# Patient Record
Sex: FEMALE | Race: WHITE | NOT HISPANIC OR LATINO | Employment: FULL TIME | URBAN - METROPOLITAN AREA
[De-identification: names, ages, dates, MRNs, and addresses within clinical notes are randomized per-mention and may not be internally consistent; named-entity substitution may affect disease eponyms.]

---

## 2017-01-09 ENCOUNTER — GENERIC CONVERSION - ENCOUNTER (OUTPATIENT)
Dept: OTHER | Facility: OTHER | Age: 42
End: 2017-01-09

## 2017-01-19 ENCOUNTER — GENERIC CONVERSION - ENCOUNTER (OUTPATIENT)
Dept: OTHER | Facility: OTHER | Age: 42
End: 2017-01-19

## 2017-04-11 ENCOUNTER — HOSPITAL ENCOUNTER (OUTPATIENT)
Dept: RADIOLOGY | Facility: HOSPITAL | Age: 42
Discharge: HOME | End: 2017-04-11
Payer: COMMERCIAL

## 2017-04-11 ENCOUNTER — TRANSCRIBE ORDERS (OUTPATIENT)
Dept: ADMINISTRATIVE | Facility: HOSPITAL | Age: 42
End: 2017-04-11

## 2017-04-11 DIAGNOSIS — J45.20 MILD INTERMITTENT ASTHMA WITHOUT COMPLICATION: ICD-10-CM

## 2017-04-11 DIAGNOSIS — J45.20 MILD INTERMITTENT ASTHMA WITHOUT COMPLICATION: Primary | ICD-10-CM

## 2017-04-11 PROCEDURE — 71020 HB CHEST X-RAY 2VW FRONTAL&LATL: CPT

## 2017-05-01 ENCOUNTER — TRANSCRIBE ORDERS (OUTPATIENT)
Dept: ADMINISTRATIVE | Facility: HOSPITAL | Age: 42
End: 2017-05-01

## 2017-05-01 DIAGNOSIS — Z12.31 VISIT FOR SCREENING MAMMOGRAM: Primary | ICD-10-CM

## 2017-05-23 ENCOUNTER — ALLSCRIPTS OFFICE VISIT (OUTPATIENT)
Dept: OTHER | Facility: OTHER | Age: 42
End: 2017-05-23

## 2017-05-23 DIAGNOSIS — Z12.31 ENCOUNTER FOR SCREENING MAMMOGRAM FOR MALIGNANT NEOPLASM OF BREAST: ICD-10-CM

## 2017-05-23 PROCEDURE — 87624 HPV HI-RISK TYP POOLED RSLT: CPT | Performed by: OBSTETRICS & GYNECOLOGY

## 2017-05-23 PROCEDURE — G0145 SCR C/V CYTO,THINLAYER,RESCR: HCPCS | Performed by: OBSTETRICS & GYNECOLOGY

## 2017-05-24 ENCOUNTER — LAB REQUISITION (OUTPATIENT)
Dept: LAB | Facility: HOSPITAL | Age: 42
End: 2017-05-24
Payer: COMMERCIAL

## 2017-05-24 DIAGNOSIS — Z01.419 ENCOUNTER FOR GYNECOLOGICAL EXAMINATION WITHOUT ABNORMAL FINDING: ICD-10-CM

## 2017-05-25 ENCOUNTER — HOSPITAL ENCOUNTER (OUTPATIENT)
Dept: MAMMOGRAPHY | Facility: HOSPITAL | Age: 42
Discharge: HOME | End: 2017-05-25
Attending: OBSTETRICS & GYNECOLOGY
Payer: COMMERCIAL

## 2017-05-25 DIAGNOSIS — Z12.31 VISIT FOR SCREENING MAMMOGRAM: ICD-10-CM

## 2017-05-25 PROCEDURE — G0202 SCR MAMMO BI INCL CAD: HCPCS

## 2017-05-31 LAB
HPV RRNA GENITAL QL NAA+PROBE: NORMAL
LAB AP GYN PRIMARY INTERPRETATION: NORMAL
LAB AP LMP: NORMAL
Lab: NORMAL

## 2017-06-01 ENCOUNTER — GENERIC CONVERSION - ENCOUNTER (OUTPATIENT)
Dept: OTHER | Facility: OTHER | Age: 42
End: 2017-06-01

## 2018-01-10 NOTE — MISCELLANEOUS
Message   Recorded as Task   Date: 01/09/2017 04:52 PM, Created By: Amparo Brown   Task Name: Med Renewal Request   Assigned To: Fabricio Luu   Regarding Patient: Cinthia Matthews, Status: Active   CommentBeryle Jarret - 09 Jan 2017 4:52 PM     TASK CREATED  PT CALLED FOR A REFILL ON HER BCP  TO GO [facility]   Berl Carry - 09 Jan 2017 4:55 PM     TASK EDITED  sent to [facility]        Active Problems    1  Abnormal mammogram (793 80) (R92 8)   2  Encounter for routine gynecological examination (V72 31) (Z01 419)   3  Encounter for screening mammogram for malignant neoplasm of breast (V76 12)   (Z12 31)   4  Menorrhagia (626 2) (N92 0)   5  Visit for screening mammogram (V76 12) (Z12 31)    Current Meds   1  Allegra-D Allergy & Congestion  MG Oral Tablet Extended Release 12 Hour   (Fexofenadine-Pseudoephed ER); Therapy: [id number] to Recorded   2  Azurette 0 15-0 02/0 01 MG (21/5) Oral Tablet; Take 1 tablet daily as directed; Therapy: 86Wgm7081 to (Evaluate:05Jun2017)  Requested for: 95VJA5262; Last   Rx:10Jun2016 Ordered   3  EpiPen Jr 2-Aayush 0 15 MG/0 3ML YARIEL; Therapy: (Recorded:13Mar2015) to Recorded   4  ProAir  (90 Base) MCG/ACT Inhalation Aerosol Solution; Therapy: 67DZH5162 to Recorded   5  Singulair 10 MG Oral Tablet (Montelukast Sodium); Therapy: 18NIH5697 to Recorded    Allergies    1  Erythromycin TABS    Plan  Menorrhagia    · Azurette 0 15-0 02/0 01 MG (21/5) Oral Tablet;  Take 1 tablet daily as directed    Signatures   Electronically signed by : German Thompson, ; Jan 9 2017  4:55PM EST                       (Author)

## 2018-01-12 NOTE — MISCELLANEOUS
Message   Recorded as Task   Date: 06/10/2016 01:04 PM, Created By: Emmett Syed   Task Name: Med Renewal Request   Assigned To: Fabricio Luu   Regarding Patient: Marissa Moreno, Status: Active   CommentCarina Bhandari - 10 Jose 2016 1:04 PM     TASK CREATED    [facility],radha maradiaga not until next may        Active Problems    1  Abnormal mammogram (793 80) (R92 8)   2  Encounter for routine gynecological examination (V72 31) (Z01 419)   3  Encounter for screening mammogram for malignant neoplasm of breast (V76 12)   (Z12 31)   4  Menorrhagia (626 2) (N92 0)   5  Visit for screening mammogram (V76 12) (Z12 31)    Current Meds   1  Allegra-D Allergy & Congestion  MG Oral Tablet Extended Release 12 Hour   (Fexofenadine-Pseudoephed ER); Therapy: 26VNQ9663 to Recorded   2  Azurette 0 15-0 02/0 01 MG (21/5) Oral Tablet; Take 1 tablet daily as directed; Therapy: 33Iov1677 to (Evaluate:11Aug2016)  Requested for: 56IKA4463; Last   Rx:15Iue9765 Ordered   3  EpiPen Jr 2-Aayush 0 15 MG/0 3ML YARIEL; Therapy: (Recorded:13Mar2015) to Recorded   4  ProAir  (90 Base) MCG/ACT Inhalation Aerosol Solution; Therapy: 07TDU2188 to Recorded   5  Singulair 10 MG Oral Tablet (Montelukast Sodium); Therapy: 93UBM5616 to Recorded    Allergies    1  Erythromycin TABS    Plan  Menorrhagia    · Azurette 0 15-0 02/0 01 MG (21/5) Oral Tablet;  Take 1 tablet daily as directed    Signatures   Electronically signed by : Peter Lujan, ; Jose 10 2016  1:04PM EST                       (Author)

## 2018-01-14 VITALS
WEIGHT: 148 LBS | HEIGHT: 63 IN | DIASTOLIC BLOOD PRESSURE: 84 MMHG | SYSTOLIC BLOOD PRESSURE: 142 MMHG | HEART RATE: 88 BPM | RESPIRATION RATE: 16 BRPM | BODY MASS INDEX: 26.22 KG/M2

## 2018-01-16 NOTE — RESULT NOTES
Verified Results  (1) THIN PREP PAP WITH IMAGING [id number] 10:07JJ Jabier Quiroz     Test Name Result Flag Reference   LAB AP CASE REPORT (Report)     Gynecologic Cytology Report            Case: [id number]                  Authorizing Provider: Derrick Madden DO     Collected:      05/23/2017           First Screen:     NOÉ Rivera Received:      05/26/2017 0818        Specimen:  LIQUID-BASED PAP, SCREENING, Cervix   HPV HIGH RISK RESULT (Report)     HPV, High Risk: HPV NEG, HPV16 NEG, HPV18 NEG      Other High Risk HPV Negative, HPV 16 Negative, HPV 18 Negative  HPV types: 16,18,31,33,35,39,45,51,52,56,58,59,66 and 68 DNA are undetectable or below the pre-set threshold  Roche???s FDA approved Adamaris 4800 is utilized with strict adherence to the ???s instruction  manual to test for the presence of High-Risk HPV DNA, as well as HPV 16 and HPV 18  This instrument  has been validated by our laboratory and/or by the   A negative result does not preclude the presence of HPV infection because results depend on adequate  specimen collection, absence of inhibitors and sufficient DNA to be detected  Additionally, HPV negative  results are not intended to prevent women from proceeding to colposcopy if clinically warranted  Positive HPV test results indicate the presence of any one or more of the high risk types, but since patients  are often co-infected with low-risk types it does not rule out the presence of low-risk types in patients  with mixed infections  LAB AP GYN PRIMARY INTERPRETATION      Negative for intraepithelial lesion or malignancy  Electronically signed by NOÉ Rivera on 5/31/2017 at 4:50 PM   LAB AP GYN SPECIMEN ADEQUACY      Satisfactory for evaluation  Endocervical/transformation zone component present     LAB AP GYN ADDITIONAL INFORMATION (Report)     [employer]'s FDA approved ,  and ThinPrep Imaging System are   utilized with strict adherence to the 's instruction manual to   prepare gynecologic and non-gynecologic cytology specimens for the   production of ThinPrep slides as well as for gynecologic ThinPrep imaging  These processes have been validated by our laboratory and/or by the     The Pap test is not a diagnostic procedure and should not be used as the   sole means to detect cervical cancer  It is only a screening procedure to   aid in the detection of cervical cancer and its precursors  Both   false-negative and false-positive results have been experienced  Your   patient's test result should be interpreted in this context together with   the history and clinical findings     LAB AP LMP 2/18/2017

## 2018-01-17 NOTE — MISCELLANEOUS
Message   Recorded as Task   Date: 01/09/2017 04:52 PM, Created By: Erich Snider   Task Name: Med Renewal Request   Assigned To: Fabricio Luu   Regarding Patient: Mary Silvestre, Status: Complete   CommentJan Nipple - 09 Jan 2017 4:52 PM     TASK CREATED  PT CALLED FOR A REFILL ON HER BCP  [facility]   Michelle Body - 09 Jan 2017 4:55 PM     TASK EDITED  sent to [place] - 09 Jan 2017 4:55 PM     TASK COMPLETED   Jessie Angelo - 19 Jan 2017 11:16 AM     TASK REACTIVATED  Pt usually gets 4 pks of bc at a time bc she only takes the active pills  [facility]  Pt called pharmacy and was told the rx needs to be rewritten to state that  Michelle Body - 19 Jan 2017 11:25 AM     TASK EDITED  pt has always been given 90 day   Fabricio Luu - 19 Jan 2017 11:26 AM     TASK COMPLETED        Active Problems    1  Abnormal mammogram (793 80) (R92 8)   2  Encounter for routine gynecological examination (V72 31) (Z01 419)   3  Encounter for screening mammogram for malignant neoplasm of breast (V76 12)   (Z12 31)   4  Menorrhagia (626 2) (N92 0)   5  Visit for screening mammogram (V76 12) (Z12 31)    Current Meds   1  Allegra-D Allergy & Congestion  MG Oral Tablet Extended Release 12 Hour   (Fexofenadine-Pseudoephed ER); Therapy: 84SXB1020 to Recorded   2  Azurette 0 15-0 02/0 01 MG (21/5) Oral Tablet; Take 1 tablet daily as directed; Therapy: 09Oaq3531 to (Evaluate:09Apr2017)  Requested for: 38HCZ6708; Last   Rx:10Jan2017 Ordered   3  EpiPen Jr 2-Aayush 0 15 MG/0 3ML YARIEL; Therapy: (Recorded:13Mar2015) to Recorded   4  ProAir  (90 Base) MCG/ACT Inhalation Aerosol Solution; Therapy: 40XMW2475 to Recorded   5  Singulair 10 MG Oral Tablet (Montelukast Sodium); Therapy: 22FHV0719 to Recorded    Allergies    1   Erythromycin TABS    Signatures   Electronically signed by : Eleni Wong, ; Jan 19 2017 11:29AM EST                       (Author)

## 2018-01-18 NOTE — MISCELLANEOUS
Message   Recorded as Task   Date: 05/23/2016 12:14 PM, Created By: Sai Gill   Task Name: Follow Up   Assigned To: Rosalba Nation   Regarding Patient: Mitchell Rodriguez, Status: Active   Comment:    Sai Gill - 23 May 2016 12:14 PM     TASK CREATED  Pt is aware of screening mammogram results and recommendation and has an appointment TODAY for a right diagnostic mammogram and u/s if needed  There is an order in [facility] for the u/s  Can you please put a right diagnostic mammogram in too? Thank you! Alyssa Ochoavero - 23 May 2016 12:35 PM     TASK EDITED  Shona Barrett entered into [facility] - r diag and r u/s        Active Problems    1  Abnormal mammogram (793 80) (R92 8)   2  Encounter for routine gynecological examination (V72 31) (Z01 419)   3  Encounter for screening mammogram for malignant neoplasm of breast (V76 12)   (Z12 31)   4  Menorrhagia (626 2) (N92 0)   5  Visit for screening mammogram (V76 12) (Z12 31)    Current Meds   1  Allegra-D Allergy & Congestion  MG Oral Tablet Extended Release 12 Hour   (Fexofenadine-Pseudoephed ER); Therapy: 52IBL3163 to Recorded   2  Azurette 0 15-0 02/0 01 MG (21/5) Oral Tablet; Take 1 tablet daily as directed; Therapy: 26Ybx0046 to (Evaluate:11Aug2016)  Requested for: 15WVT0330; Last   Rx:13May2016 Ordered   3  EpiPen Jr 2-Aayush 0 15 MG/0 3ML YARIEL; Therapy: (Recorded:13Mar2015) to Recorded   4  ProAir  (90 Base) MCG/ACT Inhalation Aerosol Solution; Therapy: 38OEZ0722 to Recorded   5  Singulair 10 MG Oral Tablet (Montelukast Sodium); Therapy: 39PKR6519 to Recorded    Allergies    1  Erythromycin TABS    Plan  Abnormal mammogram    · *US BREAST RIGHT LIMITED (DIAGNOSTIC); Status:Hold For -  [employer]; Requested for:23May2016;    · MAMMO DIAGNOSTIC RIGHT W CAD; Status:Hold For - Scheduling,Retrospective  Authorization; Requested for:23May2016;     Signatures   Electronically signed by :  Avi Pickett, ; May 23 2016 12:35PM EST                       (Author)

## 2018-01-25 DIAGNOSIS — Z30.41 ENCOUNTER FOR SURVEILLANCE OF CONTRACEPTIVE PILLS: Primary | ICD-10-CM

## 2018-01-25 NOTE — TELEPHONE ENCOUNTER
Viorele 0 15-0 02/0 01 MG (21/5) Oral Tablet          Pt called, would like refill of bc,sent to mailorder   needs 4 packs at a time

## 2018-01-26 RX ORDER — DESOGESTREL AND ETHINYL ESTRADIOL 21-5 (28)
1 KIT ORAL DAILY
Qty: 84 TABLET | Refills: 1 | Status: SHIPPED | OUTPATIENT
Start: 2018-01-26 | End: 2018-02-02

## 2018-02-02 ENCOUNTER — OFFICE VISIT (OUTPATIENT)
Dept: FAMILY MEDICINE CLINIC | Facility: CLINIC | Age: 43
End: 2018-02-02
Payer: COMMERCIAL

## 2018-02-02 VITALS
HEART RATE: 110 BPM | OXYGEN SATURATION: 99 % | SYSTOLIC BLOOD PRESSURE: 146 MMHG | WEIGHT: 147 LBS | HEIGHT: 65 IN | RESPIRATION RATE: 16 BRPM | DIASTOLIC BLOOD PRESSURE: 100 MMHG | BODY MASS INDEX: 24.49 KG/M2

## 2018-02-02 DIAGNOSIS — Z13.220 SCREENING CHOLESTEROL LEVEL: ICD-10-CM

## 2018-02-02 DIAGNOSIS — Z00.00 ANNUAL PHYSICAL EXAM: ICD-10-CM

## 2018-02-02 DIAGNOSIS — Z76.89 ENCOUNTER TO ESTABLISH CARE: Primary | ICD-10-CM

## 2018-02-02 DIAGNOSIS — Z13.29 THYROID DISORDER SCREENING: ICD-10-CM

## 2018-02-02 DIAGNOSIS — R03.0 ELEVATED BLOOD PRESSURE READING: ICD-10-CM

## 2018-02-02 DIAGNOSIS — F43.9 STRESS: ICD-10-CM

## 2018-02-02 PROBLEM — S43.002A SHOULDER SUBLUXATION, LEFT: Status: ACTIVE | Noted: 2018-02-02

## 2018-02-02 PROBLEM — G43.109 MIGRAINE WITH AURA: Status: ACTIVE | Noted: 2018-02-02

## 2018-02-02 PROBLEM — G43.009 MIGRAINE WITHOUT AURA: Status: ACTIVE | Noted: 2018-02-02

## 2018-02-02 PROBLEM — K58.0 IRRITABLE BOWEL SYNDROME WITH DIARRHEA: Status: ACTIVE | Noted: 2018-02-02

## 2018-02-02 PROCEDURE — 99386 PREV VISIT NEW AGE 40-64: CPT | Performed by: FAMILY MEDICINE

## 2018-02-02 RX ORDER — FEXOFENADINE HCL 180 MG/1
TABLET ORAL
COMMUNITY

## 2018-02-02 RX ORDER — DESOGESTREL AND ETHINYL ESTRADIOL 21-5 (28)
KIT ORAL
COMMUNITY
End: 2018-05-25 | Stop reason: SDUPTHER

## 2018-02-02 RX ORDER — ALBUTEROL SULFATE 90 UG/1
AEROSOL, METERED RESPIRATORY (INHALATION)
COMMUNITY
Start: 2014-02-28

## 2018-02-02 RX ORDER — MONTELUKAST SODIUM 10 MG/1
TABLET ORAL
COMMUNITY
End: 2019-01-02

## 2018-02-02 RX ORDER — MULTIVITAMIN
TABLET ORAL
COMMUNITY

## 2018-02-02 RX ORDER — MULTIVIT-MIN/IRON/FOLIC ACID/K 18-600-40
CAPSULE ORAL
COMMUNITY

## 2018-02-02 RX ORDER — YOHIMBE BARK 500 MG
CAPSULE ORAL
COMMUNITY
End: 2019-01-02

## 2018-02-02 RX ORDER — NICOTINE POLACRILEX 4 MG/1
GUM, CHEWING ORAL
COMMUNITY
End: 2019-03-01 | Stop reason: SDUPTHER

## 2018-02-02 RX ORDER — EPINEPHRINE 0.15 MG/.3ML
INJECTION INTRAMUSCULAR
COMMUNITY
End: 2018-02-09 | Stop reason: SDUPTHER

## 2018-02-02 NOTE — PROGRESS NOTES
Assessment/Plan:    No problem-specific Assessment & Plan notes found for this encounter  Diagnoses and all orders for this visit:    Encounter to establish care    Annual physical exam  -     CBC and differential  -     Type and screen  - advised to get records from her Allergist sent to office  - UTD with Flu and Tdap   - last PAP was in 2017 - nml -- has a GYN exam scheduled  - last Mammo was in 2017 - nml   - discussed diet and exercise     Elevated blood pressure reading  - BP elevated x3 in the office today   - no screening labs  - screening labs as listed: Comprehensive metabolic panel, CBC, Lipids and TSH  - RTO in 3-4days for BP check     Stress  - under increased stress - Mom may have had a 3rd stroke   - beast friend moved away to [place]   - 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  - offered referral to [facility] - pt will think about it, will revisit at next OV     Screening cholesterol level  -     Lipid panel    Thyroid disorder screening  -     TSH, 3rd generation with T4 reflex    Other orders  -     Lactobacillus (ACIDOPHILUS) 100 MG CAPS; Take by mouth  -     fexofenadine (ALLEGRA ALLERGY) 180 MG tablet; Take by mouth  -     Calcium 250 MG CAPS; Take by mouth  -     EPINEPHrine (EPIPEN JR) 0 15 mg/0 3 mL SOAJ; Inject as directed  -     Multiple Vitamin (MULTI-VITAMIN DAILY) TABS; Take by mouth  -     Omeprazole 20 MG TBEC; Take by mouth  -     albuterol (PROAIR HFA) 90 mcg/act inhaler; Inhale  -     montelukast (SINGULAIR) 10 mg tablet; Take by mouth  -     Ascorbic Acid (VITAMIN C) 500 MG CAPS; Take by mouth  -     desogestrel-ethinyl estradiol (VIORELE) 0 15-0 02/0 01 MG (21/5) per tablet; Take by mouth        Subjective:      Patient ID: Kameron Hernandez is a 38 y o  female      Kameron Hernandez is a 38 y o  female who presents to the office to establish care and for an annual exam  1) HM   - prior PCP: doesn't have one; does not recall last screening labs   - Specialists: Allergist (Dr Kb Saldaña), Ob[facility]Gyn: [facility]  - PMHx: Migraines (only 1 with Aura), Cluster HA x1 5yrs ago, Allergies, Asthma (seasonal), IBS, L-shoulder subluxation (2/2 overuse from exercise)    - allergies: Erythromycin, Animal dander, peanuts   - Meds: Allegra, Singulair, ProAir PRN, OCPs, Omeprazole   - PSHx: tonsillectomy, wrist surgery  - Fhx: M (HL, HTN, stroke x3), F (HTN, Hl), denies Fhx of breast/colon/cervical ca   - Immunizations: UTD with Flu vaccine, last Tdap was within 5yrs   - GYN Hx: last PAP was 5/2017 - no h/o abnml PAPs; last Mammo in 5/2017 (annual screening)   - diet/exercise: goes to [employer]; varied diet, does not eat fast food, only 12oz coffee/daily   - social: denies Tob/illicits; a glass of wine/month, single, in the process of adopting a child   - sexual Hx: not currently sexually active  - last vision: wears glasses/contacts; last Optho was 7/2017  - last dental: goes Q6months   - ROS: today in the office pt denies F/C/N/V/HA/visual changes/palpitations/SOB/wheezing/abd pain/D/urinary incontinence/suprapubic pain/CVA tenderness/vaginal bleeding or discharge/numbness or tingling in b/l UE+LE/LE edema or calf tenderness  2) elevated BP read   - noted to have elevated BP reads x3 156/94, and 146/100 x2 on repeat   - states that normally runs in the 120s/80   - is under increased stress currently - her mother may have just had her 3rd stroke, she was passed over for an adoption after years of trying  - no good support system here - her best friend moved to [facility])   - just got back from a semester abroad in [place]; new semester is starting and she is a  in [employer]  - denies excessive salt in diet and does not eat fast food         The following portions of the patient's history were reviewed and updated as appropriate: allergies, current medications, past family history, past medical history, past social history, past surgical history and problem list     Review of Systems  as per HPI    Objective:     Physical Exam   Constitutional: She is oriented to person, place, and time  She appears well-developed and well-nourished  She is active  No distress  HENT:   Head: Normocephalic and atraumatic  Right Ear: Tympanic membrane, external ear and ear canal normal    Left Ear: Tympanic membrane, external ear and ear canal normal    Nose: Nose normal  No rhinorrhea or septal deviation  Right sinus exhibits no maxillary sinus tenderness and no frontal sinus tenderness  Left sinus exhibits no maxillary sinus tenderness and no frontal sinus tenderness  Mouth/Throat: Uvula is midline, oropharynx is clear and moist and mucous membranes are normal  No oral lesions  Normal dentition  No oropharyngeal exudate or tonsillar abscesses  Eyes: Conjunctivae, EOM and lids are normal  Pupils are equal, round, and reactive to light  Right eye exhibits no discharge  Left eye exhibits no discharge  No scleral icterus  Neck: Trachea normal and normal range of motion  Neck supple  No tracheal deviation present  No thyromegaly present  Cardiovascular: Normal rate, regular rhythm, S1 normal, S2 normal and normal heart sounds  Exam reveals no gallop and no friction rub  No murmur heard  Pulmonary/Chest: Effort normal and breath sounds normal  No accessory muscle usage  No respiratory distress  She has no wheezes  She has no rhonchi  She has no rales  Abdominal: Soft  Normal appearance and bowel sounds are normal  She exhibits no distension  There is no hepatosplenomegaly  There is no tenderness  There is no rebound, no guarding and no CVA tenderness  Musculoskeletal: Normal range of motion  She exhibits no edema, tenderness or deformity  Lymphadenopathy:     She has no cervical adenopathy  Neurological: She is alert and oriented to person, place, and time  She has normal strength  No sensory deficit   Coordination and gait normal    Skin: Skin is warm  No rash noted  She is not diaphoretic  No erythema  No pallor  Psychiatric: Her speech is normal and behavior is normal  Judgment normal  Her mood appears anxious   Cognition and memory are normal

## 2018-02-07 LAB
ABO GROUP BLD: NORMAL
ALBUMIN SERPL-MCNC: 3.7 G/DL (ref 3.6–5.1)
ALBUMIN/GLOB SERPL: 1.1 (CALC) (ref 1–2.5)
ALP SERPL-CCNC: 77 U/L (ref 33–115)
ALT SERPL-CCNC: 11 U/L (ref 6–29)
AST SERPL-CCNC: 13 U/L (ref 10–30)
BASOPHILS # BLD AUTO: 45 CELLS/UL (ref 0–200)
BASOPHILS NFR BLD AUTO: 0.4 %
BILIRUB SERPL-MCNC: 0.3 MG/DL (ref 0.2–1.2)
BLD GP AB SCN SERPL QL: NORMAL
BUN SERPL-MCNC: 11 MG/DL (ref 7–25)
BUN/CREAT SERPL: NORMAL (CALC) (ref 6–22)
CALCIUM SERPL-MCNC: 8.9 MG/DL (ref 8.6–10.2)
CHLORIDE SERPL-SCNC: 102 MMOL/L (ref 98–110)
CHOLEST SERPL-MCNC: 175 MG/DL
CHOLEST/HDLC SERPL: 3.1 (CALC)
CO2 SERPL-SCNC: 25 MMOL/L (ref 20–31)
CREAT SERPL-MCNC: 0.87 MG/DL (ref 0.5–1.1)
EOSINOPHIL # BLD AUTO: 181 CELLS/UL (ref 15–500)
EOSINOPHIL NFR BLD AUTO: 1.6 %
ERYTHROCYTE [DISTWIDTH] IN BLOOD BY AUTOMATED COUNT: 12.1 % (ref 11–15)
GLOBULIN SER CALC-MCNC: 3.4 G/DL (CALC) (ref 1.9–3.7)
GLUCOSE SERPL-MCNC: 95 MG/DL (ref 65–99)
HCT VFR BLD AUTO: 40.1 % (ref 35–45)
HDLC SERPL-MCNC: 57 MG/DL
HGB BLD-MCNC: 13.2 G/DL (ref 11.7–15.5)
LDLC SERPL CALC-MCNC: 90 MG/DL (CALC)
LYMPHOCYTES # BLD AUTO: 2757 CELLS/UL (ref 850–3900)
LYMPHOCYTES NFR BLD AUTO: 24.4 %
MCH RBC QN AUTO: 28.7 PG (ref 27–33)
MCHC RBC AUTO-ENTMCNC: 32.9 G/DL (ref 32–36)
MCV RBC AUTO: 87.2 FL (ref 80–100)
MONOCYTES # BLD AUTO: 961 CELLS/UL (ref 200–950)
MONOCYTES NFR BLD AUTO: 8.5 %
NEUTROPHILS # BLD AUTO: 7356 CELLS/UL (ref 1500–7800)
NEUTROPHILS NFR BLD AUTO: 65.1 %
NONHDLC SERPL-MCNC: 118 MG/DL (CALC)
PLATELET # BLD AUTO: 326 THOUSAND/UL (ref 140–400)
PMV BLD REES-ECKER: 11.1 FL (ref 7.5–12.5)
POTASSIUM SERPL-SCNC: 3.8 MMOL/L (ref 3.5–5.3)
PROT SERPL-MCNC: 7.1 G/DL (ref 6.1–8.1)
RBC # BLD AUTO: 4.6 MILLION/UL (ref 3.8–5.1)
RH BLD: NORMAL
SL AMB EGFR AFRICAN AMERICAN: 94 ML/MIN/1.73M2
SL AMB EGFR NON AFRICAN AMERICAN: 81 ML/MIN/1.73M2
SODIUM SERPL-SCNC: 135 MMOL/L (ref 135–146)
TRIGL SERPL-MCNC: 185 MG/DL
TSH SERPL-ACNC: 2.19 MIU/L
WBC # BLD AUTO: 11.3 THOUSAND/UL (ref 3.8–10.8)

## 2018-02-09 ENCOUNTER — OFFICE VISIT (OUTPATIENT)
Dept: FAMILY MEDICINE CLINIC | Facility: CLINIC | Age: 43
End: 2018-02-09
Payer: COMMERCIAL

## 2018-02-09 VITALS
RESPIRATION RATE: 16 BRPM | WEIGHT: 145 LBS | BODY MASS INDEX: 24.16 KG/M2 | SYSTOLIC BLOOD PRESSURE: 150 MMHG | OXYGEN SATURATION: 99 % | HEIGHT: 65 IN | DIASTOLIC BLOOD PRESSURE: 80 MMHG | HEART RATE: 112 BPM

## 2018-02-09 DIAGNOSIS — I10 HYPERTENSION, UNSPECIFIED TYPE: Primary | ICD-10-CM

## 2018-02-09 PROBLEM — J30.2 SEASONAL ALLERGIES: Status: ACTIVE | Noted: 2017-05-23

## 2018-02-09 PROBLEM — J45.909 ASTHMA: Status: ACTIVE | Noted: 2017-05-23

## 2018-02-09 PROCEDURE — 99213 OFFICE O/P EST LOW 20 MIN: CPT | Performed by: FAMILY MEDICINE

## 2018-02-09 RX ORDER — HYDROCHLOROTHIAZIDE 12.5 MG/1
12.5 TABLET ORAL DAILY
Qty: 30 TABLET | Refills: 0 | Status: SHIPPED | OUTPATIENT
Start: 2018-02-09 | End: 2018-02-16 | Stop reason: SINTOL

## 2018-02-09 RX ORDER — EPINEPHRINE 0.3 MG/.3ML
INJECTION SUBCUTANEOUS
COMMUNITY
Start: 2018-02-08 | End: 2020-03-04 | Stop reason: ALTCHOICE

## 2018-02-09 NOTE — PROGRESS NOTES
Assessment/Plan:    No problem-specific Assessment & Plan notes found for this encounter  Diagnoses and all orders for this visit:    Hypertension, unspecified type  - BP elevated in the office x2  - did have elevated readings at last OV last week   - CBC, CMP, TSH within normal limits  - TG slightly elevated but rest of Lipid Panel within nml limits   - started on hydrochlorothiazide (HYDRODIURIL) 12 5 mg tablet; Take 1 tablet (12 5 mg total) by mouth daily  - cont with lifestyle modifications: diet, exercise  - encouraged to reduce stress in her life   - RTO in 1wk for HTN f/u - pt agreeable     Other orders  -     EPINEPHrine (EPIPEN) 0 3 mg/0 3 mL SOAJ;         Subjective:      Patient ID: Angie Madden is a 38 y o  female  35yo F presents to the office for f/u of elevated BP reading from last week   - BP in the office 170/90 and on repeat 150/80  - went to her Allergist appt yesterday and states that SBP was in the 130s  - of note, she has a PMHx of migraines and sometimes she gets facial/chest flushing and experiences chest tightness  - ROS denies F/C/N/V/CP/SOB/abd pain/LE edema   - she does mention that shes been told that shes had elevated BP in the past but there was no f/u   - (+) Fhx of HTN in both parents   - diet/exercise: goes to [employer]; varied diet (but does mention she eats a lot of cheeses/carbs), does not eat fast food/processed foods, 12oz coffee/daily   - social: denies Tob/illicits; a glass of wine/month, single, in the process of adopting a child   - last vision: wears glasses/contacts; last Optho was 7/2017  - (+) increased stress:  Mother may have had her 3rd stroke, best friend moved to [facility]), she got passed over for an adoption after years of trying         The following portions of the patient's history were reviewed and updated as appropriate: allergies, current medications, past family history, past medical history, past social history, past surgical history and problem list     Review of Systems  as per HPI    Objective:    Vitals:    02/09/18 1447   BP: 170/90   Pulse: (!) 112   Resp: 16   SpO2: 99%        Physical Exam   Constitutional: She is oriented to person, place, and time  She appears well-developed and well-nourished  No distress  HENT:   Head: Normocephalic and atraumatic  Nose: Nose normal    Eyes: Conjunctivae and EOM are normal  Right eye exhibits no discharge  Left eye exhibits no discharge  No scleral icterus  Neck: Normal range of motion  Cardiovascular: Normal rate, regular rhythm and normal heart sounds  Exam reveals no gallop and no friction rub  No murmur heard  Pulmonary/Chest: Effort normal and breath sounds normal  No respiratory distress  She has no wheezes  Abdominal: Soft  Bowel sounds are normal  She exhibits no distension  There is no tenderness  Musculoskeletal: Normal range of motion  She exhibits no edema or tenderness  Neurological: She is alert and oriented to person, place, and time  No cranial nerve deficit  Coordination normal    Skin: Skin is warm  No rash noted  She is not diaphoretic  No erythema  No pallor  Psychiatric: Her speech is normal and behavior is normal  Judgment and thought content normal  Her mood appears anxious   Cognition and memory are normal

## 2018-02-16 ENCOUNTER — OFFICE VISIT (OUTPATIENT)
Dept: FAMILY MEDICINE CLINIC | Facility: CLINIC | Age: 43
End: 2018-02-16
Payer: COMMERCIAL

## 2018-02-16 VITALS
OXYGEN SATURATION: 99 % | BODY MASS INDEX: 23.66 KG/M2 | WEIGHT: 142 LBS | HEIGHT: 65 IN | RESPIRATION RATE: 16 BRPM | HEART RATE: 103 BPM | DIASTOLIC BLOOD PRESSURE: 90 MMHG | SYSTOLIC BLOOD PRESSURE: 156 MMHG

## 2018-02-16 DIAGNOSIS — I10 HYPERTENSION, UNSPECIFIED TYPE: Primary | ICD-10-CM

## 2018-02-16 DIAGNOSIS — F43.9 STRESS: ICD-10-CM

## 2018-02-16 PROCEDURE — 99213 OFFICE O/P EST LOW 20 MIN: CPT | Performed by: FAMILY MEDICINE

## 2018-02-16 RX ORDER — LISINOPRIL 5 MG/1
5 TABLET ORAL DAILY
Qty: 30 TABLET | Refills: 0 | Status: SHIPPED | OUTPATIENT
Start: 2018-02-16 | End: 2018-03-02 | Stop reason: SDUPTHER

## 2018-02-16 NOTE — PROGRESS NOTES
Assessment/Plan:    No problem-specific Assessment & Plan notes found for this encounter  Diagnoses and all orders for this visit:    Hypertension, unspecified type  -  BP in the office 156/90 and on repeat 140/80  - HCTZ 12 5mg PO QD was discontinued 2/2 side effects - excessive thirst and dizziness  - started on lisinopril (ZESTRIL) 5 mg tablet; Take 1 tablet (5 mg total) by mouth daily  - advised to get a home BP cuff and to bring to the next OV with her  - RTO in 2wks for f/u     Stress  - discussed the importance of self-care and learning how to say "no" to others/delegate tasks  - t/c referral to [facility] at next OV         Subjective:      Patient ID: Jameel Elizabeth is a 38 y o  female  35yo F presents to the office for HTN f/u   1) HTN   - was started on HCTZ 12 5mg PO QD at last OV - has been feeling more [place] on [address] and been getting dizzy at the [address] [place] [address] Rd (goes 3x/week)   - BP in the office 156/90 and on repeat 140/80   - of note, she has a PMHx of migraines and sometimes she gets facial/chest flushing and experiences chest tightness (feels this is more allergy related)   - ROS denies F/C/N/V/CP/SOB/abd pain/LE edema   - of note, her maternal aunt passed away last night  - she ate out and had a bad stomach ache this morning  - (+) Fhx of HTN in both parents   - diet/exercise: goes to [employer]; varied diet (but does mention she eats a lot of cheeses/carbs), does not eat fast food/processed foods, 12oz coffee/daily   - social: denies Tob/illicits; a glass of wine/month, single, in the process of adopting a child   - last vision: wears glasses/contacts; last Optho was 2017  2) Stress   - (+) increased stress:  Mother may have had her 3rd stroke  - best friend moved to [facility])  - she got passed over for an adoption after years of trying   - 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    - her Aunt  last night (per pt, they were not close)   - 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"  - has been trying to do more self-care: read a book for fun this week, delegated a task to her sister         The following portions of the patient's history were reviewed and updated as appropriate: allergies, current medications, past family history, past medical history, past social history, past surgical history and problem list     Review of Systems  per HPI     Objective:      /90   Pulse 103   Resp 16   Ht 5' 5" (1 651 m)   Wt 64 4 kg (142 lb)   SpO2 99%   BMI 23 63 kg/m²          Physical Exam   Constitutional: She appears well-developed and well-nourished  No distress  HENT:   Head: Normocephalic and atraumatic  Nose: Nose normal    Eyes: Conjunctivae and EOM are normal  Right eye exhibits no discharge  Left eye exhibits no discharge  No scleral icterus  Neck: Normal range of motion  Cardiovascular: Normal rate, regular rhythm and normal heart sounds  Exam reveals no gallop and no friction rub  No murmur heard  Pulmonary/Chest: Effort normal and breath sounds normal  No respiratory distress  She has no wheezes  She has no rales  She exhibits no tenderness  Abdominal: Soft  There is no tenderness  Musculoskeletal: She exhibits no edema or tenderness  Neurological: She is alert  Skin: Skin is warm  She is not diaphoretic  Psychiatric: Her speech is normal and behavior is normal  Judgment and thought content normal  Her mood appears anxious   Cognition and memory are normal

## 2018-03-02 ENCOUNTER — OFFICE VISIT (OUTPATIENT)
Dept: FAMILY MEDICINE CLINIC | Facility: CLINIC | Age: 43
End: 2018-03-02
Payer: COMMERCIAL

## 2018-03-02 VITALS
RESPIRATION RATE: 16 BRPM | DIASTOLIC BLOOD PRESSURE: 80 MMHG | OXYGEN SATURATION: 98 % | SYSTOLIC BLOOD PRESSURE: 124 MMHG | HEIGHT: 65 IN | WEIGHT: 143 LBS | HEART RATE: 88 BPM | BODY MASS INDEX: 23.82 KG/M2

## 2018-03-02 DIAGNOSIS — F41.1 GENERALIZED ANXIETY DISORDER: ICD-10-CM

## 2018-03-02 DIAGNOSIS — I10 HYPERTENSION, UNSPECIFIED TYPE: Primary | ICD-10-CM

## 2018-03-02 DIAGNOSIS — F43.9 STRESS: ICD-10-CM

## 2018-03-02 PROCEDURE — 99213 OFFICE O/P EST LOW 20 MIN: CPT | Performed by: FAMILY MEDICINE

## 2018-03-02 RX ORDER — LISINOPRIL 5 MG/1
5 TABLET ORAL DAILY
Qty: 30 TABLET | Refills: 1 | Status: SHIPPED | OUTPATIENT
Start: 2018-03-02 | End: 2018-05-04 | Stop reason: SDUPTHER

## 2018-03-02 NOTE — PROGRESS NOTES
Assessment/Plan:    No problem-specific Assessment & Plan notes found for this encounter  Diagnoses and all orders for this visit:    Hypertension, unspecified type  -  BP well controlled on ACEI - 132/90 and 124/80 on repeat  - cont lisinopril (ZESTRIL) 5 mg tablet; Take 1 tablet (5 mg total) by mouth daily  - noted decrease in frequency of migraine headaches   - has home BP cuff and can check intermittently   - RTO in 2months for f/u - pt aware and agreeable     Stress  Generalized anxiety disorder  - discussed the importance of self-care and learning how to say "no" to others/delegate tasks  - offered referral to Jossie Rubio - pt will think about it         Subjective:    Patient ID: Dharmesh Velazquez is a 38 y o  female    35yo F presents to the office for HTN f/u   1) HTN  - currently on Lisinopril 5mg QD and tolerating it well - did not develop a dry cough or angioedema   - did take antihypertensive this AM   - BP in the office 132/90 and on repeat 124/80 (138/88 on her automatic BP cuff)  - denies F/C/N/V/dizziness/HA/cough/CP/SOB/abd pain/D/C/LE edema  - has noted that she hasn't had to take Excedrin for her migraines as frequently as she was before   - (+) Fhx of HTN in both parents   - diet/exercise: goes [address] 3x/wk; varied diet (but does mention she eats a lot of cheeses/carbs), does not eat fast food/processed foods, 12oz coffee/daily   - social: denies Tob/illicits; a glass of wine/month, single, in the process of adopting a child (got a call for adopting a baby and a 4yo yesterday but the adoption fell through this Am)   - last vision: wears glasses/contacts; last Optho was 7/2017  2) Stress   - best friend moved to [facility])  - she got passed over for an adoption after years of trying   - 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"  - has been trying to do more self-care: read a book for fun this week, delegated a task to her sister                   The following portions of the patient's history were reviewed and updated as appropriate: allergies, current medications, past family history, past medical history, past social history, past surgical history and problem list     Review of Systems  per HPI    Objective:    /90   Pulse 88   Resp 16   Ht 5' 5" (1 651 m)   Wt 64 9 kg (143 lb)   SpO2 98%   BMI 23 80 kg/m²      Physical Exam   Constitutional: She is oriented to person, place, and time  She appears well-developed and well-nourished  No distress  HENT:   Head: Normocephalic and atraumatic  Nose: Nose normal    Eyes: Conjunctivae and EOM are normal  Right eye exhibits no discharge  Left eye exhibits no discharge  No scleral icterus  Neck: Normal range of motion  Neck supple  Cardiovascular: Normal rate, regular rhythm and normal heart sounds  Exam reveals no gallop and no friction rub  No murmur heard  Pulmonary/Chest: Effort normal and breath sounds normal  No stridor  No respiratory distress  She has no wheezes  She has no rales  Abdominal: Soft  Bowel sounds are normal  She exhibits no distension  There is no tenderness  Musculoskeletal: Normal range of motion  She exhibits no edema, tenderness or deformity  Neurological: She is alert and oriented to person, place, and time  Skin: Skin is warm and dry  No rash noted  She is not diaphoretic  No erythema  Psychiatric: Her speech is normal and behavior is normal  Judgment and thought content normal  Her mood appears anxious   Cognition and memory are normal

## 2018-04-23 ENCOUNTER — TRANSCRIBE ORDERS (OUTPATIENT)
Dept: ADMINISTRATIVE | Facility: HOSPITAL | Age: 43
End: 2018-04-23

## 2018-04-23 DIAGNOSIS — Z12.39 SCREENING BREAST EXAMINATION: Primary | ICD-10-CM

## 2018-05-04 ENCOUNTER — OFFICE VISIT (OUTPATIENT)
Dept: FAMILY MEDICINE CLINIC | Facility: CLINIC | Age: 43
End: 2018-05-04
Payer: COMMERCIAL

## 2018-05-04 VITALS
RESPIRATION RATE: 16 BRPM | HEART RATE: 111 BPM | SYSTOLIC BLOOD PRESSURE: 136 MMHG | HEIGHT: 65 IN | BODY MASS INDEX: 23.99 KG/M2 | DIASTOLIC BLOOD PRESSURE: 80 MMHG | WEIGHT: 144 LBS | OXYGEN SATURATION: 99 %

## 2018-05-04 DIAGNOSIS — I10 HYPERTENSION, UNSPECIFIED TYPE: Primary | ICD-10-CM

## 2018-05-04 DIAGNOSIS — F43.9 STRESS: ICD-10-CM

## 2018-05-04 DIAGNOSIS — F41.1 GAD (GENERALIZED ANXIETY DISORDER): ICD-10-CM

## 2018-05-04 PROCEDURE — 3008F BODY MASS INDEX DOCD: CPT | Performed by: FAMILY MEDICINE

## 2018-05-04 PROCEDURE — 3075F SYST BP GE 130 - 139MM HG: CPT | Performed by: FAMILY MEDICINE

## 2018-05-04 PROCEDURE — 3079F DIAST BP 80-89 MM HG: CPT | Performed by: FAMILY MEDICINE

## 2018-05-04 PROCEDURE — 99213 OFFICE O/P EST LOW 20 MIN: CPT | Performed by: FAMILY MEDICINE

## 2018-05-04 RX ORDER — LISINOPRIL 5 MG/1
5 TABLET ORAL DAILY
Qty: 30 TABLET | Refills: 5 | Status: SHIPPED | OUTPATIENT
Start: 2018-05-04 | End: 2018-05-09 | Stop reason: SDUPTHER

## 2018-05-04 NOTE — PROGRESS NOTES
Assessment/Plan:  No problem-specific Assessment & Plan notes found for this encounter  Diagnoses and all orders for this visit:  Hypertension, unspecified type  - BP well controlled on ACEI - 140/80 and 136/80 on repeat  - cont lisinopril (ZESTRIL) 5 mg tablet; Take 1 tablet (5 mg total) by mouth daily  - noted decrease in frequency of migraine headaches and facial flushing   - has home BP cuff and can check intermittently   - RTO in 6months for f/u - pt aware and agreeable     Stress  EDELMIRA (generalized anxiety disorder)  - doing much better   - discussed the importance of self-care and learning how to say "no" to others/delegate tasks        Subjective:    Patient ID: Herman Peña is a 38 y o  female    37yo F presents to the office for HTN and stress f/u   1) HTN   - BP in the office 140/80 and 136/80 on repeat   - on Lisinopril 5mg QD and tolerating it well - no dry cough or swelling   - today in the office pt denies F/C/HA/N/V/CP/palpitations/SOB/abd pain/LE edema  - was a little nervous about coming in to get her pressures checked  - has not been checking them at home   - has noticed a decrease in frequency of HA and facial flushing since started ACEI - maybe 1 migraine in the past 2 months   - has been trying to eat better  - has been going to the [facility]  - of note, had experienced excessive thirst and dizziness while on HCTZ in the past   - (+) Fhx of HTN in both parents    - social: denies Tob/illicits; a glass of wine/month, single, in the process of adopting   - last vision: wears glasses/contacts; last Optho was 2017  2) Stress  - today is the last day of classes for semester, finals start next week   - MA's  went well and her Mom has been doing well   - has been in touch with her friend who moved to [facility])  - has been passed over for adoption a few times but has gotten better at coping with it - is currently in the process of getting another placement - a group of 3 siblings ages 3, 11 and 12  - was offered 2 book writing contracts - had to turn one down  - better at self care     The following portions of the patient's history were reviewed and updated as appropriate: allergies, current medications, past family history, past medical history, past social history, past surgical history and problem list     Review of Systems  as per HPI     Objective:  /80   Pulse (!) 111   Resp 16   Ht 5' 5" (1 651 m)   Wt 65 3 kg (144 lb)   SpO2 99%   BMI 23 96 kg/m²      Physical Exam   Constitutional: She is oriented to person, place, and time  She appears well-developed and well-nourished  No distress  HENT:   Head: Normocephalic and atraumatic  Right Ear: External ear normal    Left Ear: External ear normal    Nose: Nose normal    Eyes: Conjunctivae and EOM are normal  Right eye exhibits no discharge  Left eye exhibits no discharge  No scleral icterus  Neck: Normal range of motion  Neck supple  Cardiovascular: Normal rate, regular rhythm and normal heart sounds  Exam reveals no gallop and no friction rub  No murmur heard  Pulmonary/Chest: Effort normal and breath sounds normal  No stridor  No respiratory distress  She has no wheezes  She has no rales  Abdominal: Soft  Bowel sounds are normal    Musculoskeletal: Normal range of motion  She exhibits no edema, tenderness or deformity  Neurological: She is alert and oriented to person, place, and time  Skin: Skin is warm and dry  No rash noted  She is not diaphoretic  No erythema  No pallor  Psychiatric: She has a normal mood and affect  Her behavior is normal  Judgment and thought content normal    Vitals reviewed

## 2018-05-09 DIAGNOSIS — I10 HYPERTENSION, UNSPECIFIED TYPE: ICD-10-CM

## 2018-05-09 RX ORDER — LISINOPRIL 5 MG/1
5 TABLET ORAL DAILY
Qty: 90 TABLET | Refills: 1 | Status: SHIPPED | OUTPATIENT
Start: 2018-05-09 | End: 2018-11-07 | Stop reason: SDUPTHER

## 2018-05-25 ENCOUNTER — ANNUAL EXAM (OUTPATIENT)
Dept: OBGYN CLINIC | Facility: CLINIC | Age: 43
End: 2018-05-25
Payer: COMMERCIAL

## 2018-05-25 VITALS
WEIGHT: 143.2 LBS | BODY MASS INDEX: 25.37 KG/M2 | SYSTOLIC BLOOD PRESSURE: 142 MMHG | DIASTOLIC BLOOD PRESSURE: 82 MMHG | HEIGHT: 63 IN

## 2018-05-25 DIAGNOSIS — Z12.39 SCREENING FOR MALIGNANT NEOPLASM OF BREAST: ICD-10-CM

## 2018-05-25 DIAGNOSIS — Z30.41 ENCOUNTER FOR SURVEILLANCE OF CONTRACEPTIVE PILLS: ICD-10-CM

## 2018-05-25 DIAGNOSIS — Z01.419 WELL WOMAN EXAM WITH ROUTINE GYNECOLOGICAL EXAM: Primary | ICD-10-CM

## 2018-05-25 PROCEDURE — S0612 ANNUAL GYNECOLOGICAL EXAMINA: HCPCS | Performed by: OBSTETRICS & GYNECOLOGY

## 2018-05-25 RX ORDER — DESOGESTREL AND ETHINYL ESTRADIOL 21-5 (28)
1 KIT ORAL DAILY
Qty: 112 TABLET | Refills: 3 | Status: SHIPPED | OUTPATIENT
Start: 2018-05-25 | End: 2018-11-21 | Stop reason: SDUPTHER

## 2018-05-25 RX ORDER — DESOGESTREL AND ETHINYL ESTRADIOL 21-5 (28)
1 KIT ORAL DAILY
Qty: 112 TABLET | Refills: 2 | Status: SHIPPED | OUTPATIENT
Start: 2018-05-25 | End: 2018-05-25 | Stop reason: SDUPTHER

## 2018-05-25 RX ORDER — DESOGESTREL AND ETHINYL ESTRADIOL 21-5 (28)
1 KIT ORAL DAILY
Qty: 112 TABLET | Refills: 3 | Status: SHIPPED | OUTPATIENT
Start: 2018-05-25 | End: 2018-05-25 | Stop reason: SDUPTHER

## 2018-05-25 NOTE — PROGRESS NOTES
ASSESSMENT & PLAN: Johann Crowley is a 38 y o  Vonc[place] Milch with normal gynecologic exam     1   Routine well woman exam done today  2  Pap and HPV:  The patient's last pap was in 2017 was normal    Pap and cotesting was done today  Current ASCCP Guidelines reviewed  Repeat in     3   Mammogram ordered  4  The following were reviewed in today's visit: breast self exam, mammography screening ordered, use and side effects of OCPs, adequate intake of calcium and vitamin D, exercise and healthy diet  5  Contraception: refil rx for viorele sent to the pharmacy  Takes it consecutively and will call when rx is up  Is in the process of adopting 3 children ages 3, 11, 12(all sisters)    CC: Annual Gynecologic Examination    HPI: Johann Crowley is a 38 y o  Voncille Milch who presents for annual gynecologic examination  She has the following concerns:  none    Health Maintenance:    She exercises 4 days per week with [employer] and [employer]  She wears her seatbelt routinely  She does perform regular monthly self breast exams  She feels safe at home  Patients does not follow a particular diet  Her last pap was normal in    Last mammogram: was normal in 2017    Past Medical History:   Diagnosis Date    Allergic     Asthma     seasonal     Hypertension     Irritable bowel syndrome     Migraine without aura     Shoulder subluxation, left        Past Surgical History:   Procedure Laterality Date    TONSILLECTOMY      WRIST SURGERY         Past OB/Gyn History:  OB History      Para Term  AB Living    0 0 0 0 0 0    SAB TAB Ectopic Multiple Live Births    0 0 0 0 0           No LMP recorded (lmp unknown)  Patient is not currently having periods (Reason: Birth Control)  History of sexually transmitted infection No  History of abnormal pap smears  No     Patient is not currently sexually active  heterosexual Birth control: combination OCPs      Family History   Problem Relation Age of Onset  Stroke Mother      CEREBROVASCULAR ACCIDENT (CVA), LISTED 2X    Hypertension Mother     Hyperlipidemia Mother     Hypertension Father     Hyperlipidemia Father     Cervical cancer Neg Hx     Colon cancer Neg Hx     Ovarian cancer Neg Hx     Uterine cancer Neg Hx        Social History:  Social History     Social History    Marital status: Single     Spouse name: N/A    Number of children: N/A    Years of education: N/A     Occupational History    Not on file  Social History Main Topics    Smoking status: Never Smoker    Smokeless tobacco: Never Used    Alcohol use 0 6 oz/week     1 Glasses of wine per week    Drug use: No    Sexual activity: Not Currently     Other Topics Concern    Not on file     Social History Narrative    CAFFEINE USE - 12oz/daily     EXERCISE HABITS    In the process of adopting a child     Is a  at [employer]      Presently lives with self  Patient is single    Patient is currently employed [employer]  Allergies   Allergen Reactions    Cat Hair Extract     Erythromycin     Hctz [Hydrochlorothiazide] Dizziness    Seasonal Ic  [Cholestatin]     Latex Rash       Current Outpatient Prescriptions:     albuterol (PROAIR HFA) 90 mcg/act inhaler, Inhale, Disp: , Rfl:     Ascorbic Acid (VITAMIN C) 500 MG CAPS, Take by mouth, Disp: , Rfl:     Calcium 250 MG CAPS, Take by mouth, Disp: , Rfl:     desogestrel-ethinyl estradiol (VIORELE) 0 15-0 02/0 01 MG (21/5) per tablet, Take by mouth, Disp: , Rfl:     EPINEPHrine (EPIPEN) 0 3 mg/0 3 mL SOAJ, , Disp: , Rfl:     fexofenadine (ALLEGRA ALLERGY) 180 MG tablet, Take by mouth, Disp: , Rfl:     Lactobacillus (ACIDOPHILUS) 100 MG CAPS, Take by mouth, Disp: , Rfl:     lisinopril (ZESTRIL) 5 mg tablet, Take 1 tablet (5 mg total) by mouth daily, Disp: 90 tablet, Rfl: 1    montelukast (SINGULAIR) 10 mg tablet, Take by mouth, Disp: , Rfl:     Multiple Vitamin (MULTI-VITAMIN DAILY) TABS, Take by mouth, Disp: , Rfl:     Omeprazole 20 MG TBEC, Take by mouth, Disp: , Rfl:     Review of Systems:  A complete review of systems was performed and was negative, except as listed  Physical Exam:  /82   Ht 5' 3" (1 6 m)   Wt 65 kg (143 lb 3 2 oz)   LMP  (LMP Unknown)   Breastfeeding? No   BMI 25 37 kg/m²    GEN: The patient was alert and oriented x3, pleasant well-appearing female in no acute distress  HEENT:  Unremarkable, no anterior or posterior lymphadenopathy, no thyromegaly  CV:  RRR, no murmurs  RESP:  Clear to auscultation bilaterally  BREAST:  Symmetric breasts with no palpable breast masses or obvious breast lesions  She has no retractions or nipple discharge  She has no axillary abnormalities or palpable masses  Self breast exam is taught  ABD:  Soft, nontender, nondistended, normoactive bowel sounds,    EXT:  WWP, nontender, no edema  BACK:  No CVA tenderness, no tenderness to palpation along spine  PELVIC:  Normal appearing external female genitalia, normal vaginal epithelium, No discharge  Cervix present   Bimanual: absent CMT,  normal uterus, non-tender  No palpable adnexal masses

## 2018-06-21 ENCOUNTER — HOSPITAL ENCOUNTER (OUTPATIENT)
Dept: MAMMOGRAPHY | Facility: HOSPITAL | Age: 43
Discharge: HOME | End: 2018-06-21
Attending: OBSTETRICS & GYNECOLOGY
Payer: COMMERCIAL

## 2018-06-21 DIAGNOSIS — Z12.39 SCREENING BREAST EXAMINATION: ICD-10-CM

## 2018-06-21 PROCEDURE — 77067 SCR MAMMO BI INCL CAD: CPT

## 2018-06-21 PROCEDURE — 77063 BREAST TOMOSYNTHESIS BI: CPT

## 2018-11-07 ENCOUNTER — OFFICE VISIT (OUTPATIENT)
Dept: FAMILY MEDICINE CLINIC | Facility: CLINIC | Age: 43
End: 2018-11-07
Payer: COMMERCIAL

## 2018-11-07 VITALS
BODY MASS INDEX: 21.49 KG/M2 | HEIGHT: 65 IN | DIASTOLIC BLOOD PRESSURE: 70 MMHG | SYSTOLIC BLOOD PRESSURE: 122 MMHG | RESPIRATION RATE: 16 BRPM | OXYGEN SATURATION: 98 % | WEIGHT: 129 LBS | HEART RATE: 110 BPM

## 2018-11-07 DIAGNOSIS — I10 HYPERTENSION, UNSPECIFIED TYPE: Primary | ICD-10-CM

## 2018-11-07 DIAGNOSIS — Z13.220 SCREENING CHOLESTEROL LEVEL: ICD-10-CM

## 2018-11-07 DIAGNOSIS — F41.1 GAD (GENERALIZED ANXIETY DISORDER): ICD-10-CM

## 2018-11-07 DIAGNOSIS — Z13.29 SCREENING FOR THYROID DISORDER: ICD-10-CM

## 2018-11-07 DIAGNOSIS — Z13.21 ENCOUNTER FOR VITAMIN DEFICIENCY SCREENING: ICD-10-CM

## 2018-11-07 DIAGNOSIS — Z13.0 SCREENING FOR DEFICIENCY ANEMIA: ICD-10-CM

## 2018-11-07 PROCEDURE — 99214 OFFICE O/P EST MOD 30 MIN: CPT | Performed by: FAMILY MEDICINE

## 2018-11-07 PROCEDURE — 3074F SYST BP LT 130 MM HG: CPT | Performed by: FAMILY MEDICINE

## 2018-11-07 PROCEDURE — 3078F DIAST BP <80 MM HG: CPT | Performed by: FAMILY MEDICINE

## 2018-11-07 RX ORDER — LISINOPRIL 5 MG/1
5 TABLET ORAL DAILY
Qty: 90 TABLET | Refills: 1 | Status: SHIPPED | OUTPATIENT
Start: 2018-11-07 | End: 2018-11-07 | Stop reason: SDUPTHER

## 2018-11-07 RX ORDER — LISINOPRIL 5 MG/1
5 TABLET ORAL DAILY
Qty: 90 TABLET | Refills: 1 | Status: SHIPPED | OUTPATIENT
Start: 2018-11-07 | End: 2019-03-01 | Stop reason: SDUPTHER

## 2018-11-07 NOTE — PROGRESS NOTES
Assessment/Plan:  No problem-specific Assessment & Plan notes found for this encounter  Diagnoses and all orders for this visit:  Hypertension, unspecified type  - BP well controlled: 132/78 and 122/70 on repeat    - well controlled on ACEI and tolerating it well - no swelling or cough  - last Optho was 7/2017 - aware that she needs to make a f/u appt  - script given for screening labs  - RTO in 2/2019 for annual exam = pt aware   -     lisinopril (ZESTRIL) 5 mg tablet; Take 1 tablet (5 mg total) by mouth daily  -     Comprehensive metabolic panel; Future  -     Microalbumin / creatinine urine ratio    EDELMIRA (generalized anxiety disorder)  - recently adopted 3 children ages: 3, 10, 15 in July  - mother had a TIA and her niece had a baby - so the family who would normally help her are helping other family members   - discussed the importance of self-care and learning how to say "no" to others/delegate tasks  - will be looking for a  and a      Screening cholesterol level  -     Lipid panel; Future    Screening for deficiency anemia  -     CBC and differential    Screening for thyroid disorder  -     TSH, 3rd generation with Free T4 reflex; Future    Encounter for vitamin deficiency screening  -     Vitamin D 25 hydroxy; Future    Other orders  - UTD with Tdap and Flu vaccine  -    Cancel: TDAP VACCINE GREATER THAN OR EQUAL TO 8YO IM  -     Cancel: influenza vaccine, [id number], quadrivalent, recombinant, PF, 0 5 mL, for patients 18-49 yr with comorbidities (FLUBLOK)          Subjective:    Patient ID: Brody Encarnacion is a 37 y o  female    37yo F presents to the office for f/u of HTN  - BP in the office 132/78 and 122/70 on reoeat  - slightly tachy as was rushing to get to the office   - adopted 3 girls: ages 3, 10, and 15 - got the kids in July, "official adoption pending   - is currently on maternity leave till the end of the semester   - did have walking pneumonia and "lost 10lbs overnight" (current weight in the office 129lbs and last OV 144lbs in May)   - Mom had another "mini-stroke" and her niece had a baby so has been taking care of the kids all by herself  - has been eating a lot less and not snacking in front of the TV as she did in the past   - on Lisinopril 5mg QD and tolerating it well - no dry cough or swelling   - today in the office pt denies F/C/HA/N/V/CP/palpitations/SOB/abd pain/LE edema  - (+) earache (R>L), her 2yo was pulling her ears last night   - has not been going to the GYM  - of note, had experienced excessive thirst and dizziness while on HCTZ in the past   - (+) Fhx of HTN in both parents    - social: denies Tob/illicits; a glass of wine/month, single, now with 3girls  - last vision: wears glasses/contacts; last Optho was 7/2017 = aware that she needs a f/u Optho exam   - last Tdap was within the past 5-6yrs  - did get her Flu vaccine through work last week       The following portions of the patient's history were reviewed and updated as appropriate: allergies, current medications, past family history, past medical history, past social history, past surgical history and problem list     Review of Systems  as per HPI     Objective:  /70 (BP Location: Left arm, Patient Position: Sitting)   Pulse (!) 110   Resp 16   Ht 5' 5" (1 651 m)   Wt 58 5 kg (129 lb)   SpO2 98%   BMI 21 47 kg/m²    Physical Exam   Constitutional: She is oriented to person, place, and time  She appears well-developed and well-nourished  No distress  HENT:   Head: Normocephalic and atraumatic  Right Ear: External ear normal    Left Ear: External ear normal    Nose: Nose normal    Eyes: Conjunctivae and EOM are normal  Right eye exhibits no discharge  Left eye exhibits no discharge  No scleral icterus  Neck: Normal range of motion  Cardiovascular: Normal rate, regular rhythm and normal heart sounds  Exam reveals no gallop and no friction rub  No murmur heard    Pulmonary/Chest: Effort normal and breath sounds normal  No respiratory distress  She has no wheezes  She has no rales  Abdominal: Soft  Bowel sounds are normal    Musculoskeletal: Normal range of motion  She exhibits no edema, tenderness or deformity  Neurological: She is alert and oriented to person, place, and time  Skin: Skin is warm  She is not diaphoretic  Psychiatric: She has a normal mood and affect  Her behavior is normal  Judgment and thought content normal    Vitals reviewed

## 2018-11-08 ENCOUNTER — TELEPHONE (OUTPATIENT)
Dept: FAMILY MEDICINE CLINIC | Facility: CLINIC | Age: 43
End: 2018-11-08

## 2018-11-08 NOTE — TELEPHONE ENCOUNTER
Murray Carrel called in to let Dr Mardelle Nageotte know that she got her tetanus shot on 10/26/2012

## 2018-11-21 ENCOUNTER — TELEPHONE (OUTPATIENT)
Dept: OBGYN CLINIC | Facility: CLINIC | Age: 43
End: 2018-11-21

## 2018-11-21 DIAGNOSIS — Z30.41 ENCOUNTER FOR SURVEILLANCE OF CONTRACEPTIVE PILLS: Primary | ICD-10-CM

## 2018-11-21 RX ORDER — DESOGESTREL AND ETHINYL ESTRADIOL 21-5 (28)
1 KIT ORAL DAILY
Qty: 112 TABLET | Refills: 3 | Status: SHIPPED | OUTPATIENT
Start: 2018-11-21 | End: 2019-05-31 | Stop reason: SDUPTHER

## 2018-11-21 NOTE — TELEPHONE ENCOUNTER
Patient call for refill on OCP  Currently taking Viorele   Patient skips placebo so she is in need of refill      Last annual  5/25/18      Pharmacy updated  Routing to provider

## 2018-11-29 ENCOUNTER — OFFICE VISIT (OUTPATIENT)
Dept: FAMILY MEDICINE CLINIC | Facility: CLINIC | Age: 43
End: 2018-11-29
Payer: COMMERCIAL

## 2018-11-29 VITALS
OXYGEN SATURATION: 98 % | HEIGHT: 65 IN | TEMPERATURE: 98.9 F | HEART RATE: 94 BPM | WEIGHT: 127 LBS | SYSTOLIC BLOOD PRESSURE: 130 MMHG | BODY MASS INDEX: 21.16 KG/M2 | DIASTOLIC BLOOD PRESSURE: 72 MMHG

## 2018-11-29 DIAGNOSIS — J06.9 VIRAL URI: Primary | ICD-10-CM

## 2018-11-29 LAB — S PYO AG THROAT QL: NEGATIVE

## 2018-11-29 PROCEDURE — 87880 STREP A ASSAY W/OPTIC: CPT | Performed by: FAMILY MEDICINE

## 2018-11-29 PROCEDURE — 3008F BODY MASS INDEX DOCD: CPT | Performed by: FAMILY MEDICINE

## 2018-11-29 PROCEDURE — 99213 OFFICE O/P EST LOW 20 MIN: CPT | Performed by: FAMILY MEDICINE

## 2018-11-29 PROCEDURE — 1036F TOBACCO NON-USER: CPT | Performed by: FAMILY MEDICINE

## 2018-11-29 NOTE — PROGRESS NOTES
Subjective:      Patient ID: Avtar Mcginnis is a 37 y o  female  Sore Throat    This is a new problem  The current episode started yesterday  The problem has been unchanged  The maximum temperature recorded prior to her arrival was 103 - 104 F  The fever has been present for less than 1 day  The pain is at a severity of 5/10  The pain is moderate  Associated symptoms include headaches  Pertinent negatives include no congestion, coughing, hoarse voice, neck pain, shortness of breath, trouble swallowing or vomiting  She has had no exposure to strep or mono  Exposure to: Patient is 23month-old child was recently diagnosed with hand foot and mouth disease    She has tried acetaminophen for the symptoms  The treatment provided significant relief  Patient states that she has a history of streptococcal infection in the past and would like to be tested, since her child has recently improved from her viral illness  Patient states that she took Tylenol last night and this morning feels slightly better  She denies any fever or headaches  Past Medical History:   Diagnosis Date    Allergic     Asthma     seasonal     Hypertension     Irritable bowel syndrome     Migraine without aura     Shoulder subluxation, left        Family History   Problem Relation Age of Onset    Stroke Mother         CEREBROVASCULAR ACCIDENT (CVA), LISTED 2X    Hypertension Mother     Hyperlipidemia Mother     Hypertension Father     Hyperlipidemia Father     Cervical cancer Neg Hx     Colon cancer Neg Hx     Ovarian cancer Neg Hx     Uterine cancer Neg Hx        Past Surgical History:   Procedure Laterality Date    TONSILLECTOMY      WRIST SURGERY          reports that she has never smoked  She has never used smokeless tobacco  She reports that she drinks about 0 6 oz of alcohol per week   She reports that she does not use drugs        Current Outpatient Prescriptions:     albuterol (PROAIR HFA) 90 mcg/act inhaler, Inhale, Disp: , Rfl:     Ascorbic Acid (VITAMIN C) 500 MG CAPS, Take by mouth, Disp: , Rfl:     Calcium 250 MG CAPS, Take by mouth, Disp: , Rfl:     desogestrel-ethinyl estradiol (VIORELE) 0 15-0 02/0 01 MG (21/5) per tablet, Take 1 tablet by mouth daily, Disp: 112 tablet, Rfl: 3    EPINEPHrine (EPIPEN) 0 3 mg/0 3 mL SOAJ, , Disp: , Rfl:     fexofenadine (ALLEGRA ALLERGY) 180 MG tablet, Take by mouth, Disp: , Rfl:     Lactobacillus (ACIDOPHILUS) 100 MG CAPS, Take by mouth, Disp: , Rfl:     lisinopril (ZESTRIL) 5 mg tablet, Take 1 tablet (5 mg total) by mouth daily, Disp: 90 tablet, Rfl: 1    montelukast (SINGULAIR) 10 mg tablet, Take by mouth, Disp: , Rfl:     Multiple Vitamin (MULTI-VITAMIN DAILY) TABS, Take by mouth, Disp: , Rfl:     Omeprazole 20 MG TBEC, Take by mouth, Disp: , Rfl:     The following portions of the patient's history were reviewed and updated as appropriate: allergies, current medications, past family history, past medical history, past social history, past surgical history and problem list     Review of Systems   Constitutional: Negative  HENT: Positive for postnasal drip, sinus pain, sinus pressure, sneezing and sore throat  Negative for congestion, hoarse voice and trouble swallowing  Respiratory: Positive for chest tightness  Negative for cough and shortness of breath  Cardiovascular: Negative  Gastrointestinal: Negative for vomiting  Musculoskeletal: Negative for neck pain  Neurological: Positive for headaches  Objective:    /72   Pulse 94   Temp 98 9 °F (37 2 °C)   Ht 5' 5" (1 651 m)   Wt 57 6 kg (127 lb)   SpO2 98%   BMI 21 13 kg/m²      Physical Exam   Constitutional: She appears well-developed and well-nourished  HENT:   Right Ear: External ear normal    Left Ear: External ear normal    Mouth/Throat: Oropharynx is clear and moist  No oropharyngeal exudate  Posterior pharyngeal wall erythema   Neck: Normal range of motion  Neck supple  Cardiovascular: Normal rate, regular rhythm and normal heart sounds  Pulmonary/Chest: Effort normal  She has no wheezes  She has no rales  No results found for this or any previous visit (from the past 1008 hour(s))  Assessment/Plan:       Diagnoses and all orders for this visit:    Viral URI  -     POCT rapid strepA      rapid strep test was negative in the office  Since patient's symptoms have all improved I suggested her just to monitor her temperature and continue with the supportive care  She will follow up if her symptoms change or if her need for albuterol increases above her baseline

## 2018-12-03 ENCOUNTER — OFFICE VISIT (OUTPATIENT)
Dept: FAMILY MEDICINE CLINIC | Facility: CLINIC | Age: 43
End: 2018-12-03
Payer: COMMERCIAL

## 2018-12-03 VITALS
DIASTOLIC BLOOD PRESSURE: 76 MMHG | OXYGEN SATURATION: 98 % | BODY MASS INDEX: 21.16 KG/M2 | SYSTOLIC BLOOD PRESSURE: 128 MMHG | WEIGHT: 127 LBS | HEIGHT: 65 IN | RESPIRATION RATE: 16 BRPM | TEMPERATURE: 98.1 F | HEART RATE: 86 BPM

## 2018-12-03 DIAGNOSIS — B08.4 HAND, FOOT AND MOUTH DISEASE: Primary | ICD-10-CM

## 2018-12-03 PROCEDURE — 99213 OFFICE O/P EST LOW 20 MIN: CPT | Performed by: FAMILY MEDICINE

## 2018-12-03 PROCEDURE — 3008F BODY MASS INDEX DOCD: CPT | Performed by: FAMILY MEDICINE

## 2018-12-03 NOTE — PROGRESS NOTES
Assessment/Plan:      Diagnoses and all orders for this visit:    Hand, foot and mouth disease      Discussion on hand, foot, and mouth disease and progression  Patient instructed to drink plenty of fluids  Patient instructed to follow-up if symptoms get worse or do not get better  Subjective:     Patient ID: Carrillo Barrera is a 37 y o  female  Patient reports a rash for the past 4 days  Patient reports that the rash is on her palms, feet, and the inside of her mouth  Patient reports that the rash hurts  Patient reports that she had a fever of 104 last week  Patient reports that she also developed a few spots on her face and thighs  Patient reports that she is concerned about the rash  Patient reports that her daughter was diagnosed with hand, foot, and mouth disease last week  Patient reports that she was here last week and had a strep test done which was negative  Review of Systems   Constitutional: Positive for fever  Negative for appetite change  HENT: Positive for sore throat  Negative for congestion and ear pain  Eyes: Negative for pain, discharge and redness  Respiratory: Negative for cough, shortness of breath and wheezing  Cardiovascular: Negative for chest pain and palpitations  Gastrointestinal: Negative for abdominal pain, diarrhea, nausea and vomiting  Genitourinary: Negative for dysuria, frequency, hematuria and urgency  Skin: Positive for rash  Neurological: Negative for dizziness, seizures, syncope, light-headedness and headaches  Objective:     Physical Exam   Constitutional: She is oriented to person, place, and time  No distress  HENT:   Right Ear: External ear normal    Left Ear: External ear normal    Nose: Nose normal    Mouth/Throat: Posterior oropharyngeal erythema present  Eyes: Pupils are equal, round, and reactive to light  Conjunctivae are normal  Right eye exhibits no discharge  Left eye exhibits no discharge     Cardiovascular: Normal rate, regular rhythm and normal heart sounds  Pulmonary/Chest: Effort normal and breath sounds normal  No respiratory distress  She has no wheezes  Musculoskeletal:   Gait wnl  Lymphadenopathy:     She has no cervical adenopathy  Neurological: She is alert and oriented to person, place, and time  Skin:   Blister like rash noted on patient's palms and feet  Papules noted on patients thighs bilaterally  Psychiatric: She has a normal mood and affect  Vitals reviewed

## 2019-01-02 ENCOUNTER — OFFICE VISIT (OUTPATIENT)
Dept: FAMILY MEDICINE CLINIC | Facility: CLINIC | Age: 44
End: 2019-01-02
Payer: COMMERCIAL

## 2019-01-02 VITALS
OXYGEN SATURATION: 98 % | BODY MASS INDEX: 20.66 KG/M2 | DIASTOLIC BLOOD PRESSURE: 70 MMHG | RESPIRATION RATE: 16 BRPM | SYSTOLIC BLOOD PRESSURE: 148 MMHG | TEMPERATURE: 98.4 F | HEART RATE: 110 BPM | WEIGHT: 124 LBS | HEIGHT: 65 IN

## 2019-01-02 DIAGNOSIS — H66.002 ACUTE SUPPURATIVE OTITIS MEDIA OF LEFT EAR WITHOUT SPONTANEOUS RUPTURE OF TYMPANIC MEMBRANE, RECURRENCE NOT SPECIFIED: Primary | ICD-10-CM

## 2019-01-02 DIAGNOSIS — J01.10 ACUTE FRONTAL SINUSITIS, RECURRENCE NOT SPECIFIED: ICD-10-CM

## 2019-01-02 PROCEDURE — 99213 OFFICE O/P EST LOW 20 MIN: CPT | Performed by: FAMILY MEDICINE

## 2019-01-02 RX ORDER — FLUTICASONE PROPIONATE 50 MCG
2 SPRAY, SUSPENSION (ML) NASAL DAILY
Qty: 1 BOTTLE | Refills: 0 | Status: SHIPPED | OUTPATIENT
Start: 2019-01-02 | End: 2019-01-02 | Stop reason: SDUPTHER

## 2019-01-02 RX ORDER — FLUTICASONE PROPIONATE 50 MCG
2 SPRAY, SUSPENSION (ML) NASAL DAILY
Qty: 1 BOTTLE | Refills: 0 | Status: SHIPPED | OUTPATIENT
Start: 2019-01-02 | End: 2019-01-29 | Stop reason: SDUPTHER

## 2019-01-02 RX ORDER — AMOXICILLIN AND CLAVULANATE POTASSIUM 875; 125 MG/1; MG/1
1 TABLET, FILM COATED ORAL EVERY 12 HOURS SCHEDULED
Qty: 14 TABLET | Refills: 0 | Status: SHIPPED | OUTPATIENT
Start: 2019-01-02 | End: 2019-01-02 | Stop reason: SDUPTHER

## 2019-01-02 RX ORDER — AMOXICILLIN AND CLAVULANATE POTASSIUM 875; 125 MG/1; MG/1
1 TABLET, FILM COATED ORAL EVERY 12 HOURS SCHEDULED
Qty: 14 TABLET | Refills: 0 | Status: SHIPPED | OUTPATIENT
Start: 2019-01-02 | End: 2019-01-09

## 2019-01-02 NOTE — PROGRESS NOTES
Assessment/Plan:   Diagnoses and all orders for this visit:  Acute suppurative otitis media of left ear without spontaneous rupture of tympanic membrane, recurrence not specified  -     amoxicillin-clavulanate (AUGMENTIN) 875-125 mg per tablet; Take 1 tablet by mouth every 12 (twelve) hours for 7 days    Acute frontal sinusitis, recurrence not specified  -     fluticasone (FLONASE) 50 mcg/act nasal spray; 2 sprays into each nostril daily  - encouraged fluids, hot tea with honey to soothe the throat  - cool mist humidifier   - rest   - RTO PRN            Subjective:    Patient ID: Angie Madden is a 37 y o  female  37yo F presents to the office for eval of cold symptoms  - started 10days ago   - (+) subjective fevers, sinus pressure, teeth pain, L-ear clogged, nasal congestion, cough productive for thick yellow phlegm, losing voice, chest pressure - hurts when breaths and coughs, palpitations, stomach ache   - denies chills, N/V, chest pain, SOB/wheezing   - did take an OTC decongestant earlier this AM   - no antipyretics  - (+) sick contacts at home = 3 daughters   - not much of an appetite - did have HFM and the stomach bug earlier last month         The following portions of the patient's history were reviewed and updated as appropriate: allergies, current medications, past family history, past medical history, past social history, past surgical history and problem list     Review of Systems  as per HPI    Objective:  /70   Pulse (!) 110   Temp 98 4 °F (36 9 °C)   Resp 16   Ht 5' 5" (1 651 m)   Wt 56 2 kg (124 lb)   SpO2 98%   BMI 20 63 kg/m²    Physical Exam   Constitutional: She is oriented to person, place, and time  She appears well-developed and well-nourished  No distress  HENT:   Head: Normocephalic and atraumatic  Right Ear: Tympanic membrane, external ear and ear canal normal  No drainage, swelling or tenderness  No middle ear effusion  Left Ear: There is swelling and tenderness   No drainage  A middle ear effusion is present  Nose: Rhinorrhea present  Right sinus exhibits frontal sinus tenderness  Right sinus exhibits no maxillary sinus tenderness  Left sinus exhibits frontal sinus tenderness  Left sinus exhibits no maxillary sinus tenderness  Mouth/Throat: Uvula is midline and mucous membranes are normal  No uvula swelling  Posterior oropharyngeal erythema present  No oropharyngeal exudate or posterior oropharyngeal edema  Eyes: Conjunctivae and EOM are normal  Right eye exhibits no discharge  Left eye exhibits no discharge  No scleral icterus  Neck: Normal range of motion  Neck supple  Cardiovascular: Regular rhythm and normal heart sounds  Tachycardia present  Exam reveals no gallop and no friction rub  No murmur heard  Pulmonary/Chest: Effort normal and breath sounds normal  No respiratory distress  She has no wheezes  She has no rales  (+) cough   Abdominal: Soft  Neurological: She is alert and oriented to person, place, and time  Skin: Skin is warm and dry  She is not diaphoretic  Psychiatric: She has a normal mood and affect  Her behavior is normal  Judgment and thought content normal    Vitals reviewed

## 2019-01-04 ENCOUNTER — OFFICE VISIT (OUTPATIENT)
Dept: FAMILY MEDICINE CLINIC | Facility: CLINIC | Age: 44
End: 2019-01-04
Payer: COMMERCIAL

## 2019-01-04 VITALS
HEART RATE: 83 BPM | SYSTOLIC BLOOD PRESSURE: 122 MMHG | HEIGHT: 65 IN | DIASTOLIC BLOOD PRESSURE: 72 MMHG | BODY MASS INDEX: 20.49 KG/M2 | WEIGHT: 123 LBS | TEMPERATURE: 98.1 F | OXYGEN SATURATION: 98 %

## 2019-01-04 DIAGNOSIS — H66.002 ACUTE SUPPURATIVE OTITIS MEDIA OF LEFT EAR WITHOUT SPONTANEOUS RUPTURE OF TYMPANIC MEMBRANE, RECURRENCE NOT SPECIFIED: Primary | ICD-10-CM

## 2019-01-04 PROCEDURE — 99213 OFFICE O/P EST LOW 20 MIN: CPT | Performed by: FAMILY MEDICINE

## 2019-01-04 NOTE — PROGRESS NOTES
Assessment/Plan:   Diagnoses and all orders for this visit:  Acute suppurative otitis media of left ear without spontaneous rupture of tympanic membrane, recurrence not specified  -     benzocaine (OTICAINE) 20 % SOLN; Administer 1 drop into both ears 2 (two) times a day as needed for mucositis  - cont Augmentin   - continued pressure behind b/l ears (L>R) - TMs may rupture to relieve pressure  - RTO if not resolving within 1wk - pt aware and agreeable         Subjective:    Patient ID: Michael Johnson is a 37 y o  female  35yo F presents to the office for eval of cold symptoms  - was seen in the office on 1/2 and dx with acute suppurative L-OM and acute frontal sinusitis  - was started on Augmentin and instructed to use Flonase   - pt returns to office due to continued pressure behind both ears (L>R) - does not feel like her drums have popped   - otherwise feeling better than she was on Wednesday   - voice getting better    - denies chills, N/V, chest pain, SOB/wheezing   - (+) sick contacts at home = 3 daughters   - not much of an appetite - did have HFM and the stomach bug earlier last month       The following portions of the patient's history were reviewed and updated as appropriate: allergies, current medications, past family history, past medical history, past social history, past surgical history and problem list     Review of Systems  as per HPI    Objective:  /72   Pulse 83   Temp 98 1 °F (36 7 °C)   Ht 5' 5" (1 651 m)   Wt 55 8 kg (123 lb)   SpO2 98%   BMI 20 47 kg/m²    Physical Exam   Constitutional: She is oriented to person, place, and time  She appears well-developed and well-nourished  No distress  HENT:   Head: Normocephalic and atraumatic  Right Ear: Tympanic membrane, external ear and ear canal normal  No drainage, swelling or tenderness  Left Ear: Tympanic membrane, external ear and ear canal normal  No drainage, swelling or tenderness  Nose: Rhinorrhea present   Right sinus exhibits no maxillary sinus tenderness and no frontal sinus tenderness  Left sinus exhibits no maxillary sinus tenderness and no frontal sinus tenderness  Mouth/Throat: Oropharynx is clear and moist  No oropharyngeal exudate  Cardiovascular: Normal rate, regular rhythm and normal heart sounds  Exam reveals no gallop and no friction rub  No murmur heard  Pulmonary/Chest: Breath sounds normal  No respiratory distress  She has no wheezes  She has no rales  Abdominal: Soft  Musculoskeletal: Normal range of motion  Neurological: She is alert and oriented to person, place, and time  Skin: Skin is warm  She is not diaphoretic  Psychiatric: She has a normal mood and affect  Her behavior is normal  Judgment and thought content normal    Vitals reviewed

## 2019-01-29 DIAGNOSIS — J01.10 ACUTE FRONTAL SINUSITIS, RECURRENCE NOT SPECIFIED: ICD-10-CM

## 2019-01-30 RX ORDER — FLUTICASONE PROPIONATE 50 MCG
SPRAY, SUSPENSION (ML) NASAL
Qty: 1 BOTTLE | Refills: 0 | Status: SHIPPED | OUTPATIENT
Start: 2019-01-30 | End: 2019-02-09 | Stop reason: ALTCHOICE

## 2019-02-09 ENCOUNTER — OFFICE VISIT (OUTPATIENT)
Dept: URGENT CARE | Facility: CLINIC | Age: 44
End: 2019-02-09
Payer: COMMERCIAL

## 2019-02-09 VITALS
TEMPERATURE: 100.2 F | HEART RATE: 116 BPM | SYSTOLIC BLOOD PRESSURE: 138 MMHG | HEIGHT: 63 IN | OXYGEN SATURATION: 99 % | WEIGHT: 120 LBS | DIASTOLIC BLOOD PRESSURE: 74 MMHG | BODY MASS INDEX: 21.26 KG/M2 | RESPIRATION RATE: 20 BRPM

## 2019-02-09 DIAGNOSIS — R68.89 FLU-LIKE SYMPTOMS: Primary | ICD-10-CM

## 2019-02-09 PROCEDURE — 99213 OFFICE O/P EST LOW 20 MIN: CPT | Performed by: NURSE PRACTITIONER

## 2019-02-09 RX ORDER — OSELTAMIVIR PHOSPHATE 75 MG/1
75 CAPSULE ORAL EVERY 12 HOURS SCHEDULED
Qty: 10 CAPSULE | Refills: 0 | Status: SHIPPED | OUTPATIENT
Start: 2019-02-09 | End: 2019-02-14

## 2019-02-09 NOTE — PROGRESS NOTES
[facility]        NAME: Efrain Brown is a 37 y o  female  : 1975    MRN: [id number]  DATE: 2019  TIME: 11:37 AM    Assessment and Plan     1  Flu-like symptoms  Symptomatic tx  - oseltamivir (TAMIFLU) 75 mg capsule; Take 1 capsule (75 mg total) by mouth every 12 (twelve) hours for 5 days  Dispense: 10 capsule; Refill: 0      Patient Instructions     You have been diagnosed with a flu like illness  Tamiflu 75mg twice daily for 5 days  Rest, fluids, tylenol/ibuprofen as needed  Symptomatic treatment  Antibiotics are not indicated at this time  Follow up with PCP in 5-7 days if symptoms persist or worsen  Chief Complaint     Chief Complaint   Patient presents with    Fever     x 2 days fevers, body aches, chills coughing          History of Present Illness       Symptoms for 2 days  Headache, fever, chills, body aches  Cough  No sore throat  Did get flu shot  [facility]  Review of Systems   Review of Systems   Constitutional: Positive for fatigue and fever  HENT: Positive for congestion, postnasal drip and rhinorrhea  Negative for sinus pain, sinus pressure and sore throat  Respiratory: Positive for cough  Negative for shortness of breath  Gastrointestinal: Negative for diarrhea, nausea and vomiting  Musculoskeletal: Positive for myalgias  Skin: Negative for rash  Neurological: Positive for headaches  Negative for dizziness  Hematological: Negative for adenopathy           Current Medications       Current Outpatient Prescriptions:     albuterol (PROAIR HFA) 90 mcg/act inhaler, Inhale, Disp: , Rfl:     Ascorbic Acid (VITAMIN C) 500 MG CAPS, Take by mouth, Disp: , Rfl:     benzocaine (OTICAINE) 20 % SOLN, Administer 1 drop into both ears 2 (two) times a day as needed for mucositis, Disp: 1 Bottle, Rfl: 0    Calcium 250 MG CAPS, Take by mouth, Disp: , Rfl:     desogestrel-ethinyl estradiol (VIORELE) 0 15-0 02/0 01 MG () per tablet, Take 1 tablet by mouth daily, Disp: 112 tablet, Rfl: 3    EPINEPHrine (EPIPEN) 0 3 mg/0 3 mL SOAJ, , Disp: , Rfl:     fexofenadine (ALLEGRA ALLERGY) 180 MG tablet, Take by mouth, Disp: , Rfl:     lisinopril (ZESTRIL) 5 mg tablet, Take 1 tablet (5 mg total) by mouth daily, Disp: 90 tablet, Rfl: 1    Multiple Vitamin (MULTI-VITAMIN DAILY) TABS, Take by mouth, Disp: , Rfl:     Omeprazole 20 MG TBEC, Take by mouth, Disp: , Rfl:     fluticasone (FLONASE) 50 mcg/act nasal spray, SPRAY 2 SPRAYS INTO EACH NOSTRIL EVERY DAY (Patient not taking: Reported on 2/9/2019), Disp: 1 Bottle, Rfl: 0    Current Allergies     Allergies as of 02/09/2019 - Reviewed 02/09/2019   Allergen Reaction Noted    Cat hair extract  05/23/2017    Erythromycin  02/28/2014    Hctz [hydrochlorothiazide] Dizziness 05/04/2018    Seasonal ic  [cholestatin]  05/23/2017    Latex Rash 02/09/2018            The following portions of the patient's history were reviewed and updated as appropriate: allergies, current medications, past family history, past medical history, past social history, past surgical history and problem list      Past Medical History:   Diagnosis Date    Allergic     Asthma     seasonal     Hypertension     Irritable bowel syndrome     Migraine without aura     Shoulder subluxation, left        Past Surgical History:   Procedure Laterality Date    TONSILLECTOMY      WRIST SURGERY         Family History   Problem Relation Age of Onset    Stroke Mother         CEREBROVASCULAR ACCIDENT (CVA), LISTED 2X    Hypertension Mother     Hyperlipidemia Mother     Hypertension Father     Hyperlipidemia Father     Cervical cancer Neg Hx     Colon cancer Neg Hx     Ovarian cancer Neg Hx     Uterine cancer Neg Hx          Medications have been verified          Objective   /74   Pulse (!) 116   Temp 100 2 °F (37 9 °C)   Resp 20   Ht 5' 3" (1 6 m)   Wt 54 4 kg (120 lb)   LMP 12/10/2018   SpO2 99%   BMI 21 26 kg/m²        Physical Exam     Physical Exam   Constitutional: She is oriented to person, place, and time  She appears well-developed and well-nourished  No distress  HENT:   TMS WNL  Turbinates inflamed  Oropharynx with no erythema or exudate  No sinus tenderness to palpation    (+) PND     Cardiovascular: Normal rate and regular rhythm  Pulmonary/Chest: Effort normal and breath sounds normal  No respiratory distress  She has no wheezes  Lymphadenopathy:     She has no cervical adenopathy  Neurological: She is alert and oriented to person, place, and time  Skin: Skin is warm and dry  Vitals reviewed

## 2019-02-09 NOTE — PATIENT INSTRUCTIONS
You have been diagnosed with a flu like illness  Tamiflu 75mg twice daily for 5 days  Rest, fluids, tylenol/ibuprofen as needed  Symptomatic treatment  Antibiotics are not indicated at this time  Follow up with PCP in 5-7 days if symptoms persist or worsen

## 2019-02-26 LAB
25(OH)D3 SERPL-MCNC: 52 NG/ML (ref 30–100)
ALBUMIN SERPL-MCNC: 3.8 G/DL (ref 3.6–5.1)
ALBUMIN/CREAT UR: 2 MCG/MG CREAT
ALBUMIN/GLOB SERPL: 1.1 (CALC) (ref 1–2.5)
ALP SERPL-CCNC: 73 U/L (ref 33–115)
ALT SERPL-CCNC: 12 U/L (ref 6–29)
AST SERPL-CCNC: 14 U/L (ref 10–35)
BASOPHILS # BLD AUTO: 43 CELLS/UL (ref 0–200)
BASOPHILS NFR BLD AUTO: 0.4 %
BILIRUB SERPL-MCNC: 0.3 MG/DL (ref 0.2–1.2)
BUN SERPL-MCNC: 12 MG/DL (ref 7–25)
BUN/CREAT SERPL: NORMAL (CALC) (ref 6–22)
CALCIUM SERPL-MCNC: 8.8 MG/DL (ref 8.6–10.2)
CHLORIDE SERPL-SCNC: 101 MMOL/L (ref 98–110)
CHOLEST SERPL-MCNC: 154 MG/DL
CHOLEST/HDLC SERPL: 3.3 (CALC)
CO2 SERPL-SCNC: 28 MMOL/L (ref 20–32)
CREAT SERPL-MCNC: 0.76 MG/DL (ref 0.5–1.1)
CREAT UR-MCNC: 193 MG/DL (ref 20–275)
EOSINOPHIL # BLD AUTO: 107 CELLS/UL (ref 15–500)
EOSINOPHIL NFR BLD AUTO: 1 %
ERYTHROCYTE [DISTWIDTH] IN BLOOD BY AUTOMATED COUNT: 12.5 % (ref 11–15)
GLOBULIN SER CALC-MCNC: 3.4 G/DL (CALC) (ref 1.9–3.7)
GLUCOSE SERPL-MCNC: 81 MG/DL (ref 65–99)
HCT VFR BLD AUTO: 35.9 % (ref 35–45)
HDLC SERPL-MCNC: 46 MG/DL
HGB BLD-MCNC: 11.7 G/DL (ref 11.7–15.5)
LDLC SERPL CALC-MCNC: 77 MG/DL (CALC)
LYMPHOCYTES # BLD AUTO: 2076 CELLS/UL (ref 850–3900)
LYMPHOCYTES NFR BLD AUTO: 19.4 %
MCH RBC QN AUTO: 28.3 PG (ref 27–33)
MCHC RBC AUTO-ENTMCNC: 32.6 G/DL (ref 32–36)
MCV RBC AUTO: 86.7 FL (ref 80–100)
MICROALBUMIN UR-MCNC: 0.4 MG/DL
MONOCYTES # BLD AUTO: 845 CELLS/UL (ref 200–950)
MONOCYTES NFR BLD AUTO: 7.9 %
NEUTROPHILS # BLD AUTO: 7629 CELLS/UL (ref 1500–7800)
NEUTROPHILS NFR BLD AUTO: 71.3 %
NONHDLC SERPL-MCNC: 108 MG/DL (CALC)
PLATELET # BLD AUTO: 347 THOUSAND/UL (ref 140–400)
PMV BLD REES-ECKER: 11.3 FL (ref 7.5–12.5)
POTASSIUM SERPL-SCNC: 3.8 MMOL/L (ref 3.5–5.3)
PROT SERPL-MCNC: 7.2 G/DL (ref 6.1–8.1)
RBC # BLD AUTO: 4.14 MILLION/UL (ref 3.8–5.1)
SL AMB EGFR AFRICAN AMERICAN: 110 ML/MIN/1.73M2
SL AMB EGFR NON AFRICAN AMERICAN: 95 ML/MIN/1.73M2
SODIUM SERPL-SCNC: 136 MMOL/L (ref 135–146)
TRIGL SERPL-MCNC: 225 MG/DL
TSH SERPL-ACNC: 2.1 MIU/L
WBC # BLD AUTO: 10.7 THOUSAND/UL (ref 3.8–10.8)

## 2019-03-01 ENCOUNTER — OFFICE VISIT (OUTPATIENT)
Dept: FAMILY MEDICINE CLINIC | Facility: CLINIC | Age: 44
End: 2019-03-01
Payer: COMMERCIAL

## 2019-03-01 VITALS
WEIGHT: 124 LBS | HEIGHT: 63 IN | BODY MASS INDEX: 21.97 KG/M2 | DIASTOLIC BLOOD PRESSURE: 70 MMHG | HEART RATE: 79 BPM | OXYGEN SATURATION: 99 % | RESPIRATION RATE: 16 BRPM | SYSTOLIC BLOOD PRESSURE: 120 MMHG

## 2019-03-01 DIAGNOSIS — L85.3 DRY SKIN: ICD-10-CM

## 2019-03-01 DIAGNOSIS — E78.1 HYPERTRIGLYCERIDEMIA: ICD-10-CM

## 2019-03-01 DIAGNOSIS — Z00.00 ANNUAL PHYSICAL EXAM: Primary | ICD-10-CM

## 2019-03-01 DIAGNOSIS — I10 HYPERTENSION, UNSPECIFIED TYPE: ICD-10-CM

## 2019-03-01 DIAGNOSIS — K21.9 GASTROESOPHAGEAL REFLUX DISEASE WITHOUT ESOPHAGITIS: ICD-10-CM

## 2019-03-01 DIAGNOSIS — R49.0 HOARSENESS: ICD-10-CM

## 2019-03-01 PROCEDURE — 3078F DIAST BP <80 MM HG: CPT | Performed by: FAMILY MEDICINE

## 2019-03-01 PROCEDURE — 99213 OFFICE O/P EST LOW 20 MIN: CPT | Performed by: FAMILY MEDICINE

## 2019-03-01 PROCEDURE — 3074F SYST BP LT 130 MM HG: CPT | Performed by: FAMILY MEDICINE

## 2019-03-01 PROCEDURE — 3008F BODY MASS INDEX DOCD: CPT | Performed by: FAMILY MEDICINE

## 2019-03-01 PROCEDURE — 99396 PREV VISIT EST AGE 40-64: CPT | Performed by: FAMILY MEDICINE

## 2019-03-01 RX ORDER — LISINOPRIL 5 MG/1
5 TABLET ORAL DAILY
Qty: 90 TABLET | Refills: 1 | Status: SHIPPED | OUTPATIENT
Start: 2019-03-01 | End: 2020-01-08 | Stop reason: SDUPTHER

## 2019-03-01 RX ORDER — NICOTINE POLACRILEX 4 MG/1
1 GUM, CHEWING ORAL DAILY
Qty: 90 TABLET | Refills: 0 | Status: SHIPPED | OUTPATIENT
Start: 2019-03-01 | End: 2020-06-22

## 2019-03-01 NOTE — PROGRESS NOTES
Assessment/Plan:   Diagnoses and all orders for this visit:  Annual physical exam  - reviewed PMHx, PSHx, meds, allergies, FHx and Soc Hx  - UTD with Tdap and Flu vaccine for this season  - UTD with GYN exam, PAP (next due in 2020) and Mammo   - UTD with Optho and Dental     Hypertension, unspecified type  -     lisinopril (ZESTRIL) 5 mg tablet; Take 1 tablet (5 mg total) by mouth daily  - BP in the office 120/70   - well controlled on ACEI and tolerating it well - no swelling or cough  - of note, pt has lost ~15lbs since her HTN dx  - adopted 3girls in June 2018 and has not been able to exercise and eat as well   - nml CMP and urine microalbumin   - RTO in 6months for f/u - pt aware and agreeable     Hypertriglyceridemia  - TG noted to be elevated at 225 and HDL decreased from 57 --> 46   - encouraged to exercise and monitor fast food intake - pt aware  - will recheck in 6months     Hoarseness  -     Ambulatory Referral to Otolaryngology; Future  - rough winter season with back to back illnesses, has been hoarse for the past month - will refer to ENT for further eval     Gastroesophageal reflux disease without esophagitis  -     Omeprazole 20 MG TBEC; Take 1 tablet (20 mg total) by mouth daily    Dry skin  - advised to use Eucerin/Vaseline and apply thick coat on her hands at night and wear a glove to sleep         Subjective:    Patient ID: Brody Encarnacion is a 37 y o  female    Brody Encarnacion is a 37 y o  female who presents to the office for an annual exam  - Specialists: Allergist (Dr Marcelino Fuller), Ob/Gyn: [facility]   - PMHx: HTN, Migraines (only 1 with Aura), Cluster HA x1, Allergies, Asthma (seasonal), IBS-D, L-shoulder subluxation (2/2 overuse from exercise)    - allergies: Erythromycin (GI upset), HTCZ (dizziness), Latex (rash), Animal dander, peanuts   - Meds: Lisinopril 5mg PO QD, Vit C, Ca, OCPs, EpiPen, Allegra, ProAir PRN, Omeprazole 20mg PO PRN  - PSHx: tonsillectomy, wrist surgery  - Fhx: M (HL, HTN, stroke x3), F (HTN, Hl), denies Fhx of breast/colon/cervical ca   - Immunizations: UTD with Flu vaccine, last Tdap was within 6yrs   - GYN Hx: last exam 5/2018, last PAP was 5/2017 - no h/o abnml PAPs - due again in 2020; last Mammo in 6/2018 (annual screening)   - diet/exercise: not currently exercise; varied diet, 16oz/coffee/day  - social: denies Tob/illicits; a glass of wine/month, single, has adopted 3 kids (1 6yo, 5yo and 13yo daughters) - yesterday termination of parental rights was approved!   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   - (+) has lost her voice and feels like she has been hoarse for the past month, dry cracked skin on her hands, had a nosebleed earlier today    - ROS: today in the office pt denies F/C/N/V/HA/visual changes/CP/palpitations/SOB/wheezing/abd pain/D/numbness or tingling in b/l UE+LE/LE edema or calf tenderness  - reviewed essentially normal labs, but her TGs are elevated on her cholesterol panel           The following portions of the patient's history were reviewed and updated as appropriate: allergies, current medications, past family history, past medical history, past social history, past surgical history and problem list     Review of Systems  as per HPI    Objective:  /70   Pulse 79   Resp 16   Ht 5' 3" (1 6 m)   Wt 56 2 kg (124 lb)   SpO2 99%   BMI 21 97 kg/m²    Physical Exam   Constitutional: She is oriented to person, place, and time  She appears well-developed and well-nourished  HENT:   Head: Normocephalic and atraumatic  Right Ear: External ear and ear canal normal  No drainage, swelling or tenderness  A middle ear effusion is present     Left Ear: External ear and ear canal normal  No drainage, swelling or tenderness  A middle ear effusion is present  Nose: Rhinorrhea present  No mucosal edema  Right sinus exhibits no maxillary sinus tenderness and no frontal sinus tenderness  Left sinus exhibits no maxillary sinus tenderness and no frontal sinus tenderness  Mouth/Throat: Uvula is midline, oropharynx is clear and moist and mucous membranes are normal  No uvula swelling  No oropharyngeal exudate, posterior oropharyngeal edema, posterior oropharyngeal erythema or tonsillar abscesses  Eyes: Pupils are equal, round, and reactive to light  Conjunctivae and EOM are normal  Right eye exhibits no discharge  Left eye exhibits no discharge  No scleral icterus  Neck: Normal range of motion  Neck supple  No tracheal deviation present  Cardiovascular: Normal rate, regular rhythm and normal heart sounds  Exam reveals no gallop and no friction rub  No murmur heard  Pulmonary/Chest: Effort normal and breath sounds normal  No stridor  No respiratory distress  She has no wheezes  She has no rales  Abdominal: Soft  Bowel sounds are normal    Musculoskeletal: Normal range of motion  She exhibits no edema, tenderness or deformity  Lymphadenopathy:     She has no cervical adenopathy  Neurological: She is alert and oriented to person, place, and time  Skin: Skin is warm and dry  Dry hands b/l    Psychiatric: She has a normal mood and affect  Her behavior is normal    Vitals reviewed

## 2019-04-03 ENCOUNTER — TELEPHONE (OUTPATIENT)
Dept: OBGYN CLINIC | Facility: CLINIC | Age: 44
End: 2019-04-03

## 2019-04-03 DIAGNOSIS — Z12.31 ENCOUNTER FOR SCREENING MAMMOGRAM FOR MALIGNANT NEOPLASM OF BREAST: Primary | ICD-10-CM

## 2019-05-06 ENCOUNTER — CLINICAL SUPPORT (OUTPATIENT)
Dept: FAMILY MEDICINE CLINIC | Facility: CLINIC | Age: 44
End: 2019-05-06
Payer: COMMERCIAL

## 2019-05-06 DIAGNOSIS — Z11.1 PPD SCREENING TEST: Primary | ICD-10-CM

## 2019-05-06 PROCEDURE — 86580 TB INTRADERMAL TEST: CPT

## 2019-05-08 LAB
INDURATION: 0 MM
TB SKIN TEST: NEGATIVE

## 2019-05-31 ENCOUNTER — ANNUAL EXAM (OUTPATIENT)
Dept: OBGYN CLINIC | Facility: CLINIC | Age: 44
End: 2019-05-31
Payer: COMMERCIAL

## 2019-05-31 VITALS
DIASTOLIC BLOOD PRESSURE: 64 MMHG | WEIGHT: 124.4 LBS | BODY MASS INDEX: 22.04 KG/M2 | HEIGHT: 63 IN | SYSTOLIC BLOOD PRESSURE: 102 MMHG

## 2019-05-31 DIAGNOSIS — Z01.419 WELL WOMAN EXAM WITH ROUTINE GYNECOLOGICAL EXAM: Primary | ICD-10-CM

## 2019-05-31 DIAGNOSIS — Z30.41 ENCOUNTER FOR SURVEILLANCE OF CONTRACEPTIVE PILLS: ICD-10-CM

## 2019-05-31 PROCEDURE — S0612 ANNUAL GYNECOLOGICAL EXAMINA: HCPCS | Performed by: OBSTETRICS & GYNECOLOGY

## 2019-05-31 RX ORDER — DESOGESTREL AND ETHINYL ESTRADIOL 21-5 (28)
1 KIT ORAL DAILY
Qty: 112 TABLET | Refills: 3 | Status: SHIPPED | OUTPATIENT
Start: 2019-05-31 | End: 2020-01-09 | Stop reason: SDUPTHER

## 2019-06-10 ENCOUNTER — TELEPHONE (OUTPATIENT)
Dept: OBGYN CLINIC | Facility: CLINIC | Age: 44
End: 2019-06-10

## 2019-06-11 DIAGNOSIS — Z30.45 ENCOUNTER FOR SURVEILLANCE OF TRANSDERMAL PATCH HORMONAL CONTRACEPTIVE DEVICE: Primary | ICD-10-CM

## 2019-06-11 RX ORDER — DESOGESTREL AND ETHINYL ESTRADIOL 21-5 (28)
1 KIT ORAL DAILY
Qty: 84 TABLET | Refills: 3 | Status: SHIPPED | OUTPATIENT
Start: 2019-06-11 | End: 2019-10-02 | Stop reason: SDUPTHER

## 2019-08-07 ENCOUNTER — HOSPITAL ENCOUNTER (OUTPATIENT)
Dept: MAMMOGRAPHY | Facility: HOSPITAL | Age: 44
Discharge: HOME | End: 2019-08-07
Attending: OBSTETRICS & GYNECOLOGY
Payer: COMMERCIAL

## 2019-08-07 VITALS — WEIGHT: 124 LBS | BODY MASS INDEX: 21.97 KG/M2 | HEIGHT: 63 IN

## 2019-08-07 DIAGNOSIS — Z12.31 ENCOUNTER FOR SCREENING MAMMOGRAM FOR MALIGNANT NEOPLASM OF BREAST: ICD-10-CM

## 2019-08-07 PROCEDURE — 77063 BREAST TOMOSYNTHESIS BI: CPT

## 2019-08-07 PROCEDURE — 77067 SCR MAMMO BI INCL CAD: CPT

## 2019-09-02 ENCOUNTER — OFFICE VISIT (OUTPATIENT)
Dept: URGENT CARE | Facility: CLINIC | Age: 44
End: 2019-09-02
Payer: COMMERCIAL

## 2019-09-02 VITALS
SYSTOLIC BLOOD PRESSURE: 135 MMHG | RESPIRATION RATE: 18 BRPM | BODY MASS INDEX: 21.97 KG/M2 | HEART RATE: 103 BPM | TEMPERATURE: 99.2 F | HEIGHT: 63 IN | OXYGEN SATURATION: 98 % | WEIGHT: 124 LBS | DIASTOLIC BLOOD PRESSURE: 65 MMHG

## 2019-09-02 DIAGNOSIS — J02.0 STREP PHARYNGITIS: Primary | ICD-10-CM

## 2019-09-02 LAB — S PYO AG THROAT QL: POSITIVE

## 2019-09-02 PROCEDURE — 87880 STREP A ASSAY W/OPTIC: CPT | Performed by: PHYSICIAN ASSISTANT

## 2019-09-02 PROCEDURE — 99213 OFFICE O/P EST LOW 20 MIN: CPT | Performed by: PHYSICIAN ASSISTANT

## 2019-09-02 RX ORDER — PENICILLIN V POTASSIUM 500 MG/1
500 TABLET ORAL EVERY 12 HOURS
Qty: 20 TABLET | Refills: 0 | Status: SHIPPED | OUTPATIENT
Start: 2019-09-02 | End: 2019-09-12

## 2019-09-02 NOTE — PROGRESS NOTES
[facility]        NAME: Adelia Serrano is a 37 y o  female  : 1975    MRN: [id number]  DATE: 2019  TIME: 11:31 AM    Assessment and Plan   Strep pharyngitis [J02 0]  1  Strep pharyngitis  POCT rapid strepA    penicillin V potassium (VEETID) 500 mg tablet     Positive rapid strep  Patient Instructions     Penicillin as directed  Tylenol/motrin  Increase fluids  Follow up with PCP in 3-5 days  Proceed to  ER if symptoms worsen  Chief Complaint     Chief Complaint   Patient presents with    Sore Throat     x 2 days fevers, chills, sore throat, HA, sore neck          History of Present Illness       Patient is a 64-year-old female presenting for a sore throat  She developed chills, fever, headache, diarrhea and abdominal pain with a sore throat couple days ago  She denies any nausea, vomiting, cough, sinus congestion  She has been taking Advil for symptoms  She denies any sick contacts at home but has been visiting a relative in the hospital       Review of Systems   Review of Systems   Constitutional: Positive for chills, fatigue and fever  HENT: Positive for sore throat  Negative for congestion and ear pain  Eyes: Negative for redness  Respiratory: Negative for cough  Gastrointestinal: Positive for abdominal pain and diarrhea  Negative for nausea and vomiting  Skin: Negative for rash  Neurological: Positive for headaches  Current Medications       Current Outpatient Medications:     albuterol (PROAIR HFA) 90 mcg/act inhaler, Inhale, Disp: , Rfl:     Ascorbic Acid (VITAMIN C) 500 MG CAPS, Take by mouth, Disp: , Rfl:     Calcium 250 MG CAPS, Take by mouth, Disp: , Rfl:     desogestrel-ethinyl estradiol (KARIVA) 0 15-0 02/0 01 MG () per tablet, Take 1 tablet by mouth daily for 84 days Take one tablet daily for three weeks, skip placebo pills, and move straight to next pack  , Disp: 84 tablet, Rfl: 3    desogestrel-ethinyl estradiol ([place] José Antoniosar) 0  15-0 02/0 01 MG (21/5) per tablet, Take 1 tablet by mouth daily, Disp: 112 tablet, Rfl: 3    EPINEPHrine (EPIPEN) 0 3 mg/0 3 mL SOAJ, , Disp: , Rfl:     fexofenadine (ALLEGRA ALLERGY) 180 MG tablet, Take by mouth, Disp: , Rfl:     lisinopril (ZESTRIL) 5 mg tablet, Take 1 tablet (5 mg total) by mouth daily, Disp: 90 tablet, Rfl: 1    Multiple Vitamin (MULTI-VITAMIN DAILY) TABS, Take by mouth, Disp: , Rfl:     Omeprazole 20 MG TBEC, Take 1 tablet (20 mg total) by mouth daily, Disp: 90 tablet, Rfl: 0    penicillin V potassium (VEETID) 500 mg tablet, Take 1 tablet (500 mg total) by mouth every 12 (twelve) hours for 10 days, Disp: 20 tablet, Rfl: 0    Current Allergies     Allergies as of 09/02/2019 - Reviewed 09/02/2019   Allergen Reaction Noted    Cat hair extract  05/23/2017    Erythromycin  02/28/2014    Hctz [hydrochlorothiazide] Dizziness 05/04/2018    Seasonal ic  [cholestatin]  05/23/2017    Latex Rash 02/09/2018            The following portions of the patient's history were reviewed and updated as appropriate: allergies, current medications, past family history, past medical history, past social history, past surgical history and problem list      Past Medical History:   Diagnosis Date    Allergic     Asthma     seasonal     GERD (gastroesophageal reflux disease)     Hypertension     Irritable bowel syndrome     Migraine without aura     Shoulder subluxation, left        Past Surgical History:   Procedure Laterality Date    TONSILLECTOMY      WRIST SURGERY         Family History   Problem Relation Age of Onset    Stroke Mother         CEREBROVASCULAR ACCIDENT (CVA), LISTED 2X    Hypertension Mother     Hyperlipidemia Mother     Hypertension Father     Hyperlipidemia Father     Cervical cancer Neg Hx     Colon cancer Neg Hx     Ovarian cancer Neg Hx     Uterine cancer Neg Hx          Medications have been verified          Objective   /65   Pulse 103   Temp 99 2 °F (37 3 °C) Resp 18   Ht 5' 3" (1 6 m)   Wt 56 2 kg (124 lb)   SpO2 98%   BMI 21 97 kg/m²        Physical Exam     Physical Exam   Constitutional: She is oriented to person, place, and time  She appears well-developed and well-nourished  She does not have a sickly appearance  She appears ill  No distress  HENT:   Head: Normocephalic and atraumatic  Right Ear: Hearing, tympanic membrane, external ear and ear canal normal    Left Ear: Hearing, tympanic membrane, external ear and ear canal normal    Mouth/Throat: Uvula is midline and mucous membranes are normal  Posterior oropharyngeal edema and posterior oropharyngeal erythema present  No oropharyngeal exudate  Tonsils are 2+ on the right  Tonsils are 2+ on the left  No tonsillar exudate  Eyes: Conjunctivae are normal    Cardiovascular: Normal rate, regular rhythm and normal heart sounds  Pulmonary/Chest: Effort normal and breath sounds normal    Neurological: She is alert and oriented to person, place, and time  Skin: Skin is warm and dry  She is not diaphoretic  Psychiatric: She has a normal mood and affect   Her behavior is normal

## 2019-10-02 ENCOUNTER — OFFICE VISIT (OUTPATIENT)
Dept: FAMILY MEDICINE CLINIC | Facility: CLINIC | Age: 44
End: 2019-10-02
Payer: COMMERCIAL

## 2019-10-02 VITALS
HEART RATE: 86 BPM | DIASTOLIC BLOOD PRESSURE: 74 MMHG | OXYGEN SATURATION: 98 % | WEIGHT: 126.6 LBS | BODY MASS INDEX: 22.43 KG/M2 | SYSTOLIC BLOOD PRESSURE: 122 MMHG | HEIGHT: 63 IN | RESPIRATION RATE: 16 BRPM

## 2019-10-02 DIAGNOSIS — E78.1 HYPERTRIGLYCERIDEMIA: ICD-10-CM

## 2019-10-02 DIAGNOSIS — Z28.21 INFLUENZA VACCINATION DECLINED BY PATIENT: ICD-10-CM

## 2019-10-02 DIAGNOSIS — M25.521 ELBOW PAIN, RIGHT: Primary | ICD-10-CM

## 2019-10-02 PROCEDURE — 99213 OFFICE O/P EST LOW 20 MIN: CPT | Performed by: FAMILY MEDICINE

## 2019-10-02 RX ORDER — NAPROXEN 500 MG/1
500 TABLET ORAL 2 TIMES DAILY WITH MEALS
Qty: 28 TABLET | Refills: 0 | Status: SHIPPED | OUTPATIENT
Start: 2019-10-02 | End: 2019-11-25 | Stop reason: SDUPTHER

## 2019-10-02 NOTE — PROGRESS NOTES
Assessment/Plan:   Diagnoses and all orders for this visit:    Elbow pain, right  -     naproxen (NAPROSYN) 500 mg tablet; Take 1 tablet (500 mg total) by mouth 2 (two) times a day with meals  -     Ambulatory referral to Physical Therapy; Future  - likely from overuse   - educational handout re: elbow exercises/stretches printed out for pt   - RTO if not resolving/worsening - pt aware and agreeable     Hypertriglyceridemia  -     Lipid panel; Future  - RTO in 6months for annual exam - pt aware and agreeable     Influenza vaccination declined by patient  - will get via work for free    Other orders  -     Cancel: influenza vaccine, [id number], quadrivalent, 0 5 mL, preservative-free, for adult and pediatric patients 6 mos+ (AFLURIA, FLUARIX, FLULAVAL, FLUZONE)        Subjective:    Patient ID: Marcelino Gong is a 37 y o  female  35yo F presents to the office for eval of R-elbow pain   - 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   - has formally adopted 3girls - 8/13/2019  - bought and sold her house  - got a new car   - Dad was in the hospital   - (+) R-elbow pain - "nagging" pain for the past few months   - "definite" pain in the past week and then worse yesterday   - attributes it to lifting her 3yo who is 29lbs   - (+) pain with some movements - hurts to write   - has not tried anything for it   - declined Flu vaccine in the office today - gets it free from work       The following portions of the patient's history were reviewed and updated as appropriate: allergies, current medications, past family history, past medical history, past social history, past surgical history and problem list     Review of Systems  as per HPI    Objective:  /74   Pulse 86   Resp 16   Ht 5' 3" (1 6 m)   Wt 57 4 kg (126 lb 9 6 oz)   SpO2 98%   BMI 22 43 kg/m²    Physical Exam   Constitutional: She is oriented to person, place, and time  She appears well-developed and well-nourished  No distress     HENT:   Head: Normocephalic and atraumatic  Right Ear: External ear normal    Left Ear: External ear normal    Nose: Nose normal    Eyes: Conjunctivae and EOM are normal  Right eye exhibits no discharge  Left eye exhibits no discharge  No scleral icterus  Neck: Normal range of motion  Neck supple  Pulmonary/Chest: Effort normal    Musculoskeletal: She exhibits tenderness  (+) R-elbow tender to touch, not edematous, not erythematous, (+) pain with F, some pain with supination/pronation    Neurological: She is alert and oriented to person, place, and time  Skin: Skin is warm  She is not diaphoretic  Psychiatric: She has a normal mood and affect  Her behavior is normal    Vitals reviewed

## 2019-10-02 NOTE — PATIENT INSTRUCTIONS
Tennis Elbow Exercises   AMBULATORY CARE:   Tennis elbow exercises  help decrease pain in your elbow, forearm, wrist, and hand  They also help strengthen your arm muscles and prevent further injury  Start these exercises slowly  Do the exercises on both arms  Stop if you feel pain  · Finger extensions:  Hold your fingers close together  Put a rubber band around the outside of your fingers and thumb  Spread your fingers apart and then slowly bring them together without letting the rubber band fall off  Repeat 40 times  · Wrist flexor stretch:  Hold your arm straight out in front of you with your palm facing down  Use your other hand to grasp your fingers  Keep your elbow straight and slowly bend your hand back  Your fingertips should point up and your palm should face away from you  Do this until you feel a stretch in the top of your wrist  Hold for 10 seconds  Repeat 5 times  · Wrist extensor stretch: This stretch is the opposite of the wrist flexor stretch  Hold your arm straight out in front of you with your palm facing down  Use your other hand to grasp your fingers  Keep your elbow straight and slowly bend your hand down  Your fingertips should point down and your palm should face you  Do this until you feel a stretch in your wrist  Hold for 10 seconds  Repeat 5 times  · Bicep curls:  Place your hand under your injured elbow for support  Turn your palm so that it faces up and hold a weight in your hand  Ask your healthcare provider how much weight you should use  Slowly bend and straighten your elbow 30 times  · :  Hold a soft rubber ball or tennis ball in your hand  Squeeze the ball as hard as you can and hold this position  Ask your healthcare provider how long to hold this position  Repeat this exercise as directed by your healthcare provider    Contact your healthcare provider if:   · You have increased pain or weakness in your arm, wrist, hand, or fingers  · You have new numbness or tingling in your arm, hand, or fingers  · You have questions or concerns about tennis elbow exercises  © 2017 [employer] Information is for End User's use only and may not be sold, redistributed or otherwise used for commercial purposes  All illustrations and images included in CareNotes® are the copyrighted property of [employer]  or Tom Zaragoza  The above information is an  only  It is not intended as medical advice for individual conditions or treatments  Talk to your doctor, nurse or pharmacist before following any medical regimen to see if it is safe and effective for you

## 2019-10-03 ENCOUNTER — EVALUATION (OUTPATIENT)
Dept: PHYSICAL THERAPY | Facility: CLINIC | Age: 44
End: 2019-10-03
Payer: COMMERCIAL

## 2019-10-03 DIAGNOSIS — M25.521 ELBOW PAIN, RIGHT: ICD-10-CM

## 2019-10-03 PROCEDURE — 97162 PT EVAL MOD COMPLEX 30 MIN: CPT | Performed by: PHYSICAL THERAPIST

## 2019-10-03 NOTE — PROGRESS NOTES
PT Evaluation     Today's date: 10/3/2019  Patient name: Mauricio Schaefer  : 1975  MRN: [id number]  Referring provider: Ольга Hammer DO  Dx:   Encounter Diagnosis     ICD-10-CM    1  Elbow pain, right M25 521 Ambulatory referral to Physical Therapy                  Assessment  Assessment details: Patient is a 37year old female with signs and symptoms consistent with R elbow pain and lateral epicondylitis resulting in difficulty with ADLS and functional activities  She demonstrates deficits in R upper extremity strength of both shoulder, elbow, and wrist  She had gradual, insidious onset of symptoms with recent, rapid increase in symptoms  It is correlated with increased lifting demands on R UE as she is a single parent of young children  She also demonstrates deficits in rotator cuff strength which may be contributing to elbow/forearm compensatory strategies with overhead lifting, reaching  Her  strength is impaired compared to the contralateral and nondominant side  Patient was educated on all of the above and the impact that overuse and compensatory strategies can have  She was agreeable to POC 2x/week  Impairments: activity intolerance, impaired physical strength, lacks appropriate home exercise program and pain with function  Barriers to therapy: History of L shoulder subluxation; 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   Understanding of Dx/Px/POC: good   Prognosis: good    Goals  ST  Decrease pain at worst to 4/10 in 4 weeks  2  Able to tolerate wrist flexion with elbow extended in 4 weeks  LT  Increase R  strength to symmetrical with L in 8 weeks  2  Able to tolerate lifting her daughter into her car seat without increase in pain in 8 weeks  3  Independent in HEP in 8 weeks  Plan  Plan details: Taper POC to 1x/week as appropriate     Patient would benefit from: skilled physical therapy  Planned modality interventions: cryotherapy, TENS and thermotherapy: hydrocollator packs  Other planned modality interventions: Cyrosphere  Planned therapy interventions: flexibility, functional ROM exercises, home exercise program, therapeutic exercise, therapeutic activities, stretching, strengthening, patient education, neuromuscular re-education, Mejia taping, massage, manual therapy and joint mobilization  Other planned therapy interventions: Kinesiotaping   Frequency: 2x week  Duration in weeks: 8  Treatment plan discussed with: patient and referring physician        Subjective Evaluation    History of Present Illness  Mechanism of injury: Patient reports that she adopted 3 children a year ago  She notes that she's had R elbow pain for the last few months  She notes that she has a 29 pound toddler  She attributes this to lifting her frequently  She reports R hand dominance  She notes that it's been a nagging pain  She notes an increased sensitivity to even bumping the arm  She notes that in the past month it's been significantly worse  She notes that they switched to a new car and she's been lifting her in/out of the car moreso about 1 week ago  She notes that before it was nagging and intermittent pain  She notes that the pain is more consistent in the past week  She notes that she did start taking the Naproxen yesterday and that has helped  She notes that prior to taking it, she was unable to sleep that night  She reports feeling better with pressure on the elbow  She notes typing is fine  She reports that writing and reaching to  shampoo bottles is difficult  She notes intermittent tingling in hand and in the forearm  She notes that she had L shoulder subluxation last year and 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[employer]   She notes that she is on sabbatical    Pain  Current pain ratin  At best pain ratin  At worst pain ratin  Location: lateral epicondyle   Quality: dull ache  Relieving factors: rest and medications  Aggravating factors: lifting and overhead activity  Progression: worsening    Social Support  Lives with: young children    Hand dominance: right      Diagnostic Tests  No diagnostic tests performed  Treatments  Previous treatment: medication  Current treatment: physical therapy  Patient Goals  Patient goals for therapy: increased motion, decreased pain, increased strength, independence with ADLs/IADLs and return to sport/leisure activities  Patient goal: I would like it to not hurt anymore; I'd like to learn to modify         Objective     Tenderness     Right Elbow   Tenderness in the lateral epicondyle  Right Wrist/Hand   Tenderness in the lateral epicondyle  Active Range of Motion   Left Shoulder   Normal active range of motion    Right Shoulder   Normal active range of motion    Left Elbow   Flexion: 140 degrees   Extension: 0 degrees   Forearm supination: 90 degrees   Forearm pronation: 80 degrees     Right Elbow   Flexion: 138 degrees   Extension: 10 (hyper extension) degrees   Forearm supination: 90 degrees   Forearm pronation: 70 degrees     Additional Active Range of Motion Details  R extension: 10 deg  Hyper extension       Strength/Myotome Testing     Left Shoulder     Planes of Motion   Flexion: 5   Abduction: 4+   External rotation at 0°: 4+   Internal rotation at 0°: 4+     Right Shoulder     Planes of Motion   Flexion: 5   Abduction: 4   External rotation at 0°: 4-   Internal rotation at 0°: 4-     Left Elbow   Flexion: 5  Extension: 5  Forearm supination: 5  Forearm pronation: 5    Right Elbow   Flexion: 4+  Extension: 4-  Forearm supination: 4-  Forearm pronation: 4    Left Wrist/Hand   Wrist extension: 5  Wrist flexion: 5     (2nd hand position)     Trial 1: 40    Trial 2: 45    Trial 3: 35    Right Wrist/Hand   Wrist extension: 4-  Wrist flexion: 4-     (2nd hand position)     Trial 1: 25    Trial 2: 30    Trial 3: 20        Diagnosis: R elbow pain/R lateral epicondylitis   Precautions: N/A   Manual Therapy 10/3/19       IASTM R lateral forearm        Kinesiotaping                                Exercise Diary  10/3/19       UBE        Elbow flexed wrist extensor stretch 10x10"       Prayer stretch        Elbow extended wrist extensor stretch        Eccentric wrist extension        Prone retraction        Prone row        Prone T        sidelying ER                                                                                                Modalities        Cyrosphere

## 2019-10-08 ENCOUNTER — OFFICE VISIT (OUTPATIENT)
Dept: PHYSICAL THERAPY | Facility: CLINIC | Age: 44
End: 2019-10-08
Payer: COMMERCIAL

## 2019-10-08 DIAGNOSIS — M25.521 ELBOW PAIN, RIGHT: Primary | ICD-10-CM

## 2019-10-08 PROCEDURE — 97110 THERAPEUTIC EXERCISES: CPT | Performed by: PHYSICAL THERAPIST

## 2019-10-08 PROCEDURE — 97112 NEUROMUSCULAR REEDUCATION: CPT | Performed by: PHYSICAL THERAPIST

## 2019-10-08 NOTE — PROGRESS NOTES
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 and she had shaking and increased pain with that  Objective: See treatment diary below      Assessment: Tolerated treatment well  Patient would benefit from continued PT  She was challenged with UBE this date requiring verbal cues for scapular retraction  She was able to tolerate passive stretching with elbow flexed  She had hypersensitivity at lateral epicondyle which decreased with cyrosphere  She was educated on tape wear time and removal technique and verbalized understanding  She was limited in positional tolerance this date including frequent adjustments and modifications based on muscle length tension relationship  Plan: Continue per plan of care  Progress stretching as tolerated        Diagnosis: R elbow pain/R lateral epicondylitis   Precautions: N/A   Manual Therapy 10/3/19 10/8/19      IASTM R lateral forearm  3' RS      Wrist Extensor Stretch  2' RS      Kinesiotaping  3' RS                              Exercise Diary  10/3/19 10/8/19      UBE  2'/2'      Elbow flexed wrist extensor stretch 10x10" 10x10"      Prayer stretch  5"x10      Elbow extended wrist extensor stretch        Eccentric wrist extension        Prone retraction        Prone row        Prone T        sidelying ER        Putty Squeeze  Yellow 3'                                                                                       Modalities  10/8/19      Cyrosphere  3' RS      CP  10' RS

## 2019-10-10 ENCOUNTER — OFFICE VISIT (OUTPATIENT)
Dept: PHYSICAL THERAPY | Facility: CLINIC | Age: 44
End: 2019-10-10
Payer: COMMERCIAL

## 2019-10-10 DIAGNOSIS — M25.521 ELBOW PAIN, RIGHT: Primary | ICD-10-CM

## 2019-10-10 PROCEDURE — 97140 MANUAL THERAPY 1/> REGIONS: CPT | Performed by: PHYSICAL THERAPIST

## 2019-10-10 PROCEDURE — 97110 THERAPEUTIC EXERCISES: CPT | Performed by: PHYSICAL THERAPIST

## 2019-10-10 PROCEDURE — 97112 NEUROMUSCULAR REEDUCATION: CPT | Performed by: PHYSICAL THERAPIST

## 2019-10-10 NOTE — PROGRESS NOTES
Daily Note     Today's date: 10/10/2019  Patient name: Cheikh Arias  : 1975  MRN: [id number]  Referring provider: Alvarado Ramos DO  Dx:   Encounter Diagnosis     ICD-10-CM    1  Elbow pain, right M25 521                   Subjective: Patient reports that she had relief with tape, but she did have a rash upon removing which has been improving  She notes that overall the elbow felt pretty good though yesterday  She notes that squeezing the shampoo bottle was painful washing the girls hair last night  Objective: See treatment diary below      Assessment: Tolerated treatment well  Patient would benefit from continued PT  She had increased exercise tolerance this date  We deferred taping this date due to skin sensitivity last application  She fatigued quickly, but had greater tolerance to exercise when pressure applied to mid substance of wrist extensors  She had decreased pain with TB ER and wall slides with towel when verbally cued for scapular retraction  Plan: Continue per plan of care        Diagnosis: R elbow pain/R lateral epicondylitis   Precautions: N/A   Manual Therapy 10/3/19 10/8/19 10/10/19     IASTM R lateral forearm  3' RS 4' RS     Wrist Extensor Stretch  2' RS 4' RS     Kinesiotaping  3' RS                              Exercise Diary  10/3/19 10/8/19 10/10/19     UBE  2'/2' 2'/2'     Elbow flexed wrist extensor stretch 10x10" 10x10"      Prayer stretch  5"x10      Wrist extension   AROM extension 20x with pressure on trigger point     Wrist Extension iso   20x red therabar     Elbow extended wrist extensor stretch        Eccentric wrist extension        TB Rows   Grn 20x     TB ER   Red 2x10             Prone retraction        Prone row        Prone T        sidelying ER        Putty Squeeze  Yellow 3'       Towel Wall Slides   10x                                                                             Modalities  10/8/19 10/10/19     Cyrosphere  3' RS 3' RS     CP  10' RS

## 2019-10-15 ENCOUNTER — OFFICE VISIT (OUTPATIENT)
Dept: PHYSICAL THERAPY | Facility: CLINIC | Age: 44
End: 2019-10-15
Payer: COMMERCIAL

## 2019-10-15 DIAGNOSIS — M25.521 ELBOW PAIN, RIGHT: Primary | ICD-10-CM

## 2019-10-15 PROCEDURE — 97140 MANUAL THERAPY 1/> REGIONS: CPT | Performed by: PHYSICAL THERAPIST

## 2019-10-15 PROCEDURE — 97110 THERAPEUTIC EXERCISES: CPT | Performed by: PHYSICAL THERAPIST

## 2019-10-15 NOTE — PROGRESS NOTES
Daily Note     Today's date: 10/15/2019  Patient name: Chintan Crockett  : 1975  MRN: [id number]  Referring provider: Brianna Hopkins DO  Dx:   Encounter Diagnosis     ICD-10-CM    1  Elbow pain, right M25 521                   Subjective: Patient reports that she did feel somewhat better for a few days, but had increased soreness over the weekend once the kids were home  She notes that her skin is still sensitive from the area that was taped, but it is improving  She notes that the arm does feel better with pressure  Objective: See treatment diary below      Assessment: Tolerated treatment well  Patient would benefit from continued PT  She tolerated increased IASTM to involved tissues this date with greatest restriction at distal wrist extensors  She did tolerate passive wrist extensor stretching with elbow extended this date  She does fatigue quickly, but was able to perform increased repetitions this date  She required modification for hammer  for wrist supination/pronaiton to decrease level arm to tolerate  With prone retraction she required tactile cues to avoid upper trap and elbow flexion, shoulder extension compensation during scapular retraction  She would benefit from slow progression of stretching, strengthening, and endurance training as tolerated  She tolerated CP applied with increased layers  Plan: Continue per plan of care         Diagnosis: R elbow pain/R lateral epicondylitis   Precautions: N/A   Manual Therapy 10/3/19 10/8/19 10/10/19 10/15/19    IASTM R lateral forearm  3' RS 4' RS 5' RS    Wrist Extensor Stretch  2' RS 4' RS 3' RS    Kinesiotaping  3' RS                              Exercise Diary  10/3/19 10/8/19 10/10/19 10/15/19    UBE  2'/2' 2'/2' 2'/2'    Elbow flexed wrist extensor stretch 10x10" 10x10"      Prayer stretch  5"x10      Wrist extension   AROM extension 20x with pressure on trigger point     Wrist Extension iso   20x red therabar 20x3" red therabar    Elbow extended wrist extensor stretch        Eccentric wrist extension        Wrist Pronation/Supination    15x hammer mid                     TB Rows   Grn 20x Grn 20x    TB ER   Red 2x10             Prone retraction    5"x15    Prone row        Prone T        sidelying ER        Putty Squeeze  Yellow 3'       Towel Wall Slides   10x                                                                             Modalities  10/8/19 10/10/19 10/15/19    Cyrosphere  3' RS 3' RS     CP  10' RS  10' RS

## 2019-10-17 ENCOUNTER — OFFICE VISIT (OUTPATIENT)
Dept: PHYSICAL THERAPY | Facility: CLINIC | Age: 44
End: 2019-10-17
Payer: COMMERCIAL

## 2019-10-17 DIAGNOSIS — M25.521 ELBOW PAIN, RIGHT: Primary | ICD-10-CM

## 2019-10-17 PROCEDURE — G0283 ELEC STIM OTHER THAN WOUND: HCPCS | Performed by: PHYSICAL THERAPIST

## 2019-10-17 PROCEDURE — 97140 MANUAL THERAPY 1/> REGIONS: CPT | Performed by: PHYSICAL THERAPIST

## 2019-10-17 PROCEDURE — 97014 ELECTRIC STIMULATION THERAPY: CPT | Performed by: PHYSICAL THERAPIST

## 2019-10-17 PROCEDURE — 97110 THERAPEUTIC EXERCISES: CPT | Performed by: PHYSICAL THERAPIST

## 2019-10-17 NOTE — PROGRESS NOTES
Daily Note     Today's date: 10/17/2019  Patient name: Gabrielle Mccarty  : 1975  MRN: [id number]  Referring provider: Ulices Longo DO  Dx:   Encounter Diagnosis     ICD-10-CM    1  Elbow pain, right M25 521                   Subjective: She notes that she was attempting to use a drill Tu night to mount a tv and had severe increase in pain  Objective: See treatment diary below      Assessment: Tolerated treatment well  Patient would benefit from continued PT  She was unable to tolerate bilateral  bicep curl with 3# this date  She does demonstrate decreased sensitivity to R elbow, but it does remain hyper sensitive  She has increased exercise tolerance with mild modifications in positioning  We trialed TENS this date applied proximal to the elbow and on the palm without skin reaction evident and decreased pain  She requires consistent verbal cues for form  She was able to perform TB Ws without difficulty this date  Plan: Continue per plan of care  Progress shoulder strengthening as able        Diagnosis: R elbow pain/R lateral epicondylitis   Precautions: N/A   Manual Therapy 10/3/19 10/8/19 10/10/19 10/15/19 10/17/19   IASTM R lateral forearm  3' RS 4' RS 5' RS 6' RS   Wrist Extensor Stretch  2' RS 4' RS 3' RS 2' RS   Kinesiotaping  3' RS      Posterior glide Ulna     Grade I-II 2' RS                   Exercise Diary  10/3/19 10/8/19 10/10/19 10/15/19 10/17/19   UBE  2'/2' 2'/2' 2'/2' 2'/2'   Elbow flexed wrist extensor stretch 10x10" 10x10"   10x10"   Prayer stretch  5"x10      Wrist extension   AROM extension 20x with pressure on trigger point     Wrist Extension iso   20x red therabar 20x3" red therabar    Elbow extended wrist extensor stretch        Eccentric wrist extension     Cane DL 2x10 eccentric slow   Wrist Pronation/Supination    15x hammer mid             Tricep Stretch     10x10"   TB Rows   Grn 20x Grn 20x    TB ER   Red 2x10             Prone retraction    5"x15 Prone row        Prone T        sidelying ER        Putty Squeeze  Yellow 3'       Towel Wall Slides   10x     TB W     2x10 Red                                                                   Modalities  10/8/19 10/10/19 10/15/19 10/17/19   Cyrosphere  3' RS 3' RS     CP  10' RS  10' RS 10' concurrent   TENS     Distal triceps, palm 10' RS

## 2019-10-22 ENCOUNTER — OFFICE VISIT (OUTPATIENT)
Dept: PHYSICAL THERAPY | Facility: CLINIC | Age: 44
End: 2019-10-22
Payer: COMMERCIAL

## 2019-10-22 DIAGNOSIS — M25.521 ELBOW PAIN, RIGHT: Primary | ICD-10-CM

## 2019-10-22 PROCEDURE — 97112 NEUROMUSCULAR REEDUCATION: CPT | Performed by: PHYSICAL THERAPIST

## 2019-10-22 PROCEDURE — 97140 MANUAL THERAPY 1/> REGIONS: CPT | Performed by: PHYSICAL THERAPIST

## 2019-10-22 PROCEDURE — 97110 THERAPEUTIC EXERCISES: CPT | Performed by: PHYSICAL THERAPIST

## 2019-10-22 NOTE — PROGRESS NOTES
Daily Note     Today's date: 10/22/2019  Patient name: Kelly Guerra  : 1975  MRN: [id number]  Referring provider: Dasha Caraballo DO  Dx:   Encounter Diagnosis     ICD-10-CM    1  Elbow pain, right M25 521                   Subjective: Patient reports that she has decreased sensitivity  She notes that it continues to be painful  She notes seeing the hand specialist tomorrow  Objective: See treatment diary below      Assessment: Tolerated treatment well  Patient would benefit from continued PT  She responded well to edema massage and IASTM this date with increase tissue extensibility  She is able to tolerate the exercises, but then has increased discomfort and subjective report of pain afterwards  She was more limited by nerve pain and tingling with elbow extension this date  She does demonstrate decreased sensitivity to touch in that region  Plan: Continue per plan of care  Modify POC as necessary following physician follow-up        Diagnosis: R elbow pain/R lateral epicondylitis   Precautions: N/A   Manual Therapy 10/22/19 10/8/19 10/10/19 10/15/19 10/17/19   IASTM R lateral forearm 6' RS 3' RS 4' RS 5' RS 6' RS   Wrist Extensor Stretch  2' RS 4' RS 3' RS 2' RS   Kinesiotaping  3' RS      Posterior glide Ulna     Grade I-II 2' RS   Soft tissue tricep offload 2' RS       Edema massage R forearm  4' RS                               Exercise Diary  10/22/19 10/8/19 10/10/19 10/15/19 10/17/19   UBE 2'/2' 2'/2' 2'/2' 2'/2' 2'/2'   Elbow flexed wrist extensor stretch  10x10"   10x10"   Prayer stretch  5"x10      Wrist extension   AROM extension 20x with pressure on trigger point     Wrist Extension iso   20x red therabar 20x3" red therabar    Elbow extended wrist extensor stretch        Eccentric wrist extension     Cane DL 2x10 eccentric slow   Wrist Pronation/Supination    15x hammer mid             Tricep Stretch     10x10"   TB Rows   Grn 20x Grn 20x    TB ER   Red 2x10             Prone retraction    5"x15    Prone row        Prone T        sidelying ER        Putty Squeeze  Yellow 3'       Towel Wall Slides   10x     TB W Red 20x    2x10 Red   Median nerve glides 10x       OH cane flexion AAROM 10x       Elbow flexion  AAROM 10x       Pec Stretch 10x10"                                       Modalities 10/22/19 10/8/19 10/10/19 10/15/19 10/17/19   Cyrosphere  3' RS 3' RS     CP 10' RS 10' RS  10' RS 10' concurrent   TENS     Distal triceps, palm 10' RS

## 2019-10-23 ENCOUNTER — APPOINTMENT (OUTPATIENT)
Dept: RADIOLOGY | Facility: AMBULARY SURGERY CENTER | Age: 44
End: 2019-10-23
Payer: COMMERCIAL

## 2019-10-23 ENCOUNTER — OFFICE VISIT (OUTPATIENT)
Dept: OBGYN CLINIC | Facility: CLINIC | Age: 44
End: 2019-10-23
Payer: COMMERCIAL

## 2019-10-23 ENCOUNTER — TELEPHONE (OUTPATIENT)
Dept: OBGYN CLINIC | Facility: HOSPITAL | Age: 44
End: 2019-10-23

## 2019-10-23 VITALS
SYSTOLIC BLOOD PRESSURE: 139 MMHG | BODY MASS INDEX: 20.83 KG/M2 | DIASTOLIC BLOOD PRESSURE: 80 MMHG | HEIGHT: 64 IN | WEIGHT: 122 LBS | HEART RATE: 80 BPM

## 2019-10-23 DIAGNOSIS — G56.01 CARPAL TUNNEL SYNDROME ON RIGHT: ICD-10-CM

## 2019-10-23 DIAGNOSIS — M77.11 LATERAL EPICONDYLITIS, RIGHT ELBOW: ICD-10-CM

## 2019-10-23 DIAGNOSIS — M25.521 PAIN IN RIGHT ELBOW: ICD-10-CM

## 2019-10-23 DIAGNOSIS — M77.11 LATERAL EPICONDYLITIS, RIGHT ELBOW: Primary | ICD-10-CM

## 2019-10-23 PROCEDURE — 99243 OFF/OP CNSLTJ NEW/EST LOW 30: CPT | Performed by: SURGERY

## 2019-10-23 PROCEDURE — 73080 X-RAY EXAM OF ELBOW: CPT

## 2019-10-23 RX ORDER — MELOXICAM 7.5 MG/1
7.5 TABLET ORAL DAILY
Qty: 30 TABLET | Refills: 3 | Status: SHIPPED | OUTPATIENT
Start: 2019-10-23 | End: 2020-01-06 | Stop reason: SDUPTHER

## 2019-10-23 RX ORDER — METHYLPREDNISOLONE 4 MG/1
TABLET ORAL
Qty: 21 EACH | Refills: 0 | Status: SHIPPED | OUTPATIENT
Start: 2019-10-23 | End: 2019-10-23 | Stop reason: SDUPTHER

## 2019-10-23 RX ORDER — METHYLPREDNISOLONE 4 MG/1
TABLET ORAL
Qty: 21 EACH | Refills: 0 | Status: SHIPPED | OUTPATIENT
Start: 2019-10-23 | End: 2019-12-10

## 2019-10-23 NOTE — PROGRESS NOTES
ASSESSMENT/PLAN:      52 y/o female with right elbow lateral epicondylitis and right sided CTS  Patient has been treating with formal PT/HEP for the past 2-3 weeks without much noted benefit  Instructed patient to continue with formal PT/HEP until next follow up visit  She will be placed in a volar wrist brace today in the office  May perform activities as tolerated  Avoid painful maneuvers  The patient verbalized understanding of exam findings and treatment plan  We engaged in the shared decision-making process and treatment options were discussed at length with the patient  Surgical and conservative management discussed today along with risks and benefits  Diagnoses and all orders for this visit:    Lateral epicondylitis, right elbow    Carpal tunnel syndrome on right        The anatomy and physiology of lateral epicondylitis was discussed with the patient today  Typically, a traumatic injury or repetitive use may cause a partial or complete tear of the extensor carpi radialis brevis muscle  This creates pain over the lateral epicondyle  This pain typically is made worse with palm down lifting activities as well as anything that involves strength and stability of the wrist   The pain may radiate from the wrist up to the elbow  At times, the shoulder may be weak as well which can predispose or cause continuation of the problem  Conservative treatment usually cures a majority of patients; however, this may take up to 6-9 months  Conservative treatment options typically include activity modification, therapy for strengthening of the shoulder and elbow, nocturnal wrist support splints, tennis elbow straps, and possible corticosteroid injections  Corticosteroid injections do not change the natural history of this process, may decrease the pain temporarily, and may increase the risk of recurrence  Surgery is required in fewer than 10% of patients        The anatomy and physiology of carpal tunnel syndrome was discussed with the patient today  Increase pressure localized under the transverse carpal ligament can cause pain, numbness, tingling, or dysesthesias within the median nerve distribution as well as feelings of fatigue, clumsiness, or awkwardness  These symptoms typically occur at night and worse in the morning upon waking  Eventually, untreated carpal tunnel syndrome can result in weakness and permanent loss of muscle within the thenar compartment of the hand  Treatment options were discussed with the patient  Conservative treatment includes nocturnal resting splints to keep the nerve in a neutral position, ergonomic changes within the work or home environment, activity modification, and tendon gliding exercises  Steroid injections within the carpal canal can help a majority of patients, however this is often self-limited in a majority of patients  Surgical intervention to divide the transverse carpal ligament typically results in a long-lasting relief of the patient's complaints, with the recurrence rate of less than 1%  Follow Up:  Return in about 4 weeks (around 11/20/2019) for Recheck of right elbow and carpal tunnel symptoms  To Do Next Visit:  Re-evaluation of current issue  Possible cortisone injection into lateral epicondyle if symptoms persist    ____________________________________________________________________________________________________________________________________________      CHIEF COMPLAINT:  Chief Complaint   Patient presents with    Right Elbow - Pain       SUBJECTIVE:  Yamil Jonas is a 53y o  year old RHD female who presents today for an orthopedic consultation visit from her PCP (Dr Tom Mathew) on 10/2/19 for right elbow pain  Patient has been performing formal physical therapy on the diagnosis of lateral epicondylitis of the right elbow    Patient reports that her symptoms began about 2-3 months ago and mentions that she recently adopted a 2 yr child and is constantly lifting and holding her  Patient states that she has not received much benefit from formal PT/HEP since starting  She is localizing her symptoms about the lateral aspect of the elbow  Symptoms are worse with gripping and wrist extension exercises  She is also noting intermittent numbness and tingling from her wrist to her first 3 digits  She states that her pain started at the same time her right elbow symptoms began  I have personally reviewed all the relevant PMH, PSH, SH, FH, Medications and allergies  PAST MEDICAL HISTORY:  Past Medical History:   Diagnosis Date    Allergic     seasonal, food allergies    Asthma     GERD (gastroesophageal reflux disease)     Hypertension     Irritable bowel syndrome     Migraine without aura     Shoulder subluxation, left        PAST SURGICAL HISTORY:  Past Surgical History:   Procedure Laterality Date    TONSILLECTOMY      WRIST SURGERY         FAMILY HISTORY:  Family History   Problem Relation Age of Onset    Stroke Mother         CEREBROVASCULAR ACCIDENT (CVA), LISTED 2X    Hypertension Mother     Hyperlipidemia Mother     Hypertension Father     Hyperlipidemia Father     Cervical cancer Neg Hx     Colon cancer Neg Hx     Ovarian cancer Neg Hx     Uterine cancer Neg Hx        SOCIAL HISTORY:  Social History     Tobacco Use    Smoking status: Never Smoker    Smokeless tobacco: Never Used   Substance Use Topics    Alcohol use:  Yes     Alcohol/week: 1 0 standard drinks     Types: 1 Glasses of wine per week     Frequency: Monthly or less     Comment: socially     Drug use: Never       MEDICATIONS:    Current Outpatient Medications:     albuterol (PROAIR HFA) 90 mcg/act inhaler, Inhale, Disp: , Rfl:     Ascorbic Acid (VITAMIN C) 500 MG CAPS, Take by mouth, Disp: , Rfl:     Calcium 250 MG CAPS, Take by mouth, Disp: , Rfl:     desogestrel-ethinyl estradiol (VIORELE) 0 15-0 02/0 01 MG (21/5) per tablet, Take 1 tablet by mouth daily, Disp: 112 tablet, Rfl: 3    EPINEPHrine (EPIPEN) 0 3 mg/0 3 mL SOAJ, , Disp: , Rfl:     fexofenadine (ALLEGRA ALLERGY) 180 MG tablet, Take by mouth, Disp: , Rfl:     lisinopril (ZESTRIL) 5 mg tablet, Take 1 tablet (5 mg total) by mouth daily, Disp: 90 tablet, Rfl: 1    Multiple Vitamin (MULTI-VITAMIN DAILY) TABS, Take by mouth, Disp: , Rfl:     naproxen (NAPROSYN) 500 mg tablet, Take 1 tablet (500 mg total) by mouth 2 (two) times a day with meals, Disp: 28 tablet, Rfl: 0    Omeprazole 20 MG TBEC, Take 1 tablet (20 mg total) by mouth daily, Disp: 90 tablet, Rfl: 0    ALLERGIES:  Allergies   Allergen Reactions    Cat Hair Extract     Erythromycin     Hctz [Hydrochlorothiazide] Dizziness    Nuts     Seasonal Ic  [Cholestatin]     Latex Rash       REVIEW OF SYSTEMS:  Review of Systems   Constitutional: Negative for chills, fever and unexpected weight change  HENT: Negative for hearing loss, nosebleeds and sore throat  Eyes: Negative for pain, redness and visual disturbance  Respiratory: Negative for cough, shortness of breath and wheezing  Cardiovascular: Negative for chest pain, palpitations and leg swelling  Gastrointestinal: Negative for abdominal distention, nausea and vomiting  Endocrine: Negative for polydipsia and polyuria  Genitourinary: Negative for dysuria and hematuria  Musculoskeletal: Positive for arthralgias  Skin: Negative for rash and wound  Neurological: Negative for dizziness, numbness and headaches  Psychiatric/Behavioral: Negative for decreased concentration and suicidal ideas         VITALS:  Vitals:    10/23/19 1410   BP: 139/80   Pulse: 80       LABS:  HgA1c: No results found for: HGBA1C  BMP:   Lab Results   Component Value Date    CALCIUM 8 8 02/25/2019    K 3 8 02/25/2019    CO2 28 02/25/2019     02/25/2019    BUN 12 02/25/2019    CREATININE 0 76 02/25/2019       _____________________________________________________  PHYSICAL EXAMINATION:  General: well developed and well nourished, alert, oriented times 3 and appears comfortable  Psychiatric: Normal  HEENT: Normocephalic, Atraumatic Trachea Midline, No torticollis  Pulmonary: No audible wheezing or respiratory distress   Cardiovascular: No pitting edema, 2+ radial pulse   Skin: No masses, erythema, lacerations, fluctation, ulcerations  Neurovascular: Sensation Intact to the Median, Ulnar, Radial Nerve, Motor Intact to the Median, Ulnar, Radial Nerve and Pulses Intact  Musculoskeletal: Normal, except as noted in detailed exam and in HPI  MUSCULOSKELETAL EXAMINATION:    Right Elbow:    No swelling or deformity  Full range of motion with flexion, extension, supination and pronation  Positive tenderness to palpation over the Lateral Epicondyle  No pain with passive flexion of the wrist with elbow fully extended  Pain with resisted wrist extension with elbow fully extended  Pain with resisted forearm supination with the elbow fully extended  Negative tinels over the ulnar nerve at the elbow  Right Carpal Tunnel Exam:    Negative thenar atrophy  Negative phalen's test  Negative carpal tunnel compression  Positive tinels over median nerve at the wrist   Opposition strength 5/5  Abduction strength 5/5               ___________________________________________________  STUDIES REVIEWED:  I have personally reviewed AP lateral and oblique radiographs of right elbow which demonstrate no acute osseous abnormality or degenerative changes              PROCEDURES PERFORMED:  Procedures  No Procedures performed today    _____________________________________________________      Nava Stewart    I,:   Татьяна Jimenez am acting as a scribe while in the presence of the attending physician :        I,:   Tania Cavazos MD personally performed the services described in this documentation    as scribed in my presence :

## 2019-10-23 NOTE — TELEPHONE ENCOUNTER
Patient is calling stating that the script should have gone to [facility] but [facility]

## 2019-10-24 ENCOUNTER — OFFICE VISIT (OUTPATIENT)
Dept: PHYSICAL THERAPY | Facility: CLINIC | Age: 44
End: 2019-10-24
Payer: COMMERCIAL

## 2019-10-24 DIAGNOSIS — M25.521 ELBOW PAIN, RIGHT: Primary | ICD-10-CM

## 2019-10-24 PROCEDURE — 97110 THERAPEUTIC EXERCISES: CPT | Performed by: PHYSICAL THERAPIST

## 2019-10-24 PROCEDURE — 97014 ELECTRIC STIMULATION THERAPY: CPT | Performed by: PHYSICAL THERAPIST

## 2019-10-24 PROCEDURE — G0283 ELEC STIM OTHER THAN WOUND: HCPCS | Performed by: PHYSICAL THERAPIST

## 2019-10-24 NOTE — PROGRESS NOTES
Daily Note     Today's date: 10/24/2019  Patient name: Jacinta Bradley  : 1975  MRN: [id number]  Referring provider: Elsa Garcia DO  Dx:   Encounter Diagnosis     ICD-10-CM    1  Elbow pain, right M25 521                   Subjective: Patient reports that she had her appointment with Dr Zandra Bright  She notes that she was prescribed steroid/antiinflammatory for afterwards and a night splint  She notes that she was able to sleep through the night with the splint, but had increased soreness coming out of it  Objective: See treatment diary below      Assessment: Tolerated treatment well  Patient would benefit from continued PT  She had increased tolerance with hand in neutral  She was able to perform cane press AAROM  She does continue to tolerate shoulder exercises better depending on positioning vs  Isolated wrist/elbow exercises  We have deferred resisted elbow/wrist exercises this date  She was challenged with and wrist rotational movements, even in transition  Plan: Continue per plan of care  Progress AAROM of elbow and wrist gripping as able        Diagnosis: R elbow pain/R lateral epicondylitis   Precautions: N/A   Manual Therapy 10/22/19 10/24/19 10/10/19 10/15/19 10/17/19   IASTM R lateral forearm 6' RS 4' RS 4' RS 5' RS 6' RS   Wrist Extensor Stretch   4' RS 3' RS 2' RS   Kinesiotaping        Posterior glide Ulna     Grade I-II 2' RS   Soft tissue tricep offload 2' RS       Edema massage R forearm  4' RS 4' RS                              Exercise Diary  10/22/19 10/24/19 10/10/19 10/15/19 10/17/19   UBE 2'/2' 2'/2' 2'/2' 2'/2' 2'/2'   Elbow flexed wrist extensor stretch     10x10"   Prayer stretch        Wrist extension   AROM extension 20x with pressure on trigger point     Wrist Extension iso   20x red therabar 20x3" red therabar    Elbow extended wrist extensor stretch        Eccentric wrist extension     Cane DL 2x10 eccentric slow   Wrist Pronation/Supination    15x hammer mid  Tricep Stretch     10x10"   TB Rows   Grn 20x Grn 20x    TB ER   Red 2x10             Prone retraction   2x5 5"x15    Prone row        Prone T        sidelying ER  2x5      Putty Squeeze        Towel Wall Slides   10x     TB W Red 20x    2x10 Red   Median nerve glides 10x       OH cane flexion AAROM 10x 10x       Elbow flexion  AAROM 10x 10x      Cane press  10x      Pec Stretch 10x10"                                       Modalities 10/22/19 10/24/19 10/10/19 10/15/19 10/17/19   Cyrosphere   3' RS     CP 10' RS 10' RS  10' RS 10' concurrent   TENS  10' RS   Distal triceps, palm 10' RS

## 2019-10-29 ENCOUNTER — EVALUATION (OUTPATIENT)
Dept: PHYSICAL THERAPY | Facility: CLINIC | Age: 44
End: 2019-10-29
Payer: COMMERCIAL

## 2019-10-29 DIAGNOSIS — M25.521 ELBOW PAIN, RIGHT: Primary | ICD-10-CM

## 2019-10-29 PROCEDURE — G0283 ELEC STIM OTHER THAN WOUND: HCPCS | Performed by: PHYSICAL THERAPIST

## 2019-10-29 PROCEDURE — 97110 THERAPEUTIC EXERCISES: CPT | Performed by: PHYSICAL THERAPIST

## 2019-10-29 PROCEDURE — 97014 ELECTRIC STIMULATION THERAPY: CPT | Performed by: PHYSICAL THERAPIST

## 2019-10-29 PROCEDURE — 97140 MANUAL THERAPY 1/> REGIONS: CPT | Performed by: PHYSICAL THERAPIST

## 2019-10-29 NOTE — PROGRESS NOTES
PT Re-Evaluation     Today's date: 10/29/2019  Patient name: Kentrell Palmer  : 1975  MRN: [id number]  Referring provider: Marianne Conde DO  Dx:   Encounter Diagnosis     ICD-10-CM    1  Elbow pain, right M25 521                   Assessment  Assessment details: Patient is a 37year old female with signs and symptoms consistent with R elbow pain and lateral epicondylitis resulting in difficulty with ADLS and functional activities  She does demonstrate improved ROM and flexibility with decreased sensitivity in the region  She does demonstrate mild strength gains of her elbow/wrist  She recently started with steroid dose pack and night splint which has helped significantly allowing her to have increased activity and exercise tolerance  She does remain limited in endurance of involved musculature  She would benefit from continued skilled PT to address these residual deficits and improve overall function as she is a single mother of 3 and requires frequent and repetitive use of RUE for care taking and ADLs  Impairments: activity intolerance, impaired physical strength, lacks appropriate home exercise program and pain with function  Barriers to therapy: History of L shoulder subluxation; 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   Understanding of Dx/Px/POC: good   Prognosis: good    Goals  ST  Decrease pain at worst to 4/10 in 4 weeks  - Progressing  2  Able to tolerate wrist flexion with elbow extended in 4 weeks  - Partially Met    LT  Increase R  strength to symmetrical with L in 8 weeks  - Progressing  2  Able to tolerate lifting her daughter into her car seat without increase in pain in 8 weeks  - Progressing  3  Independent in HEP in 8 weeks  Plan  Plan details: Taper POC to 1x/week as appropriate     Patient would benefit from: skilled physical therapy  Planned modality interventions: cryotherapy, TENS and thermotherapy: hydrocollator packs  Other planned modality interventions: Cyrosphere  Planned therapy interventions: flexibility, functional ROM exercises, home exercise program, therapeutic exercise, therapeutic activities, stretching, strengthening, patient education, neuromuscular re-education, Mejia taping, massage, manual therapy and joint mobilization  Frequency: 2x week  Duration in weeks: 8  Treatment plan discussed with: patient and referring physician        Subjective Evaluation    Pain  Current pain ratin  At best pain ratin  At worst pain ratin  Location: lateral epicondyle   Quality: dull ache  Relieving factors: rest and medications  Aggravating factors: lifting and overhead activity  Progression: improved    Social Support  Lives with: young children    Hand dominance: right      Diagnostic Tests  No diagnostic tests performed  Treatments  Previous treatment: medication  Current treatment: physical therapy  Patient Goals  Patient goals for therapy: increased motion, decreased pain, increased strength, independence with ADLs/IADLs and return to sport/leisure activities  Patient goal: I would like it to not hurt anymore; I'd like to learn to modify - Progressing     Patient reports 70% improvement since start of PT  She notes that in the past week, she has seen great improvement since starting the medication and the night splint  She notes that her daughter was up all night last night so she did not wear the splint much last night  She notes that she only had intermittent twinges sometimes vs  Pain all the time before starting it  She notes that she has been able to tolerate doing her stretches slightly more frequently with less irritation  She notes that she is starting with skin irritation from the splint  She notes that she is holding the arm and cradling it more  Objective     Tenderness     Right Elbow   Tenderness in the lateral epicondyle  Right Wrist/Hand   Tenderness in the lateral epicondyle       Active Range of Motion   Left Shoulder Normal active range of motion    Right Shoulder   Normal active range of motion    Left Elbow   Flexion: 140 degrees   Extension: 0 degrees   Forearm supination: 90 degrees   Forearm pronation: 80 degrees     Right Elbow   Flexion: 135 degrees   Extension: 0 degrees   Forearm supination: 90 degrees   Forearm pronation: 70 degrees     Additional Active Range of Motion Details  R extension: 10 deg  Hyper extension       Strength/Myotome Testing     Left Shoulder     Planes of Motion   Flexion: 5   Abduction: 4+   External rotation at 0°: 4+   Internal rotation at 0°: 4+     Right Shoulder     Planes of Motion   Flexion: 5   Abduction: 4   External rotation at 0°: 4-   Internal rotation at 0°: 4-     Left Elbow   Flexion: 5  Extension: 5  Forearm supination: 5  Forearm pronation: 5    Right Elbow   Flexion: 4+  Extension: 4  Forearm supination: 4  Forearm pronation: 4    Left Wrist/Hand   Wrist extension: 5  Wrist flexion: 5     (2nd hand position)     Trial 1: 40    Trial 2: 55    Trial 3: 45    Right Wrist/Hand   Wrist extension: 4  Wrist flexion: 4     (2nd hand position)     Trial 1: 40    Trial 2: 30    Trial 3: 30      Flowsheet Rows      Most Recent Value   PT/OT G-Codes   Current Score  63   Projected Score  75   FOTO information reviewed  Yes        Diagnosis: R elbow pain/R lateral epicondylitis   Precautions: N/A   Manual Therapy 10/22/19 10/24/19 10/29/19 10/15/19 10/17/19   IASTM R lateral forearm 6' RS 4' RS 5' RS 5' RS 6' RS   Wrist Extensor Stretch   3' RS 3' RS 2' RS   Kinesiotaping        Posterior glide Ulna     Grade I-II 2' RS   Soft tissue tricep offload 2' RS       Edema massage R forearm  4' RS 4' RS 2' RS                     Re-Evaluation   20' RS     Exercise Diary  10/22/19 10/24/19 10/29/19 10/15/19 10/17/19   UBE 2'/2' 2'/2'  2'/2' 2'/2'   Elbow flexed wrist extensor stretch     10x10"   Prayer stretch        Wrist extension        Wrist Extension iso    20x3" red therabar    Elbow extended wrist extensor stretch        Eccentric wrist extension     Cane DL 2x10 eccentric slow   Wrist Pronation/Supination    15x hammer mid             Tricep Stretch     10x10"   TB Rows    Grn 20x    TB ER                Prone retraction    5"x15    Prone row        Prone T        sidelying ER  2x5      Putty Squeeze        Towel Wall Slides        TB W Red 20x    2x10 Red   Median nerve glides 10x       OH cane flexion AAROM 10x 10x       Elbow flexion  AAROM 10x 10x Cane 2# 15xea  Supinated ,   Pronated        Cane press  10x      Pec Stretch 10x10"                                       Modalities 10/22/19 10/24/19 10/29/19 10/15/19 10/17/19   Cyrosphere        CP 10' RS 10' RS 10' concurrent RS 10' RS 10' concurrent   TENS  10' RS 10' RS  Distal triceps, palm 10' RS

## 2019-11-01 ENCOUNTER — OFFICE VISIT (OUTPATIENT)
Dept: PHYSICAL THERAPY | Facility: CLINIC | Age: 44
End: 2019-11-01
Payer: COMMERCIAL

## 2019-11-01 DIAGNOSIS — M25.521 ELBOW PAIN, RIGHT: Primary | ICD-10-CM

## 2019-11-01 PROCEDURE — 97140 MANUAL THERAPY 1/> REGIONS: CPT | Performed by: PHYSICAL THERAPIST

## 2019-11-01 PROCEDURE — 97014 ELECTRIC STIMULATION THERAPY: CPT | Performed by: PHYSICAL THERAPIST

## 2019-11-01 PROCEDURE — 97112 NEUROMUSCULAR REEDUCATION: CPT | Performed by: PHYSICAL THERAPIST

## 2019-11-01 PROCEDURE — 97110 THERAPEUTIC EXERCISES: CPT | Performed by: PHYSICAL THERAPIST

## 2019-11-01 PROCEDURE — G0283 ELEC STIM OTHER THAN WOUND: HCPCS | Performed by: PHYSICAL THERAPIST

## 2019-11-01 NOTE — PROGRESS NOTES
Daily Note     Today's date: 2019  Patient name: Cheikh Arias  : 1975  MRN: [id number]  Referring provider: Alvarado Ramos DO  Dx:   Encounter Diagnosis     ICD-10-CM    1  Elbow pain, right M25 521                   Subjective: Patient reports that since starting medication and splint, the pain at worst has been 2/10  She notes that she some soreness in the wrist extensors Tuesday night  She notes that its much more manageable with lifting the baby and pouring, etc        Objective: See treatment diary below      Assessment: Tolerated treatment well  Patient would benefit from continued PT  She tolerated today's session well without increase in subjective report of pain  She remains more limited by increased paresthesias in certain positions which resolves quickly by getting out of the position  She needed cues for scapular retraction/depression to avoid upper trap compensation  She fatigued with prone rows/Ts and would benefit from continued scapular strengthening to provide stability for all reaching/lifting to offload distal musculature such as wrist extensors  Plan: Continue per plan of care  Progress shoulder strengthening and reinitiate wrist strengthening as able        Diagnosis: R elbow pain/R lateral epicondylitis   Precautions: N/A   Manual Therapy 10/22/19 10/24/19 10/29/19 11/1/19 10/17/19   IASTM R lateral forearm 6' RS 4' RS 5' RS 5' RS 6' RS   Wrist Extensor Stretch   3' RS 3' RS 2' RS   Kinesiotaping        Posterior glide Ulna     Grade I-II 2' RS   Soft tissue tricep offload 2' RS       Edema massage R forearm  4' RS 4' RS 2' RS                     Re-Evaluation   20' RS     Exercise Diary  10/22/19 10/24/19 10/29/19 10/31/19 10/17/19   UBE 2'/2' 2'/2'  2'/2' 2'/2'   Elbow flexed wrist extensor stretch     10x10"   Prayer stretch        Wrist extension        Wrist Extension iso        Elbow extended wrist extensor stretch        Eccentric wrist extension     Cane DL 2x10 eccentric slow   Wrist Pronation/Supination                Tricep Stretch    10x10" 10x10"   TB Rows    Blue 2x10    TB ER                Prone retraction    5"x10    Prone row    10x    Prone T    10x    sidelying ER  2x5  10x    Putty Squeeze        Towel Wall Slides        TB W Red 20x    2x10 Red   Median nerve glides 10x       OH cane flexion AAROM 10x 10x       Elbow flexion  AAROM 10x 10x Cane 2# 15xea  Supinated ,   Pronated        Cane press  10x      Pec Stretch 10x10"   10x10"                                    Modalities 10/22/19 10/24/19 10/29/19 11/1/19 10/17/19   Cyrosphere        CP 10' RS 10' RS 10' concurrent RS 10' concurrent RS 10' concurrent   TENS  10' RS 10' RS 10' RS Distal triceps, palm 10' RS

## 2019-11-04 ENCOUNTER — OFFICE VISIT (OUTPATIENT)
Dept: PHYSICAL THERAPY | Facility: CLINIC | Age: 44
End: 2019-11-04
Payer: COMMERCIAL

## 2019-11-04 DIAGNOSIS — M25.521 ELBOW PAIN, RIGHT: Primary | ICD-10-CM

## 2019-11-04 PROCEDURE — 97110 THERAPEUTIC EXERCISES: CPT | Performed by: PHYSICAL THERAPIST

## 2019-11-04 PROCEDURE — 97140 MANUAL THERAPY 1/> REGIONS: CPT | Performed by: PHYSICAL THERAPIST

## 2019-11-04 PROCEDURE — 97112 NEUROMUSCULAR REEDUCATION: CPT | Performed by: PHYSICAL THERAPIST

## 2019-11-04 NOTE — PROGRESS NOTES
Daily Note     Today's date: 2019  Patient name: Felecia Junior  : 1975  MRN: [id number]  Referring provider: Lilly Moreno DO  Dx:   Encounter Diagnosis     ICD-10-CM    1  Elbow pain, right M25 521                   Subjective: She notes that she finished the steroid on Thursday  She notes that pain returned on Saturday  She notes that it's not as bad as it was though  She notes that the pain is more like a cramp/spasm in the lateral forearm, not as the insertion  She notes that she did start taking the anti-inflammatory as instructed  Objective: See treatment diary below      Assessment: Tolerated treatment well  Patient would benefit from continued PT  She demonstrates greater restriction of proximal wrist extensors which responded well to IASTM and STM with improved soft tissue mobility evident  She had greater tolerance of wrist extensor stretch with elbow flexed vs  Extended  She responded to pronator self stretch and also had decreased spasm with eccentric wrist pronation  She was more limited due to subjective report of pain throughout session as she has stopped taking her steroid  Plan: Continue per plan of care        Diagnosis: R elbow pain/R lateral epicondylitis   Precautions: N/A   Manual Therapy 10/22/19 10/24/19 10/29/19 11/1/19 11/4/19   IASTM R lateral forearm 6' RS 4' RS 5' RS 5' RS 5' RS   Wrist Extensor Stretch   3' RS 3' RS    Kinesiotaping        Posterior glide Ulna        Soft tissue tricep offload 2' RS       Edema massage R forearm  4' RS 4' RS 2' RS  3' RS                   Re-Evaluation   20' RS     Exercise Diary  10/22/19 10/24/19 10/29/19 10/31/19 11/4/19   UBE 2'/2' 2'/2'  2'/2' 2'/2'   Elbow flexed wrist extensor stretch     10x10"   Prayer stretch        Wrist extension        Wrist Extension iso        Elbow extended wrist extensor stretch        Eccentric wrist extension        Wrist Pronation/Supination                Tricep Stretch    10x10"    TB Rows Blue 2x10    TB ER                Prone retraction    5"x10    Prone row    10x    Prone T    10x    sidelying ER  2x5  10x    Putty Squeeze        Towel Wall Slides        TB W Red 20x       Median nerve glides 10x       OH cane flexion AAROM 10x 10x       Elbow flexion  AAROM 10x 10x Cane 2# 15xea  Supinated ,   Pronated     Cane 10x    Cane extension     10x   Cane press  10x      Pec Stretch 10x10"   10x10"    Pronation self stretch     10x10"   Eccentric wrist pronation     10x                   Modalities 10/22/19 10/24/19 10/29/19 11/1/19 11/4/19   Cyrosphere        HP     10' concurrent   CP 10' RS 10' RS 10' concurrent RS 10' concurrent RS    TENS  10' RS 10' RS 10' RS 10' RS

## 2019-11-07 ENCOUNTER — OFFICE VISIT (OUTPATIENT)
Dept: PHYSICAL THERAPY | Facility: CLINIC | Age: 44
End: 2019-11-07
Payer: COMMERCIAL

## 2019-11-07 DIAGNOSIS — M25.521 ELBOW PAIN, RIGHT: Primary | ICD-10-CM

## 2019-11-07 PROCEDURE — 97014 ELECTRIC STIMULATION THERAPY: CPT | Performed by: PHYSICAL THERAPIST

## 2019-11-07 PROCEDURE — 97112 NEUROMUSCULAR REEDUCATION: CPT | Performed by: PHYSICAL THERAPIST

## 2019-11-07 PROCEDURE — G0283 ELEC STIM OTHER THAN WOUND: HCPCS | Performed by: PHYSICAL THERAPIST

## 2019-11-07 PROCEDURE — 97110 THERAPEUTIC EXERCISES: CPT | Performed by: PHYSICAL THERAPIST

## 2019-11-07 NOTE — PROGRESS NOTES
Daily Note     Today's date: 2019  Patient name: Giovanna Brock  : 1975  MRN: [id number]  Referring provider: Roseann Guevara DO  Dx:   Encounter Diagnosis     ICD-10-CM    1  Elbow pain, right M25 521                   Subjective: Patient reports that it's sore and "twingy" in the forearm muscles, but not as painful as it was before the steroid medication  Objective: See treatment diary below      Assessment: Tolerated treatment well  Patient would benefit from continued PT  She remains challenged with shoulder strengthening when required to   She was able to perform isometric rotator cuff activation at overhead range with open   She does continue to respond well to IASTM, with greatest restriction detected in mid belly of wrist extensors vs  Origin  She remains limited in overall endurance of involved musculature  Plan: Continue per plan of care  Progress nerve glides as able        Diagnosis: R elbow pain/R lateral epicondylitis   Precautions: N/A   Manual Therapy 11/7/19 10/24/19 10/29/19 11/1/19 11/4/19   IASTM R lateral forearm 5' RS 4' RS 5' RS 5' RS 5' RS   Wrist Extensor Stretch   3' RS 3' RS    Kinesiotaping        Posterior glide Ulna        Soft tissue tricep offload        Edema massage R forearm  3' RS 4' RS 2' RS  3' RS                   Re-Evaluation   20' RS     Exercise Diary  11/7/19 10/24/19 10/29/19 10/31/19 11/4/19   UBE 2'/2' 2'/2'  2'/2' 2'/2'   Elbow flexed wrist extensor stretch 10x10"    10x10"   Prayer stretch        Wrist extension        Wrist Extension iso        Elbow extended wrist extensor stretch        Eccentric wrist extension        Wrist Pronation/Supination        Bent Over Row 15x cane b/l 5#       Tricep Stretch    10x10"    TB Rows    Blue 2x10    TB ER Grn 10x       TB IR Grn 10x                       Prone retraction    5"x10    Prone row    10x    Prone T    10x    sidelying ER  2x5  10x    Putty Squeeze        Towel Wall Slides        TB W        Median nerve glides        OH cane flexion AAROM  10x       Elbow flexion   10x Cane 2# 15xea  Supinated ,   Pronated     Cane 10x    Cane extension     10x   Cane press  10x      Pec Stretch    10x10"    Pronation self stretch     10x10"   Eccentric wrist pronation 10x    10x   ER iso at 90 abd 5" 2x5               Modalities 11/7/19 10/24/19 10/29/19 11/1/19 11/4/19   Cyrosphere        HP 10' concurrent    10' concurrent   CP  10' RS 10' concurrent RS 10' concurrent RS    TENS 10' RS  10' RS 10' RS 10' RS 10' RS

## 2019-11-09 ENCOUNTER — APPOINTMENT (OUTPATIENT)
Dept: RADIOLOGY | Facility: CLINIC | Age: 44
End: 2019-11-09
Payer: COMMERCIAL

## 2019-11-09 ENCOUNTER — OFFICE VISIT (OUTPATIENT)
Dept: URGENT CARE | Facility: CLINIC | Age: 44
End: 2019-11-09
Payer: COMMERCIAL

## 2019-11-09 VITALS
BODY MASS INDEX: 22.61 KG/M2 | SYSTOLIC BLOOD PRESSURE: 131 MMHG | HEART RATE: 87 BPM | OXYGEN SATURATION: 99 % | RESPIRATION RATE: 14 BRPM | HEIGHT: 63 IN | WEIGHT: 127.6 LBS | TEMPERATURE: 98.1 F | DIASTOLIC BLOOD PRESSURE: 93 MMHG

## 2019-11-09 DIAGNOSIS — S93.401A SPRAIN OF RIGHT ANKLE, UNSPECIFIED LIGAMENT, INITIAL ENCOUNTER: Primary | ICD-10-CM

## 2019-11-09 DIAGNOSIS — S93.401A SPRAIN OF RIGHT ANKLE, UNSPECIFIED LIGAMENT, INITIAL ENCOUNTER: ICD-10-CM

## 2019-11-09 PROCEDURE — 99213 OFFICE O/P EST LOW 20 MIN: CPT | Performed by: NURSE PRACTITIONER

## 2019-11-09 PROCEDURE — 73610 X-RAY EXAM OF ANKLE: CPT

## 2019-11-09 NOTE — PROGRESS NOTES
[facility]        NAME: Misty Vela is a 53 y o  female  : 1975    MRN: [id number]  DATE: 2019  TIME: 3:05 PM    Assessment and Plan   1  Sprain of right ankle, unspecified ligament, initial encounter  - XR ankle 3+ vw right; Future  Preliminary xray reviewed  No apparent abnormality, however awaiting final report from radiologist  Pt made aware of same  Ace wrap, air cast, nsaids, ice  Patient Instructions   Preliminary xray reviewed  No apparent abnormality, however awaiting final report from radiologist    Ace bandage and air cast    Ibuprofen or Aleve as needed  Ice to right ankle several times a day  Follow up with PCP in 7-10 days if not improving  Chief Complaint     Ankle injury    History of Present Illness       Pt presents with right ankle pain  Was carrying daughter down the stairs this Am, and missed a step  Ankle has been progressively getting worse as the day went on  Pain is posterior to lateral malleolus when she is bearing weight  Slight swelling noted posterior to lateral malleolus  No bruising noted  Review of Systems   Review of Systems   Constitutional: Negative for chills, diaphoresis and fever  HENT: Negative  Respiratory: Negative for cough and shortness of breath  Cardiovascular: Negative for chest pain and palpitations  Gastrointestinal: Negative  Genitourinary: Negative  Musculoskeletal: Positive for arthralgias (left ankle)  Psychiatric/Behavioral: Negative            Current Medications       Current Outpatient Medications:     albuterol (PROAIR HFA) 90 mcg/act inhaler, Inhale, Disp: , Rfl:     Ascorbic Acid (VITAMIN C) 500 MG CAPS, Take by mouth, Disp: , Rfl:     Calcium 250 MG CAPS, Take by mouth, Disp: , Rfl:     desogestrel-ethinyl estradiol (VIORELE) 0 15-0 02/0 01 MG () per tablet, Take 1 tablet by mouth daily, Disp: 112 tablet, Rfl: 3    EPINEPHrine (EPIPEN) 0 3 mg/0 3 mL SOAJ, , Disp: , Rfl:   fexofenadine (ALLEGRA ALLERGY) 180 MG tablet, Take by mouth, Disp: , Rfl:     lisinopril (ZESTRIL) 5 mg tablet, Take 1 tablet (5 mg total) by mouth daily, Disp: 90 tablet, Rfl: 1    meloxicam (MOBIC) 7 5 mg tablet, Take 1 tablet (7 5 mg total) by mouth daily, Disp: 30 tablet, Rfl: 3    methylPREDNISolone 4 MG tablet therapy pack, Use as directed on package, Disp: 21 each, Rfl: 0    Multiple Vitamin (MULTI-VITAMIN DAILY) TABS, Take by mouth, Disp: , Rfl:     naproxen (NAPROSYN) 500 mg tablet, Take 1 tablet (500 mg total) by mouth 2 (two) times a day with meals, Disp: 28 tablet, Rfl: 0    Omeprazole 20 MG TBEC, Take 1 tablet (20 mg total) by mouth daily, Disp: 90 tablet, Rfl: 0    Current Allergies     Allergies as of 11/09/2019 - Reviewed 11/09/2019   Allergen Reaction Noted    Cat hair extract  05/23/2017    Erythromycin  02/28/2014    Hctz [hydrochlorothiazide] Dizziness 05/04/2018    Nuts  10/03/2019    Seasonal ic  [cholestatin]  05/23/2017    Latex Rash 02/09/2018            The following portions of the patient's history were reviewed and updated as appropriate: allergies, current medications, past family history, past medical history, past social history, past surgical history and problem list      Past Medical History:   Diagnosis Date    Allergic     seasonal, food allergies    Asthma     GERD (gastroesophageal reflux disease)     Hypertension     Irritable bowel syndrome     Migraine without aura     Shoulder subluxation, left        Past Surgical History:   Procedure Laterality Date    TONSILLECTOMY      WRIST SURGERY         Family History   Problem Relation Age of Onset    Stroke Mother         CEREBROVASCULAR ACCIDENT (CVA), LISTED 2X    Hypertension Mother     Hyperlipidemia Mother     Hypertension Father     Hyperlipidemia Father     Cervical cancer Neg Hx     Colon cancer Neg Hx     Ovarian cancer Neg Hx     Uterine cancer Neg Hx          Medications have been verified  Objective   /93   Pulse 87   Temp 98 1 °F (36 7 °C)   Resp 14   Ht 5' 3" (1 6 m)   Wt 57 9 kg (127 lb 9 6 oz)   SpO2 99%   BMI 22 60 kg/m²        Physical Exam     Physical Exam   Constitutional: She is oriented to person, place, and time  She appears well-developed and well-nourished  Cardiovascular: Normal rate, regular rhythm, normal heart sounds and intact distal pulses  Pulmonary/Chest: Effort normal and breath sounds normal  No respiratory distress  She has no wheezes  Musculoskeletal: She exhibits edema (Posterior to lateral malleolus) and tenderness  Neurological: She is alert and oriented to person, place, and time  Skin: Skin is warm and dry  Capillary refill takes less than 2 seconds  Psychiatric: She has a normal mood and affect   Her behavior is normal  Judgment and thought content normal      Splint application  Date/Time: 11/9/2019 3:37 PM  Performed by: DIAMOND Phelan  Authorized by: DIAMOND Phelan     Consent:     Consent obtained:  Verbal    Consent given by:  Patient    Risks discussed:  Discoloration, numbness, pain and swelling    Alternatives discussed:  No treatment, delayed treatment, alternative treatment, observation and referral  Pre-procedure details:     Sensation:  Normal  Procedure details:     Laterality:  Right    Location:  Ankle    Splint type: air cast     Supplies:  Elastic bandage

## 2019-11-09 NOTE — PATIENT INSTRUCTIONS
Preliminary xray reviewed  No apparent abnormality, however awaiting final report from radiologist    Ace bandage and air cast    Ibuprofen or Aleve as needed  Ice to right ankle several times a day  Follow up with PCP in 7-10 days if not improving

## 2019-11-12 ENCOUNTER — OFFICE VISIT (OUTPATIENT)
Dept: PHYSICAL THERAPY | Facility: CLINIC | Age: 44
End: 2019-11-12
Payer: COMMERCIAL

## 2019-11-12 DIAGNOSIS — M25.521 ELBOW PAIN, RIGHT: Primary | ICD-10-CM

## 2019-11-12 PROCEDURE — 97112 NEUROMUSCULAR REEDUCATION: CPT | Performed by: PHYSICAL THERAPIST

## 2019-11-12 PROCEDURE — 97110 THERAPEUTIC EXERCISES: CPT | Performed by: PHYSICAL THERAPIST

## 2019-11-12 NOTE — PROGRESS NOTES
Daily Note     Today's date: 2019  Patient name: Sarah Salgado  : 1975  MRN: [id number]  Referring provider: Gokul Mayes DO  Dx:   Encounter Diagnosis     ICD-10-CM    1  Elbow pain, right M25 521                   Subjective: She notes that she rolled her ankle on the steps this weekend  She notes that she did have it evaluated and it was sprained  She notes that she was pulling herself up the steps with her hands to offload the ankle this weekend and was less compliant with her stretches as a result  She notes that the bump in the muscle is back and tightness  Objective: See treatment diary below      Assessment: Tolerated treatment well  Patient would benefit from continued PT  She did demonstrate increased restriction of her wrist extensors this date which did respond well to IASTM  She was able to tolerate added resistance to her wrist pronation/supination attached to her wrist to avoid need to  resistance  She was challenged with her shoulder ER/IR isometrics at the wall  She was also limited in endurance of isometric wrist extensors  Plan: Continue per plan of care        Diagnosis: R elbow pain/R lateral epicondylitis   Precautions: N/A   Manual Therapy 11/7/19 11/12/19 10/29/19 11/1/19 11/4/19   IASTM R lateral forearm 5' RS 5' RS 5' RS 5' RS 5' RS   Wrist Extensor Stretch  3' RS 3' RS 3' RS    Kinesiotaping        Posterior glide Ulna        Soft tissue tricep offload        Edema massage R forearm  3' RS  2' RS  3' RS   Trigger point  Wrist extensors with active wrist extension  10x              Re-Evaluation   20' RS     Exercise Diary  11/7/19 11/12/19 10/29/19 10/31/19 11/4/19   UBE 2'/2' 2'/2'  2'/2' 2'/2'   Elbow flexed wrist extensor stretch 10x10"    10x10"   Prayer stretch        Wrist extension        Wrist Extension iso  Red therabar x8  2x5      Elbow extended wrist extensor stretch        Eccentric wrist extension        Wrist Pronation/Supination        Bent Over Row 15x cane b/l 5#       Tricep Stretch    10x10"    TB Rows    Blue 2x10    TB ER Grn 10x       TB IR Grn 10x                       Prone retraction    5"x10    Prone row    10x    Prone T    10x    sidelying ER    10x    Putty Squeeze        Towel Wall Slides        TB W        Median nerve glides        OH cane flexion AAROM        Elbow flexion    Cane 2# 15xea  Supinated ,   Pronated     Cane 10x    Cane extension     10x   Cane press        Pec Stretch    10x10"    Pronation self stretch     10x10"   Eccentric wrist pronation 10x 1# wrist weight 20x   10x   ER iso at 90 abd 5" 2x5 Purple ball 5"x10ea ER/IR              Modalities 11/7/19 11/12/19 10/29/19 11/1/19 11/4/19   Cyrosphere        HP 10' concurrent 10' concurrent   10' concurrent   CP   10' concurrent RS 10' concurrent RS    TENS 10' RS  10' RS 10' RS 10' RS 10' RS

## 2019-11-14 ENCOUNTER — OFFICE VISIT (OUTPATIENT)
Dept: PHYSICAL THERAPY | Facility: CLINIC | Age: 44
End: 2019-11-14
Payer: COMMERCIAL

## 2019-11-14 DIAGNOSIS — M25.521 ELBOW PAIN, RIGHT: Primary | ICD-10-CM

## 2019-11-14 PROCEDURE — 97110 THERAPEUTIC EXERCISES: CPT | Performed by: PHYSICAL MEDICINE & REHABILITATION

## 2019-11-14 PROCEDURE — 97014 ELECTRIC STIMULATION THERAPY: CPT | Performed by: PHYSICAL MEDICINE & REHABILITATION

## 2019-11-14 PROCEDURE — G0283 ELEC STIM OTHER THAN WOUND: HCPCS | Performed by: PHYSICAL MEDICINE & REHABILITATION

## 2019-11-14 PROCEDURE — 97112 NEUROMUSCULAR REEDUCATION: CPT | Performed by: PHYSICAL MEDICINE & REHABILITATION

## 2019-11-14 NOTE — PROGRESS NOTES
Daily Note     Today's date: 2019  Patient name: Salvador Vasquez  : 1975  MRN: [id number]  Referring provider: Ervin Mccormick DO  Dx:   Encounter Diagnosis     ICD-10-CM    1  Elbow pain, right M25 521                   Subjective: Patient notes continued discomfort, notes muscle spasm and discomfort just above elbow over the past day or so  Continued difficulty with all gripping exercise  Objective: See treatment diary below      Assessment: Tolerated treatment well  Patient would benefit from continued PT  Plan: Continue per plan of care        Diagnosis: R elbow pain/R lateral epicondylitis   Precautions: N/A   Manual Therapy 19   IASTM R lateral forearm 5' RS 5' RS LH 5' RS 5' RS   Wrist Extensor Stretch  3' RS Not tolerated 3' RS    Kinesiotaping        Posterior glide Ulna        Soft tissue tricep offload        Edema massage R forearm  3' RS    3' RS   Trigger point  Wrist extensors with active wrist extension  10x 10x             Re-Evaluation        Exercise Diary  11/7/19 11/12/19 11/14 10/31/19 11/4/19   UBE 2'/2' 2'/2' 3'/3' 2'/2' 2'/2'   Elbow flexed wrist extensor stretch 10x10"    10x10"   Prayer stretch        Wrist extension        Wrist Extension iso  Red therabar x8  2x5 Red, 3x5     Elbow extended wrist extensor stretch        Eccentric wrist extension        Wrist Pronation/Supination        Bent Over Row 15x cane b/l 5#       Tricep Stretch    10x10"    TB Rows    Blue 2x10    TB ER Grn 10x       TB IR Grn 10x                       Prone retraction    5"x10    Prone row    10x    Prone T    10x    sidelying ER    10x    Putty Squeeze        Towel Wall Slides        TB W        Median nerve glides        OH cane flexion AAROM        Elbow flexion      Cane 10x    Cane extension     10x   Cane press        Pec Stretch    10x10"    Pronation self stretch     10x10"   Eccentric wrist pronation 10x 1# wrist weight 20x 18x, 1# cuff wt tony 2ary to p!  10x   ER iso at 90 abd 5" 2x5 Purple ball 5"x10ea ER/IR 10x5" ea             Modalities 11/7/19 11/12/19 10/29/19 11/1/19 11/4/19   Cyrosphere        HP 10' concurrent 10' concurrent 10' w/ TENS  10' concurrent   CP    10' concurrent RS    TENS 10' RS  10' RS 10' 10' RS 10' RS

## 2019-11-18 ENCOUNTER — EVALUATION (OUTPATIENT)
Dept: PHYSICAL THERAPY | Facility: CLINIC | Age: 44
End: 2019-11-18
Payer: COMMERCIAL

## 2019-11-18 DIAGNOSIS — M25.521 ELBOW PAIN, RIGHT: Primary | ICD-10-CM

## 2019-11-18 PROCEDURE — G0283 ELEC STIM OTHER THAN WOUND: HCPCS | Performed by: PHYSICAL THERAPIST

## 2019-11-18 PROCEDURE — 97014 ELECTRIC STIMULATION THERAPY: CPT | Performed by: PHYSICAL THERAPIST

## 2019-11-18 PROCEDURE — 97110 THERAPEUTIC EXERCISES: CPT | Performed by: PHYSICAL THERAPIST

## 2019-11-18 PROCEDURE — 97140 MANUAL THERAPY 1/> REGIONS: CPT | Performed by: PHYSICAL THERAPIST

## 2019-11-18 NOTE — PROGRESS NOTES
PT Re-Evaluation     Today's date: 2019  Patient name: Juanpablo Lopez  : 1975  MRN: [id number]  Referring provider: Randy Moreno DO  Dx:   Encounter Diagnosis     ICD-10-CM    1  Elbow pain, right M25 521        Start Time: 915  Stop Time:   Total time in clinic (min): 56 minutes    Assessment  Assessment details: Patient is a 37year old female with signs and symptoms consistent with R elbow pain and lateral epicondylitis resulting in difficulty with ADLS and functional activities  She does demonstrates increased R elbow/forearm motion and improved strength  Deficits continue to be observed objectively in supination,  strength, elbow extension strength, and wrist extension strength which is consistent with her functional difficulties  She does continue to demonstrate increased tolerance to gross movements, but is more limited with fine motor movements  She would benefit from continued skilled PT to address these deficits as she is a single mother of 3 young children, dependent on her for care  Her continued exacerbation of symptoms is affected by her continued dependence on [facility]UE for tasks to care for herself and children, despite modifications as able  Impairments: activity intolerance, impaired physical strength, lacks appropriate home exercise program and pain with function  Barriers to therapy: History of L shoulder subluxation; 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   Understanding of Dx/Px/POC: good   Prognosis: good    Goals  ST  Decrease pain at worst to 4/10 in 4 weeks  - Progressing  2  Able to tolerate wrist flexion with elbow extended in 4 weeks  - Partially Met  3  Increase R  strength > 50 lbs in 4 weeks  LT  Increase R  strength to symmetrical with L in 8 weeks  - Progressing  2  Able to tolerate lifting her daughter into her car seat without increase in pain in 8 weeks  - Progressing  3  Independent in HEP in 8 weeks     4  Able to pour juice without increase in pain in 8 weeks  Plan  Plan details: Taper POC to 1x/week as appropriate  Patient would benefit from: skilled physical therapy  Planned modality interventions: cryotherapy, TENS and thermotherapy: hydrocollator packs  Other planned modality interventions: Cyrosphere  Planned therapy interventions: flexibility, functional ROM exercises, home exercise program, therapeutic exercise, therapeutic activities, stretching, strengthening, patient education, neuromuscular re-education, Mejia taping, massage, manual therapy and joint mobilization  Frequency: 2x week  Duration in weeks: 6  Treatment plan discussed with: patient and referring physician        Subjective Evaluation    Pain  Current pain ratin  At best pain ratin  At worst pain ratin  Location: lateral epicondyle   Quality: dull ache and tight (spasm)  Relieving factors: rest and medications  Aggravating factors: lifting and overhead activity  Progression: improved    Social Support  Lives with: young children    Hand dominance: right      Diagnostic Tests  No diagnostic tests performed  Treatments  Previous treatment: medication  Current treatment: physical therapy  Patient Goals  Patient goals for therapy: increased motion, decreased pain, increased strength, independence with ADLs/IADLs and return to sport/leisure activities  Patient goal: I would like it to not hurt anymore; I'd like to learn to modify - Progressing     Patient reports 60% improvement since start of PT  She notes that she is still taking the Meloxicam  She notes that  and twisting while gripping such as with pouring  She notes smaller  are more difficult  She notes that mild improvement with gross movements such as lifting her daughter and reaching overhead  She notes that she still gets spasms and knots intermittently  She notes that the sensitivity is better to touch       Objective     Tenderness     Right Elbow   Tenderness in the lateral epicondyle  Right Wrist/Hand   Tenderness in the lateral epicondyle  Active Range of Motion   Left Shoulder   Normal active range of motion    Right Shoulder   Normal active range of motion    Left Elbow   Flexion: 140 degrees   Extension: 0 degrees   Forearm supination: 90 degrees   Forearm pronation: 80 degrees     Right Elbow   Flexion: 140 degrees   Extension: 0 degrees   Forearm supination: 90 degrees   Forearm pronation: 80 degrees     Additional Active Range of Motion Details  R extension: 10 deg  Hyper extension       Strength/Myotome Testing     Left Shoulder     Planes of Motion   Flexion: 5   Abduction: 4+   External rotation at 0°: 4+   Internal rotation at 0°: 4+     Right Shoulder     Planes of Motion   Flexion: 5   Abduction: 4   External rotation at 0°: 4-   Internal rotation at 0°: 4-     Left Elbow   Flexion: 5  Extension: 5  Forearm supination: 5  Forearm pronation: 5    Right Elbow   Flexion: 5  Extension: 4+  Forearm supination: 4  Forearm pronation: 4+    Left Wrist/Hand   Wrist extension: 5  Wrist flexion: 5     (2nd hand position)     Trial 1: 40    Trial 2: 55    Trial 3: 45    Right Wrist/Hand   Wrist extension: 4  Wrist flexion: 5     (2nd hand position)     Trial 1: 40    Trial 2: 45    Trial 3: 45      Flowsheet Rows      Most Recent Value   PT/OT G-Codes   Current Score  64   Projected Score  75   FOTO information reviewed  Yes        Diagnosis: R elbow pain/R lateral epicondylitis   Precautions: N/A   Manual Therapy 11/7/19 11/12/19 11/18/19 11/1/19 11/4/19   IASTM R lateral forearm 5' RS 5' RS 4' RS  STM 4' RS 5' RS 5' RS   Wrist Extensor Stretch  3' RS  3' RS    Kinesiotaping        Posterior glide Ulna        Soft tissue tricep offload        Edema massage R forearm  3' RS    3' RS   Trigger point  Wrist extensors with active wrist extension  10x              Re-Evaluation   15' RS     Exercise Diary  11/7/19 11/12/19 11/18/19 10/31/19 11/4/19   UBE 2'/2' 2'/2' 2'/2' 2'/2' 2'/2'   Elbow flexed wrist extensor stretch 10x10"    10x10"   Prayer stretch        Wrist extension        Wrist Extension iso  Red therabar x8  2x5 Red therabar 5" 2x10     Elbow extended wrist extensor stretch        Eccentric wrist extension        Therabar bend  Red 20x Red 20x     Wrist Pronation/Supination   Active with self-pressure 10x     Bent Over Row 15x cane b/l 5#       Tricep Stretch    10x10"    TB Rows    Blue 2x10    TB ER Grn 10x       TB IR Grn 10x                       Prone retraction    5"x10    Prone row    10x    Prone T    10x    sidelying ER    10x    Putty Squeeze        Towel Wall Slides        TB W        Median nerve glides        OH cane flexion AAROM        Elbow flexion      Cane 10x    Cane extension     10x   Cane press        Pec Stretch    10x10"    Pronation self stretch     10x10"   Eccentric wrist pronation 10x 1# wrist weight 20x   10x   ER iso at 90 abd 5" 2x5 Purple ball 5"x10ea ER/IR              Modalities 11/7/19 11/12/19 11/1/19 11/4/19   Cyrosphere        HP 10' concurrent 10' concurrent   10' concurrent   CP    10' concurrent RS    TENS 10' RS  10' RS  10' RS 10' RS

## 2019-11-21 ENCOUNTER — OFFICE VISIT (OUTPATIENT)
Dept: PHYSICAL THERAPY | Facility: CLINIC | Age: 44
End: 2019-11-21
Payer: COMMERCIAL

## 2019-11-21 DIAGNOSIS — M25.521 ELBOW PAIN, RIGHT: Primary | ICD-10-CM

## 2019-11-21 PROCEDURE — 97110 THERAPEUTIC EXERCISES: CPT | Performed by: PHYSICAL THERAPIST

## 2019-11-21 PROCEDURE — 97140 MANUAL THERAPY 1/> REGIONS: CPT | Performed by: PHYSICAL THERAPIST

## 2019-11-21 PROCEDURE — 97014 ELECTRIC STIMULATION THERAPY: CPT | Performed by: PHYSICAL THERAPIST

## 2019-11-21 PROCEDURE — G0283 ELEC STIM OTHER THAN WOUND: HCPCS | Performed by: PHYSICAL THERAPIST

## 2019-11-21 NOTE — PROGRESS NOTES
Daily Note     Today's date: 2019  Patient name: Felecia Junior  : 1975  MRN: [id number]  Referring provider: Lilly Moreno DO  Dx:   Encounter Diagnosis     ICD-10-CM    1  Elbow pain, right M25 521                   Subjective: Patient reports that she has less constant pain  She notes that overall it is better, but she still has pain  Objective: See treatment diary below      Assessment: Tolerated treatment well  Patient would benefit from continued PT  She demonstrated greater restriction with IASTM at distal wrist extensors > proximal this date  She was able to perform resisted tricep extension with cues for slow controlled movements through pain-free range  She does continue to tolerate gripping greater resistance, but with larger  vs  Tighter   She would benefit form continued progression of postural training and shoulder strengthening to offload wrist extensors and elbow extensors with functional movements  Plan: Continue per plan of care        Diagnosis: R elbow pain/R lateral epicondylitis   Precautions: N/A   Manual Therapy 19   IASTM R lateral forearm 5' RS 5' RS 4' RS  STM 4' RS 8' RS IASTM, STM 5' RS   Wrist Extensor Stretch  3' RS      Kinesiotaping        Posterior glide Ulna        Soft tissue tricep offload        Edema massage R forearm  3' RS    3' RS   Trigger point  Wrist extensors with active wrist extension  10x              Re-Evaluation   15' RS     Exercise Diary  19   UBE 2'/2' 2'/2' 2'/2' 2'/2' 2'/2'   Elbow flexed wrist extensor stretch 10x10"    10x10"   Prayer stretch        Wrist extension        Wrist Extension iso  Red therabar x8  2x5 Red therabar 5" 2x10     Elbow extended wrist extensor stretch        Eccentric wrist extension        Therabar bend  Red 20x Red 20x     Wrist Pronation/Supination   Active with self-pressure 10x     Bent Over Row 15x cane b/l 5# Tricep Stretch        TB Rows        TB ER Grn 10x       TB IR Grn 10x       TB W    Red 20x    Bicep Curls    johanna 5# 2x10    Tricep Ext    kesier 5# 2x10                    Prone retraction        Prone row        Prone T        sidelying ER        Putty Squeeze        Towel Wall Slides        TB W        Median nerve glides        OH cane flexion AAROM        Elbow flexion      Cane 10x    Cane extension     10x   Cane press        Pec Stretch        Radial Nerve Glides    10x    Pronation self stretch     10x10"   Eccentric wrist pronation 10x 1# wrist weight 20x   10x   ER iso at 90 abd 5" 2x5 Purple ball 5"x10ea ER/IR              Modalities 11/7/19 11/12/19 11/21/19 11/4/19   Cyrosphere        HP 10' concurrent 10' concurrent  10' concurrent 10' concurrent   CP        TENS 10' RS  10' RS  10' RS 10' RS

## 2019-11-25 ENCOUNTER — OFFICE VISIT (OUTPATIENT)
Dept: OBGYN CLINIC | Facility: CLINIC | Age: 44
End: 2019-11-25
Payer: COMMERCIAL

## 2019-11-25 VITALS
DIASTOLIC BLOOD PRESSURE: 87 MMHG | SYSTOLIC BLOOD PRESSURE: 137 MMHG | WEIGHT: 125 LBS | HEART RATE: 76 BPM | HEIGHT: 64 IN | BODY MASS INDEX: 21.34 KG/M2

## 2019-11-25 DIAGNOSIS — G56.01 CARPAL TUNNEL SYNDROME ON RIGHT: ICD-10-CM

## 2019-11-25 DIAGNOSIS — M77.11 LATERAL EPICONDYLITIS, RIGHT ELBOW: Primary | ICD-10-CM

## 2019-11-25 DIAGNOSIS — M25.521 ELBOW PAIN, RIGHT: ICD-10-CM

## 2019-11-25 PROCEDURE — 99213 OFFICE O/P EST LOW 20 MIN: CPT | Performed by: SURGERY

## 2019-11-25 RX ORDER — NAPROXEN 500 MG/1
500 TABLET ORAL 2 TIMES DAILY WITH MEALS
Qty: 28 TABLET | Refills: 0 | Status: SHIPPED | OUTPATIENT
Start: 2019-11-25 | End: 2019-12-10 | Stop reason: ALTCHOICE

## 2019-11-25 NOTE — PROGRESS NOTES
ASSESSMENT/PLAN:      53 y o  female with right lateral epicondylitis and right carpal tunnel syndrome  A right lateral epicondyle CSI was offered today  She declined at this time  She will continue PT/OT and her HEP for an additional 6-8 weeks  I will see her back in the office in 6-8 weeks, if she hasn't made much improvement a lateral epicondylitis CSI may be performed  A refill of her Meloxicam was sent to her pharmacy electronically  The patient verbalized understanding of exam findings and treatment plan  We engaged in the shared decision-making process and treatment options were discussed at length with the patient  Surgical and conservative management discussed today along with risks and benefits  Diagnoses and all orders for this visit:    Lateral epicondylitis, right elbow  -     Ambulatory referral to PT/OT hand therapy; Future    Carpal tunnel syndrome on right    Elbow pain, right  -     naproxen (NAPROSYN) 500 mg tablet; Take 1 tablet (500 mg total) by mouth 2 (two) times a day with meals      Follow Up:  Return 6-8 weeks   To Do Next Visit:  Re-evaluation of current issue    ____________________________________________________________________________________________________________________________________________      CHIEF COMPLAINT:  Chief Complaint   Patient presents with    Right Elbow - Follow-up       SUBJECTIVE:  Ashley Ramirez is a 53y o  year old RHD female who presents to the office today for a follow up regarding right lateral epicondylitis and right carpal tunnel syndrome  Alyx Martin has been attending OT as well as performing a HEP  She has been wearing a cock up wrist brace at night  Overall Alyx Martin is doing well  She notes an aprox  20-30 percent improvement with therapy and bracing  She denies numbness and tingling when bracing at night  I have personally reviewed all the relevant PMH, PSH, SH, FH, Medications and allergies       PAST MEDICAL HISTORY:  Past Medical History:   Diagnosis Date    Allergic     seasonal, food allergies    Asthma     GERD (gastroesophageal reflux disease)     Hypertension     Irritable bowel syndrome     Migraine without aura     Shoulder subluxation, left        PAST SURGICAL HISTORY:  Past Surgical History:   Procedure Laterality Date    TONSILLECTOMY      WRIST SURGERY         FAMILY HISTORY:  Family History   Problem Relation Age of Onset    Stroke Mother         CEREBROVASCULAR ACCIDENT (CVA), LISTED 2X    Hypertension Mother     Hyperlipidemia Mother     Hypertension Father     Hyperlipidemia Father     Cervical cancer Neg Hx     Colon cancer Neg Hx     Ovarian cancer Neg Hx     Uterine cancer Neg Hx        SOCIAL HISTORY:  Social History     Tobacco Use    Smoking status: Never Smoker    Smokeless tobacco: Never Used   Substance Use Topics    Alcohol use:  Yes     Alcohol/week: 1 0 standard drinks     Types: 1 Glasses of wine per week     Frequency: Monthly or less     Comment: socially     Drug use: Never       MEDICATIONS:    Current Outpatient Medications:     albuterol (PROAIR HFA) 90 mcg/act inhaler, Inhale, Disp: , Rfl:     Ascorbic Acid (VITAMIN C) 500 MG CAPS, Take by mouth, Disp: , Rfl:     Calcium 250 MG CAPS, Take by mouth, Disp: , Rfl:     desogestrel-ethinyl estradiol (VIORELE) 0 15-0 02/0 01 MG (21/5) per tablet, Take 1 tablet by mouth daily, Disp: 112 tablet, Rfl: 3    EPINEPHrine (EPIPEN) 0 3 mg/0 3 mL SOAJ, , Disp: , Rfl:     fexofenadine (ALLEGRA ALLERGY) 180 MG tablet, Take by mouth, Disp: , Rfl:     lisinopril (ZESTRIL) 5 mg tablet, Take 1 tablet (5 mg total) by mouth daily, Disp: 90 tablet, Rfl: 1    meloxicam (MOBIC) 7 5 mg tablet, Take 1 tablet (7 5 mg total) by mouth daily, Disp: 30 tablet, Rfl: 3    Multiple Vitamin (MULTI-VITAMIN DAILY) TABS, Take by mouth, Disp: , Rfl:     Omeprazole 20 MG TBEC, Take 1 tablet (20 mg total) by mouth daily, Disp: 90 tablet, Rfl: 0    methylPREDNISolone 4 MG tablet therapy pack, Use as directed on package (Patient not taking: Reported on 11/25/2019), Disp: 21 each, Rfl: 0    naproxen (NAPROSYN) 500 mg tablet, Take 1 tablet (500 mg total) by mouth 2 (two) times a day with meals (Patient not taking: Reported on 11/25/2019), Disp: 28 tablet, Rfl: 0    ALLERGIES:  Allergies   Allergen Reactions    Cat Hair Extract     Erythromycin     Hctz [Hydrochlorothiazide] Dizziness    Nuts     Seasonal Ic  [Cholestatin]     Latex Rash       REVIEW OF SYSTEMS:  Review of Systems   Constitutional: Negative for chills, fever and unexpected weight change  HENT: Negative for hearing loss, nosebleeds and sore throat  Eyes: Negative for pain, redness and visual disturbance  Respiratory: Negative for cough, shortness of breath and wheezing  Cardiovascular: Negative for chest pain, palpitations and leg swelling  Gastrointestinal: Negative for abdominal pain, nausea and vomiting  Endocrine: Negative for polydipsia and polyuria  Genitourinary: Negative for difficulty urinating and hematuria  Musculoskeletal: Negative for arthralgias, joint swelling and myalgias  Skin: Negative for rash and wound  Neurological: Negative for dizziness, numbness and headaches  Psychiatric/Behavioral: Negative for decreased concentration, dysphoric mood and suicidal ideas  The patient is not nervous/anxious          VITALS:  Vitals:    11/25/19 1034   BP: 137/87   Pulse: 76       LABS:  HgA1c: No results found for: HGBA1C  BMP:   Lab Results   Component Value Date    CALCIUM 8 8 02/25/2019    K 3 8 02/25/2019    CO2 28 02/25/2019     02/25/2019    BUN 12 02/25/2019    CREATININE 0 76 02/25/2019       _____________________________________________________  PHYSICAL EXAMINATION:  General: well developed and well nourished, alert, oriented times 3 and appears comfortable  Psychiatric: Normal  HEENT: Normocephalic, Atraumatic Trachea Midline, No torticollis  Pulmonary: No audible wheezing or respiratory distress   Cardiovascular: No pitting edema, 2+ radial pulse   Skin: No masses, erythema, lacerations, fluctation, ulcerations  Neurovascular: Sensation Intact to the Median, Ulnar, Radial Nerve, Motor Intact to the Median, Ulnar, Radial Nerve and Pulses Intact  Musculoskeletal: Normal, except as noted in detailed exam and in HPI  MUSCULOSKELETAL EXAMINATION:    Right Elbow:  No swelling or deformity  Full range of motion with flexion, extension, supination and pronation  Positive tenderness to palpation over the Lateral Epicondyle  No pain with passive flexion of the wrist with elbow fully extended  Pain with resisted wrist extension with elbow fully extended  Mild pain with resisted long finger extension with the elbow fully extended  Negative tinels over the ulnar nerve at the elbow        ___________________________________________________  STUDIES REVIEWED:  No new imaging to review           PROCEDURES PERFORMED:  Procedures  No Procedures performed today    _____________________________________________________      Andrea Little    I,:   Buddy Ch am acting as a scribe while in the presence of the attending physician :        I,:   Jono Barnes MD personally performed the services described in this documentation    as scribed in my presence :

## 2019-11-26 ENCOUNTER — OFFICE VISIT (OUTPATIENT)
Dept: PHYSICAL THERAPY | Facility: CLINIC | Age: 44
End: 2019-11-26
Payer: COMMERCIAL

## 2019-11-26 DIAGNOSIS — M77.11 LATERAL EPICONDYLITIS, RIGHT ELBOW: ICD-10-CM

## 2019-11-26 DIAGNOSIS — M25.521 ELBOW PAIN, RIGHT: Primary | ICD-10-CM

## 2019-11-26 PROCEDURE — G0283 ELEC STIM OTHER THAN WOUND: HCPCS | Performed by: PHYSICAL THERAPIST

## 2019-11-26 PROCEDURE — 97110 THERAPEUTIC EXERCISES: CPT | Performed by: PHYSICAL THERAPIST

## 2019-11-26 PROCEDURE — 97140 MANUAL THERAPY 1/> REGIONS: CPT | Performed by: PHYSICAL THERAPIST

## 2019-11-26 PROCEDURE — 97014 ELECTRIC STIMULATION THERAPY: CPT | Performed by: PHYSICAL THERAPIST

## 2019-11-26 PROCEDURE — 97112 NEUROMUSCULAR REEDUCATION: CPT | Performed by: PHYSICAL THERAPIST

## 2019-11-26 NOTE — PROGRESS NOTES
Daily Note     Today's date: 2019  Patient name: Cosmo Valle  : 1975  MRN: [id number]  Referring provider: Easton Juarez DO  Dx:   Encounter Diagnosis     ICD-10-CM    1  Elbow pain, right M25 521                   Subjective: Patient reports that has increased R tricep tightness  She notes that she had physician follow-up and deferred injection at this time  She notes that overall pouring motions and more gross movements have improved, but  continues to be more aggravating and difficult for her  Objective: See treatment diary below      Assessment: Tolerated treatment well  Patient would benefit from continued PT   She fatigued with gripper this date  She remained most limited by onset of pain at R elbow both proximal and distal to elbow  She remains most challenged with tight   She preferred supinated  vs  neutral  this date  She had greater tolerance to tricep extension vs  Elbow flexion resisted exercises  She preferred elbow extension greater in neutral  vs  Supinated   She tolerated wall push-up without difficulty  She does require cues throughout session for scapular retraction and upright posture  She continues to exacerbate symptoms attributed to her 3 young children dependent on her for care  Plan: Continue per plan of care  Continue tricep strengthening as able to tolerate; trial body weight dips assisted with hip extension        Diagnosis: R elbow pain/R lateral epicondylitis   Precautions: N/A   Manual Therapy 19   IASTM R lateral forearm 5' RS 5' RS 4' RS  STM 4' RS 8' RS IASTM, STM 8' RS R tricep   Wrist Extensor Stretch  3' RS      Kinesiotaping        Posterior glide Ulna        Soft tissue tricep offload        Edema massage R forearm  3' RS       Trigger point  Wrist extensors with active wrist extension  10x              Re-Evaluation   15' RS     Exercise Diary  19 11/26/19   UBE 2'/2' 2'/2' 2'/2' 2'/2' 3'/3'   Elbow flexed wrist extensor stretch 10x10"       Prayer stretch        Wrist extension        Wrist Extension iso  Red therabar x8  2x5 Red therabar 5" 2x10     Gripper     Yellow 2'   Elbow extended wrist extensor stretch        Eccentric wrist extension        Therabar bend  Red 20x Red 20x     Wrist Pronation/Supination   Active with self-pressure 10x     Bent Over Row 15x cane b/l 5#       Tricep Stretch     10x10"   TB Rows        TB ER Grn 10x       TB IR Grn 10x       TB W    Red 20x    Bicep Curls    johanna 5# 2x10 5# DB eccentric, concentric assist   Tricep Ext    kesier 5# 2x10 johanna 5# body blade  slow eccentric 2x10 double hand   kesier 1# neutral single arm 10x                   Prone retraction        Prone row        Prone T        sidelying ER        Putty Squeeze        Towel Wall Slides        TB W        Median nerve glides        OH cane flexion AAROM        Elbow flexion         Wall Push-up     10x   Cane extension        Cane press        Pec Stretch        Radial Nerve Glides    10x    Pronation self stretch        Eccentric wrist pronation 10x 1# wrist weight 20x      ER iso at 90 abd 5" 2x5 Purple ball 5"x10ea ER/IR              Modalities 11/7/19 11/12/19 11/21/19 11/26/19   Cyrosphere        HP 10' concurrent 10' concurrent  10' concurrent 10' concurrent   CP        TENS 10' RS  10' RS  10' RS 10' RS

## 2019-11-27 ENCOUNTER — OFFICE VISIT (OUTPATIENT)
Dept: PHYSICAL THERAPY | Facility: CLINIC | Age: 44
End: 2019-11-27
Payer: COMMERCIAL

## 2019-11-27 DIAGNOSIS — M25.521 ELBOW PAIN, RIGHT: Primary | ICD-10-CM

## 2019-11-27 PROCEDURE — 97110 THERAPEUTIC EXERCISES: CPT | Performed by: PHYSICAL THERAPIST

## 2019-11-27 PROCEDURE — 97014 ELECTRIC STIMULATION THERAPY: CPT | Performed by: PHYSICAL THERAPIST

## 2019-11-27 PROCEDURE — 97140 MANUAL THERAPY 1/> REGIONS: CPT | Performed by: PHYSICAL THERAPIST

## 2019-11-27 PROCEDURE — G0283 ELEC STIM OTHER THAN WOUND: HCPCS | Performed by: PHYSICAL THERAPIST

## 2019-11-27 NOTE — PROGRESS NOTES
Daily Note     Today's date: 2019  Patient name: Rajesh Herrera  : 1975  MRN: [id number]  Referring provider: Doris Live DO  Dx:   Encounter Diagnosis     ICD-10-CM    1  Elbow pain, right M25 521                   Subjective: She notes that she felt pretty good today, but she had increased soreness in the shoulders  She notes that this morning, her elbow hurts worse, more proximal with trying to drink her coffee or wash her hair  Objective: See treatment diary below      Assessment: Tolerated treatment well  Patient would benefit from continued PT  She responded well to IASTM to tricep and forearm/elbow mobilizations with decreased pain, increased elbow extension ROM available in standing  She had overall improved tolerance to active overhead reaching without resistance post session after emphasis on scapular engagement  Overall with postural cues and increased activation/engagement of rotator cuff/scapular musculature she had decreased elbow pain  Plan: Continue per plan of care  Consider 1st rib/thoracic mobilization as able        Diagnosis: R elbow pain/R lateral epicondylitis   Precautions: N/A   Manual Therapy 19   IASTM R lateral forearm tricep 5' RS 5' RS 4' RS  STM 4' RS 8' RS IASTM, STM 8' RS R tricep   Wrist Extensor Stretch  3' RS      Kinesiotaping        Posterior glide Ulna        Soft tissue tricep offload        Edema massage R forearm         Trigger point  Wrist extensors with active wrist extension  10x      Anterior glide of ulna/radius 4' RS grade II-III       Glide of radius/ulna 4' RS grade II-III                       Re-Evaluation   15' RS     Exercise Diary  19   UBE  2'/2' 2'/2' 2'/2' 3'/3'   Elbow flexed wrist extensor stretch        Prayer stretch        Wrist extension        Wrist Extension iso  Red therabar x8  2x5 Red therabar 5" 2x10     Gripper     Yellow 2'   Elbow extended wrist extensor stretch        Eccentric wrist extension        Therabar bend  Red 20x Red 20x     Wrist Pronation/Supination   Active with self-pressure 10x     Bent Over Row        Tricep Stretch 10x10"    10x10"   TB Rows        TB ER        TB IR        TB W    Red 20x    Bicep Curls    johanna 5# 2x10 5# DB eccentric, concentric assist   Tricep Ext    kesier 5# 2x10 johanna 5# body blade  slow eccentric 2x10 double hand   kesier 1# neutral single arm 10x   Shoulder abduction 15x               Prone retraction        Prone row        Prone T        sidelying ER Table supported 90/90 ER with softball 10x    Table supported 90/90 IR red TB proximal to wrist 10x       Putty Squeeze        Towel Wall Slides        TB W        Median nerve glides        OH cane flexion AAROM        Elbow flexion         Wall Push-up     10x   Cane extension        Cane press        Pec Stretch 10x10"       Radial Nerve Glides 10x   10x    Pronation self stretch        Eccentric wrist pronation  1# wrist weight 20x      ER iso at 90 abd  Purple ball 5"x10ea ER/IR              Modalities  11/12/19 11/21/19 11/26/19   Cyrosphere        HP 10' concurrent 10' concurrent  10' concurrent 10' concurrent   CP        TENS 10' RS 10' RS  10' RS 10' RS

## 2019-12-03 ENCOUNTER — OFFICE VISIT (OUTPATIENT)
Dept: PHYSICAL THERAPY | Facility: CLINIC | Age: 44
End: 2019-12-03
Payer: COMMERCIAL

## 2019-12-03 DIAGNOSIS — M25.521 ELBOW PAIN, RIGHT: Primary | ICD-10-CM

## 2019-12-03 DIAGNOSIS — M77.11 LATERAL EPICONDYLITIS, RIGHT ELBOW: ICD-10-CM

## 2019-12-03 PROCEDURE — 97110 THERAPEUTIC EXERCISES: CPT | Performed by: PHYSICAL THERAPIST

## 2019-12-03 PROCEDURE — 97112 NEUROMUSCULAR REEDUCATION: CPT | Performed by: PHYSICAL THERAPIST

## 2019-12-03 PROCEDURE — G0283 ELEC STIM OTHER THAN WOUND: HCPCS | Performed by: PHYSICAL THERAPIST

## 2019-12-03 PROCEDURE — 97014 ELECTRIC STIMULATION THERAPY: CPT | Performed by: PHYSICAL THERAPIST

## 2019-12-03 PROCEDURE — 97140 MANUAL THERAPY 1/> REGIONS: CPT | Performed by: PHYSICAL THERAPIST

## 2019-12-03 NOTE — PROGRESS NOTES
Daily Note     Today's date: 12/3/2019  Patient name: Ross Hernandez  : 1975  MRN: [id number]  Referring provider: Tim Donald DO  Dx:   Encounter Diagnosis     ICD-10-CM    1  Elbow pain, right M25 521    2  Lateral epicondylitis, right elbow M77 11                   Subjective: She notes that overall pouring is improving, but opening a pill bottle remains painful and challenging  Objective: See treatment diary below      Assessment: Tolerated treatment well  Patient would benefit from continued PT  She did tolerate increased  exercises this date to include putty squeeze, gripper, and also therabar bend  She remains most limited with fine motor movements, including tight grasping  Most of her restriction detected in her tricep this date vs  Forearm with IASTM  She does tolerate progression of scapular strengthening with elbow extended without significant increase in subjective report of pain  She would benefit from overall continued strengthening and endurance training of wrist extensors, elbow extensors  Plan: Continue per plan of care  Trial re-initiation of resisted wrist extension as able        Diagnosis: R elbow pain/R lateral epicondylitis   Precautions: N/A   Manual Therapy 11/27/19 12/3/19 11/18/19 11/21/19 11/26/19   IASTM R lateral forearm tricep 5' RS tricep 8'  4' RS  STM 4' RS 8' RS IASTM, STM 8' RS R tricep   Wrist Extensor Stretch        Kinesiotaping        Posterior glide Ulna        Soft tissue tricep offload        Edema massage R forearm         Trigger point        Anterior glide of ulna/radius 4' RS grade II-III       Glide of radius/ulna 4' RS grade II-III                       Re-Evaluation   15' RS     Exercise Diary  11/27/19 12/3/19 11/18/19 11/21/19 11/26/19   UBE  2'/2' 2'/2' 2'/2' 3'/3'   Elbow flexed wrist extensor stretch        Prayer stretch        Wrist extension        Wrist Extension iso   Red therabar 5" 2x10     Gripper     Yellow 2'   Elbow extended wrist extensor stretch        Eccentric wrist extension        Therabar bend  Red 10xea supination and pronation Red 20x     Wrist Pronation/Supination   Active with self-pressure 10x     Bent Over Row        Tricep Stretch 10x10" 10x10"   10x10"   TB Rows        TB ER        TB IR        TB W    Red 20x    Bicep Curls    johanna 5# 2x10 5# DB eccentric, concentric assist   Tricep Ext  Grn 2x10  kesier 5# 2x10 johanna 5# body blade  slow eccentric 2x10 double hand   kesier 1# neutral single arm 10x   Shoulder abduction 15x 10x      TB *  Yellow 5x      Prone retraction        Prone row        Prone T        sidelying ER Table supported 90/90 ER with softball 10x    Table supported 90/90 IR red TB proximal to wrist 10x Table supported 90/90 ER 10xea: softball, green ball  Table supported 90/90 IR Red TB proximal to wrist 10x      Putty Squeeze  Gripper 2'   Putty Squeeze Red 2'      Towel Wall Slides        TB W        Median nerve glides        OH cane flexion AAROM        Elbow flexion         Wall Push-up     10x   Cane extension        Cane press        Pec Stretch 10x10"       scaption  10x      Radial Nerve Glides 10x   10x    Pronation self stretch        Eccentric wrist pronation        ER iso at 90 abd                Modalities  12/3/19  11/21/19 11/26/19   Cyrosphere        HP 10' concurrent 10' concurrent  10' concurrent 10' concurrent   CP        TENS 10' RS 10' RS  10' RS 10' RS

## 2019-12-10 ENCOUNTER — OFFICE VISIT (OUTPATIENT)
Dept: PHYSICAL THERAPY | Facility: CLINIC | Age: 44
End: 2019-12-10
Payer: COMMERCIAL

## 2019-12-10 ENCOUNTER — OFFICE VISIT (OUTPATIENT)
Dept: FAMILY MEDICINE CLINIC | Facility: CLINIC | Age: 44
End: 2019-12-10
Payer: COMMERCIAL

## 2019-12-10 VITALS
HEART RATE: 85 BPM | SYSTOLIC BLOOD PRESSURE: 120 MMHG | WEIGHT: 130 LBS | HEIGHT: 64 IN | RESPIRATION RATE: 16 BRPM | BODY MASS INDEX: 22.2 KG/M2 | DIASTOLIC BLOOD PRESSURE: 62 MMHG | TEMPERATURE: 98 F | OXYGEN SATURATION: 99 %

## 2019-12-10 DIAGNOSIS — L30.9 ECZEMA, UNSPECIFIED TYPE: ICD-10-CM

## 2019-12-10 DIAGNOSIS — M25.521 ELBOW PAIN, RIGHT: Primary | ICD-10-CM

## 2019-12-10 DIAGNOSIS — J04.0 LARYNGITIS: Primary | ICD-10-CM

## 2019-12-10 DIAGNOSIS — M77.11 LATERAL EPICONDYLITIS, RIGHT ELBOW: ICD-10-CM

## 2019-12-10 PROCEDURE — 3008F BODY MASS INDEX DOCD: CPT | Performed by: FAMILY MEDICINE

## 2019-12-10 PROCEDURE — 99213 OFFICE O/P EST LOW 20 MIN: CPT | Performed by: FAMILY MEDICINE

## 2019-12-10 PROCEDURE — 97110 THERAPEUTIC EXERCISES: CPT | Performed by: PHYSICAL THERAPIST

## 2019-12-10 PROCEDURE — 97112 NEUROMUSCULAR REEDUCATION: CPT | Performed by: PHYSICAL THERAPIST

## 2019-12-10 PROCEDURE — G0283 ELEC STIM OTHER THAN WOUND: HCPCS | Performed by: PHYSICAL THERAPIST

## 2019-12-10 PROCEDURE — 97014 ELECTRIC STIMULATION THERAPY: CPT | Performed by: PHYSICAL THERAPIST

## 2019-12-10 PROCEDURE — 97140 MANUAL THERAPY 1/> REGIONS: CPT | Performed by: PHYSICAL THERAPIST

## 2019-12-10 NOTE — PROGRESS NOTES
Daily Note     Today's date: 12/10/2019  Patient name: Lexie Lagunas  : 1975  MRN: [id number]  Referring provider: Fela Henao DO  Dx:   Encounter Diagnosis     ICD-10-CM    1  Elbow pain, right M25 521    2  Lateral epicondylitis, right elbow M77 11                   Subjective: patient no issues after last session  half-way through today's session she reports she has been doing a lot with her arm building gifts for [employer] and getting things ready which she believes why she has increased soreness with some activities today  Objective: See treatment diary below      Assessment: Patient tolerated treatment well  She was able to increase repetitions for theraband extensions and therabar activities  She had some increased soreness with gripper and putty squeeze today  Held on table supported ER and IR secondary to increased soreness from previous exercises  She continues to present with restrictions during IASTM to triceps  She will benefit from continued physical therapy  Plan: Continue per plan of care        Diagnosis: R elbow pain/R lateral epicondylitis   Precautions: N/A   Manual Therapy 11/27/19 12/3/19 12/10/19 11/21/19 11/26/19   IASTM R lateral forearm tricep 5' RS tricep 8'  tricep 8' 8' RS IASTM, STM 8' RS R tricep   Wrist Extensor Stretch        Kinesiotaping        Posterior glide Ulna        Soft tissue tricep offload        Edema massage R forearm         Trigger point        Anterior glide of ulna/radius 4' RS grade II-III       Glide of radius/ulna 4' RS grade II-III                       Re-Evaluation        Exercise Diary  11/27/19 12/3/19 12/10/19 11/21/19 11/26/19   UBE  2'/2' 2'/2' 2'/2' 3'/3'   Elbow flexed wrist extensor stretch        Prayer stretch        Wrist extension        Wrist Extension iso        Gripper     Yellow 2'   Elbow extended wrist extensor stretch        Eccentric wrist extension        Therabar bend  Red 10xea supination and pronation Red 15xea sup/pro     Wrist Pronation/Supination        Bent Over Row        Tricep Stretch 10x10" 10x10" 10x10"  10x10"   TB Rows        TB ER        TB IR        TB W    Red 20x    Bicep Curls    johanna 5# 2x10 5# DB eccentric, concentric assist   Tricep Ext  Grn 2x10 Grn 3x10 kesier 5# 2x10 johanna 5# body blade  slow eccentric 2x10 double hand   kesier 1# neutral single arm 10x   Shoulder abduction 15x 10x      TB *  Yellow 5x      Prone retraction        Prone row        Prone T        sidelying ER Table supported 90/90 ER with softball 10x    Table supported 90/90 IR red TB proximal to wrist 10x Table supported 90/90 ER 10xea: softball, green ball  Table supported 90/90 IR Red TB proximal to wrist 10x nv     Putty Squeeze  Gripper 2'   Putty Squeeze Red 2' Gripper 2'   Putty Squeeze Red 2'     Towel Wall Slides        TB W        Median nerve glides        OH cane flexion AAROM        Elbow flexion         Wall Push-up     10x   Cane extension        Cane press        Pec Stretch 10x10"       scaption  10x 15x     Radial Nerve Glides 10x   10x    Pronation self stretch        Eccentric wrist pronation        ER iso at 90 abd                Modalities  12/3/19 12/10/19 11/21/19 11/26/19   Cyrosphere        HP 10' concurrent 10' concurrent 10' concurrent 10' concurrent 10' concurrent   CP        TENS 10' RS 10' RS 10' 10' RS 10' RS

## 2019-12-10 NOTE — PROGRESS NOTES
Assessment/Plan:    Problem List Items Addressed This Visit        Respiratory    Laryngitis - Primary  She has a steroid pack at home which was given initially by her orthopedic, advised to start taking the tapering dose of steroid, and she will dose saline gargle and advised to rest to her voice,       Musculoskeletal and Integument    Eczema  Eczema  should also improve with the steroid pack and she will continue [place]          Chief Complaint   Patient presents with    Laryngitis     for 11 days       Subjective:   Patient ID: Dylan Will is a 53 y o  female  She says she started having nasal congestion mild sore throat and URI symptoms about 10-11 days and then some hoarseness in her voice which was worse but now has been more than 11 days her hoarseness is not resolving,  She denies any fever chills and her mucus is clear when she coughs but the cough is not excessive,  She has been taking Allegra for her allergies, she also has eczema on her right leg and allergist told her to see dermatologist, she feels itchy on her right lower leg  She has been taking meloxicam for tendinitis and she says she was given a steroid pack few weeks ago and she finished that but she has a neck stress steroid pack at home      Review of Systems   Constitutional: Negative for activity change, appetite change, chills, diaphoresis, fatigue, fever and unexpected weight change  HENT: Positive for voice change  Negative for congestion, dental problem, ear discharge, ear pain, facial swelling, hearing loss, mouth sores, nosebleeds, postnasal drip, rhinorrhea, sinus pressure, sinus pain, sneezing, sore throat and trouble swallowing  Eyes: Negative for photophobia, pain, discharge, redness and itching  Respiratory: Negative for cough, chest tightness, shortness of breath and wheezing  Cardiovascular: Negative for chest pain, palpitations and leg swelling     Gastrointestinal: Negative for abdominal distention, abdominal pain, blood in stool, constipation, diarrhea and nausea  Endocrine: Negative for cold intolerance, heat intolerance, polydipsia, polyphagia and polyuria  Genitourinary: Negative for dysuria, flank pain, frequency, hematuria and urgency  Musculoskeletal: Negative for arthralgias, back pain, myalgias and neck pain  Skin: Negative for color change and pallor  Exam on the right lower leg   Allergic/Immunologic: Negative for environmental allergies and food allergies  Neurological: Negative for dizziness, weakness, light-headedness, numbness and headaches  Hematological: Negative for adenopathy  Does not bruise/bleed easily  Psychiatric/Behavioral: Negative for behavioral problems, sleep disturbance and suicidal ideas  The patient is not nervous/anxious  Objective:  Physical Exam   Constitutional: She appears well-developed and well-nourished  HENT:   Head: Normocephalic and atraumatic  Mouth/Throat: Oropharynx is clear and moist    Eyes: Pupils are equal, round, and reactive to light  Conjunctivae and EOM are normal  No scleral icterus  Neck: Normal range of motion  Neck supple  No thyromegaly present  Cardiovascular: Normal rate, regular rhythm and normal heart sounds  Pulmonary/Chest: Effort normal and breath sounds normal  She has no wheezes  She has no rales  Lymphadenopathy:     She has no cervical adenopathy  Neurological: She is alert  Skin: No rash noted  No erythema  Psychiatric: She has a normal mood and affect  Nursing note and vitals reviewed           Past Surgical History:   Procedure Laterality Date    TONSILLECTOMY      WRIST SURGERY         Family History   Problem Relation Age of Onset    Stroke Mother         CEREBROVASCULAR ACCIDENT (CVA), LISTED 2X    Hypertension Mother     Hyperlipidemia Mother     Hypertension Father     Hyperlipidemia Father     Cervical cancer Neg Hx     Colon cancer Neg Hx     Ovarian cancer Neg Hx     Uterine cancer Neg Hx          Current Outpatient Medications:     albuterol (PROAIR HFA) 90 mcg/act inhaler, Inhale, Disp: , Rfl:     Ascorbic Acid (VITAMIN C) 500 MG CAPS, Take by mouth, Disp: , Rfl:     Calcium 250 MG CAPS, Take by mouth, Disp: , Rfl:     desogestrel-ethinyl estradiol (VIORELE) 0 15-0 02/0 01 MG (21/5) per tablet, Take 1 tablet by mouth daily, Disp: 112 tablet, Rfl: 3    EPINEPHrine (EPIPEN) 0 3 mg/0 3 mL SOAJ, , Disp: , Rfl:     fexofenadine (ALLEGRA ALLERGY) 180 MG tablet, Take by mouth, Disp: , Rfl:     lisinopril (ZESTRIL) 5 mg tablet, Take 1 tablet (5 mg total) by mouth daily, Disp: 90 tablet, Rfl: 1    meloxicam (MOBIC) 7 5 mg tablet, Take 1 tablet (7 5 mg total) by mouth daily, Disp: 30 tablet, Rfl: 3    Multiple Vitamin (MULTI-VITAMIN DAILY) TABS, Take by mouth, Disp: , Rfl:     Omeprazole 20 MG TBEC, Take 1 tablet (20 mg total) by mouth daily, Disp: 90 tablet, Rfl: 0    Allergies   Allergen Reactions    Cat Hair Extract     Erythromycin     Hctz [Hydrochlorothiazide] Dizziness    Nuts     Seasonal Ic  [Cholestatin]     Latex Rash       Vitals:    12/10/19 1429   BP: 120/62   Pulse: 85   Resp: 16   Temp: 98 °F (36 7 °C)   SpO2: 99%   Weight: 59 kg (130 lb)   Height: 5' 3 5" (1 613 m)

## 2019-12-17 ENCOUNTER — EVALUATION (OUTPATIENT)
Dept: PHYSICAL THERAPY | Facility: CLINIC | Age: 44
End: 2019-12-17
Payer: COMMERCIAL

## 2019-12-17 DIAGNOSIS — M25.521 ELBOW PAIN, RIGHT: Primary | ICD-10-CM

## 2019-12-17 DIAGNOSIS — M77.11 LATERAL EPICONDYLITIS, RIGHT ELBOW: ICD-10-CM

## 2019-12-17 PROCEDURE — 97110 THERAPEUTIC EXERCISES: CPT | Performed by: PHYSICAL THERAPIST

## 2019-12-17 PROCEDURE — 97140 MANUAL THERAPY 1/> REGIONS: CPT | Performed by: PHYSICAL THERAPIST
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
## 2019-12-26 ENCOUNTER — APPOINTMENT (OUTPATIENT)
Dept: PHYSICAL THERAPY | Facility: CLINIC | Age: 44
End: 2019-12-26
Payer: COMMERCIAL

## 2019-12-26 ENCOUNTER — OFFICE VISIT (OUTPATIENT)
Dept: PHYSICAL THERAPY | Facility: CLINIC | Age: 44
End: 2019-12-26
Payer: COMMERCIAL

## 2019-12-26 DIAGNOSIS — M77.11 LATERAL EPICONDYLITIS, RIGHT ELBOW: ICD-10-CM

## 2019-12-26 DIAGNOSIS — M25.521 ELBOW PAIN, RIGHT: Primary | ICD-10-CM

## 2019-12-26 PROCEDURE — G0283 ELEC STIM OTHER THAN WOUND: HCPCS | Performed by: PHYSICAL THERAPIST

## 2019-12-26 PROCEDURE — 97014 ELECTRIC STIMULATION THERAPY: CPT | Performed by: PHYSICAL THERAPIST

## 2019-12-26 PROCEDURE — 97110 THERAPEUTIC EXERCISES: CPT | Performed by: PHYSICAL THERAPIST

## 2019-12-26 PROCEDURE — 97140 MANUAL THERAPY 1/> REGIONS: CPT | Performed by: PHYSICAL THERAPIST
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
## 2019-12-31 ENCOUNTER — OFFICE VISIT (OUTPATIENT)
Dept: PHYSICAL THERAPY | Facility: CLINIC | Age: 44
End: 2019-12-31
Payer: COMMERCIAL

## 2019-12-31 DIAGNOSIS — M77.11 LATERAL EPICONDYLITIS, RIGHT ELBOW: ICD-10-CM

## 2019-12-31 DIAGNOSIS — M25.521 ELBOW PAIN, RIGHT: Primary | ICD-10-CM

## 2019-12-31 PROCEDURE — 97140 MANUAL THERAPY 1/> REGIONS: CPT | Performed by: PHYSICAL THERAPIST

## 2019-12-31 PROCEDURE — 97110 THERAPEUTIC EXERCISES: CPT | Performed by: PHYSICAL THERAPIST

## 2019-12-31 NOTE — PROGRESS NOTES
Daily Note     Today's date: 2019  Patient name: Jeff Gilbert  : 1975  MRN: [id number]  Referring provider: Renny Maria DO  Dx:   Encounter Diagnosis     ICD-10-CM    1  Elbow pain, right M25 521    2  Lateral epicondylitis, right elbow M77 11                   Subjective: She notes that it's getting better  She notes that she still has some discomfort with gripping and fine motor tasks, but it is better than before  She notes that overall it is improving  Objective: See treatment diary below      Assessment: Tolerated treatment well  Patient would benefit from continued PT  She demonstrated only mild restriction detected with IASTM this date in forearm and tricep  She was challenged with increased gripping activities  She was able to perform bent over row with cues for scapular retraction  She continues to require postural cues throughout session  She responded well to seated thoracic extension without difficulty reaching upward shoulder elevation  She tolerated resisted tricep rope extension with minimal resistance  She tolerated greater resistance with tricep extension without need to   She was able to tolerate greater resistance when not required to   Plan: Continue per plan of care  Progress tricep strengthening without gripping as able        Diagnosis: R elbow pain/R lateral epicondylitis   Precautions: N/A   Manual Therapy 12/31/19 12/3/19 12/10/19 12/17/19 12/26/19   IASTM R lateral forearm 8' forearm/tricep RS tricep 8'  tricep 8' Forearm/tricep 8' RS 12' RS with STM   Wrist Extensor Stretch 2' RS    4' RS   Kinesiotaping        Posterior glide Ulna        Soft tissue tricep offload        Edema massage R forearm         Trigger point        Anterior glide of ulna/radius    2' RS    Glide of radius/ulna                        Re-Evaluation    20' RS    Exercise Diary  12/31/19 12/3/19 12/10/19 12/17/19 12/26/19   UBE 2'/2' 2'/2' 2'/2' 2'/2' 2'/2'   Elbow flexed wrist extensor stretch        Prayer stretch        Wrist flexion     Red 2x10   Wrist extension     Red 2x10   Wrist Extension iso        Gripper        Elbow extended wrist extensor stretch 10x10"   10x10" 10x10"   Eccentric wrist extension        Therabar bend  Red 10xea supination and pronation Red 15xea sup/pro  Red 20x sup/pro   Towel Squeeze 15x       Seated thoracic extension with 1/2 foam roll 20x       Wrist Pronation/Supination     Hammer 20x   Bent Over Row 3# 10xea       Tricep Stretch 10x10" 10x10" 10x10" 10x10"    TB Rows        TB ER        TB IR        TB W        Bicep Curls        Tricep Ext 5# DL rope 10x  3 5# single arm wrist resist 10x Grn 2x10 Grn 3x10     Shoulder abduction  10x      TB *  Yellow 5x      Prone retraction        Prone row        Prone T        sidelying ER  Table supported 90/90 ER 10xea: softball, green ball  Table supported 90/90 IR Red TB proximal to wrist 10x nv     Putty Squeeze  Gripper 2'   Putty Squeeze Red 2' Gripper 2'   Putty Squeeze Red 2'     Towel Wall Slides        TB W        Median nerve glides        OH cane flexion AAROM        Elbow flexion         Wall Push-up        Cane extension        Cane press        Pec Stretch 10x10"       scaption  10x 15x     Radial Nerve Glides        Pronation self stretch        Eccentric wrist pronation        ER iso at 90 abd                Modalities  12/3/19 12/10/19 12/17/19 12/26/19   Cyrosphere def       HP  10' concurrent 10' concurrent  10' concurrent   CP    10'     TENS  10' RS 10'  10'

## 2020-01-06 ENCOUNTER — OFFICE VISIT (OUTPATIENT)
Dept: OBGYN CLINIC | Facility: CLINIC | Age: 45
End: 2020-01-06
Payer: COMMERCIAL

## 2020-01-06 ENCOUNTER — TELEPHONE (OUTPATIENT)
Dept: OBGYN CLINIC | Facility: HOSPITAL | Age: 45
End: 2020-01-06

## 2020-01-06 VITALS
HEART RATE: 88 BPM | HEIGHT: 64 IN | SYSTOLIC BLOOD PRESSURE: 144 MMHG | DIASTOLIC BLOOD PRESSURE: 80 MMHG | BODY MASS INDEX: 22.53 KG/M2 | WEIGHT: 132 LBS

## 2020-01-06 DIAGNOSIS — M77.11 LATERAL EPICONDYLITIS, RIGHT ELBOW: ICD-10-CM

## 2020-01-06 DIAGNOSIS — G56.30 RADIAL TUNNEL SYNDROME: Primary | ICD-10-CM

## 2020-01-06 DIAGNOSIS — G56.01 CARPAL TUNNEL SYNDROME ON RIGHT: ICD-10-CM

## 2020-01-06 PROCEDURE — 20551 NJX 1 TENDON ORIGIN/INSJ: CPT | Performed by: SURGERY

## 2020-01-06 PROCEDURE — 99213 OFFICE O/P EST LOW 20 MIN: CPT | Performed by: SURGERY

## 2020-01-06 RX ORDER — LIDOCAINE HYDROCHLORIDE 10 MG/ML
1 INJECTION, SOLUTION INFILTRATION; PERINEURAL
Status: COMPLETED | OUTPATIENT
Start: 2020-01-06 | End: 2020-01-06

## 2020-01-06 RX ORDER — DEXAMETHASONE SODIUM PHOSPHATE 100 MG/10ML
40 INJECTION INTRAMUSCULAR; INTRAVENOUS
Status: COMPLETED | OUTPATIENT
Start: 2020-01-06 | End: 2020-01-06

## 2020-01-06 RX ORDER — MELOXICAM 7.5 MG/1
7.5 TABLET ORAL DAILY
Qty: 30 TABLET | Refills: 3 | Status: SHIPPED | OUTPATIENT
Start: 2020-01-06 | End: 2020-09-04

## 2020-01-06 RX ADMIN — DEXAMETHASONE SODIUM PHOSPHATE 40 MG: 100 INJECTION INTRAMUSCULAR; INTRAVENOUS at 10:07

## 2020-01-06 RX ADMIN — LIDOCAINE HYDROCHLORIDE 1 ML: 10 INJECTION, SOLUTION INFILTRATION; PERINEURAL at 10:07

## 2020-01-06 NOTE — PROGRESS NOTES
ASSESSMENT/PLAN:      53 y o  female with right lateral epicondylitis developing into radial tunnel syndrome and right carpal tunnel syndrome  John Maldonado elected to proceed with a right lateral epicondylitis CSI  The injection was performed in the office today, without complication  An ultrasound guided radial tunnel CSI was ordered today, to be performed by Dr Pastor Hams  An updated OT script was provided to include radial tunnel exercises  A refill of her Meloxicam was performed today and sent to her pharmacy electronically  She was advised to avoid Excedrin while on the Meloxicam  She may take Tylenol as needed  I will see her back in 6 weeks time  The patient verbalized understanding of exam findings and treatment plan  We engaged in the shared decision-making process and treatment options were discussed at length with the patient  Surgical and conservative management discussed today along with risks and benefits  Diagnoses and all orders for this visit:    Radial tunnel syndrome  -     Ambulatory referral to PT/OT hand therapy; Future  -     US guided msk procedure; Future    Lateral epicondylitis, right elbow  -     Ambulatory referral to PT/OT hand therapy; Future  -     US guided msk procedure; Future  -     meloxicam (MOBIC) 7 5 mg tablet; Take 1 tablet (7 5 mg total) by mouth daily    Carpal tunnel syndrome on right  -     meloxicam (MOBIC) 7 5 mg tablet; Take 1 tablet (7 5 mg total) by mouth daily    Other orders  -     Hand/upper extremity injection      Follow Up:  Return in about 6 weeks (around 2/17/2020)        To Do Next Visit:  Re-evaluation of current issue    ____________________________________________________________________________________________________________________________________________      CHIEF COMPLAINT:  Chief Complaint   Patient presents with    Right Elbow - Follow-up       SUBJECTIVE:  Ashely Torre is a 53y o  year old RHD female who presents to the office today for a follow up regarding right lateral epicondylitis and right carpal tunnel syndrome  Roz Yang has been attending OT for lateral epicondylitis exercises, as well as performing a HEP  She has also been wearing a cock up wrist brace at night, which is improving her numbness and tingling  She states that her numbness and tingling is nearly gone  She is still experiencing pain to the radial aspect of her right elbow  She would like to know if she can take Excedrin with the Meloxicam         I have personally reviewed all the relevant PMH, PSH, SH, FH, Medications and allergies  PAST MEDICAL HISTORY:  Past Medical History:   Diagnosis Date    Allergic     seasonal, food allergies    Asthma     GERD (gastroesophageal reflux disease)     Hypertension     Irritable bowel syndrome     Migraine without aura     Shoulder subluxation, left        PAST SURGICAL HISTORY:  Past Surgical History:   Procedure Laterality Date    TONSILLECTOMY      WRIST SURGERY         FAMILY HISTORY:  Family History   Problem Relation Age of Onset    Stroke Mother         CEREBROVASCULAR ACCIDENT (CVA), LISTED 2X    Hypertension Mother     Hyperlipidemia Mother     Hypertension Father     Hyperlipidemia Father     Cervical cancer Neg Hx     Colon cancer Neg Hx     Ovarian cancer Neg Hx     Uterine cancer Neg Hx        SOCIAL HISTORY:  Social History     Tobacco Use    Smoking status: Never Smoker    Smokeless tobacco: Never Used   Substance Use Topics    Alcohol use:  Yes     Alcohol/week: 1 0 standard drinks     Types: 1 Glasses of wine per week     Frequency: Monthly or less     Comment: socially     Drug use: Never       MEDICATIONS:    Current Outpatient Medications:     albuterol (PROAIR HFA) 90 mcg/act inhaler, Inhale, Disp: , Rfl:     Ascorbic Acid (VITAMIN C) 500 MG CAPS, Take by mouth, Disp: , Rfl:     Calcium 250 MG CAPS, Take by mouth, Disp: , Rfl:     desogestrel-ethinyl estradiol (VIORELE) 0 15-0 02/0 01 MG (21/5) per tablet, Take 1 tablet by mouth daily, Disp: 112 tablet, Rfl: 3    EPINEPHrine (EPIPEN) 0 3 mg/0 3 mL SOAJ, , Disp: , Rfl:     fexofenadine (ALLEGRA ALLERGY) 180 MG tablet, Take by mouth, Disp: , Rfl:     lisinopril (ZESTRIL) 5 mg tablet, Take 1 tablet (5 mg total) by mouth daily, Disp: 90 tablet, Rfl: 1    meloxicam (MOBIC) 7 5 mg tablet, Take 1 tablet (7 5 mg total) by mouth daily, Disp: 30 tablet, Rfl: 3    Multiple Vitamin (MULTI-VITAMIN DAILY) TABS, Take by mouth, Disp: , Rfl:     Omeprazole 20 MG TBEC, Take 1 tablet (20 mg total) by mouth daily, Disp: 90 tablet, Rfl: 0    ALLERGIES:  Allergies   Allergen Reactions    Cat Hair Extract     Erythromycin     Hctz [Hydrochlorothiazide] Dizziness    Nuts     Seasonal Ic  [Cholestatin]     Latex Rash       REVIEW OF SYSTEMS:  Review of Systems   Constitutional: Negative for chills, fever and unexpected weight change  HENT: Negative for hearing loss, nosebleeds and sore throat  Eyes: Negative for pain, redness and visual disturbance  Respiratory: Negative for cough, shortness of breath and wheezing  Cardiovascular: Negative for chest pain, palpitations and leg swelling  Gastrointestinal: Negative for abdominal pain, nausea and vomiting  Endocrine: Negative for polydipsia and polyuria  Genitourinary: Negative for difficulty urinating and hematuria  Musculoskeletal: Negative for arthralgias, joint swelling and myalgias  Skin: Negative for rash and wound  Neurological: Negative for dizziness, numbness and headaches  Psychiatric/Behavioral: Negative for decreased concentration, dysphoric mood and suicidal ideas  The patient is not nervous/anxious          VITALS:  Vitals:    01/06/20 0947   BP: 144/80   Pulse: 88       LABS:  HgA1c: No results found for: HGBA1C  BMP:   Lab Results   Component Value Date    CALCIUM 8 8 02/25/2019    K 3 8 02/25/2019    CO2 28 02/25/2019     02/25/2019    BUN 12 02/25/2019    CREATININE 0 76 02/25/2019       _____________________________________________________  PHYSICAL EXAMINATION:  General: well developed and well nourished, alert, oriented times 3 and appears comfortable  Psychiatric: Normal  HEENT: Normocephalic, Atraumatic Trachea Midline, No torticollis  Pulmonary: No audible wheezing or respiratory distress   Cardiovascular: No pitting edema, 2+ radial pulse   Skin: No masses, erythema, lacerations, fluctation, ulcerations  Neurovascular: Sensation Intact to the Median, Ulnar, Radial Nerve, Motor Intact to the Median, Ulnar, Radial Nerve and Pulses Intact  Musculoskeletal: Normal, except as noted in detailed exam and in HPI  MUSCULOSKELETAL EXAMINATION:    Right Elbow:    No swelling or deformity  Full range of motion with flexion, extension, supination and pronation  Positive tenderness to palpation over the Lateral Epicondyle and radial tunnel  Pain with passive flexion of the wrist with elbow fully extended  Pain with resisted wrist extension with elbow fully extended  Pain with resisted long finger extension with the elbow fully extended  Negative tinels over the ulnar nerve at the elbow        ___________________________________________________  STUDIES REVIEWED:  No new imaging to review           PROCEDURES PERFORMED:  Hand/upper extremity injection: R elbow  Date/Time: 1/6/2020 10:07 AM  Consent given by: patient  Site marked: site marked  Timeout: Immediately prior to procedure a time out was called to verify the correct patient, procedure, equipment, support staff and site/side marked as required   Supporting Documentation  Indications: pain   Procedure Details  Condition:lateral epicondylitis Site: R elbow   Preparation: Patient was prepped and draped in the usual sterile fashion  Needle size: 25 G  Ultrasound guidance: no  Medications administered: 1 mL lidocaine 1 %; 40 mg dexamethasone 100 mg/10 mL    Patient tolerance: patient tolerated the procedure well with no immediate complications  Dressing:  Sterile dressing applied            _____________________________________________________      Scribe Attestation    I,:   Batsheva Ch am acting as a scribe while in the presence of the attending physician :        I,:   Adolfo Canales MD personally performed the services described in this documentation    as scribed in my presence :

## 2020-01-06 NOTE — TELEPHONE ENCOUNTER
Patient called in  # [phone]    Patient said she called and scheduled the Rt Elbow Radial Tunnel CSI and was told she needs authorization  Please advise    Scheduled for 1/14/2020

## 2020-01-07 ENCOUNTER — APPOINTMENT (OUTPATIENT)
Dept: PHYSICAL THERAPY | Facility: CLINIC | Age: 45
End: 2020-01-07
Payer: COMMERCIAL

## 2020-01-08 ENCOUNTER — OFFICE VISIT (OUTPATIENT)
Dept: FAMILY MEDICINE CLINIC | Facility: CLINIC | Age: 45
End: 2020-01-08
Payer: COMMERCIAL

## 2020-01-08 VITALS
BODY MASS INDEX: 22.36 KG/M2 | RESPIRATION RATE: 16 BRPM | HEIGHT: 64 IN | DIASTOLIC BLOOD PRESSURE: 70 MMHG | SYSTOLIC BLOOD PRESSURE: 136 MMHG | WEIGHT: 131 LBS | OXYGEN SATURATION: 98 % | HEART RATE: 96 BPM

## 2020-01-08 DIAGNOSIS — L30.9 ECZEMA, UNSPECIFIED TYPE: Primary | ICD-10-CM

## 2020-01-08 DIAGNOSIS — I10 HYPERTENSION, UNSPECIFIED TYPE: ICD-10-CM

## 2020-01-08 LAB
CHOLEST SERPL-MCNC: 154 MG/DL
CHOLEST/HDLC SERPL: 2.9 (CALC)
HDLC SERPL-MCNC: 53 MG/DL
LDLC SERPL CALC-MCNC: 71 MG/DL (CALC)
NONHDLC SERPL-MCNC: 101 MG/DL (CALC)
TRIGL SERPL-MCNC: 199 MG/DL

## 2020-01-08 PROCEDURE — 99213 OFFICE O/P EST LOW 20 MIN: CPT | Performed by: FAMILY MEDICINE

## 2020-01-08 PROCEDURE — 3078F DIAST BP <80 MM HG: CPT | Performed by: FAMILY MEDICINE

## 2020-01-08 PROCEDURE — 3008F BODY MASS INDEX DOCD: CPT | Performed by: FAMILY MEDICINE

## 2020-01-08 PROCEDURE — 3075F SYST BP GE 130 - 139MM HG: CPT | Performed by: FAMILY MEDICINE

## 2020-01-08 RX ORDER — LISINOPRIL 5 MG/1
5 TABLET ORAL DAILY
Qty: 90 TABLET | Refills: 1 | Status: SHIPPED | OUTPATIENT
Start: 2020-01-08 | End: 2020-07-14 | Stop reason: SDUPTHER

## 2020-01-08 RX ORDER — TRIAMCINOLONE ACETONIDE 0.25 MG/G
OINTMENT TOPICAL 2 TIMES DAILY
Qty: 30 G | Refills: 0 | Status: SHIPPED | OUTPATIENT
Start: 2020-01-08 | End: 2020-03-04 | Stop reason: ALTCHOICE

## 2020-01-08 NOTE — PATIENT INSTRUCTIONS
Eczema   WHAT YOU NEED TO KNOW:   What is eczema? Eczema, or atopic dermatitis, is an itchy, red skin rash  You are more likely to have it if your parent or a family member has eczema, asthma, or hay fever  It is a long-term condition that may cause flare-ups for the rest of your life  What are the signs and symptoms of eczema? You may have patches of dry, red, itchy skin  You may also have bumps or blisters that crust over or ooze clear fluid  You may also have areas of your skin that are thick, scaly, or hard and leather-like  What triggers eczema? Anything that increases dryness or makes you want to scratch is a trigger  Triggers may cause eczema to flare up  The following are common triggers:  · Frequent baths or showers  can lead to dry, itchy skin  · Sudden temperature changes , such as cold air, dries your skin  Heat can increase sweating  Both can make you itch  · Allergens  such as dust mites and pet dander can make your symptoms worse  Pollen, mold, and cigarette smoke may also irritate your skin  · Some kinds of soap, makeup, and household   may bother your skin  Ask your healthcare provider what mild products you can use  · Stress  may cause your eczema to get worse  How is eczema diagnosed? Tell your healthcare provider if you know what triggers your rash  He will want to know if anyone in your family has allergies, asthma, or eczema  There are no tests to diagnose eczema  Your healthcare provider may test you for allergies to find out if they trigger your eczema  How is eczema treated? There is no cure for eczema  The goal of treatment is to reduce pain and itching and add moisture to your skin  Your symptoms should improve after 3 weeks of treatment  You may need the following:  · Medicines , such as immunosuppressants, help reduce itching, redness, pain, and swelling  They may be given as a cream or pill   You may also receive antihistamines to reduce itching, or antibiotics if you have a skin infection  · Phototherapy , or ultraviolet light, may help heal your skin  It is also called light therapy  How can I manage eczema? · Do not scratch  Pat or press on your skin to relieve itching  Your symptoms will get worse if you scratch  Keep your fingernails short so you do not tear your skin if you do scratch  · Keep your skin moist   Rub lotion, cream, or ointment into your skin right after a bath or shower when your skin is still damp  Ask your healthcare provider what to use and how often to use it  · Take baths or showers  with warm water for 10 minutes or less  Use mild bar soap  Ask your healthcare provider for the best soap for you to use  · Wear cotton clothes  Wear loose-fitting clothes made from cotton or cotton blends  Avoid wool  · Use a humidifier  to add moisture to the air in your home  · Avoid changes in temperature , especially activities that cause you to sweat a lot because this can cause itching  Remove blankets from your bed if you get hot while you sleep  · Avoid allergens, dust, and skin irritants  Do not let pets inside your home  Do not use perfume, fabric softener, or makeup that burns or itches  When should I seek immediate care? · You develop a fever or have red streaks going up your arm or leg  · Your rash gets more swollen, red, or hot  When should I contact my healthcare provider? · Most of your skin is red, swollen, painful, and covered with scales  · You develop bloody, red, painful crusts  · Your skin blisters and oozes white or yellow pus  · You have questions about your condition or care  CARE AGREEMENT:   You have the right to help plan your care  Learn about your health condition and how it may be treated  Discuss treatment options with your caregivers to decide what care you want to receive  You always have the right to refuse treatment  The above information is an  only   It is not intended as medical advice for individual conditions or treatments  Talk to your doctor, nurse or pharmacist before following any medical regimen to see if it is safe and effective for you  © 2017 [employer] Information is for End User's use only and may not be sold, redistributed or otherwise used for commercial purposes  All illustrations and images included in CareNotes® are the copyrighted property of [employer]  or [employer] Zarago[employer]

## 2020-01-08 NOTE — PROGRESS NOTES
Assessment/Plan:   Diagnoses and all orders for this visit:    Eczema, unspecified type  -     triamcinolone (KENALOG) 0 025 % ointment; Apply topically 2 (two) times a day  - educational handout given   - can use Benadryl at night for dry skin   - avoid hot showers  - lotions < creams < ointments - advised to use Vaseline/Aquaphor or Eucerin right away after shower/bath to lock in moisture   - advised to use steroid ointment BID x7days when flare - has a f/u with Derm in 3/2020    Hypertension, unspecified type  -     lisinopril (ZESTRIL) 5 mg tablet; Take 1 tablet (5 mg total) by mouth daily  - BP well controlled - cont current regimen   - advised to schedule f/u and annual exam for 3/2020 - pt aware     Other orders  -     Cancel: influenza vaccine, [id number], quadrivalent, 0 5 mL, preservative-free, for adult and pediatric patients 6 mos+ (AFLURIA, FLUARIX, FLULAVAL, FLUZONE) -- received Flu vaccine in 10/2019         Subjective:    Patient ID: Misty Vela is a 53 y o  female    51yo F presents for eval of Eczema   - (+) dry skin   - "a little on my hands and leg" - not acutely flared at the moment   - itching bothers her most at night   - has an appt with Derm in March   - has been using Exoderm and Aveeno Oatmeal which helps   - does not take hot showers  - (+) increased stress - Mom admitted to rehab s/p fall and middle child with worsening psych issues  - took her ACEI about an hour prior       The following portions of the patient's history were reviewed and updated as appropriate: allergies, current medications, past family history, past medical history, past social history, past surgical history and problem list     Review of Systems  as per HPI    Objective:  /70 (BP Location: Right arm, Patient Position: Sitting, Cuff Size: Standard)   Pulse 96   Resp 16   Ht 5' 3 5" (1 613 m)   Wt 59 4 kg (131 lb)   SpO2 98%   BMI 22 84 kg/m²    Physical Exam   Constitutional: She is oriented to person, place, and time  She appears well-developed and well-nourished  No distress  HENT:   Head: Normocephalic and atraumatic  Right Ear: External ear normal    Left Ear: External ear normal    Nose: Nose normal    Eyes: Conjunctivae and EOM are normal  Right eye exhibits no discharge  Left eye exhibits no discharge  No scleral icterus  Neck: Normal range of motion  Neck supple  Pulmonary/Chest: Effort normal    Musculoskeletal: Normal range of motion  Neurological: She is alert and oriented to person, place, and time  Skin: Skin is dry  Rash noted  She is not diaphoretic  Psychiatric: Her mood appears anxious  Vitals reviewed

## 2020-01-09 DIAGNOSIS — Z30.41 ENCOUNTER FOR SURVEILLANCE OF CONTRACEPTIVE PILLS: ICD-10-CM

## 2020-01-09 NOTE — TELEPHONE ENCOUNTER
Pt has new insurance and is requesting that the remainder of her birth control script be sent to her new mail order, [facility]  She takes the pill continuously so needs 4 packs instead of 3 at one time  Thank you!

## 2020-01-10 ENCOUNTER — APPOINTMENT (OUTPATIENT)
Dept: PHYSICAL THERAPY | Facility: CLINIC | Age: 45
End: 2020-01-10
Payer: COMMERCIAL

## 2020-01-10 RX ORDER — DESOGESTREL AND ETHINYL ESTRADIOL 21-5 (28)
1 KIT ORAL DAILY
Qty: 112 TABLET | Refills: 3 | Status: SHIPPED | OUTPATIENT
Start: 2020-01-10 | End: 2020-06-22

## 2020-01-14 ENCOUNTER — APPOINTMENT (OUTPATIENT)
Dept: PHYSICAL THERAPY | Facility: CLINIC | Age: 45
End: 2020-01-14
Payer: COMMERCIAL

## 2020-01-14 ENCOUNTER — HOSPITAL ENCOUNTER (OUTPATIENT)
Dept: RADIOLOGY | Facility: HOSPITAL | Age: 45
Discharge: HOME | End: 2020-01-14
Attending: SURGERY | Admitting: RADIOLOGY
Payer: COMMERCIAL

## 2020-01-14 DIAGNOSIS — G56.30 RADIAL TUNNEL SYNDROME: ICD-10-CM

## 2020-01-14 DIAGNOSIS — M77.11 LATERAL EPICONDYLITIS, RIGHT ELBOW: ICD-10-CM

## 2020-01-14 PROCEDURE — 20611 DRAIN/INJ JOINT/BURSA W/US: CPT

## 2020-01-14 RX ORDER — METHYLPREDNISOLONE ACETATE 80 MG/ML
80 INJECTION, SUSPENSION INTRA-ARTICULAR; INTRALESIONAL; INTRAMUSCULAR; SOFT TISSUE ONCE
Status: COMPLETED | OUTPATIENT
Start: 2020-01-14 | End: 2020-01-14

## 2020-01-14 RX ORDER — LIDOCAINE HYDROCHLORIDE 10 MG/ML
3 INJECTION, SOLUTION EPIDURAL; INFILTRATION; INTRACAUDAL; PERINEURAL ONCE
Status: COMPLETED | OUTPATIENT
Start: 2020-01-14 | End: 2020-01-14

## 2020-01-14 RX ADMIN — LIDOCAINE HYDROCHLORIDE 5 ML: 10 INJECTION, SOLUTION EPIDURAL; INFILTRATION; INTRACAUDAL; PERINEURAL at 11:59

## 2020-01-14 RX ADMIN — METHYLPREDNISOLONE ACETATE 80 MG: 80 INJECTION, SUSPENSION INTRA-ARTICULAR; INTRALESIONAL; INTRAMUSCULAR; SOFT TISSUE at 12:01

## 2020-01-17 ENCOUNTER — OFFICE VISIT (OUTPATIENT)
Dept: PHYSICAL THERAPY | Facility: CLINIC | Age: 45
End: 2020-01-17
Payer: COMMERCIAL

## 2020-01-17 DIAGNOSIS — M77.11 LATERAL EPICONDYLITIS, RIGHT ELBOW: ICD-10-CM

## 2020-01-17 DIAGNOSIS — M25.521 ELBOW PAIN, RIGHT: Primary | ICD-10-CM

## 2020-01-17 PROCEDURE — 97140 MANUAL THERAPY 1/> REGIONS: CPT | Performed by: PHYSICAL THERAPIST

## 2020-01-17 PROCEDURE — 97110 THERAPEUTIC EXERCISES: CPT | Performed by: PHYSICAL THERAPIST

## 2020-01-17 NOTE — PROGRESS NOTES
Daily Note     Today's date: 2020  Patient name: Jeff Gilbert  : 1975  MRN: [id number]  Referring provider: Renny Maria DO  Dx:   Encounter Diagnosis     ICD-10-CM    1  Elbow pain, right M25 521    2  Lateral epicondylitis, right elbow M77 11                   Subjective: She notes that she had two injections in the elbow  She notes that the pain is feeling better every day  She notes that pouring is still shakey and sour, but it's better today  She notes that she doesn't have that hypersensitivity that she had when she first started  Objective: See treatment diary below      Assessment: Tolerated treatment well  Patient would benefit from continued PT  She tolerated initiation of new nerve glides this date  She was challenged with terminal elbow extension during bent over table rows  She still remains challenged with gripping  She tolerates mid-range elbow positioning for gripping with wider   She tolerated doorway pec stretch well without any increase in subjective report of pain  She would benefit from progression of overall neural mobility as able to tolerate  She was provided an updated HEP, but instructed to avoid increased neural aggravation  We did discuss hypersensitivity of nerves this date  Plan: Continue per plan of care  Re-Evaluation next visit        Diagnosis: R elbow pain/R lateral epicondylitis   Precautions: N/A   Manual Therapy 12/31/19 1/17/20 12/10/19 12/17/19 12/26/19   IASTM R lateral forearm 8' forearm/tricep RS STM R forearm/tricep 10' RS tricep 8' Forearm/tricep 8' RS 12' RS with STM   Wrist Extensor Stretch 2' RS    4' RS   Kinesiotaping        Posterior glide Ulna        Soft tissue tricep offload        Edema massage R forearm         Trigger point        Anterior glide of ulna/radius    2' RS    Glide of radius/ulna                        Re-Evaluation    20' RS    Exercise Diary  12/31/19 1/17/20 12/10/19 12/17/19 12/26/19   UBE 2'/2' 2'/2' 2'/2' 2'/2' 2'/2'   Elbow flexed wrist extensor stretch        Prayer stretch        Wrist flexion     Red 2x10   Wrist extension     Red 2x10   Wrist Extension iso        Gripper        Elbow extended wrist extensor stretch 10x10"   10x10" 10x10"   Eccentric wrist extension        Therabar bend   Red 15xea sup/pro  Red 20x sup/pro   Towel Squeeze 15x       Seated thoracic extension with 1/2 foam roll 20x       Wrist Pronation/Supination     Hammer 20x   Bent Over Row 3# 10xea 5# 10x      Tricep Stretch 10x10"  10x10" 10x10"    TB Rows        TB ER        TB IR        TB W        Bicep Curls        Tricep Ext 5# DL rope 10x  3 5# single arm wrist resist 10x  Grn 3x10     Shoulder abduction        TB *        Prone retraction        Prone row        Prone T        sidelying ER   nv     Putty Squeeze  Blue therabar 20x squeeze Gripper 2'   Putty Squeeze Red 2'     Towel Wall Slides        TB W        Median nerve glides        OH cane flexion AAROM        Elbow flexion         Wall Push-up        Cane extension        Cane press        Pec Stretch 10x10" 10x10"      scaption   15x     Radial nerve cross over  10x  OH      Towel radial nerve glides  10x      Radial Nerve Glides  5x      Pronation self stretch        Eccentric wrist pronation        ER iso at 90 abd                Modalities   12/10/19 12/17/19 12/26/19   Cyrosphere def       HP   10' concurrent  10' concurrent   CP    10'     TENS   10'  10'

## 2020-01-21 ENCOUNTER — EVALUATION (OUTPATIENT)
Dept: PHYSICAL THERAPY | Facility: CLINIC | Age: 45
End: 2020-01-21
Payer: COMMERCIAL

## 2020-01-21 DIAGNOSIS — M25.521 ELBOW PAIN, RIGHT: Primary | ICD-10-CM

## 2020-01-21 DIAGNOSIS — M77.11 LATERAL EPICONDYLITIS, RIGHT ELBOW: ICD-10-CM

## 2020-01-21 PROCEDURE — 97110 THERAPEUTIC EXERCISES: CPT | Performed by: PHYSICAL THERAPIST

## 2020-01-21 PROCEDURE — 97140 MANUAL THERAPY 1/> REGIONS: CPT | Performed by: PHYSICAL THERAPIST

## 2020-01-21 NOTE — PROGRESS NOTES
PT Re-Evaluation     Today's date: 2020  Patient name: Dylan Will  : 1975  MRN: [id number]  Referring provider: Oralia Gonzales DO  Dx:   Encounter Diagnosis     ICD-10-CM    1  Elbow pain, right M25 521    2  Lateral epicondylitis, right elbow M77 11                   Assessment  Assessment details: Patient is a 37year old female with signs and symptoms consistent with R elbow pain and lateral epicondylitis resulting in difficulty with ADLS and functional activities  She demonstrates improvements and well maintained gains in R wrist/elbow strength  She has continued psychosocial limiting factors that are impacting her recovery  She has improved muscular limitations and full ROM  She is impacted most recently secondary to nerve pain  She is most functionally limited by  strength deficits  At this time, her postural imbalances are a significant contributor  Her posture and pain are directly increased with her life stressors and stress  We had a lengthy discussion about posture, ergonomics, and stress  We are going to progress her treatment with increased emphasis on posture and scapular endurance to supplement her recovery in addition to wrist extensors strengthening and endurance training to tolerance  She is appropriate for and agreeable to continued POC 2x/week  Impairments: activity intolerance, impaired physical strength, lacks appropriate home exercise program and pain with function  Barriers to therapy: History of L shoulder subluxation; 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   Understanding of Dx/Px/POC: good   Prognosis: good    Goals  ST  Decrease pain at worst to 4/10 in 4 weeks  - Progressing  2  Able to tolerate wrist flexion with elbow extended in 4 weeks  - Partially Met  3  Increase R  strength > 50 lbs in 4 weeks  - Progressing    LT  Increase R  strength to symmetrical with L in 8 weeks  - Progressing  2   Able to tolerate lifting her daughter into her car seat without increase in pain in 8 weeks  - Progressing  3  Independent in HEP in 8 weeks  4  Able to pour juice without increase in pain in 8 weeks  - Progressing    Plan  Patient would benefit from: skilled physical therapy  Planned modality interventions: cryotherapy, TENS and thermotherapy: hydrocollator packs  Other planned modality interventions: Cyrosphere  Planned therapy interventions: flexibility, functional ROM exercises, home exercise program, therapeutic exercise, therapeutic activities, stretching, strengthening, patient education, neuromuscular re-education, Mejia taping, massage, manual therapy and joint mobilization  Frequency: 2x week  Duration in weeks: 4  Treatment plan discussed with: patient and referring physician        Subjective Evaluation    History of Present Illness  Onset date: ~2019  Pain  Current pain ratin  At best pain ratin  At worst pain ratin  Location: lateral epicondyle   Quality: spasm, shooting  Relieving factors: rest and medications  Aggravating factors: lifting  Progression: improved    Social Support  Lives with: young children    Hand dominance: right      Diagnostic Tests  No diagnostic tests performed  Treatments  Previous treatment: medication  Current treatment: physical therapy  Patient Goals  Patient goals for therapy: increased motion, decreased pain, increased strength, independence with ADLs/IADLs and return to sport/leisure activities  Patient goal: I would like it to not hurt anymore; I'd like to learn to modify - Progressing     Patient reports 65% improvement since start of PT  She notes that her elbow feels much better  The muscle pain is much better  She notes that she is now more bothered by her nerve pain  She notes that she shoveled this weekend  She notes that it exacerbated her symptoms  She notes that  remains the most limited  She notes that pouring is still most limited   She notes that she has much improvements in reaching away  She notes that pronation is worse than supination via demonstration  She notes that she was sore after last session  She notes that she requires about 1 day for symptom reduction  She notes that she is doing more typing for a deadline  Objective     Tenderness     Right Elbow   Tenderness in the lateral epicondyle  Right Wrist/Hand   Tenderness in the lateral epicondyle       Active Range of Motion   Left Shoulder   Normal active range of motion    Right Shoulder   Normal active range of motion    Left Elbow   Flexion: 140 degrees   Extension: 0 degrees   Forearm supination: 90 degrees   Forearm pronation: 80 degrees     Right Elbow   Flexion: 140 degrees   Extension: 0 degrees   Forearm supination: 90 degrees   Forearm pronation: 80 degrees     Strength/Myotome Testing     Left Shoulder     Planes of Motion   Flexion: 5   Abduction: 4+   External rotation at 0°: 4+   Internal rotation at 0°: 4+     Right Shoulder     Planes of Motion   Flexion: 5   Abduction: 4   External rotation at 0°: 4-   Internal rotation at 0°: 4-     Left Elbow   Flexion: 5  Extension: 5  Forearm supination: 5  Forearm pronation: 5    Right Elbow   Flexion: 5  Extension: 4+  Forearm supination: 4  Forearm pronation: 5    Left Wrist/Hand   Wrist extension: 5  Wrist flexion: 5     (2nd hand position)     Trial 1: 45    Trial 2: 55    Trial 3: 55    Right Wrist/Hand   Wrist extension: 5  Wrist flexion: 5     (2nd hand position)     Trial 1: 30    Trial 2: 30    Trial 3: 30    Additional Strength Details  Middle Trap: R: 4-/5 p!, L: 4+/5  Lower Trap: R: 4/5 p!, L: 4+/5  Lat: R: 4+/5, L 5/5          Diagnosis: R elbow pain/R lateral epicondylitis   Precautions: N/A   Manual Therapy 12/31/19 1/17/20 1/21/20 12/17/19 12/26/19   IASTM R lateral forearm 8' forearm/tricep RS STM R forearm/tricep 10' RS  Forearm/tricep 8' RS 12' RS with STM   Wrist Extensor Stretch 2' RS    4' RS   Kinesiotaping        Posterior glide Ulna Soft tissue tricep offload        Edema massage R forearm         Trigger point        Anterior glide of ulna/radius    2' RS    Glide of radius/ulna                        Re-Evaluation   35' RS 20' RS    Exercise Diary  12/31/19 1/17/20 1/21/20 12/17/19 12/26/19   UBE 2'/2' 2'/2' 3'/3' 2'/2' 2'/2'   Elbow flexed wrist extensor stretch        Prayer stretch        Wrist flexion     Red 2x10   Wrist extension     Red 2x10   Wrist Extension iso        Gripper        Elbow extended wrist extensor stretch 10x10"   10x10" 10x10"   Eccentric wrist extension        Therabar bend     Red 20x sup/pro   Towel Squeeze 15x       Seated thoracic extension with 1/2 foam roll 20x       Wrist Pronation/Supination     Hammer 20x   Bent Over Row 3# 10xea 5# 10x      Tricep Stretch 10x10"   10x10"    TB Rows        TB ER        TB IR        TB W        Bicep Curls        Tricep Ext 5# DL rope 10x  3 5# single arm wrist resist 10x       Shoulder abduction        TB *        Prone retraction        Prone row        Prone T        sidelying ER        Putty Squeeze  Blue therabar 20x squeeze      Towel Wall Slides        TB W        Median nerve glides        OH cane flexion AAROM        Elbow flexion         Wall Push-up        Cane extension        Cane press        Pec Stretch 10x10" 10x10"      scaption        Radial nerve cross over  10x  OH      Towel radial nerve glides  10x      Radial Nerve Glides  5x      Pronation self stretch        Eccentric wrist pronation        ER iso at 90 abd                Modalities    12/17/19 12/26/19   Cyrosphere def       HP     10' concurrent   CP    10'     TENS     10'

## 2020-01-24 ENCOUNTER — OFFICE VISIT (OUTPATIENT)
Dept: PHYSICAL THERAPY | Facility: CLINIC | Age: 45
End: 2020-01-24
Payer: COMMERCIAL

## 2020-01-24 DIAGNOSIS — M77.11 LATERAL EPICONDYLITIS, RIGHT ELBOW: ICD-10-CM

## 2020-01-24 DIAGNOSIS — M25.521 ELBOW PAIN, RIGHT: Primary | ICD-10-CM

## 2020-01-24 PROCEDURE — 97140 MANUAL THERAPY 1/> REGIONS: CPT | Performed by: PHYSICAL THERAPIST

## 2020-01-24 PROCEDURE — 97112 NEUROMUSCULAR REEDUCATION: CPT | Performed by: PHYSICAL THERAPIST

## 2020-01-24 PROCEDURE — 97110 THERAPEUTIC EXERCISES: CPT | Performed by: PHYSICAL THERAPIST

## 2020-01-24 NOTE — PROGRESS NOTES
Daily Note     Today's date: 2020  Patient name: Radha Fairbanks  : 1975  MRN: [id number]  Referring provider: Eugenio Garcia DO  Dx:   Encounter Diagnosis     ICD-10-CM    1  Elbow pain, right M25 521    2  Lateral epicondylitis, right elbow M77 11                   Subjective: Patient notes that she has been much more attentive and aware of her posture  She notes that she's only had a few twinges in her arm, but overall it's feeling better  Objective: See treatment diary below      Assessment: Tolerated treatment well  Patient would benefit from continued PT  Her session was shortened due to patient arriving late to session  She was able to tolerate TB rows/extensions well this date with modified   She tolerates pec stretch well  She was challenged with wall posture requiring cues to avoid cervical extension compensation for cervical retraction  She was able to tolerate weight bearing through RUE for table supported scapular retraction with 3 sets of 5 vs  15 straight repetitions contralaterally  She was able to maintain scapular protraction when tactile cued  She was challenged with ball on wall which was able to be modified with       Plan: Continue per plan of care  Progress postural training as able to tolerate  Trial serratus punches       Diagnosis: R elbow pain/R lateral epicondylitis   Precautions: N/A   Manual Therapy 19   IASTM R lateral forearm 8' forearm/tricep RS STM R forearm/tricep 10' RS   12' RS with STM   Wrist Extensor Stretch 2' RS    4' RS   Kinesiotaping        Posterior glide Ulna        Soft tissue tricep offload        Edema massage R forearm         Trigger point        Anterior glide of ulna/radius        Glide of radius/ulna        STM of tricep/forearm    8' RS            Re-Evaluation   35' RS     Exercise Diary  19   UBE 2'/2' 2'/2' 3'/3'  2'/2'   Elbow flexed wrist extensor stretch Prayer stretch        Wrist flexion     Red 2x10   Wrist extension     Red 2x10   Wrist Extension iso        Gripper        Elbow extended wrist extensor stretch 10x10"    10x10"   Eccentric wrist extension        Therabar bend     Red 20x sup/pro   Towel Squeeze 15x       Seated thoracic extension with 1/2 foam roll 20x       Wrist Pronation/Supination     Hammer 20x   Bent Over Row 3# 10xea 5# 10x      Tricep Stretch 10x10"       TB Rows        TB ER        TB IR        TB W        Bicep Curls        Tricep Ext 5# DL rope 10x  3 5# single arm wrist resist 10x       Shoulder abduction        TB *        Prone retraction    Table supported bent over retraction 5"x15ea    Prone row        Prone T        sidelying ER        Putty Squeeze  Blue therabar 20x squeeze      Towel Wall Slides        TB W        Median nerve glides        OH cane flexion AAROM        Elbow flexion         Wall Push-up        TB Extensions/Rows     Blue 2x10 ea                    Cane extension        [employer] Stretch 10x10" 10x10"  10x10"    scaption        Radial nerve cross over  10x  OH      Towel radial nerve glides  10x      Radial Nerve Glides  5x  10x    Pronation self stretch        Eccentric wrist pronation        ER iso at 90 abd        Wall Posture    10x10"    Ball on Wall     10xea                                                    Modalities     12/26/19   Cyrosphere def       HP     10' concurrent   CP        TENS     10'

## 2020-01-28 ENCOUNTER — OFFICE VISIT (OUTPATIENT)
Dept: PHYSICAL THERAPY | Facility: CLINIC | Age: 45
End: 2020-01-28
Payer: COMMERCIAL

## 2020-01-28 DIAGNOSIS — M77.11 LATERAL EPICONDYLITIS, RIGHT ELBOW: ICD-10-CM

## 2020-01-28 DIAGNOSIS — M25.521 ELBOW PAIN, RIGHT: Primary | ICD-10-CM

## 2020-01-28 PROCEDURE — 97112 NEUROMUSCULAR REEDUCATION: CPT | Performed by: PHYSICAL THERAPIST

## 2020-01-28 PROCEDURE — 97110 THERAPEUTIC EXERCISES: CPT | Performed by: PHYSICAL THERAPIST

## 2020-01-28 PROCEDURE — 97140 MANUAL THERAPY 1/> REGIONS: CPT | Performed by: PHYSICAL THERAPIST

## 2020-01-28 NOTE — PROGRESS NOTES
Daily Note     Today's date: 2020  Patient name: Peggy Guzmán  : 1975  MRN: [id number]  Referring provider: Luis Henry DO  Dx:   Encounter Diagnosis     ICD-10-CM    1  Elbow pain, right M25 521    2  Lateral epicondylitis, right elbow M77 11                   Subjective: She notes that overall she's feeling better  She notes that she had to hold her daughter back this morning as she was having a tantrum and she feels increased symptoms in the arm  She notes that she has shoulder blade and chest soreness lately with increased postural awareness  Objective: See treatment diary below      Assessment: Tolerated treatment well  Patient would benefit from continued PT  She was provided updated HEP this date  She was challenged with eccentric wrist pronation with therabar  She was able to perform supine chin tucks this date without increase in subjective report of pain in upper thoracic spine  She was challenged with scapular protraction in weight bearing on 36 in box to perform bent over scapular retraction on contralateral side  She did have onset of spasm in mid scapular region with scapular protraction/retraction which improved per subjective report after rhomboid stretch  Plan: Continue per plan of care  Continue to progress scapular and ER strength as able        Diagnosis: R elbow pain/R lateral epicondylitis   Precautions: N/A   Manual Therapy 19   IASTM R lateral forearm 8' forearm/tricep RS STM R forearm/tricep 10' RS   10' RS   Wrist Extensor Stretch 2' RS       Kinesiotaping        Posterior glide Ulna        Soft tissue tricep offload        Edema massage R forearm         Trigger point        Anterior glide of ulna/radius        Glide of radius/ulna        STM of tricep/forearm    8' RS            Re-Evaluation   35' RS     Exercise Diary  19   UBE 2'/2' 2'/2' 3'/3'  3'/3'   Elbow flexed wrist extensor stretch        Prayer stretch        Wrist flexion        Wrist extension        Wrist Extension iso        Gripper        Elbow extended wrist extensor stretch 10x10"    10x10"   Eccentric wrist extension        Therabar bend        Towel Squeeze 15x       Seated thoracic extension with 1/2 foam roll 20x       Wrist Pronation/Supination        Bent Over Row 3# 10xea 5# 10x      Tricep Stretch 10x10"    10x10"   TB Rows        TB ER        TB IR        TB W        Bicep Curls        Tricep Ext 5# DL rope 10x  3 5# single arm wrist resist 10x       Shoulder abduction        TB *        Prone retraction    Table supported bent over retraction 5"x15ea Table supported on 36" 5"x15ea   Prone row        Prone T        sidelying ER        Putty Squeeze  Blue therabar 20x squeeze      Towel Wall Slides        TB W        Median nerve glides        OH cane flexion AAROM        Elbow flexion         Wall Push-up        TB Extensions/Rows     Blue 2x10 ea            Supine chin tucks     5"x20   Cane extension        Cane press        Pec Stretch 10x10" 10x10"  10x10"    scaption        Radial nerve cross over  10x  OH      Towel radial nerve glides  10x      Radial Nerve Glides  5x  10x    Pronation self stretch        Eccentric wrist pronation     Blue Therabar 10x eccentric only   ER iso at 90 abd        Wall Posture    10x10" 10x10"   Ball on Wall     10xea                                                    Modalities        Cyrosphere def       HP        CP        TENS

## 2020-01-31 ENCOUNTER — OFFICE VISIT (OUTPATIENT)
Dept: PHYSICAL THERAPY | Facility: CLINIC | Age: 45
End: 2020-01-31
Payer: COMMERCIAL

## 2020-01-31 DIAGNOSIS — M77.11 LATERAL EPICONDYLITIS, RIGHT ELBOW: ICD-10-CM

## 2020-01-31 DIAGNOSIS — M25.521 ELBOW PAIN, RIGHT: Primary | ICD-10-CM

## 2020-01-31 PROCEDURE — 97110 THERAPEUTIC EXERCISES: CPT | Performed by: PHYSICAL THERAPIST

## 2020-01-31 PROCEDURE — 97140 MANUAL THERAPY 1/> REGIONS: CPT | Performed by: PHYSICAL THERAPIST

## 2020-01-31 PROCEDURE — 97112 NEUROMUSCULAR REEDUCATION: CPT | Performed by: PHYSICAL THERAPIST

## 2020-01-31 NOTE — PROGRESS NOTES
Daily Note     Today's date: 2020  Patient name: Franko Ruggiero  : 1975  MRN: [id number]  Referring provider: Nicolle Leggett DO  Dx:   Encounter Diagnosis     ICD-10-CM    1  Elbow pain, right M25 521    2  Lateral epicondylitis, right elbow M77 11                   Subjective: She notes that she was sore the rest of the day and the next day, requiring Tylenol, but it's better today  Objective: See treatment diary below      Assessment: Tolerated treatment well  Patient would benefit from continued PT  She tolerated pulleys well this date  She was able to tolerate serratus punches well this date without upper trap compensation  She was able to tolerate modified open books well this date  She did demonstrate improved postural awareness this date  She required assistance and tactile cues with prone retraction and rows  She tolerated eccentric supination with less symptom exacerbation, but required lighter bar and modified   Plan: Continue per plan of care  Progress scapular strengthening as able        Diagnosis: R elbow pain/R lateral epicondylitis   Precautions: N/A   Manual Therapy 20   IASTM R lateral forearm STM forearm/tricep 10' RS STM R forearm/tricep 10' RS   10' RS   Wrist Extensor Stretch        Kinesiotaping        Posterior glide Ulna        Soft tissue tricep offload        Edema massage R forearm         Trigger point        Anterior glide of ulna/radius        Glide of radius/ulna        STM of tricep/forearm    8' RS            Re-Evaluation   35' RS     Exercise Diary  20   Pulleys 3'/3'       UBE  2'/2' 3'/3'  3'/3'   Elbow flexed wrist extensor stretch        Prayer stretch        Wrist flexion        Wrist extension        Wrist Extension iso        Gripper        Elbow extended wrist extensor stretch     10x10"   Eccentric wrist extension        Therabar bend        Towel Squeeze        Seated thoracic extension with 1/2 foam roll        Wrist Pronation/Supination        Bent Over Row  5# 10x      Tricep Stretch     10x10"   TB Rows        TB ER        TB IR        TB W        Bicep Curls        Tricep Ext        Shoulder abduction        TB *        Prone retraction 5"x20   Table supported bent over retraction 5"x15ea Table supported on 36" 5"x15ea   Serratus punches 5"x20       Prone row 20xea       Prone T        sidelying ER        Putty Squeeze  Blue therabar 20x squeeze      Towel Wall Slides        TB W        Median nerve glides        OH cane flexion AAROM        Elbow flexion         Wall Push-up        TB Extensions/Rows     Blue 2x10 ea            Supine chin tucks     5"x20   Cane extension        Cane press        Pec Stretch 10x10" 10x10"  10x10"    scaption        Radial nerve cross over  10x  OH      Towel radial nerve glides  10x      Radial Nerve Glides  5x  10x    Pronation self stretch        Eccentric wrist pronation Green 2x8  With mid bar     Blue Therabar 10x eccentric only   ER iso at 90 abd        Wall Posture    10x10" 10x10"   Ball on Wall     10xea    Rhomboid stretch 10x10"                                               Modalities        Cyrosphere        HP        CP        TENS

## 2020-02-04 ENCOUNTER — OFFICE VISIT (OUTPATIENT)
Dept: PHYSICAL THERAPY | Facility: CLINIC | Age: 45
End: 2020-02-04
Payer: COMMERCIAL

## 2020-02-04 DIAGNOSIS — M77.11 LATERAL EPICONDYLITIS, RIGHT ELBOW: ICD-10-CM

## 2020-02-04 DIAGNOSIS — M25.521 ELBOW PAIN, RIGHT: Primary | ICD-10-CM

## 2020-02-04 PROCEDURE — 97110 THERAPEUTIC EXERCISES: CPT | Performed by: PHYSICAL THERAPIST

## 2020-02-04 PROCEDURE — 97112 NEUROMUSCULAR REEDUCATION: CPT | Performed by: PHYSICAL THERAPIST

## 2020-02-04 PROCEDURE — 97140 MANUAL THERAPY 1/> REGIONS: CPT | Performed by: PHYSICAL THERAPIST

## 2020-02-04 NOTE — PROGRESS NOTES
Daily Note     Today's date: 2020  Patient name: Giovanna Brock  : 1975  MRN: [id number]  Referring provider: Roseann Guevara DO  Dx:   Encounter Diagnosis     ICD-10-CM    1  Elbow pain, right M25 521    2  Lateral epicondylitis, right elbow M77 11                   Subjective: She reports that she has some soreness  She notes that she had an [place] outbreak on tricep  She notes that she wasn't too sore after last session  Objective: See treatment diary below      Assessment: Tolerated treatment well  Patient would benefit from continued PT  She demonstrated decreased restriction detected with STM this date of lateral forearm  She was able to perform greater eccentric pronation with increased  diameter of bar with towel wrapped around  He required cues throughout session for upright posture throughout  She was able to perform table supported ER with arm supported holding ball  She was able to perform bent over scapular retraction without upper trap compensation  She was able to perform rows bent over with TB resistance vs  DB gripping  She remains limited in tolerance of tricep extension with terminal extension  She continues to progress toward long-term functional goals  Plan: Continue per plan of care  Progress scapular training as able        Diagnosis: R elbow pain/R lateral epicondylitis   Precautions: N/A   Manual Therapy 20   IASTM R lateral forearm STM forearm/tricep 10' RS 8' STM lateral forearm   10' RS   Wrist Extensor Stretch        Kinesiotaping        Posterior glide Ulna        Soft tissue tricep offload        Edema massage R forearm         Trigger point        Anterior glide of ulna/radius        Glide of radius/ulna        STM of tricep/forearm    8' RS            Re-Evaluation   35' RS     Exercise Diary  20   Pulleys 3'/3'       UBE  3'/3' 3'/3'  3'/3'   Elbow flexed wrist extensor stretch Prayer stretch        Wrist flexion        Wrist extension        Wrist Extension iso        Gripper        Elbow extended wrist extensor stretch     10x10"   Eccentric wrist extension        Therabar bend        Towel Squeeze        Seated thoracic extension with 1/2 foam roll        Wrist Pronation/Supination        Bent Over Row  + scapular retraction 5"x10    Red TB 2 x10      Bent over tricep kickback  2x10      Tricep Stretch  10x10"   10x10"   TB Rows        TB ER        TB IR        TB W        Bicep Curls        Tricep Ext        Shoulder abduction        TB *        Prone retraction 5"x20   Table supported bent over retraction 5"x15ea Table supported on 36" 5"x15ea   Serratus punches 5"x20       Prone row 20xea       Prone T        sidelying ER        Putty Squeeze        Towel Wall Slides        TB W        Median nerve glides        OH cane flexion AAROM        Elbow flexion         Wall Push-up        TB Extensions/Rows     Blue 2x10 ea    Table supported ER with ball  10x   10x with Red ball      Supine chin tucks     5"x20   Cane extension        Cane press        Pec Stretch 10x10"   10x10"    scaption        Radial nerve cross over        Towel radial nerve glides        Radial Nerve Glides    10x    Pronation self stretch        Eccentric wrist pronation Green 2x8  With mid bar  Green 2x5  Green with towel wrapped 2x5   Blue Therabar 10x eccentric only   ER iso at 90 abd        Wall Posture    10x10" 10x10"   Ball on Wall     10xea    Rhomboid stretch 10x10"                                               Modalities        Cyrosphere        HP        CP        TENS

## 2020-02-07 ENCOUNTER — OFFICE VISIT (OUTPATIENT)
Dept: PHYSICAL THERAPY | Facility: CLINIC | Age: 45
End: 2020-02-07
Payer: COMMERCIAL

## 2020-02-07 DIAGNOSIS — M25.521 ELBOW PAIN, RIGHT: Primary | ICD-10-CM

## 2020-02-07 DIAGNOSIS — M77.11 LATERAL EPICONDYLITIS, RIGHT ELBOW: ICD-10-CM

## 2020-02-07 PROCEDURE — 97110 THERAPEUTIC EXERCISES: CPT | Performed by: PHYSICAL THERAPIST

## 2020-02-07 PROCEDURE — 97112 NEUROMUSCULAR REEDUCATION: CPT | Performed by: PHYSICAL THERAPIST

## 2020-02-07 PROCEDURE — 97140 MANUAL THERAPY 1/> REGIONS: CPT | Performed by: PHYSICAL THERAPIST

## 2020-02-07 NOTE — PROGRESS NOTES
Daily Note     Today's date: 2020  Patient name: Kelly Guerra  : 1975  MRN: [id number]  Referring provider: Dasha Caraballo DO  Dx:   Encounter Diagnosis     ICD-10-CM    1  Elbow pain, right M25 521    2  Lateral epicondylitis, right elbow M77 11                   Subjective: she reports that she was sore after last session  She notes that she had to hold both her young girls in awkward positions while they had tantrums which aggravated the symptoms  She notes that it doesn't flare-up as badly, and it recovers more quickly  She notes less difficulty with gripping  She notes there are times she forgets about it  Objective: See treatment diary below      Assessment: Tolerated treatment well  Patient would benefit from continued PT  She demonstrated only minimal restriction with STM of lateral forearm  She no longer has tricep restriction  She is better able to tolerate self stretch of wrist extensors with elbow extended  She was able to perform sidelying ER with resistance of band at wrist vs  Gripping the band  She tolerated upper trap stretch after mild upper trap compensation with sidelying ER with fatigue  She was fatigued with scapular training this date with initiation of T and Y  Plan: Continue per plan of care  Progress wrist extensor training as able        Diagnosis: R elbow pain/R lateral epicondylitis   Precautions: N/A   Manual Therapy 20   IASTM R lateral forearm STM forearm/tricep 10' RS 8' STM lateral forearm 8' STM lateral forearm  10' RS   Wrist Extensor Stretch        Kinesiotaping        Posterior glide Ulna        Soft tissue tricep offload        Edema massage R forearm         Trigger point        Anterior glide of ulna/radius        Glide of radius/ulna        STM of tricep/forearm    8' RS            Re-Evaluation        Exercise Diary  20   Pulleys 3'/3'  3'/3' 3'/3'    UBE  3'/3'   3'/3'   Elbow flexed wrist extensor stretch        Prayer stretch        Wrist flexion        Wrist extension        Wrist Extension iso        Gripper        Elbow extended wrist extensor stretch     10x10"   Eccentric wrist extension        Upper trap stretch   10x10"             Seated thoracic extension with 1/2 foam roll        Wrist Pronation/Supination        Bent Over Row, T, Y  + scapular retraction 5"x10    Red TB 2 x10 15x +Y, T      Bent over tricep kickback  2x10      Tricep Stretch  10x10"   10x10"   TB Rows        TB ER        TB IR        TB W        Bicep Curls        Tricep Ext        Shoulder abduction        TB *        Prone retraction 5"x20   Table supported bent over retraction 5"x15ea Table supported on 36" 5"x15ea   Serratus punches 5"x20  5"x20     Prone row 20xea       Prone T        sidelying ER   20x Red      Putty Squeeze        Towel Wall Slides        TB W        Median nerve glides        OH cane flexion AAROM        Elbow flexion         Wall Push-up        TB Extensions/Rows     Blue 2x10 ea    Table supported ER with ball  10x   10x with Red ball      Supine chin tucks     5"x20   Cane extension        Cane press        Pec Stretch 10x10"   10x10"    scaption        Radial nerve cross over        Towel radial nerve glides        Radial Nerve Glides    10x    Pronation self stretch        Eccentric wrist pronation Green 2x8  With mid bar  Green 2x5  Green with towel wrapped 2x5 Grn 2x10  Blue Therabar 10x eccentric only   ER iso at 90 abd        Wall Posture    10x10" 10x10"   Ball on Wall     10xea    Rhomboid stretch 10x10"                                               Modalities        Cyrosphere        HP        CP        TENS

## 2020-02-10 ENCOUNTER — OFFICE VISIT (OUTPATIENT)
Dept: PHYSICAL THERAPY | Facility: CLINIC | Age: 45
End: 2020-02-10
Payer: COMMERCIAL

## 2020-02-10 DIAGNOSIS — M25.521 ELBOW PAIN, RIGHT: Primary | ICD-10-CM

## 2020-02-10 DIAGNOSIS — M77.11 LATERAL EPICONDYLITIS, RIGHT ELBOW: ICD-10-CM

## 2020-02-10 PROCEDURE — 97112 NEUROMUSCULAR REEDUCATION: CPT | Performed by: PHYSICAL THERAPIST

## 2020-02-10 PROCEDURE — 97110 THERAPEUTIC EXERCISES: CPT | Performed by: PHYSICAL THERAPIST

## 2020-02-10 NOTE — PROGRESS NOTES
Daily Note     Today's date: 2/10/2020  Patient name: Tashi Puri  : 1975  MRN: [id number]  Referring provider: Ramon Kam DO  Dx:   Encounter Diagnosis     ICD-10-CM    1  Elbow pain, right M25 521    2  Lateral epicondylitis, right elbow M77 11                   Subjective: She notes that it's okay, that it's more manageable  She notes that there are times she forgets about it  She notes that she did have some muscle soreness after last session  Objective: See treatment diary below      Assessment: Tolerated treatment well  Patient would benefit from continued PT   Session was shortened as patient had to take a call regarding her daughter during today's session  She was able to perform TB W with resistance at proximal forearm vs  At the wrist and gripping  She was able to perform serratus punches without increase in subjective report of pain  She did require verbal cues for slow and controlled movements with sidelying ER  Plan: Continue per plan of care  Progress tricep strength as able        Diagnosis: R elbow pain/R lateral epicondylitis   Precautions: N/A   Manual Therapy 1/31/20 2/4/20 2/7/20 2/10/20 1/28/20   IASTM R lateral forearm STM forearm/tricep 10' RS 8' STM lateral forearm 8' STM lateral forearm D/c 10' RS   Wrist Extensor Stretch        Kinesiotaping        Posterior glide Ulna        Soft tissue tricep offload        Edema massage R forearm         Trigger point        Anterior glide of ulna/radius        Glide of radius/ulna        STM of tricep/forearm                Re-Evaluation        Exercise Diary  1/31/20 2/4/20 2/7/20 2/10/20 1/28/20   Pulleys 3'/3'  3'/3' 3'/3'    UBE  3'/3'   3'/3'   Elbow flexed wrist extensor stretch        Prayer stretch        Wrist flexion        Wrist extension        Wrist Extension iso        Gripper        Elbow extended wrist extensor stretch     10x10"   Eccentric wrist extension        Upper trap stretch   10x10"             Seated thoracic extension with 1/2 foam roll        Wrist Pronation/Supination        Bent Over Row, T, Y  + scapular retraction 5"x10    Red TB 2 x10 15x +Y, T      Bent over tricep kickback  2x10  2x10    Tricep Stretch  10x10"   10x10"   TB Rows        TB ER        TB IR        TB W    Red with modified  2x10    Bicep Curls        Tricep Ext    Supine x5    Shoulder abduction        TB *        Prone retraction 5"x20    Table supported on 36" 5"x15ea   Serratus punches 5"x20   5"x20    Prone row 20xea       Prone T        sidelying ER    2x10    Putty Squeeze        Towel Wall Slides        TB W        Median nerve glides        OH cane flexion AAROM        Elbow flexion         Wall Push-up        TB Extensions/Rows         Table supported ER with ball  10x   10x with Red ball      Supine chin tucks     5"x20   Cane extension        Cane press        Pec Stretch 10x10"   10x10"    scaption        Radial nerve cross over        Towel radial nerve glides        Radial Nerve Glides        Pronation self stretch        Eccentric wrist pronation Green 2x8  With mid bar  Green 2x5  Green with towel wrapped 2x5 Grn 2x10  Blue Therabar 10x eccentric only   ER iso at 90 abd        Wall Posture     10x10"   Ball on Wall         Rhomboid stretch 10x10"                                               Modalities        Cyrosphere        HP        CP        TENS

## 2020-02-18 ENCOUNTER — EVALUATION (OUTPATIENT)
Dept: PHYSICAL THERAPY | Facility: CLINIC | Age: 45
End: 2020-02-18
Payer: COMMERCIAL

## 2020-02-18 DIAGNOSIS — M77.11 LATERAL EPICONDYLITIS, RIGHT ELBOW: ICD-10-CM

## 2020-02-18 DIAGNOSIS — M25.521 ELBOW PAIN, RIGHT: Primary | ICD-10-CM

## 2020-02-18 PROCEDURE — 97140 MANUAL THERAPY 1/> REGIONS: CPT | Performed by: PHYSICAL THERAPIST

## 2020-02-18 NOTE — PROGRESS NOTES
PT Re-Evaluation     Today's date: 2020  Patient name: Marcelino Gong  : 1975  MRN: [id number]  Referring provider: Zahra Meza DO  Dx:   Encounter Diagnosis     ICD-10-CM    1  Elbow pain, right M25 521    2  Lateral epicondylitis, right elbow M77 11                   Assessment  Assessment details: Patient is a 37year old female with signs and symptoms consistent with R elbow pain and lateral epicondylitis resulting in difficulty with ADLS and functional activities  She demonstrates improvements and well maintained gains in R wrist/elbow strength  She demonstrates improved postural awareness and self management of symptoms  She remains most limited in  strength and fine motor tasks such as writing or pinch   We dicussed modification options for larger gripped pens, etc  She verbalized understanding  She was agreeable to transition to HEP  Residual deficits should continue to resolve with time and HEP  Impairments: impaired physical strength, lacks appropriate home exercise program and pain with function  Barriers to therapy: History of L shoulder subluxation; 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   Understanding of Dx/Px/POC: good   Prognosis: good    Goals  ST  Decrease pain at worst to 4/10 in 4 weeks  - Met  2  Able to tolerate wrist flexion with elbow extended in 4 weeks  - Met  3  Increase R  strength > 50 lbs in 4 weeks  - Progressing (45 lbs)    LT  Increase R  strength to symmetrical with L in 8 weeks  - Progressing  2  Able to tolerate lifting her daughter into her car seat without increase in pain in 8 weeks  - Met  3  Independent in HEP in 8 weeks  4  Able to pour juice without increase in pain in 8 weeks  - Met    Plan  Plan details: Transition to HEP within next 2-4 weeks     Patient would benefit from: skilled physical therapy  Planned modality interventions: cryotherapy, TENS and thermotherapy: hydrocollator packs  Other planned modality interventions: Cyrosphere  Planned therapy interventions: flexibility, functional ROM exercises, home exercise program, therapeutic exercise, therapeutic activities, stretching, strengthening, patient education, neuromuscular re-education, Mejia taping, massage, manual therapy and joint mobilization  Frequency: 1x week  Duration in weeks: 4  Treatment plan discussed with: patient and referring physician        Subjective Evaluation    History of Present Illness  Onset date: ~2019  Pain  Current pain ratin  At best pain ratin  At worst pain ratin  Location: lateral epicondyle, lateral forearm  Quality: sharp (spasm, shooting)  Relieving factors: rest and medications  Progression: improved    Social Support  Lives with: young children    Hand dominance: right      Diagnostic Tests  No diagnostic tests performed  Treatments  Previous treatment: medication  Current treatment: physical therapy  Patient Goals  Patient goals for therapy: increased motion, decreased pain, increased strength, independence with ADLs/IADLs and return to sport/leisure activities  Patient goal: I would like it to not hurt anymore; I'd like to learn to modify - Partially Met     Patient reports 80% improvement since start of PT  She notes that the big stuff is much better  She notes that pouring has improved  She notes that writing and screwing a screw  is more difficult  She notes decreased dropping episodes  She notes that she does get some discomfort  She notes that when she when she stops the activity, she has quick resolution of symptoms  She notes that she does still take her Meloxicam  She notes that she has follow-up with her hand specialist tomorrow  Objective     Tenderness     Right Elbow   Tenderness in the lateral epicondyle  Right Wrist/Hand   Tenderness in the lateral epicondyle       Active Range of Motion   Left Shoulder   Normal active range of motion    Right Shoulder   Normal active range of motion    Left Elbow   Flexion: 140 degrees   Extension: 0 degrees   Forearm supination: 90 degrees   Forearm pronation: 80 degrees     Right Elbow   Flexion: 140 degrees   Extension: 0 degrees   Forearm supination: 90 degrees   Forearm pronation: 80 degrees     Strength/Myotome Testing     Left Shoulder     Planes of Motion   Flexion: 5   Abduction: 4+   External rotation at 0°: 4+   Internal rotation at 0°: 4+     Right Shoulder     Planes of Motion   Flexion: 5   Abduction: 5   External rotation at 0°: 4+   Internal rotation at 0°: 4+     Left Elbow   Flexion: 5  Extension: 5  Forearm supination: 5  Forearm pronation: 5    Right Elbow   Flexion: 5  Extension: 5  Forearm supination: 5  Forearm pronation: 5    Left Wrist/Hand   Wrist extension: 5  Wrist flexion: 5     (2nd hand position)     Trial 1: 45    Trial 2: 55    Trial 3: 55    Right Wrist/Hand   Wrist extension: 5  Wrist flexion: 5     (2nd hand position)     Trial 1: 45    Trial 2: 40    Trial 3: 40    Additional Strength Details  Middle Trap: R: 4+/5 , L: 4+/5  Lower Trap: R: 4/5 p!, L: 4+/5          Flowsheet Rows      Most Recent Value   PT/OT G-Codes   Current Score  84   Projected Score  75   FOTO information reviewed  Yes        Diagnosis: R elbow pain/R lateral epicondylitis   Precautions: N/A   Manual Therapy 1/31/20 2/4/20 2/7/20 2/10/20 2/18/20   IASTM R lateral forearm STM forearm/tricep 10' RS 8' STM lateral forearm 8' STM lateral forearm D/c    Wrist Extensor Stretch        Kinesiotaping        Posterior glide Ulna        Soft tissue tricep offload        Edema massage R forearm         Trigger point        Anterior glide of ulna/radius        Glide of radius/ulna        STM of tricep/forearm                Re-Evaluation     40' RS   Exercise Diary  1/31/20 2/4/20 2/7/20 2/10/20 2/18/20   Pulleys 3'/3'  3'/3' 3'/3'    UBE  3'/3'      Elbow flexed wrist extensor stretch        Prayer stretch        Wrist flexion        Wrist extension        Wrist Extension iso        Gripper        Elbow extended wrist extensor stretch        Eccentric wrist extension        Upper trap stretch   10x10"             Seated thoracic extension with 1/2 foam roll        Wrist Pronation/Supination        Bent Over Row, T, Y  + scapular retraction 5"x10    Red TB 2 x10 15x +Y, T      Bent over tricep kickback  2x10  2x10    Tricep Stretch  10x10"      TB Rows        TB ER        TB IR        TB W    Red with modified  2x10    Bicep Curls        Tricep Ext    Supine x5    Shoulder abduction        TB *        Prone retraction 5"x20       Serratus punches 5"x20   5"x20    Prone row 20xea       Prone T        sidelying ER    2x10    Putty Squeeze        Towel Wall Slides        TB W        Median nerve glides        OH cane flexion AAROM        Elbow flexion         Wall Push-up        TB Extensions/Rows         Table supported ER with ball  10x   10x with Red ball      Supine chin tucks        Cane extension        Cane press        Pec Stretch 10x10"   10x10"    scaption        Radial nerve cross over        Towel radial nerve glides        Radial Nerve Glides        Pronation self stretch        Eccentric wrist pronation Green 2x8  With mid bar  Green 2x5  Green with towel wrapped 2x5 Grn 2x10     ER iso at 90 abd        Wall Posture        Ball on Wall         Rhomboid stretch 10x10"                                               Modalities        Cyrosphere        HP        CP        TENS

## 2020-02-19 ENCOUNTER — OFFICE VISIT (OUTPATIENT)
Dept: OBGYN CLINIC | Facility: CLINIC | Age: 45
End: 2020-02-19
Payer: COMMERCIAL

## 2020-02-19 VITALS
WEIGHT: 131 LBS | BODY MASS INDEX: 22.36 KG/M2 | DIASTOLIC BLOOD PRESSURE: 82 MMHG | HEART RATE: 98 BPM | SYSTOLIC BLOOD PRESSURE: 152 MMHG | HEIGHT: 64 IN

## 2020-02-19 DIAGNOSIS — G56.30 RADIAL TUNNEL SYNDROME: ICD-10-CM

## 2020-02-19 DIAGNOSIS — M65.4 DE QUERVAIN'S TENOSYNOVITIS, RIGHT: ICD-10-CM

## 2020-02-19 DIAGNOSIS — M77.11 LATERAL EPICONDYLITIS, RIGHT ELBOW: Primary | ICD-10-CM

## 2020-02-19 PROCEDURE — 3077F SYST BP >= 140 MM HG: CPT | Performed by: SURGERY

## 2020-02-19 PROCEDURE — 3008F BODY MASS INDEX DOCD: CPT | Performed by: SURGERY

## 2020-02-19 PROCEDURE — 3079F DIAST BP 80-89 MM HG: CPT | Performed by: SURGERY

## 2020-02-19 PROCEDURE — 1036F TOBACCO NON-USER: CPT | Performed by: SURGERY

## 2020-02-19 PROCEDURE — 99213 OFFICE O/P EST LOW 20 MIN: CPT | Performed by: SURGERY

## 2020-02-19 NOTE — PROGRESS NOTES
ASSESSMENT/PLAN:      53 y o  female with right lateral epicondylitis and radial tunnel syndrome  It was discussed with Rey Farmer that she may be developing right De Quervain's tenosynovitis  OT was ordered today for fine motor skills, lateral epicondylitis exercises, radial tunnel exercises and de quervain exercises  She may transition to taking the Meloxicam every other day or as needed  She will continue with nighttime bracing  If her fine motor symptoms do not improve in 6 weeks time a EMG may be ordered  Follow up in 6 weeks time  The patient verbalized understanding of exam findings and treatment plan  We engaged in the shared decision-making process and treatment options were discussed at length with the patient  Surgical and conservative management discussed today along with risks and benefits  Diagnoses and all orders for this visit:    Lateral epicondylitis, right elbow  -     Ambulatory referral to PT/OT hand therapy; Future    Radial tunnel syndrome  -     Ambulatory referral to PT/OT hand therapy; Future    De Quervain's tenosynovitis, right  -     Ambulatory referral to PT/OT hand therapy; Future      Follow Up:  Return in about 6 weeks (around 4/1/2020)  To Do Next Visit:  Re-evaluation of current issue    ____________________________________________________________________________________________________________________________________________      CHIEF COMPLAINT:  Chief Complaint   Patient presents with    Right Elbow - Follow-up       SUBJECTIVE:  Jeff Gilbert is a 53y o  year old RHD female who presents to the office today for a follow up regarding right elbow pain  Rey Farmer underwent a right lateral epicondylitis CSI as well as an ultrasound guided radial tunnel CSI  She has been attending OT and taking Meloxicam for pain control  She notes improvement in her symptoms following the CSI's  She notes dexterity issues with smaller objects  She denies associated numbness and tingling  I have personally reviewed all the relevant PMH, PSH, SH, FH, Medications and allergies  PAST MEDICAL HISTORY:  Past Medical History:   Diagnosis Date    Allergic     seasonal, food allergies    Asthma     GERD (gastroesophageal reflux disease)     Hypertension     Irritable bowel syndrome     Migraine without aura     Shoulder subluxation, left        PAST SURGICAL HISTORY:  Past Surgical History:   Procedure Laterality Date    TONSILLECTOMY      US GUIDED MSK PROCEDURE  1/14/2020    WRIST SURGERY         FAMILY HISTORY:  Family History   Problem Relation Age of Onset    Stroke Mother         CEREBROVASCULAR ACCIDENT (CVA), LISTED 2X    Hypertension Mother     Hyperlipidemia Mother     Hypertension Father     Hyperlipidemia Father     Cervical cancer Neg Hx     Colon cancer Neg Hx     Ovarian cancer Neg Hx     Uterine cancer Neg Hx        SOCIAL HISTORY:  Social History     Tobacco Use    Smoking status: Never Smoker    Smokeless tobacco: Never Used   Substance Use Topics    Alcohol use:  Yes     Alcohol/week: 1 0 standard drinks     Types: 1 Glasses of wine per week     Frequency: Monthly or less     Comment: socially     Drug use: Never       MEDICATIONS:    Current Outpatient Medications:     albuterol (PROAIR HFA) 90 mcg/act inhaler, Inhale, Disp: , Rfl:     Ascorbic Acid (VITAMIN C) 500 MG CAPS, Take by mouth, Disp: , Rfl:     Calcium 250 MG CAPS, Take by mouth, Disp: , Rfl:     desogestrel-ethinyl estradiol (VIORELE) 0 15-0 02/0 01 MG (21/5) per tablet, Take 1 tablet by mouth daily, Disp: 112 tablet, Rfl: 3    EPINEPHrine (EPIPEN) 0 3 mg/0 3 mL SOAJ, , Disp: , Rfl:     fexofenadine (ALLEGRA ALLERGY) 180 MG tablet, Take by mouth, Disp: , Rfl:     lisinopril (ZESTRIL) 5 mg tablet, Take 1 tablet (5 mg total) by mouth daily, Disp: 90 tablet, Rfl: 1    meloxicam (MOBIC) 7 5 mg tablet, Take 1 tablet (7 5 mg total) by mouth daily, Disp: 30 tablet, Rfl: 3    Multiple Vitamin (MULTI-VITAMIN DAILY) TABS, Take by mouth, Disp: , Rfl:     Omeprazole 20 MG TBEC, Take 1 tablet (20 mg total) by mouth daily, Disp: 90 tablet, Rfl: 0    triamcinolone (KENALOG) 0 025 % ointment, Apply topically 2 (two) times a day, Disp: 30 g, Rfl: 0    ALLERGIES:  Allergies   Allergen Reactions    Cat Hair Extract     Erythromycin     Hctz [Hydrochlorothiazide] Dizziness    Nuts     Seasonal Ic  [Cholestatin]     Latex Rash       REVIEW OF SYSTEMS:  Review of Systems   Constitutional: Negative for chills, fever and unexpected weight change  HENT: Negative for hearing loss, nosebleeds and sore throat  Eyes: Negative for pain, redness and visual disturbance  Respiratory: Negative for cough, shortness of breath and wheezing  Cardiovascular: Negative for chest pain, palpitations and leg swelling  Gastrointestinal: Negative for abdominal pain, nausea and vomiting  Endocrine: Negative for polydipsia and polyuria  Genitourinary: Negative for difficulty urinating and hematuria  Musculoskeletal: Negative for arthralgias, joint swelling and myalgias  Skin: Negative for rash and wound  Neurological: Negative for dizziness, numbness and headaches  Psychiatric/Behavioral: Negative for decreased concentration, dysphoric mood and suicidal ideas  The patient is not nervous/anxious          VITALS:  Vitals:    02/19/20 0951   BP: 152/82   Pulse: 98       LABS:  HgA1c: No results found for: HGBA1C  BMP:   Lab Results   Component Value Date    CALCIUM 8 8 02/25/2019    K 3 8 02/25/2019    CO2 28 02/25/2019     02/25/2019    BUN 12 02/25/2019    CREATININE 0 76 02/25/2019       _____________________________________________________  PHYSICAL EXAMINATION:  General: well developed and well nourished, alert, oriented times 3 and appears comfortable  Psychiatric: Normal  HEENT: Normocephalic, Atraumatic Trachea Midline, No torticollis  Pulmonary: No audible wheezing or respiratory distress Cardiovascular: No pitting edema, 2+ radial pulse   Skin: No masses, erythema, lacerations, fluctation, ulcerations  Neurovascular: Sensation Intact to the Median, Ulnar, Radial Nerve, Motor Intact to the Median, Ulnar, Radial Nerve and Pulses Intact  Musculoskeletal: Normal, except as noted in detailed exam and in HPI        MUSCULOSKELETAL EXAMINATION:    Right elbow:   No erythema  No ecchymosis   No edema  Full ROM   Mild tender to palpation lateral epicondyle   Non tender to palpation radial tunnel   Sensation intact to light touch     De Quervain's Tenosynovitis Exam:  right side  Positive tender to palpation over 1st dorsal extensor compartment   Negative palpable nodule  Negative crepitus over 1st dorsal extensor compartment   Negative Finkelstein's test    Positive pain with resisted abduction of the thumb    Right CMC Exam:  No adduction contracture  No hyperextension deformity of MCP joint  Positive localized tenderness over radial and dorsal aspect of thumb (CMC joint)  Grind test is Negative for pain and Negative for crepitus  Metacarpal load shift test positive   No triggering or tenderness over the A1 pulley    ___________________________________________________  STUDIES REVIEWED:  No new imaging to review           PROCEDURES PERFORMED:  Procedures  No Procedures performed today    _____________________________________________________      [facility]    I,:   Jewel Ch am acting as a scribe while in the presence of the attending physician :        I,:   Delfin Frankel, MD personally performed the services described in this documentation    as scribed in my presence :

## 2020-02-20 NOTE — PROGRESS NOTES
PT Discharge    Today's date: 2020  Patient name: Roxanna Garcias  : 1975  MRN: [id number]  Referring provider: Treva Reed DO  Dx:   Encounter Diagnosis     ICD-10-CM    1  Elbow pain, right M25 521 PT plan of care cert/re-cert   2  Lateral epicondylitis, right elbow M77 11 PT plan of care cert/re-cert       Start Time: 1005  Stop Time: 1045  Total time in clinic (min): 40 minutes    Assessment/Plan    Subjective     Patient called after her physician follow-up yesterday  She notes that she suggested starting OT for Dequervain's Syndrome and  strengthening  She was agreeable to discharge from PT as her residual symptoms remain with fine motor tasks and  strength  She is discharged at this time       Objective    Flowsheet Rows      Most Recent Value   PT/OT G-Codes   Current Score  84   Projected Score  75   FOTO information reviewed  Yes

## 2020-02-21 ENCOUNTER — APPOINTMENT (OUTPATIENT)
Dept: PHYSICAL THERAPY | Facility: CLINIC | Age: 45
End: 2020-02-21
Payer: COMMERCIAL

## 2020-02-24 ENCOUNTER — APPOINTMENT (OUTPATIENT)
Dept: PHYSICAL THERAPY | Facility: CLINIC | Age: 45
End: 2020-02-24
Payer: COMMERCIAL

## 2020-03-02 ENCOUNTER — EVALUATION (OUTPATIENT)
Dept: OCCUPATIONAL THERAPY | Facility: CLINIC | Age: 45
End: 2020-03-02
Payer: COMMERCIAL

## 2020-03-02 DIAGNOSIS — M77.11 LATERAL EPICONDYLITIS, RIGHT ELBOW: ICD-10-CM

## 2020-03-02 DIAGNOSIS — M65.4 DE QUERVAIN'S TENOSYNOVITIS, RIGHT: ICD-10-CM

## 2020-03-02 DIAGNOSIS — G56.30 RADIAL TUNNEL SYNDROME: ICD-10-CM

## 2020-03-02 PROCEDURE — 97165 OT EVAL LOW COMPLEX 30 MIN: CPT | Performed by: OCCUPATIONAL THERAPIST

## 2020-03-02 PROCEDURE — 97140 MANUAL THERAPY 1/> REGIONS: CPT | Performed by: OCCUPATIONAL THERAPIST

## 2020-03-02 NOTE — PROGRESS NOTES
OT Evaluation     Today's date: 3/2/2020  Patient name: Giovnana Brock  : 1975  MRN: [id number]  Referring provider: Adair Cheek MD  Dx:   Encounter Diagnosis     ICD-10-CM    1  Lateral epicondylitis, right elbow M77 11 Ambulatory referral to PT/OT hand therapy   2  Radial tunnel syndrome G56 30 Ambulatory referral to PT/OT hand therapy   3  De Quervain's tenosynovitis, right M65 4 Ambulatory referral to PT/OT hand therapy                  Assessment  Assessment details: Nathaniel Romero is a RHD female who is referred with a formal diagnosis of right side lateral epicondylitis, right radial tunnel, and right deQuervain's tenosynovitis  She recently attended physical therapy for her lateral epicondylitis diagnosis and she states that this has helped her symptoms this far, although they persist  She is utilizing a wrist cock up brace at night to manage pain  Her discomfort appears to be related to the radial tunnel irritation at this time  She demonstrates a positive tinels at the radial tunnel and across the [facility] as well as a +3rd finger extension test and +ULTT  She is experiencing taut extensor musculature as well  Her lateral elbow also continues to be painful and demonstrates positive provocative findings consistent with lateral epicondylitis including painful gripping, weakness, and reduced function  The 1st dorsal compartment does not appear to be significantly irritated at this time, however will be incorporated in therapy  See below for a detailed assessment  Impairments: abnormal or restricted ROM, activity intolerance, impaired physical strength and pain with function    Symptom irritability: moderateUnderstanding of Dx/Px/POC: good   Prognosis: good    Goals  STG: Patient will be compliant with ergonomic adjustments and home exercise program in 1 week  STG: Pain will be reduced to a 3/10 during appropriate activity and during therapy in 3 weeks    STG: Strength will be improved to 45 pounds and painfree in 4 weeks  STG: Radial tunnel irritation reduced in 4 weeks  LTG: Performance in ADLs and IADLS will be improved to prior level of function with the affected extremity within 6 weeks or discharge  LTG: Performance in work and caregiving activity will be improved to prior level of function with the affected extremity within 6 weeks or discharge  LTG: FOTO score increase by 20 points within 6 weeks or discharge  Plan  Plan details: Treatment to include modalities, manual therapy, PRE's, HEP, and orthotics as appropriate  Patient would benefit from: skilled OT and OT eval  Planned modality interventions: thermotherapy: hydrocollator packs  Planned therapy interventions: home exercise program, joint mobilization, manual therapy, therapeutic exercise, patient education, therapeutic activities and stretching  Frequency: 2x week  Duration in visits: [address] beginning date: 3/2/2020  Plan of Care expiration date: 2020  Treatment plan discussed with: patient        Subjective Evaluation    History of Present Illness  Mechanism of injury: Inder Aguilar reports right elbow pain that began in the summer of   She soon after began to experience R radial tunnel pain and also dorsal radial wrist pain  Pain  At worst pain ratin  Relieving factors: rest    Social Support    Employment status: working ([employer] at [employer] on [employer])  Hand dominance: right    Treatments  Previous treatment: physical therapy and injection treatment (1 CTI to lateral ep, 1 CTI to radial tunnel)  Current treatment: occupational therapy  Patient Goals  Patient goals for therapy: decreased edema, increased strength and independence with ADLs/IADLs          Objective     Tenderness     Right Elbow   Tenderness in the lateral epicondyle  Right Wrist/Hand   Tenderness in the first dorsal compartment, common extensor tendon and lateral epicondyle       Active Range of Motion     Right Elbow Normal active range of motion    Right Wrist   Normal active range of motion    Strength/Myotome Testing     Left Wrist/Hand      (2nd hand position)     Trial 1: 46 7    Right Wrist/Hand      (2nd hand position)     Trial 1: 38 6    Comments: 4/10    Additional Strength Details  Stress position=18 8# and 7/10 pain     Tests     Right Shoulder   Positive ULTT3  Right Elbow   Positive Cozen's, Mill's and radial tunnel  Right Wrist/Hand   Positive resisted middle finger and Tinel's sign (radial tunnel)  Negative Finkelstein's         Flowsheet Rows      Most Recent Value   PT/OT G-Codes   Current Score  72   Projected Score  75             Precautions: No KT      Manual  3/2            MFR radial tunnel  5'            IASTM lateral ep, radial tunnel, 1st dorsal compartment  5'            Cupping radial tunnel             RNG supine                              Exercise Diary  3/2            HEP: Mill's stretch, radial nerve glide, 1st dorsal compartment stretch             MWM lateral epicondyle             Wrist maze             Keypegs             Shoulder pulleys                                                                                                                                                                                                                    Modalities  3/2            MHP elbow and wrist  10'

## 2020-03-04 ENCOUNTER — OFFICE VISIT (OUTPATIENT)
Dept: FAMILY MEDICINE CLINIC | Facility: CLINIC | Age: 45
End: 2020-03-04
Payer: COMMERCIAL

## 2020-03-04 VITALS
HEIGHT: 64 IN | OXYGEN SATURATION: 98 % | RESPIRATION RATE: 16 BRPM | WEIGHT: 133.4 LBS | BODY MASS INDEX: 22.77 KG/M2 | HEART RATE: 97 BPM | SYSTOLIC BLOOD PRESSURE: 132 MMHG | DIASTOLIC BLOOD PRESSURE: 76 MMHG

## 2020-03-04 DIAGNOSIS — F43.9 STRESS: ICD-10-CM

## 2020-03-04 DIAGNOSIS — Z00.00 ANNUAL PHYSICAL EXAM: Primary | ICD-10-CM

## 2020-03-04 DIAGNOSIS — Z12.31 BREAST CANCER SCREENING BY MAMMOGRAM: ICD-10-CM

## 2020-03-04 DIAGNOSIS — I10 HYPERTENSION, UNSPECIFIED TYPE: ICD-10-CM

## 2020-03-04 DIAGNOSIS — Z13.29 SCREENING FOR THYROID DISORDER: ICD-10-CM

## 2020-03-04 DIAGNOSIS — E78.1 HYPERTRIGLYCERIDEMIA: ICD-10-CM

## 2020-03-04 PROCEDURE — 99213 OFFICE O/P EST LOW 20 MIN: CPT | Performed by: FAMILY MEDICINE

## 2020-03-04 PROCEDURE — 99396 PREV VISIT EST AGE 40-64: CPT | Performed by: FAMILY MEDICINE

## 2020-03-04 PROCEDURE — 3008F BODY MASS INDEX DOCD: CPT | Performed by: FAMILY MEDICINE

## 2020-03-04 PROCEDURE — 1036F TOBACCO NON-USER: CPT | Performed by: FAMILY MEDICINE

## 2020-03-04 PROCEDURE — 3075F SYST BP GE 130 - 139MM HG: CPT | Performed by: FAMILY MEDICINE

## 2020-03-04 PROCEDURE — 3078F DIAST BP <80 MM HG: CPT | Performed by: FAMILY MEDICINE

## 2020-03-04 NOTE — PATIENT INSTRUCTIONS

## 2020-03-04 NOTE — PROGRESS NOTES
Assessment/Plan:   Diagnoses and all orders for this visit:    Annual physical exam  -     CBC and differential; Future  - reviewed PMHx, PSHx, meds, allergies, FHx, Soc Hx and Sexual Hx   - UTD with Flu vaccine for this season and Tdap   - discussed diet and encouraged exercise  - not currently sexually active and declined STD screening   - due for her annual Gyn exam - scheduled with Dr Lenin Leung for 6/1/2020 - nml PAP in 2017 - due for repeat in 2022 per Ob/Gyn note   - script given for annual Mammo - due in 8/2020   - script given for screening labs   - RTO in 6months for routine f/u or sooner if needed - pt aware and agreeable     Screening for thyroid disorder  -     TSH, 3rd generation with Free T4 reflex    Breast cancer screening by mammogram  -     Mammo screening bilateral w 3d & cad; Future    Hypertension, unspecified type  -     Comprehensive metabolic panel; Future  -     Microalbumin / creatinine urine ratio  - BP on my repeat within normal limits  - cont ACEI 5mg QD for now   - discussed diet and encouraged exercise - currently with some Orthopedic concerns of her RUE and has been following with Dr Ivan Arguello and doing PT/OT    - not a smoker, rare social drinker   - pt with increased stress at home - adopted 3 sisters in 8/2019 - middle child with mental health concerns and currently being screened for Autism/Learning Disability   - worried about Mom who is now in a NH   - RTO in 6months for routine f/u or sooner if needed - pt aware and agreeable   Stress    Hypertriglyceridemia  -     Lipid panel; Future  - Lipids (1/8/2020):  <-- 225 (2/25/2019)   - (+) FHx of HL in both parents   - discussed starting low dose Tricor but pt hesitant - will do trial of dietary modification (states eats a lot of cheese)   - will repeat labs in 6months     Other orders  -     EPINEPHrine (AUVI-Q IJ); Inject as directed        Subjective:    Patient ID: Sara Denney is a 53 y o  female  Sara Denney is a 53 y o  female who presents to the office for an annual exam  - Specialists: Allergist (Dr Miroslava Girard), Ortho (Dr Zandra Bright), Ob/Gyn: [facility]    - PMHx: HTN, Migraines (only 1 with Aura), Cluster HA x1, Allergies, Asthma (seasonal), IBS-D, L-shoulder subluxation (2/2 overuse from exercise), R-lateral epicondylitis, Radial Tunnel Syndrome and R-DeQuervain's   - allergies: Erythromycin (GI upset), HTCZ (dizziness), Latex/Medical Tape (rash), animal dander, peanuts   - Meds: Lisinopril 5mg PO QD, Meloxicam 7 5mg Q48, Vit C, Ca, OCPs, EpiPen, Allegra, ProAir PRN, Omeprazole 20mg PO PRN  - PSHx: tonsillectomy, L-wrist surgery  - FHx: M (HL, HTN, stroke x3), F (HTN, Hl), denies FHx of breast/colon/cervical ca   - Immunizations: UTD with Flu vaccine for the season, last Tdap 2012  - GYN Hx: last exam 5/2019, last PAP was 5/2017 - no h/o abnml PAPs - due again in 2022 per Ob/Gyn note; last Mammo in 8/2019 (annual screening)   - diet/exercise: not currently exercising; varied diet, 16oz/coffee/day  - social: denies Tob/illicits; a glass of wine/month, single, has adopted 3 kids (2yo, 6yo and 13yo daughters) - adoption was finalized in 8/2019!  6yo is being accessed for Autism/Learning Disability   - sexual Hx: not currently sexually active and declined STD screening in the office today   - last vision: wears glasses/contacts; last Optho was 7/2019  - last dental: goes Q6months   - ROS: today in the office pt denies F/C/N/V/HA/visual changes/CP/palpitations/SOB/wheezing/abd pain/D/numbness or tingling in b/l UE+LE/LE edema or calf tenderness  - TGs are elevated on her cholesterol panel - states eats a lot of cheese   - BP in the office 148/80 and on my repeat 132/76       The following portions of the patient's history were reviewed and updated as appropriate: allergies, current medications, past family history, past medical history, past social history, past surgical history and problem list     Review of Systems  as per HPI    Objective:  /76 (BP Location: Left arm, Patient Position: Sitting, Cuff Size: Standard)   Pulse 97   Resp 16   Ht 5' 3 5" (1 613 m)   Wt 60 5 kg (133 lb 6 4 oz)   SpO2 98%   BMI 23 26 kg/m²    Physical Exam   Constitutional: She is oriented to person, place, and time  She appears well-developed and well-nourished  She is active  No distress  HENT:   Head: Normocephalic and atraumatic  Right Ear: External ear normal    Left Ear: External ear normal    Nose: Nose normal  No rhinorrhea or septal deviation  Eyes: Conjunctivae, EOM and lids are normal  Right eye exhibits no discharge  Left eye exhibits no discharge  No scleral icterus  Neck: Normal range of motion  Neck supple  Cardiovascular: Normal rate, regular rhythm, S1 normal, S2 normal and normal heart sounds  Exam reveals no gallop and no friction rub  No murmur heard  Pulmonary/Chest: Effort normal and breath sounds normal  No accessory muscle usage or stridor  No respiratory distress  She has no wheezes  She has no rhonchi  She has no rales  Abdominal: Soft  Normal appearance and bowel sounds are normal    Musculoskeletal: Normal range of motion  She exhibits no edema, tenderness or deformity  Lymphadenopathy:     She has no cervical adenopathy  Neurological: She is alert and oriented to person, place, and time  She has normal strength  No sensory deficit  Coordination and gait normal    Skin: Skin is warm  No rash noted  She is not diaphoretic  No pallor  Psychiatric: She has a normal mood and affect  Her speech is normal and behavior is normal  Judgment and thought content normal  Cognition and memory are normal    Vitals reviewed

## 2020-03-05 ENCOUNTER — OFFICE VISIT (OUTPATIENT)
Dept: OCCUPATIONAL THERAPY | Facility: CLINIC | Age: 45
End: 2020-03-05
Payer: COMMERCIAL

## 2020-03-05 DIAGNOSIS — G56.30 RADIAL TUNNEL SYNDROME: ICD-10-CM

## 2020-03-05 DIAGNOSIS — M65.4 DE QUERVAIN'S TENOSYNOVITIS, RIGHT: ICD-10-CM

## 2020-03-05 DIAGNOSIS — M77.11 LATERAL EPICONDYLITIS, RIGHT ELBOW: Primary | ICD-10-CM

## 2020-03-05 PROCEDURE — 97110 THERAPEUTIC EXERCISES: CPT | Performed by: OCCUPATIONAL THERAPIST

## 2020-03-05 PROCEDURE — 97140 MANUAL THERAPY 1/> REGIONS: CPT | Performed by: OCCUPATIONAL THERAPIST

## 2020-03-09 ENCOUNTER — OFFICE VISIT (OUTPATIENT)
Dept: OCCUPATIONAL THERAPY | Facility: CLINIC | Age: 45
End: 2020-03-09
Payer: COMMERCIAL

## 2020-03-09 DIAGNOSIS — G56.30 RADIAL TUNNEL SYNDROME: ICD-10-CM

## 2020-03-09 DIAGNOSIS — M77.11 LATERAL EPICONDYLITIS, RIGHT ELBOW: Primary | ICD-10-CM

## 2020-03-09 DIAGNOSIS — M65.4 DE QUERVAIN'S TENOSYNOVITIS, RIGHT: ICD-10-CM

## 2020-03-09 PROCEDURE — 97140 MANUAL THERAPY 1/> REGIONS: CPT | Performed by: OCCUPATIONAL THERAPIST

## 2020-03-09 PROCEDURE — 97110 THERAPEUTIC EXERCISES: CPT | Performed by: OCCUPATIONAL THERAPIST

## 2020-03-09 NOTE — PROGRESS NOTES
Daily Note     Today's date: 3/9/2020  Patient name: Yadira Chase  : 1975  MRN: [id number]  Referring provider: Corrine Benavides MD  Dx:   Encounter Diagnosis     ICD-10-CM    1  Lateral epicondylitis, right elbow M77 11    2  Radial tunnel syndrome G56 30    3  De Quervain's tenosynovitis, right M65 4                   Subjective: "I have been massaging it more"      Objective: See treatment diary below  Assessment: Continues with pain during elbow extended, gripping exercises  Challenging to upgrade PRE due to discomfort  Plan: Progress treatment as tolerated         Precautions: No KT, sensitive skin       Manual  3/2 3/5 3/9          MFR radial tunnel  5' 3'           IASTM lateral ep, radial tunnel, 1st dorsal compartment  5' 5' 5          Cupping radial tunnel  3' 3          RNG supine  5' 5                           Exercise Diary  3/2 3/5           HEP: Mill's stretch, radial nerve glide, 1st dorsal compartment stretch             MWM lateral epicondyle             Wrist maze  10x 10x          Keypegs  1x 1x          Shoulder pulleys  5 mins 5'          Rubber bands on ball    1x          Gripping   GPW at side 3x10                                                                                                                                                                                       Modalities  3/2 3/5 3/9          MHP elbow and wrist  10' 10' 15'

## 2020-03-13 ENCOUNTER — OFFICE VISIT (OUTPATIENT)
Dept: OCCUPATIONAL THERAPY | Facility: CLINIC | Age: 45
End: 2020-03-13
Payer: COMMERCIAL

## 2020-03-27 ENCOUNTER — TELEPHONE (OUTPATIENT)
Dept: OBGYN CLINIC | Facility: CLINIC | Age: 45
End: 2020-03-27

## 2020-05-15 ENCOUNTER — TELEMEDICINE (OUTPATIENT)
Dept: OBGYN CLINIC | Facility: HOSPITAL | Age: 45
End: 2020-05-15
Payer: COMMERCIAL

## 2020-05-15 DIAGNOSIS — M77.11 LATERAL EPICONDYLITIS, RIGHT ELBOW: Primary | ICD-10-CM

## 2020-05-15 PROCEDURE — 99213 OFFICE O/P EST LOW 20 MIN: CPT | Performed by: SURGERY

## 2020-06-22 ENCOUNTER — ANNUAL EXAM (OUTPATIENT)
Dept: OBGYN CLINIC | Facility: CLINIC | Age: 45
End: 2020-06-22
Payer: COMMERCIAL

## 2020-06-22 VITALS
DIASTOLIC BLOOD PRESSURE: 76 MMHG | SYSTOLIC BLOOD PRESSURE: 138 MMHG | WEIGHT: 135 LBS | TEMPERATURE: 98.6 F | BODY MASS INDEX: 23.54 KG/M2

## 2020-06-22 DIAGNOSIS — Z01.419 WELL WOMAN EXAM WITH ROUTINE GYNECOLOGICAL EXAM: Primary | ICD-10-CM

## 2020-06-22 DIAGNOSIS — Z30.41 ENCOUNTER FOR SURVEILLANCE OF CONTRACEPTIVE PILLS: ICD-10-CM

## 2020-06-22 PROBLEM — B08.4 HAND, FOOT AND MOUTH DISEASE: Status: RESOLVED | Noted: 2018-12-03 | Resolved: 2020-06-22

## 2020-06-22 PROBLEM — Z91.040 LATEX ALLERGY: Status: ACTIVE | Noted: 2020-06-22

## 2020-06-22 PROBLEM — J45.20 MILD INTERMITTENT ASTHMA: Status: ACTIVE | Noted: 2020-06-22

## 2020-06-22 PROBLEM — K21.9 GASTROESOPHAGEAL REFLUX DISEASE: Status: ACTIVE | Noted: 2020-06-22

## 2020-06-22 PROBLEM — J04.0 LARYNGITIS: Status: RESOLVED | Noted: 2019-12-10 | Resolved: 2020-06-22

## 2020-06-22 PROBLEM — J06.9 VIRAL URI: Status: RESOLVED | Noted: 2018-11-29 | Resolved: 2020-06-22

## 2020-06-22 PROCEDURE — 3078F DIAST BP <80 MM HG: CPT | Performed by: OBSTETRICS & GYNECOLOGY

## 2020-06-22 PROCEDURE — 87624 HPV HI-RISK TYP POOLED RSLT: CPT | Performed by: OBSTETRICS & GYNECOLOGY

## 2020-06-22 PROCEDURE — S0612 ANNUAL GYNECOLOGICAL EXAMINA: HCPCS | Performed by: OBSTETRICS & GYNECOLOGY

## 2020-06-22 PROCEDURE — 3075F SYST BP GE 130 - 139MM HG: CPT | Performed by: OBSTETRICS & GYNECOLOGY

## 2020-06-22 PROCEDURE — G0145 SCR C/V CYTO,THINLAYER,RESCR: HCPCS | Performed by: OBSTETRICS & GYNECOLOGY

## 2020-06-22 RX ORDER — EPINEPHRINE 0.3 MG/.3ML
INJECTION SUBCUTANEOUS
COMMUNITY
Start: 2019-05-29

## 2020-06-22 RX ORDER — DESOGESTREL AND ETHINYL ESTRADIOL 21-5 (28)
1 KIT ORAL DAILY
Qty: 112 TABLET | Refills: 3 | Status: SHIPPED | OUTPATIENT
Start: 2020-06-22 | End: 2021-06-28

## 2020-06-22 RX ORDER — IPRATROPIUM BROMIDE 42 UG/1
SPRAY, METERED NASAL
COMMUNITY
Start: 2016-03-31 | End: 2020-09-30 | Stop reason: ALTCHOICE

## 2020-06-22 RX ORDER — ALBUTEROL SULFATE 90 UG/1
AEROSOL, METERED RESPIRATORY (INHALATION)
COMMUNITY
Start: 2015-09-29 | End: 2020-06-22

## 2020-06-22 RX ORDER — FEXOFENADINE HCL 180 MG/1
TABLET ORAL
COMMUNITY
Start: 2015-09-29 | End: 2020-06-22

## 2020-06-22 RX ORDER — OMEPRAZOLE 40 MG/1
CAPSULE, DELAYED RELEASE ORAL
COMMUNITY
Start: 2016-12-08 | End: 2020-09-04 | Stop reason: ALTCHOICE

## 2020-06-22 RX ORDER — MONTELUKAST SODIUM 10 MG/1
TABLET ORAL
COMMUNITY
Start: 2015-09-29

## 2020-06-22 RX ORDER — BETAMETHASONE DIPROPIONATE 0.5 MG/G
CREAM TOPICAL
COMMUNITY
Start: 2015-10-29 | End: 2021-02-04

## 2020-06-22 RX ORDER — EPINEPHRINE 0.3 MG/.3ML
INJECTION SUBCUTANEOUS
COMMUNITY
Start: 2018-02-08 | End: 2020-06-22

## 2020-06-26 LAB
HPV HR 12 DNA CVX QL NAA+PROBE: NEGATIVE
HPV16 DNA CVX QL NAA+PROBE: NEGATIVE
HPV18 DNA CVX QL NAA+PROBE: NEGATIVE
LAB AP GYN PRIMARY INTERPRETATION: NORMAL
Lab: NORMAL

## 2020-07-14 DIAGNOSIS — I10 HYPERTENSION, UNSPECIFIED TYPE: ICD-10-CM

## 2020-07-15 RX ORDER — LISINOPRIL 5 MG/1
5 TABLET ORAL DAILY
Qty: 90 TABLET | Refills: 1 | Status: SHIPPED | OUTPATIENT
Start: 2020-07-15 | End: 2021-01-29 | Stop reason: SDUPTHER

## 2020-08-18 ENCOUNTER — OFFICE VISIT (OUTPATIENT)
Dept: FAMILY MEDICINE CLINIC | Facility: CLINIC | Age: 45
End: 2020-08-18
Payer: COMMERCIAL

## 2020-08-18 VITALS
BODY MASS INDEX: 23.05 KG/M2 | HEART RATE: 90 BPM | WEIGHT: 135 LBS | TEMPERATURE: 97.6 F | DIASTOLIC BLOOD PRESSURE: 76 MMHG | HEIGHT: 64 IN | OXYGEN SATURATION: 99 % | RESPIRATION RATE: 16 BRPM | SYSTOLIC BLOOD PRESSURE: 136 MMHG

## 2020-08-18 DIAGNOSIS — H69.83 DYSFUNCTION OF BOTH EUSTACHIAN TUBES: Primary | ICD-10-CM

## 2020-08-18 PROBLEM — H69.93 DYSFUNCTION OF BOTH EUSTACHIAN TUBES: Status: ACTIVE | Noted: 2020-08-18

## 2020-08-18 PROCEDURE — 3078F DIAST BP <80 MM HG: CPT | Performed by: NURSE PRACTITIONER

## 2020-08-18 PROCEDURE — 3008F BODY MASS INDEX DOCD: CPT | Performed by: NURSE PRACTITIONER

## 2020-08-18 PROCEDURE — 99213 OFFICE O/P EST LOW 20 MIN: CPT | Performed by: NURSE PRACTITIONER

## 2020-08-18 PROCEDURE — 1036F TOBACCO NON-USER: CPT | Performed by: NURSE PRACTITIONER

## 2020-08-18 PROCEDURE — 3075F SYST BP GE 130 - 139MM HG: CPT | Performed by: NURSE PRACTITIONER

## 2020-08-18 NOTE — PROGRESS NOTES
Assessment/Plan:      Diagnoses and all orders for this visit:    Dysfunction of both eustachian tubes      Patient reports right ear pressure for the past few days and left ear pressure since last night  Patient reports that she started with a cold 12 days ago  Patient reports that she is feeling better except for the ear pressure  No signs of ear infection noted  Discussed with the patient that her symptoms are most likely caused by eustachian tube dysfunction  Patient does not want to use a steroid nasal spray  Patient instructed to use saline nasal spray OTC  Patient instructed to follow-up with her allergist if her symptoms get worse or do not improve  Subjective:     Patient ID: Wilton Qureshi is a 53 y o  female  Patient reports right ear pressure and fullness for the past few days  Patient also reports left ear pressure since last night  Patient reports that she had nasal congestion and a cold that started about 12 days ago  Denies any fever  Denies any sore throat, or anosmia  Patient reports that she had a rare cough  Denies any SOB, Has, myalgias, vomiting, or diarrhea  Patient reports that her children were also sick  Patient reports that she called the [employer] when her symptoms started and they said she did not need testing  Denies any recent travel  Patient reports that she feels much better except that her ear pressure is not going away  Denies any contact with any suspected or confirmed COVID 19 cases  Review of Systems   Constitutional: Negative for chills and fever  HENT:        As noted in HPI  Eyes: Negative for pain, discharge and redness  Respiratory: Negative for chest tightness, shortness of breath and wheezing  Cardiovascular: Negative for chest pain  Gastrointestinal: Negative for abdominal pain, diarrhea, nausea and vomiting  Genitourinary: Negative for dysuria, frequency, hematuria and urgency  Musculoskeletal: Negative for myalgias     Skin: Negative for rash    Neurological: Negative for dizziness, syncope, light-headedness and headaches  Objective:     Physical Exam  Vitals signs reviewed  Constitutional:       General: She is not in acute distress  Appearance: She is not ill-appearing or diaphoretic  HENT:      Right Ear: Tympanic membrane, ear canal and external ear normal       Left Ear: Tympanic membrane, ear canal and external ear normal       Mouth/Throat:      Mouth: Mucous membranes are moist       Pharynx: Oropharynx is clear  No posterior oropharyngeal erythema  Eyes:      General:         Right eye: No discharge  Left eye: No discharge  Conjunctiva/sclera: Conjunctivae normal       Pupils: Pupils are equal, round, and reactive to light  Cardiovascular:      Rate and Rhythm: Normal rate and regular rhythm  Pulmonary:      Effort: Pulmonary effort is normal  No respiratory distress  Breath sounds: Normal breath sounds  No wheezing  Musculoskeletal:      Comments: Gait wnl  Lymphadenopathy:      Cervical: No cervical adenopathy  Skin:     Findings: No rash  Neurological:      Mental Status: She is alert and oriented to person, place, and time     Psychiatric:         Mood and Affect: Mood normal

## 2020-08-27 LAB
ALBUMIN SERPL-MCNC: 3.7 G/DL (ref 3.6–5.1)
ALBUMIN/CREAT UR: 4 MCG/MG CREAT
ALBUMIN/GLOB SERPL: 1.1 (CALC) (ref 1–2.5)
ALP SERPL-CCNC: 68 U/L (ref 31–125)
ALT SERPL-CCNC: 11 U/L (ref 6–29)
AST SERPL-CCNC: 13 U/L (ref 10–35)
BASOPHILS # BLD AUTO: 42 CELLS/UL (ref 0–200)
BASOPHILS NFR BLD AUTO: 0.4 %
BILIRUB SERPL-MCNC: 0.3 MG/DL (ref 0.2–1.2)
BUN SERPL-MCNC: 14 MG/DL (ref 7–25)
BUN/CREAT SERPL: NORMAL (CALC) (ref 6–22)
CALCIUM SERPL-MCNC: 8.7 MG/DL (ref 8.6–10.2)
CHLORIDE SERPL-SCNC: 103 MMOL/L (ref 98–110)
CHOLEST SERPL-MCNC: 169 MG/DL
CHOLEST/HDLC SERPL: 2.9 (CALC)
CO2 SERPL-SCNC: 27 MMOL/L (ref 20–32)
CREAT SERPL-MCNC: 0.82 MG/DL (ref 0.5–1.1)
CREAT UR-MCNC: 159 MG/DL (ref 20–275)
EOSINOPHIL # BLD AUTO: 250 CELLS/UL (ref 15–500)
EOSINOPHIL NFR BLD AUTO: 2.4 %
ERYTHROCYTE [DISTWIDTH] IN BLOOD BY AUTOMATED COUNT: 11.8 % (ref 11–15)
GLOBULIN SER CALC-MCNC: 3.3 G/DL (CALC) (ref 1.9–3.7)
GLUCOSE SERPL-MCNC: 84 MG/DL (ref 65–99)
HCT VFR BLD AUTO: 38.6 % (ref 35–45)
HDLC SERPL-MCNC: 58 MG/DL
HGB BLD-MCNC: 12.3 G/DL (ref 11.7–15.5)
LDLC SERPL CALC-MCNC: 81 MG/DL (CALC)
LYMPHOCYTES # BLD AUTO: 2246 CELLS/UL (ref 850–3900)
LYMPHOCYTES NFR BLD AUTO: 21.6 %
MCH RBC QN AUTO: 28.5 PG (ref 27–33)
MCHC RBC AUTO-ENTMCNC: 31.9 G/DL (ref 32–36)
MCV RBC AUTO: 89.4 FL (ref 80–100)
MICROALBUMIN UR-MCNC: 0.6 MG/DL
MONOCYTES # BLD AUTO: 936 CELLS/UL (ref 200–950)
MONOCYTES NFR BLD AUTO: 9 %
NEUTROPHILS # BLD AUTO: 6926 CELLS/UL (ref 1500–7800)
NEUTROPHILS NFR BLD AUTO: 66.6 %
NONHDLC SERPL-MCNC: 111 MG/DL (CALC)
PLATELET # BLD AUTO: 359 THOUSAND/UL (ref 140–400)
PMV BLD REES-ECKER: 11 FL (ref 7.5–12.5)
POTASSIUM SERPL-SCNC: 4.1 MMOL/L (ref 3.5–5.3)
PROT SERPL-MCNC: 7 G/DL (ref 6.1–8.1)
RBC # BLD AUTO: 4.32 MILLION/UL (ref 3.8–5.1)
SL AMB EGFR AFRICAN AMERICAN: 99 ML/MIN/1.73M2
SL AMB EGFR NON AFRICAN AMERICAN: 86 ML/MIN/1.73M2
SODIUM SERPL-SCNC: 137 MMOL/L (ref 135–146)
TRIGL SERPL-MCNC: 201 MG/DL
TSH SERPL-ACNC: 2.31 MIU/L
WBC # BLD AUTO: 10.4 THOUSAND/UL (ref 3.8–10.8)

## 2020-09-04 ENCOUNTER — OFFICE VISIT (OUTPATIENT)
Dept: FAMILY MEDICINE CLINIC | Facility: CLINIC | Age: 45
End: 2020-09-04
Payer: COMMERCIAL

## 2020-09-04 VITALS
SYSTOLIC BLOOD PRESSURE: 130 MMHG | HEIGHT: 64 IN | HEART RATE: 68 BPM | OXYGEN SATURATION: 99 % | TEMPERATURE: 98.4 F | BODY MASS INDEX: 23.05 KG/M2 | RESPIRATION RATE: 16 BRPM | WEIGHT: 135 LBS | DIASTOLIC BLOOD PRESSURE: 80 MMHG

## 2020-09-04 DIAGNOSIS — Z23 NEED FOR INFLUENZA VACCINATION: ICD-10-CM

## 2020-09-04 DIAGNOSIS — I10 HYPERTENSION, UNSPECIFIED TYPE: Primary | ICD-10-CM

## 2020-09-04 DIAGNOSIS — E78.1 HYPERTRIGLYCERIDEMIA: ICD-10-CM

## 2020-09-04 DIAGNOSIS — F41.1 GAD (GENERALIZED ANXIETY DISORDER): ICD-10-CM

## 2020-09-04 PROCEDURE — 99214 OFFICE O/P EST MOD 30 MIN: CPT | Performed by: FAMILY MEDICINE

## 2020-09-04 PROCEDURE — 90471 IMMUNIZATION ADMIN: CPT | Performed by: FAMILY MEDICINE

## 2020-09-04 PROCEDURE — 90682 RIV4 VACC RECOMBINANT DNA IM: CPT | Performed by: FAMILY MEDICINE

## 2020-09-04 RX ORDER — OMEGA-3 FATTY ACIDS CAP DELAYED RELEASE 1000 MG 1000 MG
CAPSULE DELAYED RELEASE ORAL 2 TIMES DAILY
Qty: 180 CAPSULE | Refills: 0 | Status: SHIPPED | OUTPATIENT
Start: 2020-09-04

## 2020-09-04 NOTE — PROGRESS NOTES
Assessment/Plan:   Diagnoses and all orders for this visit:    Hypertension, unspecified type  - BP in the office 120/78 and on repeat 130/80   - currently on ACEI 5mg and tolerating it well - cont current regimen   - nml CMP and urine microalbumin    - Optho scheduled in 10/2020   - not a smoker, rare social drinker   - pt with increased stress at home - adopted 3 sisters in 8/2019 - middle child with mental health concerns     EDELMIRA (generalized anxiety disorder)  -     Ambulatory referral to [facility]; Future  - PHQ-9 score of 1  - EDELMIRA-7 score of 16 in the office today   - Mom in hospice   - has been under an incredible amount of stress during COVID pandemic - has lost her support system and all additional outside help that she was getting for her 3 adopted girls (ages 1, 6, 13)   - middle daughter has behavioral issues - was placed on Lithium (now off) - having violent temper tantrums Q2-3days   - has been teaching virtually   - 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   - feels like she is burning out   - discussed possibility of starting pt on Lexapro or Wellbutrin = pt would like to hold off for now as her girls are restarting school next week   - referred to interim in-office therapist and also to [employer]  com  - RTO in 1month (or sooner) for f/u - pt aware and agreeable     Hypertriglyceridemia  -     Omega-3 Fatty Acids (Fish Oil) 1000 MG CPDR; Take by mouth 2 (two) times a day  - TG elevated at 201  - discussed diet and encouraged exercise  - advised Fish Oil for now and will repeat panel in 3-6months - pt aware and agreeable     Need for influenza vaccination  -     influenza vaccine, quadrivalent, recombinant, PF, 0 5 mL, for patients 18 yr+ (FLUBLOK)          Subjective:    Patient ID: Onesimo Tariq is a 53 y o  female    51yo F presents to the office for f/u   - BP in the office 120/78 and on repeat 130/80   - currently on ACEI 5mg and tolerating it well - denies swelling or dry cough   - Optho scheduled in 10/2020 - (+) change in vision   - Mom in hospice   - has been under an incredible amount of stress during COVID pandemic - has lost her support system and all additional outside help that she was getting for her 3 adopted girls (ages 1, 6, 13)   - middle daughter has behavioral issues - was placed on Lithium (now off) - having violent temper tantrums Q2-3days   - has been teaching virtually   - 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   - feels like she is burning out   - PHQ-9 score of 1   - EDELMIRA-7 score of 16  - TG on Lipid Panel elevated at 201  - has not been exercise and poor diet - has been eating more fast food lately   - denies F/C/N/V/CP/palpitations/SOB/wheezing/cough/abd pain/D/C/LE edema       The following portions of the patient's history were reviewed and updated as appropriate: allergies, current medications, past family history, past medical history, past social history, past surgical history and problem list     Review of Systems  as per HPI    Objective:  /80 (BP Location: Left arm, Patient Position: Sitting, Cuff Size: Standard)   Pulse 68   Temp 98 4 °F (36 9 °C) (Tympanic)   Resp 16   Ht 5' 3 5" (1 613 m)   Wt 61 2 kg (135 lb)   SpO2 99%   BMI 23 54 kg/m²    Physical Exam  Vitals signs reviewed  Constitutional:       General: She is not in acute distress  Appearance: Normal appearance  She is normal weight  She is not ill-appearing, toxic-appearing or diaphoretic  HENT:      Head: Normocephalic and atraumatic  Right Ear: External ear normal       Left Ear: External ear normal    Eyes:      General: No scleral icterus  Right eye: No discharge  Left eye: No discharge  Extraocular Movements: Extraocular movements intact  Conjunctiva/sclera: Conjunctivae normal    Neck:      Musculoskeletal: Normal range of motion     Cardiovascular:      Rate and Rhythm: Normal rate and regular rhythm  Heart sounds: Normal heart sounds  No murmur  No friction rub  No gallop  Pulmonary:      Effort: Pulmonary effort is normal  No respiratory distress  Breath sounds: Normal breath sounds  No stridor  No wheezing, rhonchi or rales  Chest:      Chest wall: No tenderness  Abdominal:      General: Bowel sounds are normal  There is no distension  Palpations: Abdomen is soft  There is no mass  Tenderness: There is no abdominal tenderness  There is no guarding or rebound  Musculoskeletal: Normal range of motion  General: No swelling, tenderness, deformity or signs of injury  Right lower leg: No edema  Left lower leg: No edema  Skin:     General: Skin is warm  Neurological:      General: No focal deficit present  Mental Status: She is alert and oriented to person, place, and time  Psychiatric:         Attention and Perception: Attention and perception normal          Mood and Affect: Affect normal  Mood is anxious  Speech: Speech normal          Behavior: Behavior normal  Behavior is cooperative  Thought Content: Thought content normal  Thought content does not include homicidal or suicidal ideation  Thought content does not include homicidal or suicidal plan  Cognition and Memory: Cognition and memory normal          Judgment: Judgment normal       Comments: EDELMIRA-7 score of 16 and PHQ-9 score of 1          I have spent 25 minutes with Patient  today in which greater than 50% of this time was spent in counseling/coordination of care regarding Diagnostic results, Prognosis, Risks and benefits of tx options, Intructions for management, Patient and family education, Importance of tx compliance, Risk factor reductions and Impressions

## 2020-09-30 ENCOUNTER — OFFICE VISIT (OUTPATIENT)
Dept: FAMILY MEDICINE CLINIC | Facility: CLINIC | Age: 45
End: 2020-09-30
Payer: COMMERCIAL

## 2020-09-30 VITALS
TEMPERATURE: 98.1 F | WEIGHT: 138 LBS | RESPIRATION RATE: 16 BRPM | DIASTOLIC BLOOD PRESSURE: 78 MMHG | BODY MASS INDEX: 23.56 KG/M2 | SYSTOLIC BLOOD PRESSURE: 138 MMHG | HEIGHT: 64 IN | OXYGEN SATURATION: 99 % | HEART RATE: 85 BPM

## 2020-09-30 DIAGNOSIS — T36.95XA ANTIBIOTIC-INDUCED YEAST INFECTION: ICD-10-CM

## 2020-09-30 DIAGNOSIS — F41.1 GAD (GENERALIZED ANXIETY DISORDER): ICD-10-CM

## 2020-09-30 DIAGNOSIS — L03.115 CELLULITIS OF RIGHT LOWER EXTREMITY: Primary | ICD-10-CM

## 2020-09-30 DIAGNOSIS — B37.9 ANTIBIOTIC-INDUCED YEAST INFECTION: ICD-10-CM

## 2020-09-30 PROCEDURE — 99214 OFFICE O/P EST MOD 30 MIN: CPT | Performed by: FAMILY MEDICINE

## 2020-09-30 RX ORDER — ALPRAZOLAM 0.5 MG/1
0.5 TABLET ORAL
Qty: 10 TABLET | Refills: 0 | Status: SHIPPED | OUTPATIENT
Start: 2020-09-30 | End: 2021-02-05

## 2020-09-30 RX ORDER — FLUCONAZOLE 150 MG/1
150 TABLET ORAL ONCE
Qty: 1 TABLET | Refills: 0 | Status: SHIPPED | OUTPATIENT
Start: 2020-09-30 | End: 2020-09-30

## 2020-09-30 RX ORDER — SULFAMETHOXAZOLE AND TRIMETHOPRIM 800; 160 MG/1; MG/1
1 TABLET ORAL EVERY 12 HOURS SCHEDULED
Qty: 20 TABLET | Refills: 0 | Status: SHIPPED | OUTPATIENT
Start: 2020-09-30 | End: 2020-10-10

## 2020-09-30 RX ORDER — BUPROPION HYDROCHLORIDE 150 MG/1
150 TABLET ORAL DAILY
Qty: 30 TABLET | Refills: 1 | Status: SHIPPED | OUTPATIENT
Start: 2020-09-30 | End: 2021-02-05

## 2020-09-30 NOTE — PROGRESS NOTES
Assessment/Plan:   Diagnoses and all orders for this visit:    Cellulitis of right lower extremity  -     sulfamethoxazole-trimethoprim (BACTRIM DS) 800-160 mg per tablet; Take 1 tablet by mouth every 12 (twelve) hours for 10 days  - will start on Bactrim DS BID [id number]   - RTO on Friday (10/2/2020) for f/u - pt aware and agreeable   Antibiotic-induced yeast infection  -     fluconazole (DIFLUCAN) 150 mg tablet; Take 1 tablet (150 mg total) by mouth once for 1 dose    EDELMIRA (generalized anxiety disorder)  -     buPROPion (WELLBUTRIN XL) 150 mg 24 hr tablet; Take 1 tablet (150 mg total) by mouth daily  -     ALPRAZolam (XANAX) 0 5 mg tablet; Take 1 tablet (0 5 mg total) by mouth daily at bedtime as needed for anxiety  - worsening anxiety   - EDELMIRA-7 score of 21 <-- 16  - denies PMHx of seizures or eating disorders   - will start on Wellbutrin XL 150mg QD and short term supply (10tabs) of Xanax 0 5mg QD PRN given to pt   - inter-office msg sent to Tulio Alonso to see if can expedite a therapy session for pt  - re-referred to [facility]   - RTO in 2-4wks for f/u - pt aware and agreeable         Subjective:    Patient ID: Yessy Torre is a 53 y o  female    51yo F presents to the office for f/u and eval of rash   1) Rash   - started 2days ago on her R-leg - had a patch of eczema which she scratched - now has gotten painful, hot, swollen  - denies F/C/N/V  - (+) tender to touch   - (+) hurt to drive and ankle is painful, pt is favoring that leg   - has tried the eczema cream and OTC Neosporin yesterday - w/o relief - "its twice as big as it was yesterday"   2) EDELMIRA  - Mom in hospice and tested COVID POS last Thursday (sister is now in quarantine as was with Mom prior to results coming back) - "waiting for that phone call"   - has been under an incredible amount of stress during COVID pandemic - has lost her support system and all additional outside help that she was getting for her 3 adopted girls (ages 1, 6, 13)   - "this wknd was the worst with her (middle child)" - 10-14hours of tantrums this wknd - not as violent (as they were on Lithium) - daughter did see Psych and her meds were adjusted - "if we weren't in the middle of a pandemic = she would be hospitalized"   - (+) "the uncertainty is very difficult to live with"  - (+) "today is not a good representation"  - EDELMIRA-7 score of 21 <-- 16  - did reach out to [facility] - next appt is not till 11/2020   - has not yet tried [employer]   - has been teaching and directing a play virtually   - 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   - feels like she is burning out   - has not been exercise and poor diet - has been eating more fast food lately   - denies F/C/N/V/CP/palpitations/SOB/wheezing/cough/abd pain/D/C      The following portions of the patient's history were reviewed and updated as appropriate: allergies, current medications, past family history, past medical history, past social history, past surgical history and problem list     Review of Systems  as per HPI    Objective:  /78 (BP Location: Left arm, Patient Position: Sitting, Cuff Size: Standard)   Pulse 85   Temp 98 1 °F (36 7 °C) (Tympanic)   Resp 16   Ht 5' 3 5" (1 613 m)   Wt 62 6 kg (138 lb)   SpO2 99%   BMI 24 06 kg/m²    Physical Exam  Vitals signs reviewed  Constitutional:       General: She is not in acute distress  Appearance: Normal appearance  She is normal weight  She is not ill-appearing, toxic-appearing or diaphoretic  HENT:      Head: Normocephalic and atraumatic  Right Ear: External ear normal       Left Ear: External ear normal    Eyes:      General: No scleral icterus  Right eye: No discharge  Left eye: No discharge  Extraocular Movements: Extraocular movements intact  Conjunctiva/sclera: Conjunctivae normal    Neck:      Musculoskeletal: Normal range of motion     Pulmonary:      Effort: Pulmonary effort is normal  Musculoskeletal:      Left lower leg: No edema  Skin:     Findings: Lesion present  Comments: Large cellulitic region on RLE - about the size of pt's palm print (marked in the office today) - erythematous, tender to palpation, hot    Neurological:      General: No focal deficit present  Mental Status: She is alert and oriented to person, place, and time  Psychiatric:         Attention and Perception: Attention normal          Mood and Affect: Mood is anxious and depressed  Speech: Speech normal          Behavior: Behavior normal  Behavior is cooperative  Thought Content: Thought content normal  Thought content does not include homicidal or suicidal ideation  Thought content does not include homicidal or suicidal plan           Cognition and Memory: Cognition and memory normal          Judgment: Judgment normal       Comments: EDELMIRA-7 score of 21 <-- 16

## 2020-10-02 ENCOUNTER — OFFICE VISIT (OUTPATIENT)
Dept: FAMILY MEDICINE CLINIC | Facility: CLINIC | Age: 45
End: 2020-10-02
Payer: COMMERCIAL

## 2020-10-02 VITALS
BODY MASS INDEX: 23.56 KG/M2 | WEIGHT: 138 LBS | RESPIRATION RATE: 16 BRPM | HEART RATE: 83 BPM | DIASTOLIC BLOOD PRESSURE: 70 MMHG | TEMPERATURE: 97 F | SYSTOLIC BLOOD PRESSURE: 120 MMHG | HEIGHT: 64 IN | OXYGEN SATURATION: 99 %

## 2020-10-02 DIAGNOSIS — L03.115 CELLULITIS OF RIGHT LOWER EXTREMITY: Primary | ICD-10-CM

## 2020-10-02 PROCEDURE — 3078F DIAST BP <80 MM HG: CPT | Performed by: FAMILY MEDICINE

## 2020-10-02 PROCEDURE — 99212 OFFICE O/P EST SF 10 MIN: CPT | Performed by: FAMILY MEDICINE

## 2020-10-02 PROCEDURE — 1036F TOBACCO NON-USER: CPT | Performed by: FAMILY MEDICINE

## 2020-10-02 PROCEDURE — 3725F SCREEN DEPRESSION PERFORMED: CPT | Performed by: FAMILY MEDICINE

## 2020-10-08 ENCOUNTER — TELEPHONE (OUTPATIENT)
Dept: BEHAVIORAL/MENTAL HEALTH CLINIC | Facility: CLINIC | Age: 45
End: 2020-10-08

## 2020-10-14 ENCOUNTER — TELEPHONE (OUTPATIENT)
Dept: OBGYN CLINIC | Facility: CLINIC | Age: 45
End: 2020-10-14

## 2020-10-14 DIAGNOSIS — M77.11 LATERAL EPICONDYLITIS, RIGHT ELBOW: Primary | ICD-10-CM

## 2020-10-14 RX ORDER — MELOXICAM 7.5 MG/1
7.5 TABLET ORAL DAILY
Qty: 30 TABLET | Refills: 2 | Status: SHIPPED | OUTPATIENT
Start: 2020-10-14 | End: 2022-03-22 | Stop reason: ALTCHOICE

## 2020-11-02 ENCOUNTER — SOCIAL WORK (OUTPATIENT)
Dept: BEHAVIORAL/MENTAL HEALTH CLINIC | Facility: CLINIC | Age: 45
End: 2020-11-02
Payer: COMMERCIAL

## 2020-11-02 DIAGNOSIS — F41.1 GAD (GENERALIZED ANXIETY DISORDER): Primary | ICD-10-CM

## 2020-11-02 PROCEDURE — 90834 PSYTX W PT 45 MINUTES: CPT | Performed by: SOCIAL WORKER

## 2020-11-30 ENCOUNTER — VBI (OUTPATIENT)
Dept: ADMINISTRATIVE | Facility: OTHER | Age: 45
End: 2020-11-30

## 2021-01-04 ENCOUNTER — TELEMEDICINE (OUTPATIENT)
Dept: BEHAVIORAL/MENTAL HEALTH CLINIC | Facility: CLINIC | Age: 46
End: 2021-01-04
Payer: COMMERCIAL

## 2021-01-04 DIAGNOSIS — F41.1 GAD (GENERALIZED ANXIETY DISORDER): Primary | ICD-10-CM

## 2021-01-04 PROCEDURE — 90837 PSYTX W PT 60 MINUTES: CPT | Performed by: SOCIAL WORKER

## 2021-01-04 NOTE — PSYCH
Virtual Regular Visit      Assessment/Plan:    Problem List Items Addressed This Visit        Other    EDELMIRA (generalized anxiety disorder) - Primary               Reason for visit is   Chief Complaint   Patient presents with    Virtual Regular Visit        Encounter provider Maxine Lake LCSW    Provider located at [address] [place] [id number]      Recent Visits  No visits were found meeting these conditions  Showing recent visits within past 7 days and meeting all other requirements     Today's Visits  Date Type Provider Dept   01/04/21 Telemedicine Maxine Lake LCSW [facility] 1 today's visits and meeting all other requirements     Future Appointments  No visits were found meeting these conditions  Showing future appointments within next 150 days and meeting all other requirements        The patient was identified by name and date of birth  Maikol Antonio was informed that this is a telemedicine visit and that the visit is being conducted through [employer] and patient was informed that this is a secure, HIPAA-compliant platform  She agrees to proceed     My office door was closed  No one else was in the room  She acknowledged consent and understanding of privacy and security of the video platform  The patient has agreed to participate and understands they can discontinue the visit at any time  Patient is aware this is a billable service  Subjective  Maikol Antonio is a 50 y o  female presenting for follow up session with [facility]  Patient reports she has had a very hectic last few months, schooling the children at home, and managing her 6year old;s behaviors  Patient is the adoptive mother of three children, ages 1, 6, and 13  Patient's [de-identified] year old has reactive attachment disorder and is therefore very difficult to manage  The child was hospitalized at [facility] earlier in Dec 2020, sent home, and almost immediately rehospitalized due to her extremely violent and threatening behavior at home  Patient's daughter has been at [facility] since around 2020  Patient states that since the child has been hospitalized, she has told staff that she would like to kill her mother, which is why patient believes they have no discharged her yet  Patient is feeling exhausted and discouraged - feels that the hospitalizations are "hopeless" in improving daughter's behaviors  Patient reports guilt for not missing the child while she is hospitalized, and or enjoying the peace while she is gone  Patient states that she was happen to have a peaceful [place] without the child  Patient understands that amidst this stress, she has not grieved the loss of her mother from Covid, nor has she grieved the loss of aunt and godafather who both  the week of Thanksgi  Patient understandably, has fear of catching covid, and this limits her ability to do in-person meetings at the hospital   It has also shut down a lot of the care availability for child  Patient states, "if this were a spouse, I would have already pressed charges and gotten a restraining order  Patient also struggles with feeling as though people do not believe her when she speaks about how difficult her daughter is  She feels consistent guilt, exhaustion, and grief  She expresses a great deal of anger towards the child for her hurtful relentlessness  Patient's child is in counseling five times a week, however patient is afraid they will get covid in the house and she will blame daughter, adding to the hurt and resentment  Patient was provided with support without judgement, as she feels like "a bad mom "  Patent was encouraged to advocate for daughter upon discharge from [facility] for realistic and beneficial discharge plans and options for treatment    She was also empowered to be persistent about her daughter's treatment needs - for patient's safety and the safety of the children in the home  Patient plans to exhaust all treatment options for child before considering RTF  Patient plans to continue following up with therapist every two weeks for ongoing support and assistance building tools to cope with anxiety        HPI     Past Medical History:   Diagnosis Date    Allergic     seasonal, food allergies    Asthma     GERD (gastroesophageal reflux disease)     Hypertension     Irritable bowel syndrome     Migraine without aura     Shoulder subluxation, left     Tennis elbow        Past Surgical History:   Procedure Laterality Date    TONSILLECTOMY      US GUIDED MSK PROCEDURE  1/14/2020    WRIST SURGERY         Current Outpatient Medications   Medication Sig Dispense Refill    albuterol (PROAIR HFA) 90 mcg/act inhaler Inhale      ALPRAZolam (XANAX) 0 5 mg tablet Take 1 tablet (0 5 mg total) by mouth daily at bedtime as needed for anxiety 10 tablet 0    Ascorbic Acid (VITAMIN C) 500 MG CAPS Take by mouth      betamethasone, augmented, (Diprolene AF) 0 05 % cream       buPROPion (WELLBUTRIN XL) 150 mg 24 hr tablet Take 1 tablet (150 mg total) by mouth daily 30 tablet 1    Calcium 250 MG CAPS Take by mouth      desogestrel-ethinyl estradiol (Viorele) 0 15-0 02/0 01 MG (21/5) per tablet Take 1 tablet by mouth daily 112 tablet 3    EPINEPHrine (Auvi-Q) 0 3 mg/0 3 mL SOAJ as directed      fexofenadine (ALLEGRA ALLERGY) 180 MG tablet Take by mouth      lisinopril (ZESTRIL) 5 mg tablet Take 1 tablet (5 mg total) by mouth daily 90 tablet 1    meloxicam (MOBIC) 7 5 mg tablet Take 1 tablet (7 5 mg total) by mouth daily 30 tablet 2    montelukast (Singulair) 10 mg tablet Take 1 tablet by mouth each evening      Multiple Vitamin (MULTI-VITAMIN DAILY) TABS Take by mouth      Omega-3 Fatty Acids (Fish Oil) 1000 MG CPDR Take by mouth 2 (two) times a day 180 capsule 0     No current facility-administered medications for this visit  Allergies   Allergen Reactions    Cat Hair Extract     Erythromycin     Hctz [Hydrochlorothiazide] Dizziness    Nuts     Seasonal Ic  [Cholestatin]     Latex Rash    Medical Tape Rash       Review of Systems   Psychiatric/Behavioral: Positive for dysphoric mood  Negative for agitation, behavioral problems, confusion, decreased concentration, hallucinations, self-injury, sleep disturbance and suicidal ideas  The patient is nervous/anxious  The patient is not hyperactive  Video Exam    There were no vitals filed for this visit  Physical Exam  Psychiatric:         Attention and Perception: Attention and perception normal          Mood and Affect: Mood is anxious and depressed  Speech: Speech normal          Behavior: Behavior normal  Behavior is cooperative  Thought Content: Thought content normal          Cognition and Memory: Cognition and memory normal          Judgment: Judgment normal       Comments: Patient presents with anxious and depressed mood and congruent affect  Patient is well-groomed, with good eye contact and good focus in session  Patient's speech is normal rate and rhythm  Patient is alert, oriented, engaged, and open  Patient's thought process is organized and thought content is future-directed but also with guilt about daughter  Patient's memory and cognition appear intact  Patient denies SI/HI/AH/VH and self-harm  I spent 58 minutes directly with the patient during this visit, from [id number]  VIRTUAL VISIT DISCLAIMER    Valeria Thomas acknowledges that she has consented to an online visit or consultation  She understands that the online visit is based solely on information provided by her, and that, in the absence of a face-to-face physical evaluation by the physician, the diagnosis she receives is both limited and provisional in terms of accuracy and completeness   This is not intended to replace a full medical face-to-face evaluation by the physician  Arcelia Nicholas understands and accepts these terms

## 2021-01-04 NOTE — PATIENT INSTRUCTIONS
Continue to take all medications as prescribed  Attend all scheduled medical appointments    Follow up with therapist     If you are experiencing a mental health emergency, please call 911 for emergent care, or your county crisis office for someone to talk to 24 hours a day, 7 days a week:    [facility] ([facility] AT [place] Intervention: [address] [facility] Intervention: [phone]

## 2021-01-13 ENCOUNTER — TELEMEDICINE (OUTPATIENT)
Dept: BEHAVIORAL/MENTAL HEALTH CLINIC | Facility: CLINIC | Age: 46
End: 2021-01-13
Payer: COMMERCIAL

## 2021-01-13 DIAGNOSIS — F41.1 GAD (GENERALIZED ANXIETY DISORDER): Primary | ICD-10-CM

## 2021-01-13 PROCEDURE — 90834 PSYTX W PT 45 MINUTES: CPT | Performed by: SOCIAL WORKER

## 2021-01-13 NOTE — PSYCH
Virtual Regular Visit      Assessment/Plan:    Problem List Items Addressed This Visit        Other    EDELMIRA (generalized anxiety disorder) - Primary               Reason for visit is   Chief Complaint   Patient presents with    Virtual Regular Visit        Encounter provider Jace Spence LCSW    Provider located at [address] [facility]  [address] [facility] [id number]      Recent Visits  Date Type Provider Dept   01/13/21 1270 [facility] recent visits within past 7 days and meeting all other requirements     Future Appointments  No visits were found meeting these conditions  Showing future appointments within next 150 days and meeting all other requirements        The patient was identified by name and date of birth  Valeria Thomas was informed that this is a telemedicine visit and that the visit is being conducted through [employer] and patient was informed that this is a secure, HIPAA-compliant platform  She agrees to proceed     My office door was closed  No one else was in the room  She acknowledged consent and understanding of privacy and security of the video platform  The patient has agreed to participate and understands they can discontinue the visit at any time  Patient is aware this is a billable service  Subjective  Valeria Thomas is a 50 y o  female presenting for follow up service with [facility]  Patient reports she is feeling much more calm than in previous session  In previous session, Lul Pulliam was discussing sending patient's daughter home    This was worrisome to patient because she knew that her daughter's behaviors had not been witnessed or addressed at the hospital   This gave patient a sense of not being validated, she felt as though "no one believed her" when she described the extreme behavioral outbursts she had to deal with with daughter  Finally, patient's daughter did have an extensive meltdown at [facility] requiring restraints  She has had multiple behavioral incidents since then as well  Patient feels extremely relieved that the professionals were finally able to see the behaviors that patient desperately tried to describe, but were never witnessed by anyone outside the home  Patient reflects that the previous lack of validation was impacting her mood and thought processes significantly  She notices that she does not feel angry towards child anymore, but more "feels bad for her "  She also states that she has been able to "collect herself" and cry about the loss of her mother a few months ago  In addition to these "wins," patient states she was also put in line for the covid vaccine and 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  Additionally, she has used the time gained from not having [de-identified] year old in the house to enhance relationships with other children  Patient does express some regret regarding daughter, because she feels that she was "tricked" by the adoption agency  Patient plans to use her newly refreshed mindset to cope with child when she comes home  She recognizes that she cannot let child puncture her self-esteem  She plans to focus on the "wins" that happen throughout her day, instead of focusing so much on the negativity and seeming hopelessness of child's behavior  Patient also recognizes that eating eating and exercise can positively impact her mood, so she plans to try to incorporate these things into her week more  She plans to enlist more help when needed to support her with balancing parenting, work, and self-care  She would also like to focus on creative outlets like theater to support positivity in her life  Patient plans to follow up with therapist in one week to evaluate progress            HPI     Past Medical History:   Diagnosis Date    Allergic     seasonal, food allergies    Asthma     GERD (gastroesophageal reflux disease)     Hypertension     Irritable bowel syndrome     Migraine without aura     Shoulder subluxation, left     Tennis elbow        Past Surgical History:   Procedure Laterality Date    TONSILLECTOMY      US GUIDED MSK PROCEDURE  1/14/2020    WRIST SURGERY         Current Outpatient Medications   Medication Sig Dispense Refill    albuterol (PROAIR HFA) 90 mcg/act inhaler Inhale      ALPRAZolam (XANAX) 0 5 mg tablet Take 1 tablet (0 5 mg total) by mouth daily at bedtime as needed for anxiety 10 tablet 0    Ascorbic Acid (VITAMIN C) 500 MG CAPS Take by mouth      betamethasone, augmented, (Diprolene AF) 0 05 % cream       buPROPion (WELLBUTRIN XL) 150 mg 24 hr tablet Take 1 tablet (150 mg total) by mouth daily 30 tablet 1    Calcium 250 MG CAPS Take by mouth      desogestrel-ethinyl estradiol (Viorele) 0 15-0 02/0 01 MG (21/5) per tablet Take 1 tablet by mouth daily 112 tablet 3    EPINEPHrine (Auvi-Q) 0 3 mg/0 3 mL SOAJ as directed      fexofenadine (ALLEGRA ALLERGY) 180 MG tablet Take by mouth      lisinopril (ZESTRIL) 5 mg tablet Take 1 tablet (5 mg total) by mouth daily 90 tablet 1    meloxicam (MOBIC) 7 5 mg tablet Take 1 tablet (7 5 mg total) by mouth daily 30 tablet 2    montelukast (Singulair) 10 mg tablet Take 1 tablet by mouth each evening      Multiple Vitamin (MULTI-VITAMIN DAILY) TABS Take by mouth      Omega-3 Fatty Acids (Fish Oil) 1000 MG CPDR Take by mouth 2 (two) times a day 180 capsule 0     No current facility-administered medications for this visit           Allergies   Allergen Reactions    Cat Hair Extract     Erythromycin     Hctz [Hydrochlorothiazide] Dizziness    Nuts     Seasonal Ic  [Cholestatin]     Latex Rash    Medical Tape Rash       Review of Systems   Psychiatric/Behavioral: Negative for agitation, behavioral problems, confusion, decreased concentration, dysphoric mood, hallucinations, self-injury, sleep disturbance and suicidal ideas  The patient is not nervous/anxious and is not hyperactive  Video Exam    There were no vitals filed for this visit  Physical Exam  Psychiatric:         Attention and Perception: Attention and perception normal          Mood and Affect: Mood and affect normal          Speech: Speech normal          Behavior: Behavior normal  Behavior is cooperative  Thought Content: Thought content normal          Cognition and Memory: Cognition and memory normal          Judgment: Judgment normal       Comments: Patient presents with euthymic mood and congruent affect  Patient is well-groomed, with good eye contact and good focus in session  Patient's speech is normal rate and rhythm  Patient is alert, oriented, engaged, and open  She is much more calm than in last session, as she has been validated about her daughter's behaviors  Patient's thought process is organized and thought content is future-directed and hopeful  Patient's memory and cognition appear intact  Patient denies SI/HI/AH/VH and self-harm  I spent 44 minutes directly with the patient during this visit, from [phone]  VIRTUAL VISIT DISCLAIMER    Edilma Crowell acknowledges that she has consented to an online visit or consultation  She understands that the online visit is based solely on information provided by her, and that, in the absence of a face-to-face physical evaluation by the physician, the diagnosis she receives is both limited and provisional in terms of accuracy and completeness  This is not intended to replace a full medical face-to-face evaluation by the physician  Edilma Crowell understands and accepts these terms

## 2021-01-14 NOTE — PATIENT INSTRUCTIONS
Continue to take all medications as prescribed  Attend all scheduled medical appointments  Follow up with therapist in one week for ongoing support       If you are experiencing a mental health emergency, please call 911 for emergent care, or your county crisis office for someone to talk to 24 hours a day, 7 days a week:    [facility] ([facility]) AT [place] Intervention: [address] [facility] Crisis Intervention: [phone]

## 2021-01-21 ENCOUNTER — HOSPITAL ENCOUNTER (OUTPATIENT)
Dept: RADIOLOGY | Facility: HOSPITAL | Age: 46
Discharge: HOME | End: 2021-01-21
Payer: COMMERCIAL

## 2021-01-21 VITALS — WEIGHT: 138 LBS | BODY MASS INDEX: 23.56 KG/M2 | HEIGHT: 64 IN

## 2021-01-21 DIAGNOSIS — Z12.31 BREAST CANCER SCREENING BY MAMMOGRAM: ICD-10-CM

## 2021-01-21 PROCEDURE — 77067 SCR MAMMO BI INCL CAD: CPT

## 2021-01-21 PROCEDURE — 77063 BREAST TOMOSYNTHESIS BI: CPT

## 2021-01-22 ENCOUNTER — TELEPHONE (OUTPATIENT)
Dept: FAMILY MEDICINE CLINIC | Facility: CLINIC | Age: 46
End: 2021-01-22

## 2021-01-22 NOTE — TELEPHONE ENCOUNTER
----- Message from Sushil Esparza DO sent at 1/22/2021  1:37 PM EST -----  nml Mammo - annual screening recommended

## 2021-01-29 DIAGNOSIS — I10 HYPERTENSION, UNSPECIFIED TYPE: ICD-10-CM

## 2021-01-29 RX ORDER — LISINOPRIL 5 MG/1
5 TABLET ORAL DAILY
Qty: 30 TABLET | Refills: 0 | Status: SHIPPED | OUTPATIENT
Start: 2021-01-29 | End: 2021-02-05 | Stop reason: SDUPTHER

## 2021-01-29 NOTE — TELEPHONE ENCOUNTER
Patient was left a detailed message to schedule an office visit with Dr Woody Goyal  # 30  Until can be seen

## 2021-02-01 ENCOUNTER — TELEMEDICINE (OUTPATIENT)
Dept: BEHAVIORAL/MENTAL HEALTH CLINIC | Facility: CLINIC | Age: 46
End: 2021-02-01
Payer: COMMERCIAL

## 2021-02-01 DIAGNOSIS — F41.1 GAD (GENERALIZED ANXIETY DISORDER): Primary | ICD-10-CM

## 2021-02-01 PROCEDURE — 90834 PSYTX W PT 45 MINUTES: CPT | Performed by: SOCIAL WORKER

## 2021-02-01 NOTE — PSYCH
Virtual Regular Visit      Assessment/Plan:    Problem List Items Addressed This Visit        Other    EDELMIRA (generalized anxiety disorder) - Primary               Reason for visit is   Chief Complaint   Patient presents with    Virtual Regular Visit        Encounter provider Monica Yin LCSW    Provider located at [address] [place] [address] Road  [address] [place] [phone]      Recent Visits  No visits were found meeting these conditions  Showing recent visits within past 7 days and meeting all other requirements     Today's Visits  Date Type Provider Dept   02/01/21 Telemedicine Monica Yin LCSW [facility]  [place]   Showing today's visits and meeting all other requirements     Future Appointments  No visits were found meeting these conditions  Showing future appointments within next 150 days and meeting all other requirements        The patient was identified by name and date of birth  Cherelle Ivy was informed that this is a telemedicine visit and that the visit is being conducted through [employer] and patient was informed that this is a secure, HIPAA-compliant platform  She agrees to proceed     My office door was closed  No one else was in the room  She acknowledged consent and understanding of privacy and security of the video platform  The patient has agreed to participate and understands they can discontinue the visit at any time  Patient is aware this is a billable service  Subjective  Cherelle Ivy is a 50 y o  female presenting for follow up appointment with [facility]  Patient reports that she is doing well, getting sick of quarantine  Patient is conservative about risks and potentia exposures to the virus  Her three year old daughter is in "quarantine," as a teacher from her  tested positive    Last session patient had spoke about the relief of her youngest going back to school in person, as it gives her time to work and think alone  During the very little alone time patient gets, she sometimes thinks about the loss of her mother from covid  She has not properly grieved the loss of her mother amongst all of the behavioral struggles with her [de-identified] year old daughter  She was encouraged to take moments to allow herself to think about the loss of her mother so that a healthy grief process can occur  Patient notes much stress as a single parent - some of this was normalized, as was "mom guilt," - feeling as though she is never doing enough for her children  Patient's situation is also unique however because of an adopted [de-identified] year old daughter struggling with RAD  Daughter has been hospitalized three times since Dec 8  The last time she came home, she was home for 30 hours before she had a dangerous and violent behavioral meltdown and had to return to the hospital   She is being transferred to a residential program once an appropriate residence is found  Patient worries they will return her home, and was offered education about case management and levels of care  Patient seeks the support of family and friends when needed  Although this is beneficial, she still feels as though no one understands the severity of what she is going through  Her daughter's hospitalizations have been validating for her  Patient plans to try to find some time for herself, when she is not working, but rather doing something to quiet her mind or give her space when she does not have to think as much about her responsibilities  Patient is hopeful about her daughter's situation and the overall future  Patient plans to follow up with this therapist in a week for ongoing support, process, and stress management assistance         HPI     Past Medical History:   Diagnosis Date    Allergic     seasonal, food allergies    Asthma     GERD (gastroesophageal reflux disease)     Hypertension     Irritable bowel syndrome     Migraine without aura     Shoulder subluxation, left     Tennis elbow        Past Surgical History:   Procedure Laterality Date    TONSILLECTOMY      US GUIDED MSK PROCEDURE  1/14/2020    WRIST SURGERY         Current Outpatient Medications   Medication Sig Dispense Refill    albuterol (PROAIR HFA) 90 mcg/act inhaler Inhale      ALPRAZolam (XANAX) 0 5 mg tablet Take 1 tablet (0 5 mg total) by mouth daily at bedtime as needed for anxiety 10 tablet 0    Ascorbic Acid (VITAMIN C) 500 MG CAPS Take by mouth      betamethasone, augmented, (Diprolene AF) 0 05 % cream       buPROPion (WELLBUTRIN XL) 150 mg 24 hr tablet Take 1 tablet (150 mg total) by mouth daily 30 tablet 1    Calcium 250 MG CAPS Take by mouth      desogestrel-ethinyl estradiol (Viorele) 0 15-0 02/0 01 MG (21/5) per tablet Take 1 tablet by mouth daily 112 tablet 3    EPINEPHrine (Auvi-Q) 0 3 mg/0 3 mL SOAJ as directed      fexofenadine (ALLEGRA ALLERGY) 180 MG tablet Take by mouth      lisinopril (ZESTRIL) 5 mg tablet Take 1 tablet (5 mg total) by mouth daily 30 tablet 0    meloxicam (MOBIC) 7 5 mg tablet Take 1 tablet (7 5 mg total) by mouth daily 30 tablet 2    montelukast (Singulair) 10 mg tablet Take 1 tablet by mouth each evening      Multiple Vitamin (MULTI-VITAMIN DAILY) TABS Take by mouth      Omega-3 Fatty Acids (Fish Oil) 1000 MG CPDR Take by mouth 2 (two) times a day 180 capsule 0     No current facility-administered medications for this visit  Allergies   Allergen Reactions    Cat Hair Extract     Erythromycin     Hctz [Hydrochlorothiazide] Dizziness    Nuts     Seasonal Ic  [Cholestatin]     Latex Rash    Medical Tape Rash       Review of Systems   Psychiatric/Behavioral: Negative for agitation, behavioral problems, confusion, decreased concentration, dysphoric mood, hallucinations, self-injury, sleep disturbance and suicidal ideas  The patient is nervous/anxious  The patient is not hyperactive  Video Exam    There were no vitals filed for this visit  Physical Exam  Psychiatric:         Attention and Perception: Attention and perception normal          Mood and Affect: Mood is anxious  Speech: Speech normal          Behavior: Behavior normal  Behavior is cooperative  Thought Content: Thought content normal          Cognition and Memory: Cognition and memory normal          Judgment: Judgment normal       Comments: Patient presents with anxious mood and congruent affect  Patient is well-groomed, with good eye contact and good focus in session  Patient's speech is normal rate and rhythm  Patient is alert, oriented, engaged, and open  Patient's thought process is organized and thought content is future-directed and goal-oriented  Patient's memory and cognition appear intact  Patient denies SI/HI/AH/VH and self-harm  I spent 47 minutes directly with the patient during this visit, from [id number]  VIRTUAL VISIT DISCLAIMER    Oanh Carpenter acknowledges that she has consented to an online visit or consultation  She understands that the online visit is based solely on information provided by her, and that, in the absence of a face-to-face physical evaluation by the physician, the diagnosis she receives is both limited and provisional in terms of accuracy and completeness  This is not intended to replace a full medical face-to-face evaluation by the physician  Oanh Carpenter understands and accepts these terms

## 2021-02-01 NOTE — PATIENT INSTRUCTIONS
Continue to take all medications as prescribed  Attend all scheduled medical appointments  Follow up with therapist in a week to evaluate progress and build skills for stress management       If you are experiencing a mental health emergency, please call 911 for emergent care, or your county crisis office for someone to talk to 24 hours a day, 7 days a week:    [facility] ([facility]) AT [place] Intervention: [address] [facility] Crisis Intervention: [phone]

## 2021-02-05 ENCOUNTER — OFFICE VISIT (OUTPATIENT)
Dept: FAMILY MEDICINE CLINIC | Facility: CLINIC | Age: 46
End: 2021-02-05
Payer: COMMERCIAL

## 2021-02-05 ENCOUNTER — TELEPHONE (OUTPATIENT)
Dept: FAMILY MEDICINE CLINIC | Facility: CLINIC | Age: 46
End: 2021-02-05

## 2021-02-05 VITALS
SYSTOLIC BLOOD PRESSURE: 126 MMHG | BODY MASS INDEX: 23.73 KG/M2 | HEART RATE: 86 BPM | WEIGHT: 139 LBS | OXYGEN SATURATION: 97 % | DIASTOLIC BLOOD PRESSURE: 70 MMHG | HEIGHT: 64 IN

## 2021-02-05 DIAGNOSIS — E78.1 HYPERTRIGLYCERIDEMIA: ICD-10-CM

## 2021-02-05 DIAGNOSIS — I10 HYPERTENSION, UNSPECIFIED TYPE: Primary | ICD-10-CM

## 2021-02-05 DIAGNOSIS — Z13.0 SCREENING FOR DEFICIENCY ANEMIA: ICD-10-CM

## 2021-02-05 DIAGNOSIS — F32.9 REACTIVE DEPRESSION: ICD-10-CM

## 2021-02-05 DIAGNOSIS — F41.1 GAD (GENERALIZED ANXIETY DISORDER): ICD-10-CM

## 2021-02-05 PROCEDURE — 99214 OFFICE O/P EST MOD 30 MIN: CPT | Performed by: FAMILY MEDICINE

## 2021-02-05 RX ORDER — LISINOPRIL 5 MG/1
5 TABLET ORAL DAILY
Qty: 90 TABLET | Refills: 0 | Status: SHIPPED | OUTPATIENT
Start: 2021-02-05 | End: 2021-05-17 | Stop reason: SDUPTHER

## 2021-02-05 NOTE — PROGRESS NOTES
Assessment/Plan:   Diagnoses and all orders for this visit:    Hypertension, unspecified type  -     Comprehensive metabolic panel; Future  -     Microalbumin / creatinine urine ratio  -     lisinopril (ZESTRIL) 5 mg tablet; Take 1 tablet (5 mg total) by mouth daily  - BP stable in the office on my repeat   - currently on ACEI 5mg and tolerating it well - cont current regimen   - due for labs - script given   - UTD with Optho   - not a smoker, rare social drinker   - pt with increased stress at home - see below    Reactive depression  EDELMIRA (generalized anxiety disorder)  -     TSH, 3rd generation with Free T4 reflex  -     Vitamin D 25 hydroxy; Future  - PHQ-9 score of 7 <-- 1   - EDELMIRA-7 score of 16 <-- 16  - has been under an incredible amount of stress during COVID pandemic   - Mom was in hospice and passed away from [facility] in 10/2020  - Aunt passed away from [facility] in 11/2020   - has lost her support system and all additional outside help that she was getting for her 3 adopted girls (ages 1, 6, 13)   - Middle Child has been in and out of [facility] and frequent hospitalizations -- recommend residential care and will be starting that today  - youngest with sleep and potty training regression   - oldest with barely passing grades at home 2/2 stress   - would like to have more consistent f/u with Therapist - referred to [facility]  - hesitant to start medication -- was eRx'd Xanax and Wellbutrin in 10/2020 but pt never filled prescriptions   - advised to RTO in 1month for f/u and annual exam - pt aware and agreeable     Hypertriglyceridemia  -     Lipid panel; Future    Screening for deficiency anemia  -     CBC and differential; Future        Subjective:    Patient ID: Hugh Lima is a 50 y o  female    49yo F presents to the office for f/u   - BP in the office 148/72 and 126/70 on my repeat   - currently on ACEI 5mg and tolerating it well - denies swelling or dry cough   - UTD with Optho - 10/2020   - Mom was in hospice and passed away from [facility] in 10/2020  - Aunt passed away from [facility] in 11/2020   - Book proofs are due today!  - denies F/C/N/V/CP/palpitations/SOB/wheezing/cough/abd pain/D/C/LE edema   - has not been exercise and poor diet - has been eating more fast food lately     - PHQ-9 score of 7 <-- 1   - EDELMIRA-7 score of 16 <-- 16  - has been under an incredible amount of stress during COVID pandemic - has lost her support system and all additional outside help that she was getting for her 3 adopted girls (ages 1, 6, 13) -- Middle Child has been in and out of [facility] and frequent hospitalizations -- recommend residential care and will be starting that today  - youngest with sleep and potty training regression   - oldest with barely passing grades at home 2/2 stress   - would like to have more consistent f/u with Therapist   - hesitant to start medication -- was eRx'd Xanax and Wellbutrin in 10/2020 but pt never filled prescriptions       The following portions of the patient's history were reviewed and updated as appropriate: allergies, current medications, past family history, past medical history, past social history, past surgical history and problem list     Review of Systems  as per HPI    Objective:  /70 (BP Location: Left arm, Patient Position: Sitting, Cuff Size: Standard)   Pulse 86   Ht 5' 3 5" (1 613 m)   Wt 63 kg (139 lb)   SpO2 97%   BMI 24 24 kg/m²    Physical Exam  Vitals signs reviewed  Constitutional:       General: She is not in acute distress  Appearance: Normal appearance  She is not ill-appearing, toxic-appearing or diaphoretic  HENT:      Head: Normocephalic and atraumatic  Right Ear: External ear normal       Left Ear: External ear normal    Eyes:      General: No scleral icterus  Right eye: No discharge  Left eye: No discharge  Extraocular Movements: Extraocular movements intact        Conjunctiva/sclera: Conjunctivae normal    Neck: Musculoskeletal: Normal range of motion  Cardiovascular:      Rate and Rhythm: Normal rate and regular rhythm  Heart sounds: Normal heart sounds  No murmur  No friction rub  No gallop  Pulmonary:      Effort: Pulmonary effort is normal    Musculoskeletal: Normal range of motion  General: No swelling, tenderness, deformity or signs of injury  Right lower leg: No edema  Left lower leg: No edema  Skin:     General: Skin is warm  Coloration: Skin is not pale  Neurological:      General: No focal deficit present  Mental Status: She is alert and oriented to person, place, and time  Psychiatric:         Attention and Perception: Attention normal          Mood and Affect: Affect normal  Mood is anxious and depressed  Speech: Speech normal          Behavior: Behavior normal  Behavior is not agitated  Behavior is cooperative  Thought Content: Thought content normal  Thought content is not paranoid  Thought content does not include homicidal or suicidal ideation  Thought content does not include homicidal or suicidal plan  Cognition and Memory: Cognition and memory normal          Judgment: Judgment normal       Comments: - PHQ-9 score of 7 <-- 1   - EDELMIRA-7 score of 16 <-- 16       Depression Screening Follow-up Plan: Patient's depression screening was positive with a PHQ-2 score of 3  Their PHQ-9 score was 7  Patient assessed for underlying major depression  They have no active suicidal ideations  Brief counseling provided and recommend additional follow-up/re-evaluation next office visit  Continue regular follow-up with their psychologist/therapist/psychiatrist who is managing their mental health condition(s)      I have spent 30 minutes with Patient  today in which greater than 50% of this time was spent in counseling/coordination of care regarding Diagnostic results, Prognosis, Risks and benefits of tx options, Intructions for management, Patient and family education, Importance of tx compliance, Risk factor reductions and Impressions

## 2021-02-08 ENCOUNTER — TELEMEDICINE (OUTPATIENT)
Dept: BEHAVIORAL/MENTAL HEALTH CLINIC | Facility: CLINIC | Age: 46
End: 2021-02-08
Payer: COMMERCIAL

## 2021-02-08 DIAGNOSIS — F41.1 GAD (GENERALIZED ANXIETY DISORDER): Primary | ICD-10-CM

## 2021-02-08 PROCEDURE — 90834 PSYTX W PT 45 MINUTES: CPT | Performed by: SOCIAL WORKER

## 2021-02-08 NOTE — PATIENT INSTRUCTIONS
Continue to take all medications as prescribed  Attend all scheduled medical appointments    Follow up with therapist     If you are experiencing a mental health emergency, please call 911 for emergent care, or your county crisis office for someone to talk to 24 hours a day, 7 days a week:    [facility] ([facility]) AT [place] Intervention: [address] [facility] Intervention: [phone]

## 2021-02-08 NOTE — PSYCH
Virtual Regular Visit      Assessment/Plan:    Problem List Items Addressed This Visit        Other    EDELMIRA (generalized anxiety disorder) - Primary               Reason for visit is   Chief Complaint   Patient presents with    Virtual Regular Visit        Encounter provider Kb Vivar LCSW    Provider located at [address] [place] [address] Road  [address] [place] [phone]      Recent Visits  Date Type Provider Dept   21 Office Visit Russel Garza   21 [address] Romaine, [address]   Showing recent visits within past 7 days and meeting all other requirements     Today's Visits  Date Type Provider Dept   21 Telemedicine Kb Vivar, [address] [facility] today's visits and meeting all other requirements     Future Appointments  No visits were found meeting these conditions  Showing future appointments within next 150 days and meeting all other requirements        The patient was identified by name and date of birth  Marian Brown was informed that this is a telemedicine visit and that the visit is being conducted through [employer] and patient was informed that this is a secure, HIPAA-compliant platform  She agrees to proceed     My office door was closed  No one else was in the room  She acknowledged consent and understanding of privacy and security of the video platform  The patient has agreed to participate and understands they can discontinue the visit at any time  Patient is aware this is a billable service  Subjective  Marian Brown is a 50 y o  female presenting for follow up appointment with Integration [facility]  Patient reports that it is her late mother's birthday tomorrow - she is tearful when speaking about this  Patient's mother  a few months ago from [address]     Patient continues to feels as though she cannot properly grieve because of having to take care of her three children, especially her middle child who is in [address] [place] [address] right now waiting for residential placement [facility])  Patient was encouraged to stop and address her feelings about the loss of her mother when they come up for her and she is able  Patient still holds resentment towards middle child for how her behaviors have affected her grieving process and for things that daughter said while patient's mother was dying  Patient reports ongoing feelings of disconnect between her and middle daughter  She references an intellectual knowing that daughter cannot help her behaviors versus an emotional response to her behaviors and statements  Psychoeducation provided on residential placement, as well as her daughter's ability to empathize with others while in such a highly agitated emotional state  Patient has been fostering connection with toddler and teenager, moving into routines that are sustainable  She has a constant fear that middle child will return home and upset the balance once again  Patient was provided support as she describes feelings of loss, grief, regret, resentment, and guilt  Patient does report that she received her book from the printer and is happy for the accomplishment  Patient plans to follow up with therapist in about six weeks, however therapist will call if sooner appointment becomes available  In the meantime, patient plans to make cupcakes with daughters for her mother's birthday and to try to do something for herself so that she does not lose herself in work and parenting       HPI     Past Medical History:   Diagnosis Date    Allergic     seasonal, food allergies    Asthma     GERD (gastroesophageal reflux disease)     Hypertension     Irritable bowel syndrome     Migraine without aura     Shoulder subluxation, left     Tennis elbow        Past Surgical History:   Procedure Laterality Date    TONSILLECTOMY      US GUIDED MSK PROCEDURE  1/14/2020    WRIST SURGERY         Current Outpatient Medications   Medication Sig Dispense Refill    albuterol (PROAIR HFA) 90 mcg/act inhaler Inhale      Ascorbic Acid (VITAMIN C) 500 MG CAPS Take by mouth      Calcium 250 MG CAPS Take by mouth      desogestrel-ethinyl estradiol (Viorele) 0 15-0 02/0 01 MG (21/5) per tablet Take 1 tablet by mouth daily 112 tablet 3    EPINEPHrine (Auvi-Q) 0 3 mg/0 3 mL SOAJ as directed      fexofenadine (ALLEGRA ALLERGY) 180 MG tablet Take by mouth      lisinopril (ZESTRIL) 5 mg tablet Take 1 tablet (5 mg total) by mouth daily 90 tablet 0    meloxicam (MOBIC) 7 5 mg tablet Take 1 tablet (7 5 mg total) by mouth daily 30 tablet 2    montelukast (Singulair) 10 mg tablet Take 1 tablet by mouth each evening      Multiple Vitamin (MULTI-VITAMIN DAILY) TABS Take by mouth      Omega-3 Fatty Acids (Fish Oil) 1000 MG CPDR Take by mouth 2 (two) times a day 180 capsule 0     No current facility-administered medications for this visit  Allergies   Allergen Reactions    Cat Hair Extract     Erythromycin     Hctz [Hydrochlorothiazide] Dizziness    Nuts     Seasonal Ic  [Cholestatin]     Latex Rash    Medical Tape Rash       Review of Systems   Psychiatric/Behavioral: Positive for dysphoric mood  Negative for agitation, behavioral problems, confusion, decreased concentration, hallucinations, self-injury, sleep disturbance and suicidal ideas  The patient is nervous/anxious  The patient is not hyperactive  Video Exam    There were no vitals filed for this visit  Physical Exam  Psychiatric:         Attention and Perception: Attention and perception normal          Mood and Affect: Mood is anxious and depressed  Speech: Speech normal          Behavior: Behavior normal  Behavior is cooperative  Thought Content:  Thought content normal          Cognition and Memory: Cognition and memory normal  Judgment: Judgment normal       Comments: Patient presents with some anxiety and depression, however affect is bright with full range  Patient's speech is normal rate and rhythm  Patient's thought process is organized and thought content is future-oriented  She is alert, oriented, engaged  Patient denies SI/HI/AH/VH and self-harm  I spent 45 minutes directly with the patient during this visit, from [id number]  VIRTUAL VISIT DISCLAIMER    Marian Brown acknowledges that she has consented to an online visit or consultation  She understands that the online visit is based solely on information provided by her, and that, in the absence of a face-to-face physical evaluation by the physician, the diagnosis she receives is both limited and provisional in terms of accuracy and completeness  This is not intended to replace a full medical face-to-face evaluation by the physician  Marian Brown understands and accepts these terms

## 2021-02-25 ENCOUNTER — TELEPHONE (OUTPATIENT)
Dept: FAMILY MEDICINE CLINIC | Facility: CLINIC | Age: 46
End: 2021-02-25

## 2021-02-25 LAB
25(OH)D3 SERPL-MCNC: 45 NG/ML (ref 30–100)
ALBUMIN SERPL-MCNC: 3.6 G/DL (ref 3.6–5.1)
ALBUMIN/CREAT UR: 3 MCG/MG CREAT
ALBUMIN/GLOB SERPL: 1.1 (CALC) (ref 1–2.5)
ALP SERPL-CCNC: 67 U/L (ref 31–125)
ALT SERPL-CCNC: 10 U/L (ref 6–29)
AST SERPL-CCNC: 15 U/L (ref 10–35)
BASOPHILS # BLD AUTO: 37 CELLS/UL (ref 0–200)
BASOPHILS NFR BLD AUTO: 0.3 %
BILIRUB SERPL-MCNC: 0.3 MG/DL (ref 0.2–1.2)
BUN SERPL-MCNC: 17 MG/DL (ref 7–25)
BUN/CREAT SERPL: NORMAL (CALC) (ref 6–22)
CALCIUM SERPL-MCNC: 8.8 MG/DL (ref 8.6–10.2)
CHLORIDE SERPL-SCNC: 100 MMOL/L (ref 98–110)
CHOLEST SERPL-MCNC: 172 MG/DL
CHOLEST/HDLC SERPL: 3.2 (CALC)
CO2 SERPL-SCNC: 26 MMOL/L (ref 20–32)
CREAT SERPL-MCNC: 0.77 MG/DL (ref 0.5–1.1)
CREAT UR-MCNC: 116 MG/DL (ref 20–275)
EOSINOPHIL # BLD AUTO: 149 CELLS/UL (ref 15–500)
EOSINOPHIL NFR BLD AUTO: 1.2 %
ERYTHROCYTE [DISTWIDTH] IN BLOOD BY AUTOMATED COUNT: 11.8 % (ref 11–15)
GLOBULIN SER CALC-MCNC: 3.4 G/DL (CALC) (ref 1.9–3.7)
GLUCOSE SERPL-MCNC: 81 MG/DL (ref 65–99)
HCT VFR BLD AUTO: 38.7 % (ref 35–45)
HDLC SERPL-MCNC: 53 MG/DL
HGB BLD-MCNC: 12.7 G/DL (ref 11.7–15.5)
LDLC SERPL CALC-MCNC: 83 MG/DL (CALC)
LYMPHOCYTES # BLD AUTO: 2468 CELLS/UL (ref 850–3900)
LYMPHOCYTES NFR BLD AUTO: 19.9 %
MCH RBC QN AUTO: 29 PG (ref 27–33)
MCHC RBC AUTO-ENTMCNC: 32.8 G/DL (ref 32–36)
MCV RBC AUTO: 88.4 FL (ref 80–100)
MICROALBUMIN UR-MCNC: 0.4 MG/DL
MONOCYTES # BLD AUTO: 905 CELLS/UL (ref 200–950)
MONOCYTES NFR BLD AUTO: 7.3 %
NEUTROPHILS # BLD AUTO: 8841 CELLS/UL (ref 1500–7800)
NEUTROPHILS NFR BLD AUTO: 71.3 %
NONHDLC SERPL-MCNC: 119 MG/DL (CALC)
PLATELET # BLD AUTO: 325 THOUSAND/UL (ref 140–400)
PMV BLD REES-ECKER: 10.9 FL (ref 7.5–12.5)
POTASSIUM SERPL-SCNC: 4 MMOL/L (ref 3.5–5.3)
PROT SERPL-MCNC: 7 G/DL (ref 6.1–8.1)
RBC # BLD AUTO: 4.38 MILLION/UL (ref 3.8–5.1)
SL AMB EGFR AFRICAN AMERICAN: 107 ML/MIN/1.73M2
SL AMB EGFR NON AFRICAN AMERICAN: 92 ML/MIN/1.73M2
SODIUM SERPL-SCNC: 136 MMOL/L (ref 135–146)
TRIGL SERPL-MCNC: 274 MG/DL
TSH SERPL-ACNC: 1.98 MIU/L
WBC # BLD AUTO: 12.4 THOUSAND/UL (ref 3.8–10.8)

## 2021-02-25 NOTE — TELEPHONE ENCOUNTER
Patient called stating she received her labs on my chart and was concerned due to seeing some abnormal labs, doesn't really understand it so wanted a call if possible    She is scheduled for 03/05/2021, just was anxious

## 2021-03-05 ENCOUNTER — OFFICE VISIT (OUTPATIENT)
Dept: FAMILY MEDICINE CLINIC | Facility: CLINIC | Age: 46
End: 2021-03-05
Payer: COMMERCIAL

## 2021-03-05 VITALS
WEIGHT: 140 LBS | HEART RATE: 79 BPM | OXYGEN SATURATION: 99 % | RESPIRATION RATE: 16 BRPM | DIASTOLIC BLOOD PRESSURE: 70 MMHG | SYSTOLIC BLOOD PRESSURE: 150 MMHG | HEIGHT: 64 IN | BODY MASS INDEX: 23.9 KG/M2

## 2021-03-05 DIAGNOSIS — F32.9 REACTIVE DEPRESSION: ICD-10-CM

## 2021-03-05 DIAGNOSIS — E78.1 HYPERTRIGLYCERIDEMIA: ICD-10-CM

## 2021-03-05 DIAGNOSIS — I10 HYPERTENSION, UNSPECIFIED TYPE: ICD-10-CM

## 2021-03-05 DIAGNOSIS — Z00.00 ANNUAL PHYSICAL EXAM: Primary | ICD-10-CM

## 2021-03-05 DIAGNOSIS — F41.1 GAD (GENERALIZED ANXIETY DISORDER): ICD-10-CM

## 2021-03-05 PROCEDURE — 99396 PREV VISIT EST AGE 40-64: CPT | Performed by: FAMILY MEDICINE

## 2021-03-05 PROCEDURE — 3077F SYST BP >= 140 MM HG: CPT | Performed by: FAMILY MEDICINE

## 2021-03-05 PROCEDURE — 3078F DIAST BP <80 MM HG: CPT | Performed by: FAMILY MEDICINE

## 2021-03-05 PROCEDURE — 3725F SCREEN DEPRESSION PERFORMED: CPT | Performed by: FAMILY MEDICINE

## 2021-03-05 PROCEDURE — 3008F BODY MASS INDEX DOCD: CPT | Performed by: FAMILY MEDICINE

## 2021-03-05 PROCEDURE — 1036F TOBACCO NON-USER: CPT | Performed by: FAMILY MEDICINE

## 2021-03-05 PROCEDURE — 99214 OFFICE O/P EST MOD 30 MIN: CPT | Performed by: FAMILY MEDICINE

## 2021-03-05 NOTE — PATIENT INSTRUCTIONS

## 2021-03-05 NOTE — PROGRESS NOTES
Assessment/Plan:   Diagnoses and all orders for this visit:    Annual physical exam  - reviewed PMHx, PSHx, meds, allergies, FHx, Soc Hx and Sexual Hx   - UTD with Flu vaccine for this season and Tdap   - discussed diet and encouraged exercise  - not currently sexually active and declined STD screening   - UTD with annual Gyn exam/PAP - 6/2020  - UTD with Mammo (1/2021) - annual screening recommended   - reviewed labs - see below   - UTD with Optho and Dental   - RTO in 1yr for annual exam     Hypertension, unspecified type  - BP stable in the office elevated x2  - was rushing to get to office and drank coffee prior to OV   - drinks 1/2caf BID and limits self to 1 diet coke/day - advised to watch her caffeine intake   - currently on ACEI 5mg and tolerating it well - cont current regimen for now   - nml CMP and urine microabumin on most recent labs   - UTD with Optho   - not a smoker, rare social drinker   - pt with increased stress at home - see below  - RTO in 1month for f/u - pt aware and agreeable    Reactive depression  EDELMIRA (generalized anxiety disorder)  - nml TSH and Vit D   - PHQ-2 score of 2  - EDELMIRA-7 score of 14 <-- 16 <-- 16  - has been under an incredible amount of stress during COVID pandemic   - Mom was in hospice and passed away from [facility] in 10/2020 - 5month anniversary today  - Aunt passed away from [facility] in 11/2020   - has lost her support system and all additional outside help that she was getting for her 3 adopted girls (ages 1, 6, 13)   - Middle Child has been in and out of [facility] and frequent hospitalizations -- currently in  residential care   - youngest with sleep and potty training regression   - oldest with barely passing grades at home 2/2 stress   - would like to have more consistent f/u with Therapist - referred to [facility]  - hesitant to start medication -- was eRx'd Xanax and Wellbutrin in 10/2020 but pt never filled prescriptions   - advised to RTO in 1month for f/u - pt aware and agreeable     Hypertriglyceridemia  - Lipids (2/24/2021): , , HDL 53, LDL 83  - discussed diet and encouraged exercise  - cont OTC Fish Oil   - The 10-year ASCVD risk score (Vasu Bailey et al , 2013) is: 1 5%    Values used to calculate the score:      Age: 50 years      Sex: Female      Is Non- : No      Diabetic: No      Tobacco smoker: No      Systolic Blood Pressure: 874 mmHg      Is BP treated: Yes      HDL Cholesterol: 53 mg/dL      Total Cholesterol: 172 mg/dL          Subjective:    Patient ID: Fred Li is a 50 y o  female  Fred Li is a 50 y o  female who presents to the office for an annual exam and f/u   1) Annual   - Specialists: Allergist (Dr Ryan Arango), Ortho (Dr Quinton Haywood), Ob/Gyn: [facility]    - PMHx: HTN, Migraines (only 1 with Aura), Cluster HA x1, Allergies, Asthma (seasonal), IBS-D, L-shoulder subluxation (2/2 overuse from exercise), R-lateral epicondylitis, Radial Tunnel Syndrome and R-DeQuervain's   - allergies: Erythromycin (GI upset), HTCZ (dizziness), Latex/Medical Tape (rash), animal dander, peanuts   - Meds: see med rec  - PSHx: tonsillectomy, L-wrist surgery  - FHx: M (HL, HTN, stroke x3), F (HTN, HL), denies FHx of breast/colon/cervical ca   - Immunizations: UTD with Flu vaccine for the season, last Tdap 2012  - GYN Hx: last exam 6/2020, last PAP was 6/2020 - no h/o abnml PAPs; last Mammo in 1/2021 (annual screening)   - diet/exercise: varied exercise; varied diet   1/2 caf coffee BID, 1 diet coke/day   - social: denies Tob/illicits; rare EtOH, single   - sexual Hx: not currently sexually active and declined STD screening in the office today   - last vision: wears glasses/contacts; last Optho was 10/2020  - last dental: goes Q6months   2) HTN  - BP in the office 150/70 and same on my repeat   - currently on ACEI 5mg and tolerating it well - denies swelling or dry cough   - was running late and drank coffee prior to OV   - UTD with Optho - 10/2020   - nml urine microalbumin and CMP on recent labs   - Mom was in hospice and passed away from [facility] in 10/2020  - Aunt passed away from [facility] in 11/2020   - denies F/C/N/V/CP/palpitations/SOB/wheezing/cough/abd pain/D/C/LE edema   - has not been exercise and poor diet - has been eating more fast food lately   3) EDELMIRA  - PHQ-9 score of 2 <-- 7 <-- 1   - EDELMIRA-7 score of 14 <-- 16 <-- 16  - has been under an incredible amount of stress during COVID pandemic - has lost her support system and all additional outside help that she was getting for her 3 adopted girls (ages 1, 6, 13) -- Middle Child Malinda Frame) has been in and out of [facility] and frequent hospitalizations -- currently in residential care   - youngest with sleep and potty training regression   - oldest with barely passing grades at home 2/2 stress   - would like to have more consistent f/u with Therapist   - hesitant to start medication -- was eRx'd Xanax and Wellbutrin in 10/2020 but pt never filled prescriptions   4) HTG   - TGs are elevated on her cholesterol panel - states eats a lot of cheese       The following portions of the patient's history were reviewed and updated as appropriate: allergies, current medications, past family history, past medical history, past social history, past surgical history and problem list     Review of Systems  as per HPI    Objective:  /70 (BP Location: Left arm, Patient Position: Sitting, Cuff Size: Standard)   Pulse 79   Resp 16   Ht 5' 3 5" (1 613 m)   Wt 63 5 kg (140 lb)   SpO2 99%   BMI 24 41 kg/m²    Physical Exam  Vitals signs reviewed  Constitutional:       General: She is not in acute distress  Appearance: Normal appearance  She is normal weight  She is not ill-appearing, toxic-appearing or diaphoretic  HENT:      Head: Normocephalic and atraumatic  Right Ear: External ear normal       Left Ear: External ear normal    Eyes:      General: No scleral icterus  Right eye: No discharge  Left eye: No discharge  Extraocular Movements: Extraocular movements intact  Conjunctiva/sclera: Conjunctivae normal    Neck:      Musculoskeletal: Normal range of motion and neck supple  Cardiovascular:      Rate and Rhythm: Normal rate and regular rhythm  Heart sounds: Normal heart sounds  No murmur  No friction rub  No gallop  Pulmonary:      Effort: Pulmonary effort is normal  No respiratory distress  Breath sounds: Normal breath sounds  No stridor  No wheezing, rhonchi or rales  Abdominal:      General: Abdomen is flat  Bowel sounds are normal  There is no distension  Palpations: Abdomen is soft  There is no mass  Tenderness: There is no abdominal tenderness  There is no guarding or rebound  Musculoskeletal: Normal range of motion  General: No swelling, tenderness or deformity  Right lower leg: No edema  Left lower leg: No edema  Skin:     General: Skin is warm  Neurological:      General: No focal deficit present  Mental Status: She is alert and oriented to person, place, and time  Psychiatric:         Attention and Perception: Attention normal          Mood and Affect: Affect normal  Mood is anxious and depressed  Speech: Speech normal  She is communicative  Behavior: Behavior normal  Behavior is cooperative  Thought Content: Thought content normal  Thought content does not include homicidal or suicidal ideation  Thought content does not include homicidal or suicidal plan  Cognition and Memory: Cognition and memory normal          Judgment: Judgment normal       Comments: PHQ-2 score of 2, EDELMIRA-7 score of 14        Depression Screening Follow-up Plan: Patient's depression screening was positive with a PHQ-2 score of 2  Their PHQ-9 score was   Patient assessed for underlying major depression  They have no active suicidal ideations   Brief counseling provided and recommend additional follow-up/re-evaluation next office visit  Continue regular follow-up with their psychologist/therapist/psychiatrist who is managing their mental health condition(s)

## 2021-03-26 ENCOUNTER — TELEMEDICINE (OUTPATIENT)
Dept: BEHAVIORAL/MENTAL HEALTH CLINIC | Facility: CLINIC | Age: 46
End: 2021-03-26
Payer: COMMERCIAL

## 2021-03-26 DIAGNOSIS — F41.1 GAD (GENERALIZED ANXIETY DISORDER): Primary | ICD-10-CM

## 2021-03-26 PROCEDURE — 90834 PSYTX W PT 45 MINUTES: CPT | Performed by: SOCIAL WORKER

## 2021-03-26 NOTE — PSYCH
Virtual Regular Visit      Assessment/Plan:    Problem List Items Addressed This Visit        Other    EDELMIRA (generalized anxiety disorder) - Primary               Reason for visit is   Chief Complaint   Patient presents with    Virtual Regular Visit        Encounter provider Dodie Celeste LCSW    Provider located at [place] [address] [address] [facility] [address] [phone]      Recent Visits  No visits were found meeting these conditions  Showing recent visits within past 7 days and meeting all other requirements     Today's Visits  Date Type Provider Dept   03/26/21 Telemedicine Dodie Celeste LCSW [facility] [address] today's visits and meeting all other requirements     Future Appointments  No visits were found meeting these conditions  Showing future appointments within next 150 days and meeting all other requirements        The patient was identified by name and date of birth  Blade Vasquez was informed that this is a telemedicine visit and that the visit is being conducted through [employer] and patient was informed that this is a secure, HIPAA-compliant platform  She agrees to proceed     My office door was closed  No one else was in the room  She acknowledged consent and understanding of privacy and security of the video platform  The patient has agreed to participate and understands they can discontinue the visit at any time  Patient is aware this is a billable service  Subjective  Blade Vasquez is a 50 y o  female presenting for follow up appointment with [facility]  Patient continues to manage stress related to balancing her three adopted children (one of which has reactive attachment disorder and has been in and out of the hospital), launching a new publication, grieving the loss of mother in Nov 2020, and teaching college courses    She also reports:    [de-identified] year old is at a residential program now - [facility]  Patient upset she does not believe daughter is being well-taken care of - dirty, not bathed, no toothbrush, in urinated clothing  [facility]    We scheduled multiple follow up appointments, because patient was concerned with frequency with which she was being seen  Patient would like to be seen weekly if possible  Patient also provided contact details for Katharine Parker, the family therapist at [facility], as he would like to be in touch to discuss patient's treatment as it relates to daughter's treatment  I have sent patient a form to fill out for written consent  Patient and therapist made a plan to speak more about the loss of patient's mother at next session  Patient plans to follow up in approximately two weeks (biweekly, until patient's preferred availability becomes available)         HPI     Past Medical History:   Diagnosis Date    Allergic     seasonal, food allergies    Asthma     GERD (gastroesophageal reflux disease)     Hypertension     Irritable bowel syndrome     Migraine without aura     Shoulder subluxation, left     Tennis elbow        Past Surgical History:   Procedure Laterality Date    TONSILLECTOMY      US GUIDED MSK PROCEDURE  1/14/2020    WRIST SURGERY         Current Outpatient Medications   Medication Sig Dispense Refill    albuterol (PROAIR HFA) 90 mcg/act inhaler Inhale      Ascorbic Acid (VITAMIN C) 500 MG CAPS Take by mouth      Calcium 250 MG CAPS Take by mouth      desogestrel-ethinyl estradiol (Viorele) 0 15-0 02/0 01 MG (21/5) per tablet Take 1 tablet by mouth daily 112 tablet 3    EPINEPHrine (Auvi-Q) 0 3 mg/0 3 mL SOAJ as directed      fexofenadine (ALLEGRA ALLERGY) 180 MG tablet Take by mouth      lisinopril (ZESTRIL) 5 mg tablet Take 1 tablet (5 mg total) by mouth daily 90 tablet 0    meloxicam (MOBIC) 7 5 mg tablet Take 1 tablet (7 5 mg total) by mouth daily 30 tablet 2    montelukast (Singulair) 10 mg tablet Take 1 tablet by mouth each evening      Multiple Vitamin (MULTI-VITAMIN DAILY) TABS Take by mouth      Omega-3 Fatty Acids (Fish Oil) 1000 MG CPDR Take by mouth 2 (two) times a day 180 capsule 0     No current facility-administered medications for this visit  Allergies   Allergen Reactions    Cat Hair Extract     Erythromycin     Hctz [Hydrochlorothiazide] Dizziness    Peanut [Nuts]     Seasonal Ic  [Cholestatin]     Latex Rash    Medical Tape Rash       Review of Systems   Psychiatric/Behavioral: Negative for agitation, behavioral problems, confusion, decreased concentration, dysphoric mood, hallucinations, self-injury, sleep disturbance and suicidal ideas  The patient is nervous/anxious  The patient is not hyperactive  Video Exam    There were no vitals filed for this visit  Physical Exam  Psychiatric:         Attention and Perception: Attention and perception normal          Mood and Affect: Affect normal  Mood is anxious  Speech: Speech normal          Behavior: Behavior normal  Behavior is cooperative  Thought Content: Thought content normal          Cognition and Memory: Cognition and memory normal          Judgment: Judgment normal       Comments: Patient presents with anxious mood and normal affect  Patient is well-groomed, with good eye contact and good focus in session  Patient's speech is normal rate and rhythm  Patient is alert, oriented, engaged, and open  Patient's thought process is organized and thought content is future-directed and goal-oriented  Patient's memory and cognition appear intact  Patient denies SI/HI/AH/VH and self-harm  I spent 49 minutes directly with the patient during this visit, from [phone]  VIRTUAL VISIT DISCLAIMER    Liberty Perez acknowledges that she has consented to an online visit or consultation   She understands that the online visit is based solely on information provided by her, and that, in the absence of a face-to-face physical evaluation by the physician, the diagnosis she receives is both limited and provisional in terms of accuracy and completeness  This is not intended to replace a full medical face-to-face evaluation by the physician  Juno De La Cruz understands and accepts these terms

## 2021-03-26 NOTE — PATIENT INSTRUCTIONS
Continue to take all medications as prescribed  Attend all scheduled medical appointments    Follow up with therapist     If you are experiencing a mental health emergency, please call 911 for emergent care, or one of the following numbers for someone to talk to 24 hours a day, 7 days a week:    [facility] AT [place] Intervention: [address] Crisis Intervention: [phone]  CrisisTextLine:  Text PA to 541567  PA's support and referral hotline: 378.645.4603  Suicide Prevention Lifeline:  210.725.6722

## 2021-03-31 DIAGNOSIS — Z23 ENCOUNTER FOR IMMUNIZATION: ICD-10-CM

## 2021-04-07 ENCOUNTER — OFFICE VISIT (OUTPATIENT)
Dept: FAMILY MEDICINE CLINIC | Facility: CLINIC | Age: 46
End: 2021-04-07
Payer: COMMERCIAL

## 2021-04-07 VITALS
HEART RATE: 92 BPM | DIASTOLIC BLOOD PRESSURE: 76 MMHG | BODY MASS INDEX: 24.41 KG/M2 | WEIGHT: 143 LBS | HEIGHT: 64 IN | SYSTOLIC BLOOD PRESSURE: 128 MMHG | RESPIRATION RATE: 16 BRPM | OXYGEN SATURATION: 98 %

## 2021-04-07 DIAGNOSIS — F32.9 REACTIVE DEPRESSION: ICD-10-CM

## 2021-04-07 DIAGNOSIS — E78.1 HYPERTRIGLYCERIDEMIA: ICD-10-CM

## 2021-04-07 DIAGNOSIS — I10 HYPERTENSION, UNSPECIFIED TYPE: Primary | ICD-10-CM

## 2021-04-07 DIAGNOSIS — F41.1 GAD (GENERALIZED ANXIETY DISORDER): ICD-10-CM

## 2021-04-07 PROCEDURE — 1036F TOBACCO NON-USER: CPT | Performed by: FAMILY MEDICINE

## 2021-04-07 PROCEDURE — 99214 OFFICE O/P EST MOD 30 MIN: CPT | Performed by: FAMILY MEDICINE

## 2021-04-07 PROCEDURE — 3725F SCREEN DEPRESSION PERFORMED: CPT | Performed by: FAMILY MEDICINE

## 2021-04-07 PROCEDURE — 3078F DIAST BP <80 MM HG: CPT | Performed by: FAMILY MEDICINE

## 2021-04-07 PROCEDURE — 3008F BODY MASS INDEX DOCD: CPT | Performed by: FAMILY MEDICINE

## 2021-04-07 PROCEDURE — 3074F SYST BP LT 130 MM HG: CPT | Performed by: FAMILY MEDICINE

## 2021-04-07 NOTE — PROGRESS NOTES
Assessment/Plan:   Diagnoses and all orders for this visit:    Hypertension, unspecified type  - BP in the office 138/82 and same on my repeat 128/76  - currently on ACEI 5mg and tolerating it well - cont current regimen   - no caffeine this AM   - stressed this AM - youngest peed herself and meeting her Middle Child (who is in residential care for mental health) for a Dental appt   - UTD with COVID IMMs   - UTD with Optho - 10/2020   - nml urine microalbumin and CMP on recent labs   - not a smoker, rare social drinker   - RTO in 3months for f/u - pt aware and agreeable     Reactive depression  EDELMIRA (generalized anxiety disorder)  - PHQ-2 score of 1 <-- 2  - EDELMIRA-7 score of 8 <-- 14 <-- 16 <-- 16  - feels like she has a better handle on things   - had an appt with Therapist on 3/26/2021 and has regular appts scheduled Q2wks   - nml TSH and Vit D   - has been under an incredible amount of stress during [employer] pandemic   - Mom was in hospice and passed away from [employer] in 10/2020   - Aunt passed away from [employer] in 11/2020   - has lost her support system and all additional outside help that she was getting for her 3 adopted girls (ages 1, 6, 17)   -Bravo Renteria Child has been in and out of [facility] and frequent hospitalizations -- currently in residential care   - youngest with sleep and potty training regression   - oldest with barely passing grades at home 2/2 stress   - hesitant to start medication -- was eRx'd Xanax and Wellbutrin in 10/2020 but pt never filled prescriptions   - advised to RTO in 3month for f/u - pt aware and agreeable     Hypertriglyceridemia  -     Lipid panel;  Future  - Lipids (2/24/2021): , , HDL 53, LDL 83  - discussed diet and encouraged exercise  - cont OTC Fish Oil   - will reheck labs in 3months - pt aware and agreeable   - The 10-year ASCVD risk score (Micheline Ponce et al , 2013) is: 1 1%    Values used to calculate the score:      Age: 50 years      Sex: Female      Is Non- : No      Diabetic: No      Tobacco smoker: No      Systolic Blood Pressure: 751 mmHg      Is BP treated: Yes      HDL Cholesterol: 53 mg/dL      Total Cholesterol: 172 mg/dL          Subjective:    Patient ID: Charo Polanco is a 50 y o  female    51yo F presents to the office for f/u   1) HTN  - BP in the office 138/82 and same on my repeat 128/76  - currently on ACEI 5mg and tolerating it well - denies swelling or dry cough   - no caffeine this AM   - stressed this AM - youngest peed herself and meeting her Middle Child (who is in residential care for mental health) for a Dental appt   - UTD with COVID IMMs   - UTD with Optho - 10/2020   - nml urine microalbumin and CMP on recent labs   - Mom was in hospice and passed away from [facility] in 10/2020  - Aunt passed away from [facility] in 11/2020   - denies F/C/N/V/CP/palpitations/SOB/wheezing/cough/abd pain/D/C/LE edema   - has not been exercise and poor diet - has been eating more fast food lately   2) EDELMIRA  - PHQ-9 score of 1 <-- 2 <-- 7 <-- 1   - EDELMIRA-7 score of 8 <-- 14 <-- 16 <-- 16  - had an appt with Therapist on 3/26/2021 and has regular appts scheduled Q2wks   - has been under an incredible amount of stress during COVID pandemic - has lost her support system and all additional outside help that she was getting for her 3 adopted girls (ages 1, 6, 13) -- Middle Child Yeison Mention) has been in and out of [facility] and frequent hospitalizations -- currently in residential care and its "not going great"   - youngest with sleep and potty training regression   - oldest with barely passing grades at home 2/2 stress   - hesitant to start medication -- was eRx'd Xanax and Wellbutrin in 10/2020 but pt never filled prescriptions   3) HTG   - TGs are elevated on her cholesterol panel - states eats a lot of cheese       The following portions of the patient's history were reviewed and updated as appropriate: allergies, current medications, past family history, past medical history, past social history, past surgical history and problem list     Review of Systems  as per HPI    Objective:  /76 (BP Location: Left arm, Patient Position: Sitting, Cuff Size: Standard)   Pulse 92   Resp 16   Ht 5' 3 5" (1 613 m)   Wt 64 9 kg (143 lb)   SpO2 98%   BMI 24 93 kg/m²    Physical Exam  Vitals signs reviewed  Constitutional:       General: She is not in acute distress  Appearance: Normal appearance  She is normal weight  She is not ill-appearing, toxic-appearing or diaphoretic  HENT:      Head: Normocephalic and atraumatic  Right Ear: External ear normal       Left Ear: External ear normal    Eyes:      General: No scleral icterus  Right eye: No discharge  Left eye: No discharge  Extraocular Movements: Extraocular movements intact  Conjunctiva/sclera: Conjunctivae normal    Neck:      Musculoskeletal: Normal range of motion and neck supple  Cardiovascular:      Rate and Rhythm: Normal rate and regular rhythm  Heart sounds: Normal heart sounds  No murmur  No friction rub  No gallop  Pulmonary:      Effort: Pulmonary effort is normal       Breath sounds: Normal breath sounds  Abdominal:      General: Bowel sounds are normal  There is no distension  Palpations: Abdomen is soft  There is no mass  Tenderness: There is no abdominal tenderness  There is no guarding or rebound  Musculoskeletal: Normal range of motion  General: No swelling, tenderness, deformity or signs of injury  Right lower leg: No edema  Left lower leg: No edema  Skin:     General: Skin is warm  Coloration: Skin is not pale  Findings: No erythema  Neurological:      General: No focal deficit present  Mental Status: She is alert and oriented to person, place, and time  Psychiatric:         Attention and Perception: Attention normal          Mood and Affect: Affect normal  Mood is anxious           Speech: Speech normal          Behavior: Behavior normal  Behavior is not agitated  Behavior is cooperative  Thought Content: Thought content normal  Thought content is not paranoid or delusional  Thought content does not include homicidal or suicidal ideation  Thought content does not include homicidal or suicidal plan  Cognition and Memory: Cognition normal          Judgment: Judgment normal       Comments: PHQ-9 score of 1, EDELMIRA-7 score of 8        Depression Screening Follow-up Plan: Patient's depression screening was positive with a PHQ-2 score of 1  Their PHQ-9 score was   Continue regular follow-up with their psychologist/therapist/psychiatrist who is managing their mental health condition(s)  Patient advised to follow-up with PCP for further management

## 2021-04-12 ENCOUNTER — TELEMEDICINE (OUTPATIENT)
Dept: BEHAVIORAL/MENTAL HEALTH CLINIC | Facility: CLINIC | Age: 46
End: 2021-04-12
Payer: COMMERCIAL

## 2021-04-12 DIAGNOSIS — F41.1 GAD (GENERALIZED ANXIETY DISORDER): Primary | ICD-10-CM

## 2021-04-12 PROCEDURE — 90834 PSYTX W PT 45 MINUTES: CPT | Performed by: SOCIAL WORKER

## 2021-04-12 NOTE — PATIENT INSTRUCTIONS
Continue to take all medications as prescribed  Attend all scheduled medical appointments    Follow up with therapist     If you are experiencing a mental health emergency, please call 911 for emergent care, or one of the following numbers for someone to talk to 24 hours a day, 7 days a week:    [facility] ([facility] AT [place] Intervention: [address] Crisis Intervention: [phone]  CrisisTextLine:  Text PA to 813003  PA's support and referral hotline: 337.943.1137  Suicide Prevention Lifeline:  247.805.2924

## 2021-04-12 NOTE — PSYCH
Virtual Regular Visit      Assessment/Plan:    Problem List Items Addressed This Visit        Other    EDELMIRA (generalized anxiety disorder) - Primary               Reason for visit is   Chief Complaint   Patient presents with    Virtual Regular Visit        Encounter provider Frankey Height, LCSW    Provider located at [address] [place] [id number]      Recent Visits  Date Type Provider Dept   04/07/21 Office Visit Kwabena Pérez, [address] [place] recent visits within past 7 days and meeting all other requirements     Today's Visits  Date Type Provider Dept   04/12/21 Telemedicine Frankey Height, [address] [place] [address] today's visits and meeting all other requirements     Future Appointments  No visits were found meeting these conditions  Showing future appointments within next 150 days and meeting all other requirements        The patient was identified by name and date of birth  Karla Palacios was informed that this is a telemedicine visit and that the visit is being conducted through [employer] and patient was informed that this is a secure, HIPAA-compliant platform  She agrees to proceed     My office door was closed  No one else was in the room  She acknowledged consent and understanding of privacy and security of the video platform  The patient has agreed to participate and understands they can discontinue the visit at any time  Patient is aware this is a billable service  Subjective  Karla Palacios is a 50 y o  female presenting for follow up appointment with [facility]  Patient reports ongoing stress related to daughter in residential treatment, a hectic work schedule, loss of her mother from [place] in November, and trying to care for her teenager and her toddler    Patient is not looking forward to Mother's Day as it will be about six months from the loss of her mother  She feels as though she is spread thin, and is constantly concerned about what will happen if they send her daughter home from residential treatment  Patient has very little time to herself and therefore has difficulty fully processing some of her loss and grief  We did have plans to discuss some of patient's grief today, however she had a meeting after our appointment and was afraid she would become too emotional  She would like to revisit the topic at next session in two weeks  HPI     Past Medical History:   Diagnosis Date    Allergic     seasonal, food allergies    Asthma     GERD (gastroesophageal reflux disease)     Hypertension     Irritable bowel syndrome     Migraine without aura     Shoulder subluxation, left     Tennis elbow        Past Surgical History:   Procedure Laterality Date    TONSILLECTOMY      US GUIDED MSK PROCEDURE  1/14/2020    WRIST SURGERY         Current Outpatient Medications   Medication Sig Dispense Refill    albuterol (PROAIR HFA) 90 mcg/act inhaler Inhale      Ascorbic Acid (VITAMIN C) 500 MG CAPS Take by mouth      Calcium 250 MG CAPS Take by mouth      desogestrel-ethinyl estradiol (Viorele) 0 15-0 02/0 01 MG (21/5) per tablet Take 1 tablet by mouth daily 112 tablet 3    EPINEPHrine (Auvi-Q) 0 3 mg/0 3 mL SOAJ as directed      fexofenadine (ALLEGRA ALLERGY) 180 MG tablet Take by mouth      lisinopril (ZESTRIL) 5 mg tablet Take 1 tablet (5 mg total) by mouth daily 90 tablet 0    meloxicam (MOBIC) 7 5 mg tablet Take 1 tablet (7 5 mg total) by mouth daily 30 tablet 2    montelukast (Singulair) 10 mg tablet Take 1 tablet by mouth each evening      Multiple Vitamin (MULTI-VITAMIN DAILY) TABS Take by mouth      Omega-3 Fatty Acids (Fish Oil) 1000 MG CPDR Take by mouth 2 (two) times a day 180 capsule 0     No current facility-administered medications for this visit           Allergies   Allergen Reactions    Cat Hair Extract     Erythromycin     Hctz [Hydrochlorothiazide] Dizziness    Peanut [Nuts - Food Allergy]     Seasonal Ic  [Cholestatin]     Latex Rash    Medical Tape Rash       Review of Systems   Psychiatric/Behavioral: Negative for agitation, behavioral problems, confusion, decreased concentration, dysphoric mood, hallucinations, self-injury, sleep disturbance and suicidal ideas  The patient is nervous/anxious  The patient is not hyperactive  Video Exam    There were no vitals filed for this visit  Physical Exam  Psychiatric:         Attention and Perception: Attention and perception normal          Mood and Affect: Mood is anxious  Speech: Speech normal          Behavior: Behavior normal  Behavior is cooperative  Thought Content: Thought content normal          Cognition and Memory: Cognition and memory normal          Judgment: Judgment normal       Comments: Patient presents with anxious mood and congruent affect  Patient is well-groomed, with good eye contact and good focus in session  Patient's speech is normal rate and rhythm  Patient is alert, oriented, engaged, and open  Patient's thought process is organized and thought content is future-directed and goal-oriented  Patient's memory and cognition appear intact  Patient denies SI/HI/AH/VH and self-harm  I spent 47 minutes directly with the patient during this visit, from 8768-0648  VIRTUAL VISIT DISCLAIMER    Abhijit Negrete acknowledges that she has consented to an online visit or consultation  She understands that the online visit is based solely on information provided by her, and that, in the absence of a face-to-face physical evaluation by the physician, the diagnosis she receives is both limited and provisional in terms of accuracy and completeness  This is not intended to replace a full medical face-to-face evaluation by the physician  Abhijit Negrete understands and accepts these terms

## 2021-05-07 ENCOUNTER — TELEMEDICINE (OUTPATIENT)
Dept: BEHAVIORAL/MENTAL HEALTH CLINIC | Facility: CLINIC | Age: 46
End: 2021-05-07
Payer: COMMERCIAL

## 2021-05-07 DIAGNOSIS — F41.1 GAD (GENERALIZED ANXIETY DISORDER): Primary | ICD-10-CM

## 2021-05-07 PROCEDURE — 90834 PSYTX W PT 45 MINUTES: CPT | Performed by: SOCIAL WORKER

## 2021-05-07 NOTE — PSYCH
Virtual Regular Visit      Assessment/Plan:    Problem List Items Addressed This Visit        Other    EDELMIRA (generalized anxiety disorder) - Primary               Reason for visit is   Chief Complaint   Patient presents with    Virtual Regular Visit        Encounter provider Bentley Vigil LCSW    Provider located at [place] [address] [address] [facility] [address] [facility] [id number] [phone]      Recent Visits  Date Type Provider Dept   05/07/21 1270 Barbara Babb LCSW [facility]   Showing recent visits within past 7 days and meeting all other requirements     Future Appointments  No visits were found meeting these conditions  Showing future appointments within next 150 days and meeting all other requirements        The patient was identified by name and date of birth  Gerald Calvillo was informed that this is a telemedicine visit and that the visit is being conducted through [address] [employer] and patient was informed that this is a secure, HIPAA-compliant platform  She agrees to proceed     My office door was closed  No one else was in the room  She acknowledged consent and understanding of privacy and security of the video platform  The patient has agreed to participate and understands they can discontinue the visit at any time  Patient is aware this is a billable service  Subjective  Gerald Calvillo is a 50 y o  female presenting for follow up appointment with [facility]  Patient is tearful today at the thought of having her first mother's day without her mother  Patient is conflicted about whether to go to see daughter at [facility] or to go to her father's and celebrate both Mother's Day and her youngest daughter's birthday  Patient is happy to go to father's now that they can actually be together without masks or worry about Covid    Patient and I went through decision-making for going to see daughter (which patient feels will significantly impact her day for the negative) or to go to father's and not visit daughter  Patient struggles with the fact that her daughter (adopted, diagnosed with RAD) has absolutely no desire to be around patient, but the therapist asks her if she is coming  Patient expresses resentment towards daughter and was validated for this feeling  She was encouraged to take time with family this weekend to heal and grieve among lived ones, rather than being attacked by daughters  Patient finds that with the opening of more stores and activities, as well as vaccinations, she is grieving mother even more because she is not able to do things that they used to do, like "go to [employer] "  Patient find herself missing her mother now that she is "out in the world" more  She also finds herself unable to 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  Patient's feelings were accepted and supported  She plans to forgo trip to daughter's this weekend in favor of quality family time with those she feels safe with and loved  Patient plans to follow up with therapist in two weeks           HPI     Past Medical History:   Diagnosis Date    Allergic     seasonal, food allergies    Asthma     GERD (gastroesophageal reflux disease)     Hypertension     Irritable bowel syndrome     Migraine without aura     Shoulder subluxation, left     Tennis elbow        Past Surgical History:   Procedure Laterality Date    TONSILLECTOMY      US GUIDED MSK PROCEDURE  1/14/2020    WRIST SURGERY         Current Outpatient Medications   Medication Sig Dispense Refill    albuterol (PROAIR HFA) 90 mcg/act inhaler Inhale      Ascorbic Acid (VITAMIN C) 500 MG CAPS Take by mouth      Calcium 250 MG CAPS Take by mouth      desogestrel-ethinyl estradiol (Viorele) 0 15-0 02/0 01 MG (21/5) per tablet Take 1 tablet by mouth daily 112 tablet 3    EPINEPHrine (Auvi-Q) 0 3 mg/0 3 mL SOAJ as directed      fexofenadine (ALLEGRA ALLERGY) 180 MG tablet Take by mouth      lisinopril (ZESTRIL) 5 mg tablet Take 1 tablet (5 mg total) by mouth daily 90 tablet 0    meloxicam (MOBIC) 7 5 mg tablet Take 1 tablet (7 5 mg total) by mouth daily 30 tablet 2    montelukast (Singulair) 10 mg tablet Take 1 tablet by mouth each evening      Multiple Vitamin (MULTI-VITAMIN DAILY) TABS Take by mouth      Omega-3 Fatty Acids (Fish Oil) 1000 MG CPDR Take by mouth 2 (two) times a day 180 capsule 0     No current facility-administered medications for this visit  Allergies   Allergen Reactions    Cat Hair Extract     Erythromycin     Hctz [Hydrochlorothiazide] Dizziness    Peanut [Nuts - Food Allergy]     Seasonal Ic  [Cholestatin]     Latex Rash    Medical Tape Rash       Review of Systems   Psychiatric/Behavioral: Positive for dysphoric mood  Negative for agitation, behavioral problems, confusion, decreased concentration, hallucinations, self-injury, sleep disturbance and suicidal ideas  The patient is nervous/anxious  The patient is not hyperactive  Video Exam    There were no vitals filed for this visit  Physical Exam  Psychiatric:         Attention and Perception: Attention and perception normal          Mood and Affect: Mood is anxious and depressed  Affect is tearful  Speech: Speech normal          Behavior: Behavior normal  Behavior is cooperative  Thought Content: Thought content normal          Cognition and Memory: Cognition and memory normal          Judgment: Judgment normal       Comments: Patient presents with depressed and anxious mood and congruent affect, tearful at times  Patient is well-groomed, with good eye contact and good focus in session  Patient's speech is normal rate and rhythm  Patient is alert, oriented, engaged, and open    Patient's thought process is organized and thought content is focused on Mother's Day plans  Patient's memory and cognition appear intact  Patient denies SI/HI/AH/VH and self-harm  I spent 47 minutes directly with the patient during this visit, from 3410-4631  VIRTUAL VISIT DISCLAIMER    Maribell Cunningham acknowledges that she has consented to an online visit or consultation  She understands that the online visit is based solely on information provided by her, and that, in the absence of a face-to-face physical evaluation by the physician, the diagnosis she receives is both limited and provisional in terms of accuracy and completeness  This is not intended to replace a full medical face-to-face evaluation by the physician  Maribell Cunningham understands and accepts these terms

## 2021-05-07 NOTE — PATIENT INSTRUCTIONS
Continue to take all medications as prescribed  Attend all scheduled medical appointments    Follow up with therapist     If you are experiencing a mental health emergency, please call 911 for emergent care, or one of the following numbers for someone to talk to 24 hours a day, 7 days a week:    [facility] ([facility]) AT [place] Intervention: [address] Crisis Intervention: [phone]  CrisisTextLine:  Text PA to 944484  PA's support and referral hotline: 269.150.7898  Suicide Prevention Lifeline:  223.378.1283

## 2021-05-17 DIAGNOSIS — I10 HYPERTENSION, UNSPECIFIED TYPE: ICD-10-CM

## 2021-05-19 RX ORDER — LISINOPRIL 5 MG/1
5 TABLET ORAL DAILY
Qty: 90 TABLET | Refills: 0 | Status: SHIPPED | OUTPATIENT
Start: 2021-05-19 | End: 2021-07-14 | Stop reason: SDUPTHER

## 2021-05-21 ENCOUNTER — TELEMEDICINE (OUTPATIENT)
Dept: BEHAVIORAL/MENTAL HEALTH CLINIC | Facility: CLINIC | Age: 46
End: 2021-05-21
Payer: COMMERCIAL

## 2021-05-21 DIAGNOSIS — F41.1 GAD (GENERALIZED ANXIETY DISORDER): Primary | ICD-10-CM

## 2021-05-21 PROCEDURE — 90834 PSYTX W PT 45 MINUTES: CPT | Performed by: SOCIAL WORKER

## 2021-05-21 NOTE — PSYCH
Virtual Regular Visit      Assessment/Plan:    Problem List Items Addressed This Visit        Other    EDELMIRA (generalized anxiety disorder) - Primary               Reason for visit is   Chief Complaint   Patient presents with    Virtual Regular Visit        Encounter provider Cathryn Chua LCSW    Provider located at [place] [address] [address] [facility] [address] [facility] [id number] [phone]      Recent Visits  No visits were found meeting these conditions  Showing recent visits within past 7 days and meeting all other requirements     Today's Visits  Date Type Provider Dept   05/21/21 Telemedicine Cathryn Chua LCSW [facility] [address] today's visits and meeting all other requirements     Future Appointments  No visits were found meeting these conditions  Showing future appointments within next 150 days and meeting all other requirements        The patient was identified by name and date of birth  Kristopher Sullivan was informed that this is a telemedicine visit and that the visit is being conducted through [employer] and patient was informed that this is a secure, HIPAA-compliant platform  She agrees to proceed     My office door was closed  No one else was in the room  She acknowledged consent and understanding of privacy and security of the video platform  The patient has agreed to participate and understands they can discontinue the visit at any time  Patient is aware this is a billable service  Subjective  Kristopher Sullivan is a 50 y o  female presenting for follow-up appointment with [facility]  Patient discussed her Mother's Day, and was happy that she did not go to visit her adopted daughter at the behavioral center  Patient continues to express resentment and anger over daughter's behavior, especially because daughter seems to only behave poorly with her   We discussed this as a symptom of reactive attachment disorder, and patient is also working with therapist at daughter's residence to understand same  Patient states that she has urged herself to find more acceptance about her daughter's condition  We discussed some of her anger, as anger often has a tendency to fester and come back on the person who is feeling it  Patient expressed understanding and knows that she needs to be more accepting  We practiced some phrases that she can use when she finds herself overcome with anger over her daughter  Patient has also been making a greater effort to participate in self-care activities  For example, she took herself out for shoes last weekend  She has been working on her screen porch which she states is her "sanctuary " She has her best friend coming up this weekend and is happy to be able to spend the time feeling like herself  Patient plans to continue being mindful of her self-care and anger  She plans to follow up with this therapist in about 11 days for ongoing treatment sessions              HPI     Past Medical History:   Diagnosis Date    Allergic     seasonal, food allergies    Asthma     GERD (gastroesophageal reflux disease)     Hypertension     Irritable bowel syndrome     Migraine without aura     Shoulder subluxation, left     Tennis elbow        Past Surgical History:   Procedure Laterality Date    TONSILLECTOMY      US GUIDED MSK PROCEDURE  1/14/2020    WRIST SURGERY         Current Outpatient Medications   Medication Sig Dispense Refill    albuterol (PROAIR HFA) 90 mcg/act inhaler Inhale      Ascorbic Acid (VITAMIN C) 500 MG CAPS Take by mouth      Calcium 250 MG CAPS Take by mouth      desogestrel-ethinyl estradiol (Viorele) 0 15-0 02/0 01 MG (21/5) per tablet Take 1 tablet by mouth daily 112 tablet 3    EPINEPHrine (Auvi-Q) 0 3 mg/0 3 mL SOAJ as directed      fexofenadine (ALLEGRA ALLERGY) 180 MG tablet Take by mouth      lisinopril (ZESTRIL) 5 mg tablet Take 1 tablet (5 mg total) by mouth daily 90 tablet 0    meloxicam (MOBIC) 7 5 mg tablet Take 1 tablet (7 5 mg total) by mouth daily 30 tablet 2    montelukast (Singulair) 10 mg tablet Take 1 tablet by mouth each evening      Multiple Vitamin (MULTI-VITAMIN DAILY) TABS Take by mouth      Omega-3 Fatty Acids (Fish Oil) 1000 MG CPDR Take by mouth 2 (two) times a day 180 capsule 0     No current facility-administered medications for this visit  Allergies   Allergen Reactions    Cat Hair Extract     Erythromycin     Hctz [Hydrochlorothiazide] Dizziness    Peanut [Nuts - Food Allergy]     Seasonal Ic  [Cholestatin]     Latex Rash    Medical Tape Rash       Review of Systems   Psychiatric/Behavioral: Negative for agitation, behavioral problems, confusion, decreased concentration, dysphoric mood, hallucinations, self-injury, sleep disturbance and suicidal ideas  The patient is not nervous/anxious and is not hyperactive  Video Exam    There were no vitals filed for this visit  Physical Exam  Psychiatric:         Attention and Perception: Attention and perception normal          Mood and Affect: Mood and affect normal          Speech: Speech normal          Behavior: Behavior normal  Behavior is cooperative  Thought Content: Thought content normal          Cognition and Memory: Cognition and memory normal          Judgment: Judgment normal       Comments: Patient presents with euthymic mood and congruent affect  Patient is well-groomed, with good eye contact and good focus in session  Patient's speech is normal rate and rhythm  Patient is alert, oriented, engaged, and open  Patient's thought process is organized and thought content is future-directed and goal-oriented  Patient's memory and cognition appear intact  Patient denies SI/HI/AH/VH and self-harm  I spent 45 minutes directly with the patient during this visit, from 8892-5250        VIRTUAL VISIT Jere Colin Tita Giang acknowledges that she has consented to an online visit or consultation  She understands that the online visit is based solely on information provided by her, and that, in the absence of a face-to-face physical evaluation by the physician, the diagnosis she receives is both limited and provisional in terms of accuracy and completeness  This is not intended to replace a full medical face-to-face evaluation by the physician  [facility] understands and accepts these terms

## 2021-05-27 NOTE — PATIENT INSTRUCTIONS
Continue to take all medications as prescribed  Attend all scheduled medical appointments    Follow up with therapist     If you are experiencing a mental health emergency, please call 911 for emergent care, or one of the following numbers for someone to talk to 24 hours a day, 7 days a week:    [facility] ([facility]) AT [place] Intervention: [address] Crisis Intervention: [phone]  CrisisTextLine:  Text PA to 648665  PA's support and referral hotline: 984.894.2860  Suicide Prevention Lifeline:  569.647.9135

## 2021-06-01 ENCOUNTER — TELEMEDICINE (OUTPATIENT)
Dept: BEHAVIORAL/MENTAL HEALTH CLINIC | Facility: CLINIC | Age: 46
End: 2021-06-01
Payer: COMMERCIAL

## 2021-06-01 DIAGNOSIS — F41.1 GAD (GENERALIZED ANXIETY DISORDER): Primary | ICD-10-CM

## 2021-06-01 PROCEDURE — 90834 PSYTX W PT 45 MINUTES: CPT | Performed by: SOCIAL WORKER
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
## 2021-06-01 NOTE — PATIENT INSTRUCTIONS
Continue to take all medications as prescribed  Attend all scheduled medical appointments    Follow up with therapist     If you are experiencing a mental health emergency, please call 911 for emergent care, or one of the following numbers for someone to talk to 24 hours a day, 7 days a week:    [facility] ([facility]) AT [place] Intervention: [address] Crisis Intervention: [phone]  CrisisTextLine:  Text PA to 625477  PA's support and referral hotline: 101.974.8609  Suicide Prevention Lifeline:  898.213.3889

## 2021-06-08 ENCOUNTER — TELEMEDICINE (OUTPATIENT)
Dept: BEHAVIORAL/MENTAL HEALTH CLINIC | Facility: CLINIC | Age: 46
End: 2021-06-08
Payer: COMMERCIAL

## 2021-06-08 DIAGNOSIS — F41.1 GAD (GENERALIZED ANXIETY DISORDER): Primary | ICD-10-CM

## 2021-06-08 PROCEDURE — 90834 PSYTX W PT 45 MINUTES: CPT | Performed by: SOCIAL WORKER
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
## 2021-06-10 NOTE — PATIENT INSTRUCTIONS
Continue to take all medications as prescribed  Attend all scheduled medical appointments    Follow up with therapist     If you are experiencing a mental health emergency, please call 911 for emergent care, or one of the following numbers for someone to talk to 24 hours a day, 7 days a week:    [facility] ([facility]) AT [place] Inter[facility]: [address] Crisis Intervention: [phone]  CrisisTextLine:  Text PA to 291905  PA's support and referral hotline: [phone]  Suicide Prevention Lifeline:  116.341.6055

## 2021-06-14 ENCOUNTER — TELEMEDICINE (OUTPATIENT)
Dept: BEHAVIORAL/MENTAL HEALTH CLINIC | Facility: CLINIC | Age: 46
End: 2021-06-14
Payer: COMMERCIAL

## 2021-06-14 DIAGNOSIS — F41.1 GAD (GENERALIZED ANXIETY DISORDER): Primary | ICD-10-CM

## 2021-06-14 PROCEDURE — 90834 PSYTX W PT 45 MINUTES: CPT | Performed by: SOCIAL WORKER

## 2021-06-14 NOTE — PATIENT INSTRUCTIONS
Continue to take all medications as prescribed  Attend all scheduled medical appointments    Follow up with therapist     If you are experiencing a mental health emergency, please call 911 for emergent care, or one of the following numbers for someone to talk to 24 hours a day, 7 days a week:    [facility] ([facility]) AT [place] Intervention: [address] Crisis Intervention: [phone]  CrisisTextLine:  Text PA to 030674  PA's support and referral hotline: 567.355.4050  Suicide Prevention Lifeline:  [phone]

## 2021-06-14 NOTE — PSYCH
Virtual Regular Visit      Assessment/Plan:    Problem List Items Addressed This Visit        Other    EDELMIRA (generalized anxiety disorder) - Primary                   Reason for visit is   Chief Complaint   Patient presents with    Virtual Regular Visit        Encounter provider Shell Barth LCSW    Provider located at [address] [place] [id number]      Recent Visits  Date Type Provider Dept   06/08/21 Telemedicine Ariel Oconnell recent visits within past 7 days and meeting all other requirements  Future Appointments  No visits were found meeting these conditions  Showing future appointments within next 150 days and meeting all other requirements       The patient was identified by name and date of birth  Romeo Gilmore was informed that this is a telemedicine visit and that the visit is being conducted through [employer] and patient was informed that this is a secure, HIPAA-compliant platform  She agrees to proceed     My office door was closed  No one else was in the room  She acknowledged consent and understanding of privacy and security of the video platform  The patient has agreed to participate and understands they can discontinue the visit at any time  Patient is aware this is a billable service  Subjective  Romeo Gilmore is a 50 y o  female presenting for follow up appointment with Integration Services  Patient booked a shore vacation for her and the two girls living at home  She is proud of herself for taking this step and not feeling so guilty about leaving her third daughter behind at [facility]  She has been staying busy, constantly working on projects and we processed this, as a potential avoidance strategy to stay away from emotions and insecurities that might come up for her    She states she has always been a "worrier "  She would like to start yoga again because she knows she needs to focus her thoughts on the present, breathing and movement  We compared the benefits of order versus chaos - she identifies herself as an orderly person and her children as individuals who thrive on chaos  She is able to see how her behavioral daughter Frances Valerio creates chaotic situations where they do not exist because this is likely what she is familiar with and comfortable with  Although patient identifies herself as orderly, she states she loves the live element of theater, because there is unpredictability, that has learned over time to control for  She appreciates the "rush" of live theater  Patient continues to struggle with her daughter's behaviors - will join treatment team meeting after this call    Patient plans to "embrace the chaos" on her shore trip this week and will follow up with therapist in a week        HPI     Past Medical History:   Diagnosis Date    Allergic     seasonal, food allergies    Asthma     GERD (gastroesophageal reflux disease)     Hypertension     Irritable bowel syndrome     Migraine without aura     Shoulder subluxation, left     Tennis elbow        Past Surgical History:   Procedure Laterality Date    TONSILLECTOMY      US GUIDED MSK PROCEDURE  1/14/2020    WRIST SURGERY         Current Outpatient Medications   Medication Sig Dispense Refill    albuterol (PROAIR HFA) 90 mcg/act inhaler Inhale      Ascorbic Acid (VITAMIN C) 500 MG CAPS Take by mouth      Calcium 250 MG CAPS Take by mouth      desogestrel-ethinyl estradiol (Viorele) 0 15-0 02/0 01 MG (21/5) per tablet Take 1 tablet by mouth daily 112 tablet 3    EPINEPHrine (Auvi-Q) 0 3 mg/0 3 mL SOAJ as directed      fexofenadine (ALLEGRA ALLERGY) 180 MG tablet Take by mouth      lisinopril (ZESTRIL) 5 mg tablet Take 1 tablet (5 mg total) by mouth daily 90 tablet 0    meloxicam (MOBIC) 7 5 mg tablet Take 1 tablet (7 5 mg total) by mouth daily 30 tablet 2    montelukast (Singulair) 10 mg tablet Take 1 tablet by mouth each evening      Multiple Vitamin (MULTI-VITAMIN DAILY) TABS Take by mouth      Omega-3 Fatty Acids (Fish Oil) 1000 MG CPDR Take by mouth 2 (two) times a day 180 capsule 0     No current facility-administered medications for this visit  Allergies   Allergen Reactions    Cat Hair Extract     Erythromycin     Hctz [Hydrochlorothiazide] Dizziness    Peanut [Nuts - Food Allergy]     Seasonal Ic  [Cholestatin]     Latex Rash    Medical Tape Rash       Review of Systems    Video Exam    There were no vitals filed for this visit  Physical Exam  Psychiatric:         Attention and Perception: Attention and perception normal          Mood and Affect: Mood is anxious  Speech: Speech normal          Behavior: Behavior normal  Behavior is cooperative  Thought Content: Thought content normal          Cognition and Memory: Cognition and memory normal          Judgment: Judgment normal       Comments: Patient presents with anxious mood and congruent affect  Patient is well-groomed, with good eye contact and good focus in session  Patient's speech is normal rate and rhythm  Patient is alert, oriented, engaged, and open  Patient's thought process is organized and thought content is future-directed and goal-oriented  Patient's memory and cognition appear intact  Patient denies SI/HI/AH/VH and self-harm  I spent 45 minutes directly with the patient during this visit, rom [id number]  VIRTUAL VISIT DISCLAIMER    Jackelyn Anthony acknowledges that she has consented to an online visit or consultation  She understands that the online visit is based solely on information provided by her, and that, in the absence of a face-to-face physical evaluation by the physician, the diagnosis she receives is both limited and provisional in terms of accuracy and completeness   This is not intended to replace a full medical face-to-face evaluation by the [facility], [facility] understands and accepts these terms

## 2021-06-21 ENCOUNTER — TELEMEDICINE (OUTPATIENT)
Dept: BEHAVIORAL/MENTAL HEALTH CLINIC | Facility: CLINIC | Age: 46
End: 2021-06-21
Payer: COMMERCIAL

## 2021-06-21 DIAGNOSIS — F41.1 GAD (GENERALIZED ANXIETY DISORDER): Primary | ICD-10-CM

## 2021-06-21 PROCEDURE — 90834 PSYTX W PT 45 MINUTES: CPT | Performed by: SOCIAL WORKER

## 2021-06-21 NOTE — PSYCH
Virtual Regular Visit      Assessment/Plan:    Problem List Items Addressed This Visit        Other    EDELMIRA (generalized anxiety disorder) - Primary                 Reason for visit is   Chief Complaint   Patient presents with    Virtual Regular Visit        Encounter provider Vito Castellon LCSW    Provider located at [address] [place] [phone]      Recent Visits  Date Type Provider Dept   06/14/21 Telemedicine Vito Castellon, [address] [place] [address]   Showing recent visits within past 7 days and meeting all other requirements  Today's Visits  Date Type Provider Dept   06/21/21 Telemedicine Vito Castellon, [address] [place] [address] today's visits and meeting all other requirements  Future Appointments  No visits were found meeting these conditions  Showing future appointments within next 150 days and meeting all other requirements       The patient was identified by name and date of birth  Tre Winkler was informed that this is a telemedicine visit and that the visit is being conducted through [employer] and patient was informed that this is a secure, HIPAA-compliant platform  She agrees to proceed     My office door was closed  No one else was in the room  She acknowledged consent and understanding of privacy and security of the video platform  The patient has agreed to participate and understands they can discontinue the visit at any time  Patient is aware this is a billable service  Subjective  Tre Winkler is a 50 y o  female presenting for follow up appointment with Integration Services  Patient reports she had a scare over the weekend when her daughter fell down the stairs    Patient stated the incident was extremely stressful, had to take her to the hospital   Patient's daughter was examined and deemed to be unaffected by the incident  Patient reports that last week they went on their shore trip as planned, which she states was very relaxing and built up her confidence as the first time as a mother  Patient still confused by middle daughter's behaviors - she states daughter will say she misses her, but then will avoid speaking with her on the phone when she calls  Patient is working with the therapists at her daughter's placement on attachment, reunification, however daughter has made not behavioral improvements  Patient continues to feel detached and resentful towards daughter, we process her guilt and validate her feelings in session  Patient plans to join an exercise class while youngest daughter is at  for stress relief and self-care  Patient plans to follow up with therapist in a week             HPI     Past Medical History:   Diagnosis Date    Allergic     seasonal, food allergies    Asthma     GERD (gastroesophageal reflux disease)     Hypertension     Irritable bowel syndrome     Migraine without aura     Shoulder subluxation, left     Tennis elbow        Past Surgical History:   Procedure Laterality Date    TONSILLECTOMY      US GUIDED MSK PROCEDURE  1/14/2020    WRIST SURGERY         Current Outpatient Medications   Medication Sig Dispense Refill    albuterol (PROAIR HFA) 90 mcg/act inhaler Inhale      Ascorbic Acid (VITAMIN C) 500 MG CAPS Take by mouth      Calcium 250 MG CAPS Take by mouth      desogestrel-ethinyl estradiol (Viorele) 0 15-0 02/0 01 MG (21/5) per tablet Take 1 tablet by mouth daily 112 tablet 3    EPINEPHrine (Auvi-Q) 0 3 mg/0 3 mL SOAJ as directed      fexofenadine (ALLEGRA ALLERGY) 180 MG tablet Take by mouth      lisinopril (ZESTRIL) 5 mg tablet Take 1 tablet (5 mg total) by mouth daily 90 tablet 0    meloxicam (MOBIC) 7 5 mg tablet Take 1 tablet (7 5 mg total) by mouth daily 30 tablet 2    montelukast (Singulair) 10 mg tablet Take 1 tablet by mouth each evening      Multiple Vitamin (MULTI-VITAMIN DAILY) TABS Take by mouth      Omega-3 Fatty Acids (Fish Oil) 1000 MG CPDR Take by mouth 2 (two) times a day 180 capsule 0     No current facility-administered medications for this visit  Allergies   Allergen Reactions    Cat Hair Extract     Erythromycin     Hctz [Hydrochlorothiazide] Dizziness    Peanut [Nuts - Food Allergy]     Seasonal Ic  [Cholestatin]     Latex Rash    Medical Tape Rash       Review of Systems   Psychiatric/Behavioral: Negative for agitation, behavioral problems, confusion, decreased concentration, dysphoric mood, hallucinations, self-injury, sleep disturbance and suicidal ideas  The patient is nervous/anxious  The patient is not hyperactive  Video Exam    There were no vitals filed for this visit  Physical Exam  Psychiatric:         Attention and Perception: Attention and perception normal          Mood and Affect: Affect normal  Mood is anxious  Speech: Speech normal          Behavior: Behavior normal  Behavior is cooperative  Thought Content: Thought content normal          Cognition and Memory: Cognition and memory normal          Judgment: Judgment normal       Comments: Patient presents with anxious mood and congruent affect  Patient is well-groomed, with good eye contact and good focus in session  Patient's speech is normal rate and rhythm  Patient is alert, oriented, engaged, and open  Patient's thought process is organized and thought content is future-directed and goal-oriented  Patient's memory and cognition appear intact  Patient denies SI/HI/AH/VH and self-harm  I spent 46 minutes directly with the patient during this visit, from 3725-7699  VIRTUAL VISIT DISCLAIMER    Bassam Jaimes acknowledges that she has consented to an online visit or consultation   She understands that the online visit is based solely on information provided by her, and that, in the absence of a face-to-face physical evaluation by the physician, the diagnosis she receives is both limited and provisional in terms of accuracy and completeness  This is not intended to replace a full medical face-to-face evaluation by the physician  [facility] understands and accepts these terms

## 2021-06-21 NOTE — PATIENT INSTRUCTIONS
Continue to take all medications as prescribed  Attend all scheduled medical appointments    Follow up with therapist     If you are experiencing a mental health emergency, please call 911 for emergent care, or one of the following numbers for someone to talk to 24 hours a day, 7 days a week:    [facility] ([facility]) AT [place] Intervention: [address] Crisis Intervention: [phone]  CrisisTextLine:  Text PA to 665320  PA's support and referral hotline: 721.580.5457  Suicide Prevention Lifeline:  [phone]

## 2021-06-28 ENCOUNTER — TELEMEDICINE (OUTPATIENT)
Dept: BEHAVIORAL/MENTAL HEALTH CLINIC | Facility: CLINIC | Age: 46
End: 2021-06-28
Payer: COMMERCIAL

## 2021-06-28 ENCOUNTER — ANNUAL EXAM (OUTPATIENT)
Dept: OBGYN CLINIC | Facility: CLINIC | Age: 46
End: 2021-06-28
Payer: COMMERCIAL

## 2021-06-28 VITALS — WEIGHT: 138 LBS | BODY MASS INDEX: 24.06 KG/M2 | DIASTOLIC BLOOD PRESSURE: 74 MMHG | SYSTOLIC BLOOD PRESSURE: 124 MMHG

## 2021-06-28 DIAGNOSIS — F41.1 GAD (GENERALIZED ANXIETY DISORDER): Primary | ICD-10-CM

## 2021-06-28 DIAGNOSIS — Z12.31 ENCOUNTER FOR SCREENING MAMMOGRAM FOR MALIGNANT NEOPLASM OF BREAST: ICD-10-CM

## 2021-06-28 DIAGNOSIS — Z30.41 ENCOUNTER FOR SURVEILLANCE OF CONTRACEPTIVE PILLS: ICD-10-CM

## 2021-06-28 DIAGNOSIS — Z01.419 WELL WOMAN EXAM WITH ROUTINE GYNECOLOGICAL EXAM: Primary | ICD-10-CM

## 2021-06-28 PROCEDURE — S0612 ANNUAL GYNECOLOGICAL EXAMINA: HCPCS | Performed by: OBSTETRICS & GYNECOLOGY

## 2021-06-28 PROCEDURE — 90834 PSYTX W PT 45 MINUTES: CPT | Performed by: SOCIAL WORKER

## 2021-06-28 RX ORDER — DESOGESTREL AND ETHINYL ESTRADIOL 21-5 (28)
1 KIT ORAL DAILY
Qty: 112 TABLET | Refills: 3 | Status: SHIPPED | OUTPATIENT
Start: 2021-06-28 | End: 2022-07-25

## 2021-06-28 NOTE — PATIENT INSTRUCTIONS
Continue to take all medications as prescribed  Attend all scheduled medical appointments    Follow up with therapist     If you are experiencing a mental health emergency, please call 911 for emergent care, or one of the following numbers for someone to talk to 24 hours a day, 7 days a week:    [facility] ([facility]) AT [place] Intervention: [address] Crisis Intervention: [phone]  CrisisTextLine:  Text PA to 601157  PA's support and referral hotline: 624.536.6848  Suicide Prevention Lifeline:  809.572.1195

## 2021-06-28 NOTE — PSYCH
Virtual Regular Visit      Assessment/Plan:    Problem List Items Addressed This Visit        Other    EDELMIRA (generalized anxiety disorder) - Primary                  Reason for visit is   Chief Complaint   Patient presents with    Virtual Regular Visit        Encounter provider Yamile Cook LCSW    Provider located at [address] [place] [phone]      Recent Visits  Date Type Provider Dept   06/21/21 Telemedicine Yamile Cook, [address] [facility] [address]   Showing recent visits within past 7 days and meeting all other requirements  Today's Visits  Date Type Provider Dept   06/28/21 Telemedicine Yamile Cook, [address] [facility] today's visits and meeting all other requirements  Future Appointments  No visits were found meeting these conditions  Showing future appointments within next 150 days and meeting all other requirements       The patient was identified by name and date of birth  Sugar Whitfield was informed that this is a telemedicine visit and that the visit is being conducted through [employer] and patient was informed that this is a secure, HIPAA-compliant platform  She agrees to proceed     My office door was closed  No one else was in the room  She acknowledged consent and understanding of privacy and security of the video platform  The patient has agreed to participate and understands they can discontinue the visit at any time  Patient is aware this is a billable service  Subjective  Sugar Whitfield is a 50 y o  female presenting for follow up appointment with Integration Services  Since last visit, patient has been reflecting on her decreased anxiety and increased empowerment and confidence as a mother   She suggested we decrease the frequency of some of our visits, as she is feeling in a much better place than she was   She feels is continuing to gain confidence as a mother (taking the kids to [facility] alone), and has put forth a great effort to engage in self-care  She has started a [employer] class that she really enjoys and feels healthier and like her bloodwork will be better  She is trying not to let her middle daughter define her status as a "success" or "failure" of a mother  Patient was reinforced for the changes she has taken to take care of her mental and physical health, which has seemed to lead to other positive outcomes  I did success that if she has any free time, she look into some material from [employer], starting with her [employer] Talk on vulnerability, as we discussed how so many of patient's positive changes started with her first being vulnerable enough to take a trip to the beach alone with the kids  Patient plans to continue challenging herself as a mother to see what she is capable of  She plans to follow up in about a week before we extend sessions to less frequency       HPI     Past Medical History:   Diagnosis Date    Allergic     seasonal, food allergies    Asthma     GERD (gastroesophageal reflux disease)     Hypertension     Irritable bowel syndrome     Migraine without aura     Shoulder subluxation, left     Tennis elbow        Past Surgical History:   Procedure Laterality Date    TONSILLECTOMY      US GUIDED MSK PROCEDURE  1/14/2020    WRIST SURGERY         Current Outpatient Medications   Medication Sig Dispense Refill    albuterol (PROAIR HFA) 90 mcg/act inhaler Inhale      Ascorbic Acid (VITAMIN C) 500 MG CAPS Take by mouth      Calcium 250 MG CAPS Take by mouth      desogestrel-ethinyl estradiol (Viorele) 0 15-0 02/0 01 MG (21/5) per tablet Take 1 tablet by mouth daily 112 tablet 3    EPINEPHrine (Auvi-Q) 0 3 mg/0 3 mL SOAJ as directed      fexofenadine (ALLEGRA ALLERGY) 180 MG tablet Take by mouth      lisinopril (ZESTRIL) 5 mg tablet Take 1 tablet (5 mg total) by mouth daily 90 tablet 0    meloxicam (MOBIC) 7 5 mg tablet Take 1 tablet (7 5 mg total) by mouth daily 30 tablet 2    montelukast (Singulair) 10 mg tablet Take 1 tablet by mouth each evening      Multiple Vitamin (MULTI-VITAMIN DAILY) TABS Take by mouth      Omega-3 Fatty Acids (Fish Oil) 1000 MG CPDR Take by mouth 2 (two) times a day 180 capsule 0     No current facility-administered medications for this visit  Allergies   Allergen Reactions    Cat Hair Extract     Erythromycin     Hctz [Hydrochlorothiazide] Dizziness    Peanut [Nuts - Food Allergy]     Seasonal Ic  [Cholestatin]     Latex Rash    Medical Tape Rash       Review of Systems   Psychiatric/Behavioral: Negative for agitation, behavioral problems, confusion, decreased concentration, dysphoric mood, hallucinations, self-injury, sleep disturbance and suicidal ideas  The patient is not nervous/anxious and is not hyperactive  Video Exam    There were no vitals filed for this visit  Physical Exam  Psychiatric:         Attention and Perception: Attention and perception normal          Mood and Affect: Mood and affect normal          Speech: Speech normal          Behavior: Behavior normal  Behavior is cooperative  Thought Content: Thought content normal          Cognition and Memory: Cognition and memory normal          Judgment: Judgment normal       Comments: Patient presents with euthymic mood and congruent affect  Patient is well-groomed, with good eye contact and good focus in session  Patient's speech is normal rate and rhythm  Patient is alert, oriented, engaged, and open  Patient's thought process is organized and thought content is future-directed and goal-oriented  Patient's memory and cognition appear intact  Patient denies SI/HI/AH/VH and self-harm  I spent 44 minutes directly with the patient during this visit, from [id number]        VIRTUAL VISIT DISCLAIMER    Servando Chua acknowledges that she has consented to an online visit or consultation  She understands that the online visit is based solely on information provided by her, and that, in the absence of a face-to-face physical evaluation by the physician, the diagnosis she receives is both limited and provisional in terms of accuracy and completeness  This is not intended to replace a full medical face-to-face evaluation by the physician  [facility] understands and accepts these terms

## 2021-06-29 NOTE — PROGRESS NOTES
ASSESSMENT & PLAN: Burke Patrick is a 50 y o  [place] with normal gynecologic exam     1   Routine well woman exam done today  2    Pap and HPV:Pap with HPV was not done today  Current ASCCP Guidelines reviewed  Last Pap   :  no abnormalities  3  Mammogram ordered  Recommend yearly mammography  4   The patient declined STD testing  Safe sex practices have been discussed  5  The patient is not sexually active  She uses contraception and options have been discussed  6  The following were reviewed in today's visit: breast self exam, mammography screening ordered, STD testing, use and side effects of OCPs, adequate intake of calcium and vitamin D, exercise and healthy diet  7  Patient to return to office in 12 months for annual      All questions have been answered to her satisfaction  CC:  Annual Gynecologic Examination    HPI: Burke aPtrick is a 50 y o  [place] who presents for annual gynecologic examination  She has the following concerns:  none    Health Maintenance:    She exercises 2 days per week  She wears her seatbelt routinely  She does perform irregular monthly self breast exams  She feels safe at home  Patients does follow a reg diet  Past Medical History:   Diagnosis Date    Allergic     seasonal, food allergies    Asthma     GERD (gastroesophageal reflux disease)     Hypertension     Irritable bowel syndrome     Migraine without aura     Shoulder subluxation, left     Tennis elbow        Past Surgical History:   Procedure Laterality Date    TONSILLECTOMY      US GUIDED MSK PROCEDURE  2020    WRIST SURGERY         Past OB/Gyn History:  Period Cycle (Days): 84  Period Pattern: Regular  Menstrual Flow: Moderate  Dysmenorrhea: (!) Moderate  Dysmenorrhea Symptoms: HeadacheNo LMP recorded (within months)  (Menstrual status: Birth Control)    Menstrual History:  OB History        0    Para   0    Term   0       0    AB   0    Living   0       SAB   0    TAB 0    Ectopic   0    Multiple   0    Live Births   0                  History of sexually transmitted infection No  Patient is not currently sexually active  heterosexual Birth control: combination OCPs  Last Pap  2020 :  no abnormalities  Family History   Problem Relation Age of Onset    Stroke Mother         CEREBROVASCULAR ACCIDENT (CVA), LISTED 2X    Hypertension Mother     Hyperlipidemia Mother     Hypertension Father     Hyperlipidemia Father     Cervical cancer Neg Hx     Colon cancer Neg Hx     Ovarian cancer Neg Hx     Uterine cancer Neg Hx        Social History:  Social History     Socioeconomic History    Marital status: Single     Spouse name: Not on file    Number of children: Not on file    Years of education: Not on file    Highest education level: Not on file   Occupational History    Not on file   Tobacco Use    Smoking status: Never Smoker    Smokeless tobacco: Never Used   Vaping Use    Vaping Use: Never used   Substance and Sexual Activity    Alcohol use: Yes     Alcohol/week: 1 0 standard drinks     Types: 1 Glasses of wine per week     Comment: socially     Drug use: Never    Sexual activity: Not Currently   Other Topics Concern    Not on file   Social History Narrative    CAFFEINE USE - 16oz/daily     EXERCISE HABITS    Adopted 3 children: girls ages 3, 10, and 11yo - all sisters     Is a  at [address] [place] [address] Strain:     Difficulty of Paying Living Expenses:    Food Insecurity:     Worried About [facility] in the Last Year:    [address] [facility] in the Last Year:    Transportation Needs:     Lack of Transportation (Medical):      Lack of Transportation (Non-Medical):    Physical Activity:     Days of Exercise per Week:     Minutes of Exercise per Session:    Stress:     Feeling of Stress :    Social Connections:     Frequency of Communication with Friends and Family:     Frequency of Social Gatherings with Friends and Family:     Attends Zoroastrianism Services:     Active Member of Clubs or Organizations:     Attends Club or Organization Meetings:     Marital Status:    Intimate Partner Violence:     Fear of Current or Ex-Partner:     Emotionally Abused:     Physically Abused:     Sexually Abused:      Presently lives with her childrean  Patient is single  Patient is currently employed   Allergies   Allergen Reactions    Cat Hair Extract     Erythromycin     Hctz [Hydrochlorothiazide] Dizziness    Peanut [Nuts - Food Allergy]     Seasonal Ic  [Cholestatin]     Trish Sun Screen Spf 30 [Albolene] Rash    Latex Rash    Medical Tape Rash       Current Outpatient Medications:     albuterol (PROAIR HFA) 90 mcg/act inhaler, Inhale, Disp: , Rfl:     Ascorbic Acid (VITAMIN C) 500 MG CAPS, Take by mouth, Disp: , Rfl:     Calcium 250 MG CAPS, Take by mouth, Disp: , Rfl:     desogestrel-ethinyl estradiol (Viorele) 0 15-0 02/0 01 MG (21/5) per tablet, Take 1 tablet by mouth daily, Disp: 112 tablet, Rfl: 3    EPINEPHrine (Auvi-Q) 0 3 mg/0 3 mL SOAJ, as directed, Disp: , Rfl:     fexofenadine (ALLEGRA ALLERGY) 180 MG tablet, Take by mouth, Disp: , Rfl:     lisinopril (ZESTRIL) 5 mg tablet, Take 1 tablet (5 mg total) by mouth daily, Disp: 90 tablet, Rfl: 0    meloxicam (MOBIC) 7 5 mg tablet, Take 1 tablet (7 5 mg total) by mouth daily, Disp: 30 tablet, Rfl: 2    montelukast (Singulair) 10 mg tablet, Take 1 tablet by mouth each evening, Disp: , Rfl:     Multiple Vitamin (MULTI-VITAMIN DAILY) TABS, Take by mouth, Disp: , Rfl:     Omega-3 Fatty Acids (Fish Oil) 1000 MG CPDR, Take by mouth 2 (two) times a day, Disp: 180 capsule, Rfl: 0    Review of Systems:  Review of Systems   Constitutional: Negative  HENT: Negative  Respiratory: Negative  Cardiovascular: Negative  Gastrointestinal: Negative  Genitourinary: Negative  Neurological: Negative  Psychiatric/Behavioral: Negative  Physical Exam:  /74   Wt 62 6 kg (138 lb)   LMP  (Within Months)   Breastfeeding No   BMI 24 06 kg/m²    Physical Exam  Constitutional:       Appearance: Normal appearance  She is normal weight  Genitourinary:      Vulva, urethra, bladder, vagina, cervix, uterus, right adnexa and left adnexa normal       No vulval tenderness or Bartholin's cyst noted  No signs of labial injury  No signs of injury in the vagina  Vaginal rugosity present  No vaginal discharge or tenderness  Cervix is nulliparous  HENT:      Head: Normocephalic and atraumatic  Eyes:      Extraocular Movements: Extraocular movements intact  Conjunctiva/sclera: Conjunctivae normal       Pupils: Pupils are equal, round, and reactive to light  Cardiovascular:      Rate and Rhythm: Normal rate and regular rhythm  Heart sounds: Normal heart sounds  No murmur heard  Pulmonary:      Effort: Pulmonary effort is normal       Breath sounds: Normal breath sounds  Chest:      Breasts:         Right: Normal          Left: Normal    Abdominal:      General: Abdomen is flat  There is no distension  Palpations: Abdomen is soft  Tenderness: There is no abdominal tenderness  There is no guarding  Neurological:      General: No focal deficit present  Mental Status: She is alert and oriented to person, place, and time  Skin:     General: Skin is warm and dry  Vitals and nursing note reviewed

## 2021-07-01 LAB
CHOLEST SERPL-MCNC: 170 MG/DL
CHOLEST/HDLC SERPL: 3 (CALC)
HDLC SERPL-MCNC: 57 MG/DL
LDLC SERPL CALC-MCNC: 87 MG/DL (CALC)
NONHDLC SERPL-MCNC: 113 MG/DL (CALC)
TRIGL SERPL-MCNC: 154 MG/DL

## 2021-07-07 ENCOUNTER — TELEMEDICINE (OUTPATIENT)
Dept: BEHAVIORAL/MENTAL HEALTH CLINIC | Facility: CLINIC | Age: 46
End: 2021-07-07
Payer: COMMERCIAL

## 2021-07-07 DIAGNOSIS — F41.1 GAD (GENERALIZED ANXIETY DISORDER): Primary | ICD-10-CM

## 2021-07-07 PROCEDURE — 90834 PSYTX W PT 45 MINUTES: CPT | Performed by: SOCIAL WORKER

## 2021-07-08 NOTE — PATIENT INSTRUCTIONS
Continue to take all medications as prescribed  Attend all scheduled medical appointments    Follow up with therapist     If you are experiencing a mental health emergency, please call 911 for emergent care, or one of the following numbers for someone to talk to 24 hours a day, 7 days a week:    [facility] ([facility]) AT [place] Intervention: [address] Crisis Intervention: [phone]  CrisisTextLine:  Text PA to 986268  PA's support and referral hotline: 507.161.5834  Suicide Prevention Lifeline:  716.840.4692

## 2021-07-08 NOTE — PSYCH
Virtual Regular Visit      Assessment/Plan:    Problem List Items Addressed This Visit        Other    EDELMIRA (generalized anxiety disorder) - Primary                 Reason for visit is   Chief Complaint   Patient presents with    Virtual Regular Visit        Encounter provider Damian Matos LCSW    Provider located at [address] [facility]  [address] [facility] [id number]      Recent Visits  Date Type Provider Dept   07/07/21 1270 Barbara Babb[facility] recent visits within past 7 days and meeting all other requirements  Future Appointments  No visits were found meeting these conditions  Showing future appointments within next 150 days and meeting all other requirements       The patient was identified by name and date of birth  Petra Jones was informed that this is a telemedicine visit and that the visit is being conducted through relocality and patient was informed that this is a secure, HIPAA-compliant platform  She agrees to proceed     My office door was closed  No one else was in the room  She acknowledged consent and understanding of privacy and security of the video platform  The patient has agreed to participate and understands they can discontinue the visit at any time  Patient is aware this is a billable service  Subjective  Petra Jones is a 50 y o  female presenting for follow up appointment with Integration Services  Patient continues to do well, with less anxiety, and more confidence  She has been enjoying her children at home - is still advocating and frustrated with the care of her child at [facility], as she does not seem to be progressing, nor does she seem to be receiving very good care  Patient has placed boundaries for her own self-care - is going to an exercise class whenever possible and enjoying it    She also reports that she has been 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  Patient is allowing herself more balance to work on her own self-improvement and goals  Anxiety is well-controlled  She will follow up in about 10 days  HPI     Past Medical History:   Diagnosis Date    Allergic     seasonal, food allergies    Asthma     GERD (gastroesophageal reflux disease)     Hypertension     Irritable bowel syndrome     Migraine without aura     Shoulder subluxation, left     Tennis elbow        Past Surgical History:   Procedure Laterality Date    TONSILLECTOMY      US GUIDED MSK PROCEDURE  1/14/2020    WRIST SURGERY         Current Outpatient Medications   Medication Sig Dispense Refill    albuterol (PROAIR HFA) 90 mcg/act inhaler Inhale      Ascorbic Acid (VITAMIN C) 500 MG CAPS Take by mouth      Calcium 250 MG CAPS Take by mouth      desogestrel-ethinyl estradiol (Viorele) 0 15-0 02/0 01 MG (21/5) per tablet Take 1 tablet by mouth daily 112 tablet 3    EPINEPHrine (Auvi-Q) 0 3 mg/0 3 mL SOAJ as directed      fexofenadine (ALLEGRA ALLERGY) 180 MG tablet Take by mouth      lisinopril (ZESTRIL) 5 mg tablet Take 1 tablet (5 mg total) by mouth daily 90 tablet 0    meloxicam (MOBIC) 7 5 mg tablet Take 1 tablet (7 5 mg total) by mouth daily 30 tablet 2    montelukast (Singulair) 10 mg tablet Take 1 tablet by mouth each evening      Multiple Vitamin (MULTI-VITAMIN DAILY) TABS Take by mouth      Omega-3 Fatty Acids (Fish Oil) 1000 MG CPDR Take by mouth 2 (two) times a day 180 capsule 0     No current facility-administered medications for this visit          Allergies   Allergen Reactions    Cat Hair Extract     Erythromycin     Hctz [Hydrochlorothiazide] Dizziness    Peanut [Nuts - Food Allergy]     Seasonal Ic  [Cholestatin]     Trish Sun Screen Spf 30 [Albolene] Rash    Latex Rash    Medical Tape Rash       Review of Systems   Psychiatric/Behavioral: Negative for agitation, behavioral problems, confusion, decreased concentration, dysphoric mood, hallucinations, self-injury, sleep disturbance and suicidal ideas  The patient is not nervous/anxious and is not hyperactive  Video Exam    There were no vitals filed for this visit  Physical Exam  Psychiatric:         Attention and Perception: Attention and perception normal          Mood and Affect: Mood and affect normal          Speech: Speech normal          Behavior: Behavior normal  Behavior is cooperative  Thought Content: Thought content normal          Cognition and Memory: Cognition and memory normal          Judgment: Judgment normal       Comments: Patient presents with euthymic mood and congruent affect  Patient is well-groomed, with good eye contact and good focus in session  Patient's speech is normal rate and rhythm  Patient is alert, oriented, engaged, and open  Patient's thought process is organized and thought content is future-directed and goal-oriented  Patient's memory and cognition appear intact  Patient denies SI/HI/AH/VH and self-harm  I spent 42 minutes directly with the patient during this visit, from [id number]  VIRTUAL VISIT DISCLAIMER    Kavya Puente acknowledges that she has consented to an online visit or consultation  She understands that the online visit is based solely on information provided by her, and that, in the absence of a face-to-face physical evaluation by the physician, the diagnosis she receives is both limited and provisional in terms of accuracy and completeness  This is not intended to replace a full medical face-to-face evaluation by the physician  Kavya Puente understands and accepts these terms

## 2021-07-14 ENCOUNTER — OFFICE VISIT (OUTPATIENT)
Dept: FAMILY MEDICINE CLINIC | Facility: CLINIC | Age: 46
End: 2021-07-14
Payer: COMMERCIAL

## 2021-07-14 VITALS
HEIGHT: 64 IN | WEIGHT: 141 LBS | BODY MASS INDEX: 24.07 KG/M2 | RESPIRATION RATE: 16 BRPM | DIASTOLIC BLOOD PRESSURE: 78 MMHG | SYSTOLIC BLOOD PRESSURE: 118 MMHG | HEART RATE: 84 BPM | OXYGEN SATURATION: 99 %

## 2021-07-14 DIAGNOSIS — E78.1 HYPERTRIGLYCERIDEMIA: ICD-10-CM

## 2021-07-14 DIAGNOSIS — Z12.11 COLON CANCER SCREENING: ICD-10-CM

## 2021-07-14 DIAGNOSIS — F32.9 REACTIVE DEPRESSION: ICD-10-CM

## 2021-07-14 DIAGNOSIS — J45.20 MILD INTERMITTENT ASTHMA WITHOUT COMPLICATION: ICD-10-CM

## 2021-07-14 DIAGNOSIS — F41.1 GAD (GENERALIZED ANXIETY DISORDER): ICD-10-CM

## 2021-07-14 DIAGNOSIS — I10 HYPERTENSION, UNSPECIFIED TYPE: Primary | ICD-10-CM

## 2021-07-14 PROCEDURE — 99214 OFFICE O/P EST MOD 30 MIN: CPT | Performed by: FAMILY MEDICINE

## 2021-07-14 PROCEDURE — 1036F TOBACCO NON-USER: CPT | Performed by: FAMILY MEDICINE

## 2021-07-14 PROCEDURE — 3078F DIAST BP <80 MM HG: CPT | Performed by: FAMILY MEDICINE

## 2021-07-14 PROCEDURE — 3074F SYST BP LT 130 MM HG: CPT | Performed by: FAMILY MEDICINE

## 2021-07-14 RX ORDER — LISINOPRIL 5 MG/1
5 TABLET ORAL DAILY
Qty: 90 TABLET | Refills: 1 | Status: SHIPPED | OUTPATIENT
Start: 2021-07-14 | End: 2022-01-12 | Stop reason: SDUPTHER

## 2021-07-14 RX ORDER — TRIAMCINOLONE ACETONIDE 1 MG/G
CREAM TOPICAL
COMMUNITY
Start: 2021-07-12 | End: 2022-07-25

## 2021-07-14 NOTE — PROGRESS NOTES
Assessment/Plan:   Diagnoses and all orders for this visit:    Hypertension, unspecified type  -     lisinopril (ZESTRIL) 5 mg tablet;  Take 1 tablet (5 mg total) by mouth daily  - BP stable in the office   - currently on ACEI 5mg and tolerating it well - cont current regimen   - UTD with COVID IMMs   - UTD with Optho - 10/2020   - nml urine microalbumin and CMP on labs 2/2021  - not a smoker, rare social drinker   - RTO in 6months for f/u - pt aware and agreeable     Reactive depression  EDELMIRA (generalized anxiety disorder)  - PHQ-9 score of 1 <-- 2 <-- 7 <-- 1   - EDELMIRA-7 score of 5 <-- 8 <-- 14 <-- 16 <-- 16  - denies SI/HI  - had been following with Therapist Qwk for May/Elaina - will be switching to Q2wks   - had been under an incredible amount of stress during COVID pandemic   - Mom was in hospice and passed away from [facility] in 10/2020   - Aunt passed away from [facility] in 11/2020   - has lost her support system and all additional outside help that she was getting for her 3 adopted girls (ages 3, 5, 13)   -Rachell Renee Child has been in and out of [facility] and frequent hospitalizations -- currently in residential care which was just extended for an additional 60days  - feels like things have stabilized since Middle Child in Residential Care - her 2 other daughters are flourishing, pt has been able to make lifestyle changes, 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   - RTO in 6months (or sooner if needed)     Hypertriglyceridemia  - Lipids (2/24/2021): , , HDL 53, LDL 83  - Lipids (7/1/2021): , , HDL 57, LDL 87  - TGs have normalized   - cont with lifestyle changes - has cut back on cheese/dairy and eating more fruits/veggies   - will repeat 6-12months   - The 10-year ASCVD risk score (Michael Donohue et al , 2013) is: 0 8%    Values used to calculate the score:      Age: 50 years      Sex: Female      Is Non- : No      Diabetic: No      Tobacco smoker: No      Systolic Blood Pressure: 358 mmHg      Is BP treated: Yes      HDL Cholesterol: 57 mg/dL      Total Cholesterol: 170 mg/dL    Colon cancer screening  -     Ambulatory referral to Gastroenterology; Future    Mild intermittent asthma without complication  - stable     Other orders  -     triamcinolone (KENALOG) 0 1 % cream          Subjective:    Patient ID: Jeremy Beauchamp is a 50 y o  female    HPI   51yo F presents to the office for f/u   1) HTN  - BP in the office 124/80 and 118/78  - currently on ACEI 5mg and tolerating it well - denies swelling or dry cough   - doing really well   - her Middle Child is in residential care - going to visit with her tmrw for her birthday   - UTD with COVID IMMs   - UTD with Optho - 10/2020   - nml urine microalbumin and CMP on labs 2/2021   - [facility] was in hospice and [facility] 10/[facility]  - Aunt passed away from [facility] in 11/2020   - denies F/C/N/V/HA/change in vision/CP/palpitations/SOB/wheezing/cough/abd pain/D/C/LE edema   2) EDELMIRA  - PHQ-9 score of 1 <-- 2 <-- 7 <-- 1   - EDELMIRA-7 score of 5 <-- 8 <-- 14 <-- 16 <-- 16  - had been following with Therapist Qwk for May/June - will be switching to Q2wks   - 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   - not taking medications   - had been under an incredible amount of stress during COVID pandemic - has lost her support system and all additional outside help that she was getting for her 3 adopted girls (ages 3, 5, 12) -- Middle Child Radha Lai) has been in and out of [facility] and frequent hospitalizations -- currently in residential care and its "not going great"   3) HTG   - TGs have normalized  - has cut back on her cheese and dairy intake   - eating more fruits and veggies   - rejoined the [employer] last month - goes 2x/week   - walks after dinner with her family       The following portions of the patient's history were reviewed and updated as appropriate: allergies, current medications, past family history, past medical history, past social history, past surgical history and problem list     Review of Systems  as per HPI    Objective:  /78 (BP Location: Left arm, Patient Position: Sitting, Cuff Size: Standard)   Pulse 84   Resp 16   Ht 5' 3 5" (1 613 m)   Wt 64 kg (141 lb)   SpO2 99%   BMI 24 59 kg/m²    Physical Exam  Vitals reviewed  Constitutional:       General: She is not in acute distress  Appearance: Normal appearance  She is normal weight  She is not ill-appearing, toxic-appearing or diaphoretic  HENT:      Head: Normocephalic and atraumatic  Right Ear: External ear normal       Left Ear: External ear normal    Eyes:      General: No scleral icterus  Right eye: No discharge  Left eye: No discharge  Extraocular Movements: Extraocular movements intact  Conjunctiva/sclera: Conjunctivae normal    Cardiovascular:      Rate and Rhythm: Normal rate and regular rhythm  Heart sounds: Normal heart sounds  No murmur heard  No friction rub  No gallop  Pulmonary:      Effort: Pulmonary effort is normal  No respiratory distress  Breath sounds: Normal breath sounds  No stridor  No wheezing, rhonchi or rales  Abdominal:      General: Abdomen is flat  Bowel sounds are normal       Palpations: Abdomen is soft  Musculoskeletal:         General: Normal range of motion  Cervical back: Normal range of motion and neck supple  Right lower leg: No edema  Left lower leg: No edema  Skin:     General: Skin is warm  Neurological:      General: No focal deficit present  Mental Status: She is alert and oriented to person, place, and time  Psychiatric:         Attention and Perception: Attention and perception normal          Mood and Affect: Mood and affect normal  Mood is not anxious or depressed  Speech: Speech normal  She is communicative  Behavior: Behavior normal  Behavior is cooperative  Thought Content: Thought content normal  Thought content does not include homicidal or suicidal ideation   Thought content does not include homicidal or suicidal plan  Cognition and Memory: Cognition normal          Judgment: Judgment normal       Comments: PHQ-9 = 1, EDELMIRA-7 = 5         Depression Screening Follow-up Plan: Patient's depression screening was positive with a PHQ-2 score of   Their PHQ-9 score was   Continue regular follow-up with their psychologist/therapist/psychiatrist who is managing their mental health condition(s)

## 2021-07-19 ENCOUNTER — TELEMEDICINE (OUTPATIENT)
Dept: BEHAVIORAL/MENTAL HEALTH CLINIC | Facility: CLINIC | Age: 46
End: 2021-07-19
Payer: COMMERCIAL

## 2021-07-19 DIAGNOSIS — F41.1 GAD (GENERALIZED ANXIETY DISORDER): Primary | ICD-10-CM

## 2021-07-19 PROCEDURE — 90834 PSYTX W PT 45 MINUTES: CPT | Performed by: SOCIAL WORKER

## 2021-07-19 NOTE — PSYCH
Virtual Regular Visit    Verification of patient location:    Patient is currently located in the state of [place]  Patient is currently located in a state in which I am licensed      Assessment/Plan:    Problem List Items Addressed This Visit        Other    EDELMIRA (generalized anxiety disorder) - Primary               Reason for visit is   Chief Complaint   Patient presents with    Virtual Regular Visit        Encounter provider Randee Stoddard LCSW    Provider located at [address]  [address] [facility] [address]      Recent Visits  Date Type Provider Dept   07/14/21 Office Visit Joanie Lambert DO [address] [facility] recent visits within past 7 days and meeting all other requirements  Today's Visits  Date Type Provider Dept   07/19/21 Telemedicine Randee Stoddard, [address] [facility] today's visits and meeting all other requirements  Future Appointments  No visits were found meeting these conditions  Showing future appointments within next 150 days and meeting all other requirements       The patient was identified by name and date of birth  Myah Iverson was informed that this is a telemedicine visit and that the visit is being conducted throughMicrosoft Teams and patient was informed that this is a secure, HIPAA-compliant platform  She agrees to proceed     My office door was closed  No one else was in the room  She acknowledged consent and understanding of privacy and security of the video platform  The patient has agreed to participate and understands they can discontinue the visit at any time  Patient is aware this is a billable service  Subjective  Myah Iverson is a 50 y o  female presenting for follow up appointment with Integration Services  Patient reports that she is doing well, maintaining confidence and mental health   She describes an incident in which she was challenges - at middle child [facility] birthday party when Blanch Cushing was brought home from [facility], and was acting behavioral   Patient becomes upset because Blanch Cushing shows no gratitude and patient feels embarrassed by this  Patient was reminded that due to patient's history and developmental stage, gratitude and empathy might not be skills she has yet  She is often in a high-mood behavioral status, indicating she is still experiencing trauma response and adapting  Patient understands this, both from me and daughter's therapist, but it still frustrates her, because she would like her daughters to be raised with gratitude and empathy  Patient also states that there was "drama" with Tennille's birth parents, especially after they found out that patient is living at a RTF  Patient feels she is more confident in handling child's birth parents, and uses better boundaries when necessary  Patient is also feeling confident because she got happy results with her bloodwork and attributes it to her accountability in eating better and exercising, which she is committed to make a regular routine out of  Patient also feels confident in being able to independently complete some repair projects around the house  Patient is happy with our new plan to meet biweekly instead of weekly  Patient plans to follow up in two weeks           HPI     Past Medical History:   Diagnosis Date    Allergic     seasonal, food allergies    Asthma     GERD (gastroesophageal reflux disease)     Hypertension     Irritable bowel syndrome     Migraine without aura     Shoulder subluxation, left     Tennis elbow        Past Surgical History:   Procedure Laterality Date    TONSILLECTOMY      US GUIDED MSK PROCEDURE  1/14/2020    WRIST SURGERY         Current Outpatient Medications   Medication Sig Dispense Refill    albuterol (PROAIR HFA) 90 mcg/act inhaler Inhale      Ascorbic Acid (VITAMIN C) 500 MG CAPS Take by mouth      Calcium 250 MG CAPS Take by mouth      desogestrel-ethinyl estradiol (Viorele) 0 15-0 02/0 01 MG (21/5) per tablet Take 1 tablet by mouth daily 112 tablet 3    EPINEPHrine (Auvi-Q) 0 3 mg/0 3 mL SOAJ as directed      fexofenadine (ALLEGRA ALLERGY) 180 MG tablet Take by mouth      lisinopril (ZESTRIL) 5 mg tablet Take 1 tablet (5 mg total) by mouth daily 90 tablet 1    meloxicam (MOBIC) 7 5 mg tablet Take 1 tablet (7 5 mg total) by mouth daily 30 tablet 2    montelukast (Singulair) 10 mg tablet Take 1 tablet by mouth each evening      Multiple Vitamin (MULTI-VITAMIN DAILY) TABS Take by mouth      Omega-3 Fatty Acids (Fish Oil) 1000 MG CPDR Take by mouth 2 (two) times a day 180 capsule 0    triamcinolone (KENALOG) 0 1 % cream        No current facility-administered medications for this visit  Allergies   Allergen Reactions    Cat Hair Extract     Erythromycin     Hctz [Hydrochlorothiazide] Dizziness    Peanut [Nuts - Food Allergy]     Seasonal Ic  [Cholestatin]     Trish Sun Screen Spf 30 [Albolene] Rash    Latex Rash    Medical Tape Rash       Review of Systems   Psychiatric/Behavioral: Negative for agitation, behavioral problems, confusion, decreased concentration, dysphoric mood, hallucinations, self-injury, sleep disturbance and suicidal ideas  The patient is not nervous/anxious and is not hyperactive  Video Exam    There were no vitals filed for this visit  Physical Exam  Psychiatric:         Attention and Perception: Attention and perception normal          Mood and Affect: Mood and affect normal          Speech: Speech normal          Behavior: Behavior normal  Behavior is cooperative  Thought Content: Thought content normal          Cognition and Memory: Cognition and memory normal          Judgment: Judgment normal       Comments: Patient presents with euthymic mood and congruent affect  Patient is well-groomed, with good eye contact and good focus in session    Patient's speech is normal rate and rhythm  Patient is alert, oriented, engaged, and open  Patient's thought process is organized and thought content is future-directed and goal-oriented  Patient's memory and cognition appear intact  Patient denies SI/HI/AH/VH and self-harm  I spent 41 minutes directly with the patient during this visit, from 7962-6762  VIRTUAL VISIT DISCLAIMER    Kavya Puente verbally agrees to participate in [employer]  Pt is aware that [employer] could be limited without vital signs or the ability to perform a full hands-on physical Arnulfo Arenas understands she or the provider may request at any time to terminate the video visit and request the patient to seek care or treatment in person

## 2021-07-20 NOTE — PATIENT INSTRUCTIONS
Continue to take all medications as prescribed  Attend all scheduled medical appointments    Follow up with therapist     If you are experiencing a mental health emergency, please call 911 for emergent care, or one of the following numbers for someone to talk to 24 hours a day, 7 days a week:    [facility] ([facility]) AT [place] Intervention: [address] Crisis Intervention: [phone]  CrisisTextLine:  Text [place] to 241164  [place]'s support and referral hotline: 922.827.1607  Suicide Prevention Lifeline:  404.616.9228

## 2021-08-17 ENCOUNTER — TELEMEDICINE (OUTPATIENT)
Dept: BEHAVIORAL/MENTAL HEALTH CLINIC | Facility: CLINIC | Age: 46
End: 2021-08-17
Payer: COMMERCIAL

## 2021-08-17 DIAGNOSIS — F41.1 GAD (GENERALIZED ANXIETY DISORDER): Primary | ICD-10-CM

## 2021-08-17 PROBLEM — F43.9 STRESS: Status: RESOLVED | Noted: 2018-05-04 | Resolved: 2021-08-17

## 2021-08-17 PROBLEM — E78.1 HYPERTRIGLYCERIDEMIA: Status: RESOLVED | Noted: 2019-03-01 | Resolved: 2021-08-17

## 2021-08-17 PROCEDURE — 90834 PSYTX W PT 45 MINUTES: CPT | Performed by: SOCIAL WORKER
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
## 2021-08-31 ENCOUNTER — TELEMEDICINE (OUTPATIENT)
Dept: BEHAVIORAL/MENTAL HEALTH CLINIC | Facility: CLINIC | Age: 46
End: 2021-08-31
Payer: COMMERCIAL

## 2021-08-31 DIAGNOSIS — F41.1 GAD (GENERALIZED ANXIETY DISORDER): Primary | ICD-10-CM

## 2021-08-31 PROCEDURE — 90834 PSYTX W PT 45 MINUTES: CPT | Performed by: SOCIAL WORKER

## 2021-08-31 NOTE — PATIENT INSTRUCTIONS
Continue to take all medications as prescribed  Attend all scheduled medical appointments  Follow up with therapist     Ethical notice of pause of regular services: Patient and I discussed my impending maternity leave, beginning October 15, 2021, and anticipated to last three months  Patient was provided with options of 1) planned stable discharge; 2) discharge and referral; 3) to be contacted upon my return; or 4) meeting with an interim therapist provided by [address]  Patient chooses to be contacted by interim therapist  We will discuss again at each appointment until my leave to assure patient's acceptance of the plan       If you are experiencing a mental health emergency, please call 911 for emergent care, or one of the following numbers for someone to talk to 24 hours a day, 7 days a week:    [facility] ([facility]) AT [place] Intervention: [address] Crisis Intervention: [phone]  CrisisTextLine:  Text [place] to 096161  [place]'s support and referral hotline: 681.250.1930  Suicide Prevention Lifeline:  289.666.3992

## 2021-08-31 NOTE — PSYCH
Virtual Regular Visit    Verification of patient location:    Patient is located in the following state in which I hold an active license [place]      Assessment/Plan:    Problem List Items Addressed This Visit        Other    EDELMIRA (generalized anxiety disorder) - Primary               Reason for visit is   Chief Complaint   Patient presents with    Virtual Regular Visit        Encounter provider María Izquierdo LCSW    Provider located at [address] [place] [address]  [address] [place] [id number] [phone]      Recent Visits  No visits were found meeting these conditions  Showing recent visits within past 7 days and meeting all other requirements  Future Appointments  No visits were found meeting these conditions  Showing future appointments within next 150 days and meeting all other requirements       The patient was identified by name and date of birth  Cherelle Kan was informed that this is a telemedicine visit and that the visit is being conducted throughMicrosoft Teams and patient was informed that this is a secure, HIPAA-compliant platform  She agrees to proceed     My office door was closed  No one else was in the room  She acknowledged consent and understanding of privacy and security of the video platform  The patient has agreed to participate and understands they can discontinue the visit at any time  Patient is aware this is a billable service  Subjective  Cherelle Kan is a 50 y o  female presenting for follow up appointment with Integration Services  Patient with some anxiety 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  and her daughters starting school again  Patient feels she was doing well before because she was exercising, and going out and getting coffee, sitting outside for a little bit    Patient is feeling very physically nervous - "jittery "  "Obsessively" searching the news to see if vaccine has come out for kids  Having trouble sleeping the past couple weeks, but now that she and daughters are back to school she feels confident that she will get more sleep  Trying to clean, walk, organize to get the physical energy out  Discussed also slowing the body down with yoga which patient is interested in and open to  We also discussed the physical trauma reactions she is having - likely related to the long and painful death of her mother from Covid last fall  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, but this makes her anxious as well  I again reinforced slowing down her body, especially at night, to improve her sleep and thus her problem-solving and critical thinking  She is on her way to see middle daughter Saintclair Spearman today  Patient plans to begin a bedtime yoga regimen  Patient plans to follow up in about two weeks  Ethical notice of pause of regular services: Patient and I discussed my impending maternity leave, beginning October 15, 2021, and anticipated to last three months  Patient was provided with options of 1) planned stable discharge; 2) discharge and referral; 3) to be contacted upon my return; or 4) meeting with an interim therapist provided by [address]  Patient chooses to be contacted by interim therapist  We will discuss again at each appointment until my leave to assure patient's acceptance of the plan         HPI     Past Medical History:   Diagnosis Date    Allergic     seasonal, food allergies    Asthma     GERD (gastroesophageal reflux disease)     Hypertension     Irritable bowel syndrome     Migraine without aura     Shoulder subluxation, left     Tennis elbow        Past Surgical History:   Procedure Laterality Date    TONSILLECTOMY     Milo Boles GUIDED MSK PROCEDURE  1/14/2020    WRIST SURGERY         Current Outpatient Medications   Medication Sig Dispense Refill    albuterol (PROAIR HFA) 90 mcg/act inhaler Inhale      Ascorbic Acid (VITAMIN C) 500 MG CAPS Take by mouth      Calcium 250 MG CAPS Take by mouth      desogestrel-ethinyl estradiol (Viorele) 0 15-0 02/0 01 MG (21/5) per tablet Take 1 tablet by mouth daily 112 tablet 3    EPINEPHrine (Auvi-Q) 0 3 mg/0 3 mL SOAJ as directed      fexofenadine (ALLEGRA ALLERGY) 180 MG tablet Take by mouth      lisinopril (ZESTRIL) 5 mg tablet Take 1 tablet (5 mg total) by mouth daily 90 tablet 1    meloxicam (MOBIC) 7 5 mg tablet Take 1 tablet (7 5 mg total) by mouth daily 30 tablet 2    montelukast (Singulair) 10 mg tablet Take 1 tablet by mouth each evening      Multiple Vitamin (MULTI-VITAMIN DAILY) TABS Take by mouth      Omega-3 Fatty Acids (Fish Oil) 1000 MG CPDR Take by mouth 2 (two) times a day 180 capsule 0    triamcinolone (KENALOG) 0 1 % cream        No current facility-administered medications for this visit  Allergies   Allergen Reactions    Cat Hair Extract     Erythromycin     Hctz [Hydrochlorothiazide] Dizziness    Peanut [Nuts - Food Allergy]     Seasonal Ic  [Cholestatin]     Trish Sun Screen Spf 30 [Albolene] Rash    Latex Rash    Medical Tape Rash       Review of Systems   Psychiatric/Behavioral: Negative for agitation, behavioral problems, confusion, decreased concentration, dysphoric mood, hallucinations, self-injury, sleep disturbance and suicidal ideas  The patient is nervous/anxious  The patient is not hyperactive  Video Exam    There were no vitals filed for this visit  Physical Exam  Psychiatric:         Attention and Perception: Attention and perception normal          Mood and Affect: Mood is anxious  Speech: Speech normal          Behavior: Behavior normal  Behavior is cooperative  Thought Content: Thought content normal          Cognition and Memory: Cognition and memory normal          Judgment: Judgment normal       Comments: Patient presents with euthymic mood and congruent affect    Patient is well-groomed, with good eye contact and good focus in session  Patient's speech is normal rate and rhythm  Patient is alert, oriented, engaged, and open  Patient's thought process is organized and thought content is future-directed and goal-oriented  Patient's memory and cognition appear intact  Patient denies SI/HI/AH/VH and self-harm  I spent 45 minutes directly with the patient during this visit from [id number]  VIRTUAL VISIT DISCLAIMER    Samy Chandraie verbally agrees to participate in [employer]  Pt is aware that [employer] could be limited without vital signs or the ability to perform a full hands-on physical Gabbie Wagner understands she or the provider may request at any time to terminate the video visit and request the patient to seek care or treatment in person

## 2021-09-10 ENCOUNTER — TELEMEDICINE (OUTPATIENT)
Dept: FAMILY MEDICINE CLINIC | Facility: CLINIC | Age: 46
End: 2021-09-10
Payer: COMMERCIAL

## 2021-09-10 VITALS — TEMPERATURE: 97.4 F | WEIGHT: 141 LBS | HEIGHT: 64 IN | BODY MASS INDEX: 24.07 KG/M2

## 2021-09-10 DIAGNOSIS — J02.9 SORE THROAT: Primary | ICD-10-CM

## 2021-09-10 PROCEDURE — 3008F BODY MASS INDEX DOCD: CPT | Performed by: FAMILY MEDICINE

## 2021-09-10 PROCEDURE — 99213 OFFICE O/P EST LOW 20 MIN: CPT | Performed by: FAMILY MEDICINE

## 2021-09-10 PROCEDURE — 1036F TOBACCO NON-USER: CPT | Performed by: FAMILY MEDICINE

## 2021-09-10 PROCEDURE — 3725F SCREEN DEPRESSION PERFORMED: CPT | Performed by: FAMILY MEDICINE

## 2021-09-10 RX ORDER — AMOXICILLIN AND CLAVULANATE POTASSIUM 875; 125 MG/1; MG/1
1 TABLET, FILM COATED ORAL EVERY 12 HOURS SCHEDULED
Qty: 14 TABLET | Refills: 0 | Status: SHIPPED | OUTPATIENT
Start: 2021-09-10 | End: 2021-09-17

## 2021-09-10 NOTE — PROGRESS NOTES
Virtual Regular Visit    Verification of patient location:  Patient is located in the following state in which I hold an active license [place]    Assessment/Plan:  Problem List Items Addressed This Visit     None      Visit Diagnoses     Sore throat    -  Primary    Relevant Medications    amoxicillin-clavulanate (AUGMENTIN) 875-125 mg per tablet  - advised to treat symptomatically with continued use of Allegra/Singulair and also OTC Mucinex   - cool mist humidifier in the room   - if no relief after 4days, then to start abx - pt aware and agreeable              Reason for visit is   Chief Complaint   Patient presents with    Hoarse     dry throat    Virtual Regular Visit        Encounter provider Keith Baltazar DO  Provider located at [address] [place] [id number]      Recent Visits  No visits were found meeting these conditions  Showing recent visits within past 7 days and meeting all other requirements  Today's Visits  Date Type Provider Dept   09/10/21 Telemedicine Eleanor Rivas DO [facility]   Showing today's visits and meeting all other requirements  Future Appointments  No visits were found meeting these conditions  Showing future appointments within next 150 days and meeting all other requirements       The patient was identified by name and date of birth  Rom Estrada was informed that this is a telemedicine visit and that the visit is being conducted through [facility] and patient was informed that this is a secure, HIPAA-compliant platform  She agrees to proceed     My office door was closed  No one else was in the room  She acknowledged consent and understanding of privacy and security of the video platform  The patient has agreed to participate and understands they can discontinue the visit at any time  Patient is aware this is a billable service       Subjective  Rom Estrada is a 50 y o  female who presents virtually for eval of sore throat  HPI   - has been ongoing for the past 2wks   - denies F/C/sniffles/sneezing/HA/sinus pressure/CP/palpitations/SOB/wheezing/cough  - tonsils are "red" and throat feels dry   - ears are popping   - takes Allegra and Singulair QD and does not feel like helping too much   - had this prior to a prolonged sinus infection   - UTD with COVID IMMs   - (+) allergies to erythromycin     Past Medical History:   Diagnosis Date    Allergic     seasonal, food allergies    Asthma     GERD (gastroesophageal reflux disease)     Hypertension     Irritable bowel syndrome     Migraine without aura     Shoulder subluxation, left     Tennis elbow        Past Surgical History:   Procedure Laterality Date    TONSILLECTOMY      US GUIDED MSK PROCEDURE  1/14/2020    WRIST SURGERY         Current Outpatient Medications   Medication Sig Dispense Refill    albuterol (PROAIR HFA) 90 mcg/act inhaler Inhale      Ascorbic Acid (VITAMIN C) 500 MG CAPS Take by mouth      Calcium 250 MG CAPS Take by mouth      desogestrel-ethinyl estradiol (Viorele) 0 15-0 02/0 01 MG (21/5) per tablet Take 1 tablet by mouth daily 112 tablet 3    EPINEPHrine (Auvi-Q) 0 3 mg/0 3 mL SOAJ as directed      fexofenadine (ALLEGRA ALLERGY) 180 MG tablet Take by mouth      lisinopril (ZESTRIL) 5 mg tablet Take 1 tablet (5 mg total) by mouth daily 90 tablet 1    meloxicam (MOBIC) 7 5 mg tablet Take 1 tablet (7 5 mg total) by mouth daily 30 tablet 2    montelukast (Singulair) 10 mg tablet Take 1 tablet by mouth each evening      Multiple Vitamin (MULTI-VITAMIN DAILY) TABS Take by mouth      Omega-3 Fatty Acids (Fish Oil) 1000 MG CPDR Take by mouth 2 (two) times a day 180 capsule 0    triamcinolone (KENALOG) 0 1 % cream       amoxicillin-clavulanate (AUGMENTIN) 875-125 mg per tablet Take 1 tablet by mouth every 12 (twelve) hours for 7 days 14 tablet 0     No current facility-administered medications for this visit          Allergies   Allergen Reactions    Cat Hair Extract     Erythromycin     Hctz [Hydrochlorothiazide] Dizziness    Peanut [Nuts - Food Allergy]     Seasonal Ic  [Cholestatin]     Trish Sun Screen Spf 30 [Albolene] Rash    Latex Rash    Medical Tape Rash       Review of Systems as per HPI     Video Exam  Vitals:    09/10/21 0917   Temp: (!) 97 4 °F (36 3 °C)   Weight: 64 kg (141 lb)   Height: 5' 3 5" (1 613 m)     Physical Exam   General: AAOx3, NAD  HEENT: NC/AT, EOMI, clear conjunctiva, nml external ear and nose, (+) hoarse voice    Cardio: deferred  Pulm: no acute respiratory distress, able to talk in complete sentences w/o getting short of breath   Abd: deferred   Psych: nml mood/affect/behavior     I spent 15 minutes directly with the patient during this visit    VIRTUAL VISIT [address] verbally agrees to participate in [facility]  Pt is aware that [facility] could be limited without vital signs or the ability to perform a full hands-on physical Angeline Buenrostro understands she or the provider may request at any time to terminate the video visit and request the patient to seek care or treatment in person

## 2021-09-14 ENCOUNTER — TELEMEDICINE (OUTPATIENT)
Dept: BEHAVIORAL/MENTAL HEALTH CLINIC | Facility: CLINIC | Age: 46
End: 2021-09-14
Payer: COMMERCIAL

## 2021-09-14 DIAGNOSIS — F41.1 GAD (GENERALIZED ANXIETY DISORDER): Primary | ICD-10-CM

## 2021-09-14 PROCEDURE — 90834 PSYTX W PT 45 MINUTES: CPT | Performed by: SOCIAL WORKER

## 2021-09-14 NOTE — PSYCH
Virtual Regular Visit    Verification of patient location:    Patient is located in the following state in which I hold an active license [place]      Assessment/Plan:    Problem List Items Addressed This Visit        Other    EDELMIRA (generalized anxiety disorder) - Primary               Reason for visit is   Chief Complaint   Patient presents with    Virtual Regular Visit        Encounter provider Randee Stoddard LCSW    Provider located at [address] [place] [address]  Gl  [address] [place] [address]  [phone]      Recent Visits  Date Type Provider Dept   09/10/21 Telemedicine Eleanor Thelma Medicine, DO [facility]   Showing recent visits within past 7 days and meeting all other requirements  Future Appointments  No visits were found meeting these conditions  Showing future appointments within next 150 days and meeting all other requirements       The patient was identified by name and date of birth  Myah Iverson was informed that this is a telemedicine visit and that the visit is being conducted throughMicrosoft Teams and patient was informed that this is a secure, HIPAA-compliant platform  She agrees to proceed     My office door was closed  No one else was in the room  She acknowledged consent and understanding of privacy and security of the video platform  The patient has agreed to participate and understands they can discontinue the visit at any time  Patient is aware this is a billable service  Subjective  Myah Iverson is a 50 y o  female presenting for follow up appointment with [facility]  Patient has been experienced increased anxiety because 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  Despite the college faculty and students being 98% vaccinated, patient had 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 that tested positive this week   She even received an email while on the call with the announcement of another Covid positive from a student she was in class with just yesterday  Patient was observed to be shaken and distracted by this news  She would like to tell her dministrator that she would like to finish the week virtually, and was supported and validated regarding this request   Patient's biggest fear is that her smallest daughter (4) who is obviously unvaccinated will contract Covid  Patient has a traumtic history with the disease, as she states it was about a year ago at this time that her mother contracted the disease (and ultimately  from it)  Despite her anxiety, she feels "oddly calm '  We discussed this a potential effect of having Covid in her class instead of just "waiting" for it to come  Always stressful in patient's life, is her middle daughter Susannah Ku behavior at [employer]  Patient feels guilt associated with not bringing Darian 60 home, but she knows there is no way she can handle her behaviors while working and keeping the other children safe  Patient demonstrates internal conflict regarding whether or not to bring daughter home  Patient plans to continue to advocate for herself with her daughter's placement, as well as her employment  Patient plans to follow up in about two weeks  Ethical notice of pause of regular services: Patient and I discussed my impending maternity leave, beginning October 15, 2021, and anticipated to last three months  Patient was provided with options of 1) planned stable discharge; 2) discharge and referral; 3) to be contacted upon my return; or 4) meeting with an interim therapist provided by [address]  Patient chooses to be seen by interim therapist  We will discuss again at each appointment until my leave to assure patient's acceptance of the plan           HPI     Past Medical History:   Diagnosis Date    Allergic     seasonal, food allergies    Asthma     GERD (gastroesophageal reflux disease)     Hypertension     Irritable bowel syndrome     Migraine without aura     Shoulder subluxation, left     Tennis elbow        Past Surgical History:   Procedure Laterality Date    TONSILLECTOMY      US GUIDED MSK PROCEDURE  1/14/2020    WRIST SURGERY         Current Outpatient Medications   Medication Sig Dispense Refill    albuterol (PROAIR HFA) 90 mcg/act inhaler Inhale      amoxicillin-clavulanate (AUGMENTIN) 875-125 mg per tablet Take 1 tablet by mouth every 12 (twelve) hours for 7 days 14 tablet 0    Ascorbic Acid (VITAMIN C) 500 MG CAPS Take by mouth      Calcium 250 MG CAPS Take by mouth      desogestrel-ethinyl estradiol (Viorele) 0 15-0 02/0 01 MG (21/5) per tablet Take 1 tablet by mouth daily 112 tablet 3    EPINEPHrine (Auvi-Q) 0 3 mg/0 3 mL SOAJ as directed      fexofenadine (ALLEGRA ALLERGY) 180 MG tablet Take by mouth      lisinopril (ZESTRIL) 5 mg tablet Take 1 tablet (5 mg total) by mouth daily 90 tablet 1    meloxicam (MOBIC) 7 5 mg tablet Take 1 tablet (7 5 mg total) by mouth daily 30 tablet 2    montelukast (Singulair) 10 mg tablet Take 1 tablet by mouth each evening      Multiple Vitamin (MULTI-VITAMIN DAILY) TABS Take by mouth      Omega-3 Fatty Acids (Fish Oil) 1000 MG CPDR Take by mouth 2 (two) times a day 180 capsule 0    triamcinolone (KENALOG) 0 1 % cream        No current facility-administered medications for this visit  Allergies   Allergen Reactions    Cat Hair Extract     Erythromycin     Hctz [Hydrochlorothiazide] Dizziness    Peanut [Nuts - Food Allergy]     Seasonal Ic  [Cholestatin]     Trish Sun Screen Spf 30 [Albolene] Rash    Latex Rash    Medical Tape Rash       Review of Systems   Psychiatric/Behavioral: Negative for agitation, behavioral problems, confusion, decreased concentration, dysphoric mood, hallucinations, self-injury, sleep disturbance and suicidal ideas  The patient is nervous/anxious  The patient is not hyperactive          Video Exam    There were no vitals filed for this visit  Physical Exam  Psychiatric:         Attention and Perception: Attention and perception normal          Mood and Affect: Mood is anxious  Speech: Speech normal          Behavior: Behavior normal  Behavior is cooperative  Thought Content: Thought content normal          Cognition and Memory: Cognition and memory normal       Comments: Patient presents with anxious mood and congruent affect  Patient is well-groomed, with good eye contact and good focus in session  Patient's speech is normal rate and rhythm  Patient is alert, oriented, engaged, and open  Patient's thought process is organized and thought content is focused on anxiety related to Covid and daughter [de-identified]  Patient's memory and cognition appear intact  Patient denies SI/HI/AH/VH and self-harm  I spent 44 minutes directly with the patient during this visit, from [id number]  VIRTUAL VISIT DISCLAIMER    Janak Gold verbally agrees to participate in [employer]  Pt is aware that [employer] could be limited without vital signs or the ability to perform a full hands-on physical Lizzy Trinovazquez understands she or the provider may request at any time to terminate the video visit and request the patient to seek care or treatment in person

## 2021-09-15 NOTE — PATIENT INSTRUCTIONS
Continue to take all medications as prescribed  Attend all scheduled medical appointments  Follow up with therapist     Ethical notice of pause of regular services: Patient and I discussed my impending maternity leave, beginning October 15, 2021, and anticipated to last three months  Patient was provided with options of 1) planned stable discharge; 2) discharge and referral; 3) to be contacted upon my return; or 4) meeting with an interim therapist provided by [address]  Patient chooses to be seen by interim therapist  We will discuss again at each appointment until my leave to assure patient's acceptance of the plan       If you are experiencing a mental health emergency, please call 911 for emergent care, or one of the following numbers for someone to talk to 24 hours a day, 7 days a week:    [facility] ([facility]) AT [place] Intervention: [address] Crisis Intervention: [phone]  CrisisTextLine:  Text [place] to 443768  [place]'s support and referral hotline: 404.509.1442  Suicide Prevention Lifeline:  173.360.1486

## 2021-09-28 ENCOUNTER — TELEMEDICINE (OUTPATIENT)
Dept: BEHAVIORAL/MENTAL HEALTH CLINIC | Facility: CLINIC | Age: 46
End: 2021-09-28
Payer: COMMERCIAL

## 2021-09-28 DIAGNOSIS — F41.1 GAD (GENERALIZED ANXIETY DISORDER): Primary | ICD-10-CM

## 2021-09-28 PROCEDURE — 90834 PSYTX W PT 45 MINUTES: CPT | Performed by: SOCIAL WORKER
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
## 2021-09-28 NOTE — PATIENT INSTRUCTIONS
Continue to take all medications as prescribed  Attend all scheduled medical appointments  Follow up with therapist     Ethical notice of pause of regular services: Patient and I discussed my impending maternity leave, beginning October 15, 2021, and anticipated to last three months  Patient was provided with options of 1) planned stable discharge; 2) discharge and referral; 3) to be contacted upon my return; or 4) meeting with an interim therapist provided by [address]  Patient chooses to be seen by interim therapist  We will discuss again at each appointment until my leave to assure patient's acceptance of the plan       If you are experiencing a mental health emergency, please call 911 for emergent care, or one of the following numbers for someone to talk to 24 hours a day, 7 days a week:    [facility] ([facility]) AT [place] Intervention: [address] Crisis Intervention: [phone]  CrisisTextLine:  Text [place] to 323530  PA's support and referral hotline: 209.143.5413  Suicide Prevention Lifeline:  362.709.2454

## 2021-10-19 ENCOUNTER — TELEPHONE (OUTPATIENT)
Dept: FAMILY MEDICINE CLINIC | Facility: CLINIC | Age: 46
End: 2021-10-19

## 2021-10-19 ENCOUNTER — TELEMEDICINE (OUTPATIENT)
Dept: BEHAVIORAL/MENTAL HEALTH CLINIC | Facility: CLINIC | Age: 46
End: 2021-10-19
Payer: COMMERCIAL

## 2021-10-19 DIAGNOSIS — F41.1 GAD (GENERALIZED ANXIETY DISORDER): Primary | ICD-10-CM

## 2021-10-19 PROCEDURE — 90834 PSYTX W PT 45 MINUTES: CPT | Performed by: SOCIAL WORKER

## 2021-10-27 ENCOUNTER — TELEPHONE (OUTPATIENT)
Dept: PSYCHIATRY | Facility: CLINIC | Age: 46
End: 2021-10-27

## 2021-10-27 DIAGNOSIS — K21.9 GASTROESOPHAGEAL REFLUX DISEASE, UNSPECIFIED WHETHER ESOPHAGITIS PRESENT: Primary | ICD-10-CM

## 2021-10-27 RX ORDER — OMEPRAZOLE 40 MG/1
40 CAPSULE, DELAYED RELEASE ORAL DAILY
Qty: 90 CAPSULE | Refills: 0 | Status: SHIPPED | OUTPATIENT
Start: 2021-10-27 | End: 2022-01-26

## 2022-01-12 ENCOUNTER — OFFICE VISIT (OUTPATIENT)
Dept: FAMILY MEDICINE CLINIC | Facility: CLINIC | Age: 47
End: 2022-01-12
Payer: COMMERCIAL

## 2022-01-12 VITALS
HEART RATE: 77 BPM | HEIGHT: 64 IN | WEIGHT: 145 LBS | RESPIRATION RATE: 16 BRPM | DIASTOLIC BLOOD PRESSURE: 84 MMHG | SYSTOLIC BLOOD PRESSURE: 132 MMHG | BODY MASS INDEX: 24.75 KG/M2 | OXYGEN SATURATION: 98 %

## 2022-01-12 DIAGNOSIS — E78.1 HYPERTRIGLYCERIDEMIA: ICD-10-CM

## 2022-01-12 DIAGNOSIS — I10 HYPERTENSION, UNSPECIFIED TYPE: Primary | ICD-10-CM

## 2022-01-12 DIAGNOSIS — F41.1 GAD (GENERALIZED ANXIETY DISORDER): ICD-10-CM

## 2022-01-12 DIAGNOSIS — Z13.0 SCREENING FOR DEFICIENCY ANEMIA: ICD-10-CM

## 2022-01-12 PROCEDURE — 99214 OFFICE O/P EST MOD 30 MIN: CPT | Performed by: FAMILY MEDICINE

## 2022-01-12 PROCEDURE — 3725F SCREEN DEPRESSION PERFORMED: CPT | Performed by: FAMILY MEDICINE

## 2022-01-12 PROCEDURE — 1036F TOBACCO NON-USER: CPT | Performed by: FAMILY MEDICINE

## 2022-01-12 PROCEDURE — 3008F BODY MASS INDEX DOCD: CPT | Performed by: FAMILY MEDICINE

## 2022-01-12 RX ORDER — LISINOPRIL 5 MG/1
5 TABLET ORAL DAILY
Qty: 90 TABLET | Refills: 1 | Status: SHIPPED | OUTPATIENT
Start: 2022-01-12 | End: 2022-06-20 | Stop reason: SDUPTHER

## 2022-01-12 RX ORDER — LISINOPRIL 5 MG/1
5 TABLET ORAL DAILY
Qty: 90 TABLET | Refills: 1 | Status: CANCELLED | OUTPATIENT
Start: 2022-01-12
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
## 2022-01-21 ENCOUNTER — TELEPHONE (OUTPATIENT)
Dept: FAMILY MEDICINE CLINIC | Facility: CLINIC | Age: 47
End: 2022-01-21

## 2022-01-21 NOTE — TELEPHONE ENCOUNTER
LM for pt explaining that COVID antibody testing yields inconclusive results and is not a test that is routinely ordered for COVID

## 2022-01-21 NOTE — TELEPHONE ENCOUNTER
Patient called and stated that she had antibody testing which came back positive  She was boosted in October and wasn't sure if that would cause it to be positive  Her result was >102 igg so she wanted to know if you thought it was a recent infection

## 2022-01-26 DIAGNOSIS — K21.9 GASTROESOPHAGEAL REFLUX DISEASE, UNSPECIFIED WHETHER ESOPHAGITIS PRESENT: ICD-10-CM

## 2022-01-26 RX ORDER — OMEPRAZOLE 40 MG/1
CAPSULE, DELAYED RELEASE ORAL
Qty: 90 CAPSULE | Refills: 0 | Status: SHIPPED | OUTPATIENT
Start: 2022-01-26 | End: 2022-03-22 | Stop reason: ALTCHOICE

## 2022-01-27 ENCOUNTER — HOSPITAL ENCOUNTER (OUTPATIENT)
Dept: RADIOLOGY | Facility: HOSPITAL | Age: 47
Discharge: HOME | End: 2022-01-27
Payer: COMMERCIAL

## 2022-01-27 VITALS — HEIGHT: 64 IN | BODY MASS INDEX: 24.75 KG/M2 | WEIGHT: 145 LBS

## 2022-01-27 DIAGNOSIS — Z12.31 ENCOUNTER FOR SCREENING MAMMOGRAM FOR MALIGNANT NEOPLASM OF BREAST: ICD-10-CM

## 2022-01-27 PROCEDURE — 77067 SCR MAMMO BI INCL CAD: CPT

## 2022-01-27 PROCEDURE — 77063 BREAST TOMOSYNTHESIS BI: CPT

## 2022-02-18 ENCOUNTER — TELEMEDICINE (OUTPATIENT)
Dept: BEHAVIORAL/MENTAL HEALTH CLINIC | Facility: CLINIC | Age: 47
End: 2022-02-18
Payer: COMMERCIAL

## 2022-02-18 DIAGNOSIS — F41.1 GAD (GENERALIZED ANXIETY DISORDER): Primary | ICD-10-CM

## 2022-02-18 PROCEDURE — 90834 PSYTX W PT 45 MINUTES: CPT | Performed by: SOCIAL WORKER

## 2022-02-18 NOTE — PSYCH
Psychotherapy Provided: Individual Psychotherapy 51 minutes, from 7283-3315  Length of time in session: 51 minutes, follow up in 2 weeks    Encounter Diagnosis     ICD-10-CM    1  EDELMRIA (generalized anxiety disorder)  F41 1        Goals addressed in session: Goal 1 Reduce anxiety      Current suicide risk : Low       Virtual Regular Visit    Verification of patient location:    Patient is located in the following state in which I hold an active license [place]      Assessment/Plan:    Problem List Items Addressed This Visit        Other    EDELMIRA (generalized anxiety disorder) - Primary                  Reason for visit is   Chief Complaint   Patient presents with    Virtual Regular Visit        Encounter provider Sung Deal LCSW    Provider located at [address]  [facility] [address] [phone]      Recent Visits  No visits were found meeting these conditions  Showing recent visits within past 7 days and meeting all other requirements  Future Appointments  No visits were found meeting these conditions  Showing future appointments within next 150 days and meeting all other requirements       The patient was identified by name and date of birth  Florin Burrows was informed that this is a telemedicine visit and that the visit is being conducted throughMicrosoft Teams and patient was informed that this is a secure, HIPAA-compliant platform  She agrees to proceed     My office door was closed  No one else was in the room  She acknowledged consent and understanding of privacy and security of the video platform  The patient has agreed to participate and understands they can discontinue the visit at any time  Patient is aware this is a billable service  Subjective  Florin Burrows is a 48 y o  female presenting for follow up appointment with Integration Services   Patient was last seen by this clinician 10/19/2021 prior to my maternity leave  Patient did not utilize Integration service in the interim  Since our last visit, patient has completed proceedings to relinquish custody of her middle adopted daughter Gilford Hane, who has severe behavioral issues and has been riding in [facility] for children  This decision comes after long deliberation during which patient was supported by this therapist  Patient has been processing feels of guilt, shame, and anger, as [facility] and even her sisters perceive patient as abandoning her daughter  Patient has also had negative contact with the girls' biological mother, father, and grandmother who "think she is a horrible person" for relinquishing custody  Patient's middle daughter has found a foster family to live with and was not very emotional at patient telling her she would be going to live with a different family  This reinforced patient's belief that the child is unable to bond with her  We processed patient's sisters' opposing viewpoints and with the context of the family dynamic  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  She has also found that oldest daughter is experiencing some anxious symptoms and asked for a counseling referral from this therapist which was provided  Patient continues to be worried about her or her family members catching Covid and uses a lot of precautions  Patient plans to continue to use therapy, family/social support, and routine/schedule to reduce anxiety, especially during this trying time  She plans to follow up with therapist in two weeks       HPI     Past Medical History:   Diagnosis Date    Allergic     seasonal, food allergies    Asthma     GERD (gastroesophageal reflux disease)     Hypertension     Irritable bowel syndrome     Migraine without aura     Shoulder subluxation, left     Tennis elbow        Past Surgical History:   Procedure Laterality Date    TONSILLECTOMY      US GUIDED MSK PROCEDURE  1/14/2020    WRIST SURGERY         Current Outpatient Medications   Medication Sig Dispense Refill    albuterol (PROAIR HFA) 90 mcg/act inhaler Inhale      Ascorbic Acid (VITAMIN C) 500 MG CAPS Take by mouth      Calcium 250 MG CAPS Take by mouth      desogestrel-ethinyl estradiol (Viorele) 0 15-0 02/0 01 MG (21/5) per tablet Take 1 tablet by mouth daily 112 tablet 3    EPINEPHrine (Auvi-Q) 0 3 mg/0 3 mL SOAJ as directed      fexofenadine (ALLEGRA ALLERGY) 180 MG tablet Take by mouth      lisinopril (ZESTRIL) 5 mg tablet Take 1 tablet (5 mg total) by mouth daily 90 tablet 1    meloxicam (MOBIC) 7 5 mg tablet Take 1 tablet (7 5 mg total) by mouth daily (Patient not taking: Reported on 1/12/2022 ) 30 tablet 2    montelukast (Singulair) 10 mg tablet Take 1 tablet by mouth each evening      Multiple Vitamin (MULTI-VITAMIN DAILY) TABS Take by mouth      Omega-3 Fatty Acids (Fish Oil) 1000 MG CPDR Take by mouth 2 (two) times a day 180 capsule 0    omeprazole (PriLOSEC) 40 MG capsule TAKE 1 CAPSULE BY MOUTH EVERY DAY 90 capsule 0    triamcinolone (KENALOG) 0 1 % cream  (Patient not taking: Reported on 1/12/2022 )       No current facility-administered medications for this visit  Allergies   Allergen Reactions    Cat Hair Extract     Erythromycin     Hctz [Hydrochlorothiazide] Dizziness    Peanut [Nuts - Food Allergy]     Seasonal Ic  [Cholestatin]     Trish Sun Screen Spf 30 [Albolene] Rash    Latex Rash    Medical Tape Rash       Review of Systems   Psychiatric/Behavioral: Negative for agitation, behavioral problems, confusion, decreased concentration, dysphoric mood, hallucinations, self-injury, sleep disturbance and suicidal ideas  The patient is nervous/anxious  The patient is not hyperactive  Video Exam    There were no vitals filed for this visit      Physical Exam  Psychiatric:         Attention and Perception: Attention and perception normal          Mood and Affect: Mood is anxious  Speech: Speech normal          Behavior: Behavior normal  Behavior is cooperative  Thought Content: Thought content normal          Cognition and Memory: Cognition and memory normal          Judgment: Judgment normal       Comments: Patient presents with anxious mood and congruent affect  Patient is well-groomed, with good eye contact and good focus in session  Patient's speech is normal rate and rhythm  Patient is alert, oriented, engaged, and open  Patient's thought process is organized and thought content is future-directed and goal-oriented  Patient's memory and cognition appear intact  Patient denies SI/HI/AH/VH and self-harm  VIRTUAL VISIT DISCLAIMER    Bart Howard verbally agrees to participate in [employer]  Pt is aware that [employer] could be limited without vital signs or the ability to perform a full hands-on physical Honey Herb understands she or the provider may request at any time to terminate the video visit and request the patient to seek care or treatment in person

## 2022-02-18 NOTE — PATIENT INSTRUCTIONS
Continue to take all medications as prescribed  Attend all scheduled medical appointments    Follow up with therapist     If you are experiencing a mental health emergency, please call 911 for emergent care, or one of the following numbers for someone to talk to 24 hours a day, 7 days a week:    [facility] ([facility]) AT [place] Intervention: [address] Crisis Intervention: [phone]  CrisisTextLine:  Text [place] to 484400  PA's support and referral hotline: 905.702.2873  Suicide Prevention Lifeline:  202.927.5111

## 2022-03-04 ENCOUNTER — TELEMEDICINE (OUTPATIENT)
Dept: BEHAVIORAL/MENTAL HEALTH CLINIC | Facility: CLINIC | Age: 47
End: 2022-03-04
Payer: COMMERCIAL

## 2022-03-04 DIAGNOSIS — F41.1 GAD (GENERALIZED ANXIETY DISORDER): Primary | ICD-10-CM

## 2022-03-04 PROCEDURE — 90834 PSYTX W PT 45 MINUTES: CPT | Performed by: SOCIAL WORKER

## 2022-03-04 NOTE — PATIENT INSTRUCTIONS
Continue to take all medications as prescribed  Attend all scheduled medical appointments  Follow up with therapist     If you are experiencing a mental health emergency, please call 911 for emergent care, or one of the following numbers for someone to talk to 24 hours a day, 7 days a week:    [facility] ([facility]) AT [place] Intervention: [address] Crisis Intervention: [phone]  CrisisTextLine:  Text [place] to [phone]  PA's Support and Referral Hotline: Rui 58:  214.336.5263    If you would like to leave a phone message for your therapist, please call the [address] [place] [address] [address] Integration Department at [phone]  If you need to reach [address] [place] [address] E after hours, please call [phone] for on-call service

## 2022-03-04 NOTE — PSYCH
Psychotherapy Provided: Individual Psychotherapy 48 minutes, from [id number]  Length of time in session: 48 minutes, follow up in 4 weeks    Encounter Diagnosis     ICD-10-CM    1  EDELMIRA (generalized anxiety disorder)  F41 1        Goals addressed in session: Goal 1       Current suicide risk : Low         Virtual Regular Visit    Verification of patient location:    Patient is located in the following state in which I hold an active license [place]      Assessment/Plan:    Problem List Items Addressed This Visit        Other    EDELMIRA (generalized anxiety disorder) - Primary               Reason for visit is   Chief Complaint   Patient presents with    Virtual Regular Visit        Encounter provider Elbert Marcus LCSW    Provider located at [address]  [facility] [address]  [phone]      Recent Visits  No visits were found meeting these conditions  Showing recent visits within past 7 days and meeting all other requirements  Future Appointments  No visits were found meeting these conditions  Showing future appointments within next 150 days and meeting all other requirements       The patient was identified by name and date of birth  Adelia Bernard was informed that this is a telemedicine visit and that the visit is being conducted throughMicrosoft Teams and patient was informed that this is a secure, HIPAA-compliant platform  She agrees to proceed     My office door was closed  No one else was in the room  She acknowledged consent and understanding of privacy and security of the video platform  The patient has agreed to participate and understands they can discontinue the visit at any time  Patient is aware this is a billable service  Subjective  Adelia Bernard is a 48 y o  female presenting for follow up appointment with Integration Services   She reports a lot of stress in the past week - court was yesterday for placing middle daughter into foster care  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  Patient is feeling depleted, drained  We processed patient's feelings around the loss of her daughter  Patient has many fears - she worries that something is going to go wrong with daughter's settling into the new home  Patient does express relief from being able to "remove the daily dread" of their conversations  Patient often mentions the inconvenience of these calls and meetings and how they interfered with her work  Patient reports feeling significantly tired - we discussed measures of self-care, treating herself with extra attention to regular meals, water, sleep, boundaries  Patient was reminded that she has been under a significant amount of stress and she feels that now that the situation with daughter is over, she is having a "comedown "  We discussed this as a common and appropriate response for the body to have to a stressful event or sustained stress  Patient is looking forward to having a couple days off upcoming, as she had no days off in the past week  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 and believes it will be beneficial to improving mood and energy  Patient plans to follow up in about a month         HPI     Past Medical History:   Diagnosis Date    Allergic     seasonal, food allergies    Asthma     GERD (gastroesophageal reflux disease)     Hypertension     Irritable bowel syndrome     Migraine without aura     Shoulder subluxation, left     Tennis elbow        Past Surgical History:   Procedure Laterality Date    TONSILLECTOMY      US GUIDED MSK PROCEDURE  1/14/2020    WRIST SURGERY         Current Outpatient Medications   Medication Sig Dispense Refill    albuterol (PROAIR HFA) 90 mcg/act inhaler Inhale      Ascorbic Acid (VITAMIN C) 500 MG CAPS Take by mouth      Calcium 250 MG CAPS Take by mouth      desogestrel-ethinyl estradiol (Viorele) 0  15-0 02/0 01 MG (21/5) per tablet Take 1 tablet by mouth daily 112 tablet 3    EPINEPHrine (Auvi-Q) 0 3 mg/0 3 mL SOAJ as directed      fexofenadine (ALLEGRA ALLERGY) 180 MG tablet Take by mouth      lisinopril (ZESTRIL) 5 mg tablet Take 1 tablet (5 mg total) by mouth daily 90 tablet 1    meloxicam (MOBIC) 7 5 mg tablet Take 1 tablet (7 5 mg total) by mouth daily (Patient not taking: Reported on 1/12/2022 ) 30 tablet 2    montelukast (Singulair) 10 mg tablet Take 1 tablet by mouth each evening      Multiple Vitamin (MULTI-VITAMIN DAILY) TABS Take by mouth      Omega-3 Fatty Acids (Fish Oil) 1000 MG CPDR Take by mouth 2 (two) times a day 180 capsule 0    omeprazole (PriLOSEC) 40 MG capsule TAKE 1 CAPSULE BY MOUTH EVERY DAY 90 capsule 0    triamcinolone (KENALOG) 0 1 % cream  (Patient not taking: Reported on 1/12/2022 )       No current facility-administered medications for this visit  Allergies   Allergen Reactions    Cat Hair Extract     Erythromycin     Hctz [Hydrochlorothiazide] Dizziness    Peanut [Nuts - Food Allergy]     Seasonal Ic  [Cholestatin]     Trish Sun Screen Spf 30 [Albolene] Rash    Latex Rash    Medical Tape Rash       Review of Systems   Psychiatric/Behavioral: Negative for agitation, behavioral problems, confusion, decreased concentration, dysphoric mood, hallucinations, self-injury, sleep disturbance and suicidal ideas  The patient is nervous/anxious  The patient is not hyperactive  Video Exam    There were no vitals filed for this visit  Physical Exam  Psychiatric:         Attention and Perception: Attention and perception normal          Mood and Affect: Mood is anxious  Speech: Speech normal          Behavior: Behavior normal  Behavior is cooperative  Thought Content:  Thought content normal          Cognition and Memory: Cognition and memory normal          Judgment: Judgment normal       Comments: Patient presents with anxious mood and congruent affect  Patient is well-groomed, with good eye contact and good focus in session  Patient's speech is normal rate and rhythm  Patient is alert, oriented, engaged, and open  Patient's thought process is organized and thought content is future-directed and goal-oriented  Patient's memory and cognition appear intact  Patient denies SI/HI/AH/VH and self-harm  VIRTUAL VISIT DISCLAIMER    Susan Stokes verbally agrees to participate in [employer]  Pt is aware that [employer] could be limited without vital signs or the ability to perform a full hands-on physical Vikas Star understands she or the provider may request at any time to terminate the video visit and request the patient to seek care or treatment in person

## 2022-03-18 ENCOUNTER — TELEMEDICINE (OUTPATIENT)
Dept: BEHAVIORAL/MENTAL HEALTH CLINIC | Facility: CLINIC | Age: 47
End: 2022-03-18
Payer: COMMERCIAL

## 2022-03-18 DIAGNOSIS — F41.1 GAD (GENERALIZED ANXIETY DISORDER): Primary | ICD-10-CM

## 2022-03-18 PROCEDURE — 90834 PSYTX W PT 45 MINUTES: CPT | Performed by: SOCIAL WORKER

## 2022-03-18 NOTE — PATIENT INSTRUCTIONS
Continue to take all medications as prescribed  Attend all scheduled medical appointments  Follow up with therapist     If you are experiencing a mental health emergency, please call 911 for emergent care, or one of the following numbers for someone to talk to 24 hours a day, 7 days a week:    [facility] ([facility]) AT [place] Intervention: [address] Crisis Intervention: [phone]  CrisisTextLine:  Text PA to [phone]  PA's Support and Referral Hotline: Rui 58:  282.697.8640    If you would like to leave a phone message for your therapist, please call the [address] [place] [address] [address] Integration Department at [phone]  If you need to reach [address] [place] [address] E after hours, please call [phone] for on-call service

## 2022-03-18 NOTE — PSYCH
Psychotherapy Provided: Individual Psychotherapy 48 minutes, from [id number]  Length of time in session: 48 minutes, follow up in     Encounter Diagnosis     ICD-10-CM    1  EDELMIRA (generalized anxiety disorder)  F41 1        Goals addressed in session: Goal 1 Maintain reduced anxiety    Current suicide risk : Low       Virtual Regular Visit    Verification of patient location:    Patient is located in the following state in which I hold an active license [place]      Assessment/Plan:    Problem List Items Addressed This Visit        Other    EDELMIRA (generalized anxiety disorder) - Primary               Reason for visit is   Chief Complaint   Patient presents with    Virtual Regular Visit        Encounter provider Azalia Gonzalez LCSW    Provider located at [address]  [facility] [address] [phone]      Recent Visits  No visits were found meeting these conditions  Showing recent visits within past 7 days and meeting all other requirements  Future Appointments  No visits were found meeting these conditions  Showing future appointments within next 150 days and meeting all other requirements       The patient was identified by name and date of birth  Fanny White was informed that this is a telemedicine visit and that the visit is being conducted throughMicrosoft Teams and patient was informed that this is a secure, HIPAA-compliant platform  She agrees to proceed     My office door was closed  No one else was in the room  She acknowledged consent and understanding of privacy and security of the video platform  The patient has agreed to participate and understands they can discontinue the visit at any time  Patient is aware this is a billable service       Subjective  Fanny White is a 48 y o  female   Reduced anxiety - not much contact regarding Kenyon Kaminski, bit she is having tantrums which makes patient feel validated  Aunt   Stressed about money  Child support  Spoke about adopting the children - had been very explicit about not wanting speical needs children    difficutly adjusting emotionally to livvy being gone - appropriate response  HPI     Past Medical History:   Diagnosis Date    Allergic     seasonal, food allergies    Asthma     GERD (gastroesophageal reflux disease)     Hypertension     Irritable bowel syndrome     Migraine without aura     Shoulder subluxation, left     Tennis elbow        Past Surgical History:   Procedure Laterality Date    TONSILLECTOMY      US GUIDED MSK PROCEDURE  2020    WRIST SURGERY         Current Outpatient Medications   Medication Sig Dispense Refill    albuterol (PROAIR HFA) 90 mcg/act inhaler Inhale      Ascorbic Acid (VITAMIN C) 500 MG CAPS Take by mouth      Calcium 250 MG CAPS Take by mouth      desogestrel-ethinyl estradiol (Viorele) 0 15-0 02/0 01 MG () per tablet Take 1 tablet by mouth daily 112 tablet 3    EPINEPHrine (Auvi-Q) 0 3 mg/0 3 mL SOAJ as directed      fexofenadine (ALLEGRA ALLERGY) 180 MG tablet Take by mouth      lisinopril (ZESTRIL) 5 mg tablet Take 1 tablet (5 mg total) by mouth daily 90 tablet 1    meloxicam (MOBIC) 7 5 mg tablet Take 1 tablet (7 5 mg total) by mouth daily (Patient not taking: Reported on 2022 ) 30 tablet 2    montelukast (Singulair) 10 mg tablet Take 1 tablet by mouth each evening      Multiple Vitamin (MULTI-VITAMIN DAILY) TABS Take by mouth      Omega-3 Fatty Acids (Fish Oil) 1000 MG CPDR Take by mouth 2 (two) times a day 180 capsule 0    omeprazole (PriLOSEC) 40 MG capsule TAKE 1 CAPSULE BY MOUTH EVERY DAY 90 capsule 0    triamcinolone (KENALOG) 0 1 % cream  (Patient not taking: Reported on 2022 )       No current facility-administered medications for this visit          Allergies   Allergen Reactions    Cat Hair Extract     Erythromycin     Hctz [Hydrochlorothiazide] Dizziness    Peanut [Nuts - Food Allergy]     Seasonal Ic  [Cholestatin]     Trish Sun Screen Spf 30 [Albolene] Rash    Latex Rash    Medical Tape Rash       Review of Systems   Psychiatric/Behavioral: Negative for agitation, behavioral problems, confusion, decreased concentration, dysphoric mood, hallucinations, self-injury, sleep disturbance and suicidal ideas  The patient is nervous/anxious  The patient is not hyperactive  Video Exam    There were no vitals filed for this visit  Physical Exam  Psychiatric:         Attention and Perception: Attention and perception normal          Mood and Affect: Mood is anxious  Speech: Speech normal          Behavior: Behavior normal  Behavior is cooperative  Thought Content: Thought content normal          Cognition and Memory: Cognition and memory normal          Judgment: Judgment normal       Comments: Patient presents with anxious mood and normal affect  Patient is well-groomed, with good eye contact and good focus in session  Patient's speech is normal rate and rhythm  Patient is alert, oriented, engaged, and open  Patient's thought process is organized and thought content is future-directed and goal-oriented  Patient's memory and cognition appear intact  Patient denies SI/HI/AH/VH and self-harm  VIRTUAL VISIT DISCLAIMER    Ricardo Ballard verbally agrees to participate in [employer]  Pt is aware that [employer] could be limited without vital signs or the ability to perform a full hands-on physical Mary Aragon understands she or the provider may request at any time to terminate the video visit and request the patient to seek care or treatment in person

## 2022-03-22 ENCOUNTER — CONSULT (OUTPATIENT)
Dept: GASTROENTEROLOGY | Facility: AMBULARY SURGERY CENTER | Age: 47
End: 2022-03-22
Payer: COMMERCIAL

## 2022-03-22 VITALS
HEART RATE: 89 BPM | BODY MASS INDEX: 25.27 KG/M2 | DIASTOLIC BLOOD PRESSURE: 82 MMHG | SYSTOLIC BLOOD PRESSURE: 132 MMHG | HEIGHT: 64 IN | OXYGEN SATURATION: 98 % | WEIGHT: 148 LBS

## 2022-03-22 DIAGNOSIS — Z12.11 SCREEN FOR COLON CANCER: Primary | ICD-10-CM

## 2022-03-22 PROCEDURE — 3008F BODY MASS INDEX DOCD: CPT | Performed by: INTERNAL MEDICINE

## 2022-03-22 PROCEDURE — 1036F TOBACCO NON-USER: CPT | Performed by: INTERNAL MEDICINE

## 2022-03-22 PROCEDURE — 99203 OFFICE O/P NEW LOW 30 MIN: CPT | Performed by: INTERNAL MEDICINE

## 2022-03-22 RX ORDER — SODIUM PICOSULFATE, MAGNESIUM OXIDE, AND ANHYDROUS CITRIC ACID 10; 3.5; 12 MG/160ML; G/160ML; G/160ML
1 LIQUID ORAL SEE ADMIN INSTRUCTIONS
Qty: 320 ML | Refills: 0 | Status: SHIPPED | OUTPATIENT
Start: 2022-03-22 | End: 2022-07-25

## 2022-03-22 NOTE — PROGRESS NOTES
Consultation - [address] [facility]  Susan Divya 1975 female         Chief Complaint:  For colonoscopy    HPI:  24-year-old female with no significant past medical history was referred for colonoscopy  Patient never had colonoscopy in the past   Patient has regular bowel movements and denies any blood or mucus in the stool  Appetite is good and denies any recent weight loss  Denies any abdominal pain, nausea, or vomiting  Reports having occasional acid reflux but not on any medication  She took Prilosec in the past   Denies any difficulty swallowing  REVIEW OF SYSTEMS: Review of Systems   Constitutional: Negative for activity change, appetite change, chills, diaphoresis, fatigue, fever and unexpected weight change  HENT: Negative for ear discharge, ear pain, facial swelling, hearing loss, nosebleeds, sore throat, tinnitus and voice change  Eyes: Negative for pain, discharge, redness, itching and visual disturbance  Respiratory: Negative for apnea, cough, chest tightness, shortness of breath and wheezing  Cardiovascular: Negative for chest pain and palpitations  Gastrointestinal:        As noted in HPI   Endocrine: Negative for cold intolerance, heat intolerance and polyuria  Genitourinary: Negative for difficulty urinating, dysuria, flank pain, hematuria and urgency  Musculoskeletal: Negative for arthralgias, back pain, gait problem, joint swelling and myalgias  Skin: Positive for rash  Negative for wound  Neurological: Negative for dizziness, tremors, seizures, speech difficulty, light-headedness, numbness and headaches  Hematological: Negative for adenopathy  Does not bruise/bleed easily  Psychiatric/Behavioral: Negative for agitation, behavioral problems and confusion  The patient is not nervous/anxious           Past Medical History:   Diagnosis Date    Allergic     seasonal, food allergies    Asthma     GERD (gastroesophageal reflux disease)     Hypertension     Irritable bowel syndrome     Migraine without aura     Shoulder subluxation, left     Tennis elbow       Past Surgical History:   Procedure Laterality Date    TONSILLECTOMY      US GUIDED MSK PROCEDURE  1/14/2020    WRIST SURGERY       Social History     Socioeconomic History    Marital status: Single     Spouse name: Not on file    Number of children: Not on file    Years of education: Not on file    Highest education level: Not on file   Occupational History    Not on file   Tobacco Use    Smoking status: Never Smoker    Smokeless tobacco: Never Used   Vaping Use    Vaping Use: Never used   Substance and Sexual Activity    Alcohol use:  Yes     Alcohol/week: 1 0 standard drink     Types: 1 Glasses of wine per week     Comment: socially     Drug use: Never    Sexual activity: Not Currently   Other Topics Concern    Not on file   Social History Narrative    CAFFEINE USE - 16oz/daily     EXERCISE HABITS    Adopted 3 children: girls ages 3, 10, and 11yo - all sisters     Is a  at [employer]     Financial Resource Strain: Not on [employer] Insecurity: Not on file   Transportation Needs: Not on file   Physical Activity: Not on file   Stress: Not on file   Social Connections: Not on file   Intimate Partner Violence: Not on file   Housing Stability: Not on file     Family History   Problem Relation Age of Onset    Stroke Mother         CEREBROVASCULAR ACCIDENT (CVA), LISTED 2X    Hypertension Mother     Hyperlipidemia Mother     Hypertension Father     Hyperlipidemia Father     Skin cancer Father     Cervical cancer Neg Hx     Colon cancer Neg Hx     Ovarian cancer Neg Hx     Uterine cancer Neg Hx      Cat hair extract, Erythromycin, Hctz [hydrochlorothiazide], Peanut [nuts - food allergy], Seasonal ic  [cholestatin], Trish sun screen spf 30 [albolene], Latex, and Medical tape  Current Outpatient Medications Medication Sig Dispense Refill    albuterol (PROAIR HFA) 90 mcg/act inhaler Inhale      Ascorbic Acid (VITAMIN C) 500 MG CAPS Take by mouth      Calcium 250 MG CAPS Take by mouth      desogestrel-ethinyl estradiol (Viorele) 0 15-0 02/0 01 MG (21/5) per tablet Take 1 tablet by mouth daily 112 tablet 3    EPINEPHrine (Auvi-Q) 0 3 mg/0 3 mL SOAJ as directed      fexofenadine (ALLEGRA ALLERGY) 180 MG tablet Take by mouth      lisinopril (ZESTRIL) 5 mg tablet Take 1 tablet (5 mg total) by mouth daily 90 tablet 1    montelukast (Singulair) 10 mg tablet Take 1 tablet by mouth each evening      Multiple Vitamin (MULTI-VITAMIN DAILY) TABS Take by mouth      Omega-3 Fatty Acids (Fish Oil) 1000 MG CPDR Take by mouth 2 (two) times a day 180 capsule 0    Sod Picosulfate-Mag Ox-Cit Acd (Clenpiq) 10-3 5-12 MG-GM -GM/160ML SOLN Take 1 Package by mouth see administration instructions 320 mL 0    triamcinolone (KENALOG) 0 1 % cream  (Patient not taking: Reported on 1/12/2022 )       No current facility-administered medications for this visit  Blood pressure 132/82, pulse 89, height 5' 3 5" (1 613 m), weight 67 1 kg (148 lb), SpO2 98 %, not currently breastfeeding  PHYSICAL EXAM: Physical Exam  Constitutional:       Appearance: Normal appearance  She is well-developed  HENT:      Head: Normocephalic and atraumatic  Nose: Nose normal    Eyes:      Conjunctiva/sclera: Conjunctivae normal    Neck:      Thyroid: No thyromegaly  Vascular: No JVD  Trachea: No tracheal deviation  Cardiovascular:      Rate and Rhythm: Normal rate and regular rhythm  Heart sounds: Normal heart sounds  No murmur heard  No friction rub  No gallop  Pulmonary:      Effort: Pulmonary effort is normal  No respiratory distress  Breath sounds: Normal breath sounds  No wheezing or rales  Abdominal:      General: Bowel sounds are normal  There is no distension  Palpations: Abdomen is soft  There is no mass  Tenderness: There is no abdominal tenderness  There is no guarding  Hernia: No hernia is present  Musculoskeletal:         General: No tenderness or deformity  Cervical back: Neck supple  Right lower leg: No edema  Left lower leg: No edema  Lymphadenopathy:      Cervical: No cervical adenopathy  Skin:     General: Skin is warm and dry  Findings: No erythema or rash  Neurological:      Mental Status: She is alert and oriented to person, place, and time  Psychiatric:         Mood and Affect: Mood normal          Behavior: Behavior normal          Thought Content: Thought content normal           Lab Results   Component Value Date    WBC 12 4 (H) 02/24/2021    HGB 12 7 02/24/2021    HCT 38 7 02/24/2021    MCV 88 4 02/24/2021     02/24/2021     Lab Results   Component Value Date    CALCIUM 8 8 02/24/2021    K 4 0 02/24/2021    CO2 26 02/24/2021     02/24/2021    BUN 17 02/24/2021    CREATININE 0 77 02/24/2021     Lab Results   Component Value Date    ALT 10 02/24/2021    AST 15 02/24/2021    ALKPHOS 67 02/24/2021     No results found for: INR, PROTIME    Mammo screening bilateral w 3d & cad    Result Date: 1/28/2022  Impression: No mammographic evidence of malignancy  ASSESSMENT/BI-RADS CATEGORY: Left: 1 - Negative Right: 1 - Negative Overall: 1 - Negative RECOMMENDATION:      - Routine screening mammogram in 1 year for both breasts  Workstation ID: [id number]       ASSESSMENT & PLAN:    Screen for colon cancer  Screening for colon cancer - patient is at average risk for colon cancer screening  Rule out colorectal lesions including polyps or malignancy         -Schedule for colonoscopy  -High-fiber diet     -Patient was given instructions about the colonoscopy prep     -Patient was explained about  the risks and benefits of the procedure  Risks including but not limited to bleeding, infection, perforation were explained in detail   Also explained about less than 100% sensitivity with the exam and other alternatives

## 2022-03-22 NOTE — PATIENT INSTRUCTIONS
Scheduled date of colonoscopy (as of today):4/28/2022  Physician performing colonoscopy:DR HENDERSON  Location of colonoscopy:[facility]  Bowel prep reviewed with patient:JS PER DR HENDERSON/NASRIN GIVEN  Instructions reviewed with patient by:TOMY GREENE  Clearances:

## 2022-03-23 ENCOUNTER — TELEPHONE (OUTPATIENT)
Dept: FAMILY MEDICINE CLINIC | Facility: CLINIC | Age: 47
End: 2022-03-23

## 2022-03-23 DIAGNOSIS — Z12.11 COLON CANCER SCREENING: Primary | ICD-10-CM

## 2022-03-23 NOTE — TELEPHONE ENCOUNTER
Patient called, stated she went to the GI visit for her colonoscopy  They scheduled her for the colonoscopy but when she got home she started to think about all that was involved and she said because she has kids that it would be very hard to do everything that is needed  She wants to know if she can change to doing the Cologuard    Please advise

## 2022-03-24 ENCOUNTER — TELEPHONE (OUTPATIENT)
Dept: GASTROENTEROLOGY | Facility: AMBULARY SURGERY CENTER | Age: 47
End: 2022-03-24

## 2022-03-24 NOTE — TELEPHONE ENCOUNTER
Pt called the GI to cancel her colonoscopy w/ dr Olu Mcgovern scheduled for 4/28/2022  Pt states she will do COLOGUARD instead

## 2022-04-01 ENCOUNTER — TELEMEDICINE (OUTPATIENT)
Dept: BEHAVIORAL/MENTAL HEALTH CLINIC | Facility: CLINIC | Age: 47
End: 2022-04-01
Payer: COMMERCIAL

## 2022-04-01 DIAGNOSIS — F41.1 GAD (GENERALIZED ANXIETY DISORDER): Primary | ICD-10-CM

## 2022-04-01 PROCEDURE — 90834 PSYTX W PT 45 MINUTES: CPT | Performed by: SOCIAL WORKER

## 2022-04-01 NOTE — PSYCH
Psychotherapy Provided: Individual Psychotherapy 51 minutes, from [id number]  Length of time in session: 51 minutes, follow up in 2 weeks  Encounter Diagnosis     ICD-10-CM    1  EDELMIRA (generalized anxiety disorder)  F41 1        Goals addressed in session: Goal 1 Reduce anxiety    Current suicide risk : Low     Virtual Regular Visit    Verification of patient location:    Patient is located in the following state in which I hold an active license [place]      Assessment/Plan:    Problem List Items Addressed This Visit        Other    EDELMIRA (generalized anxiety disorder) - Primary                 Reason for visit is   Chief Complaint   Patient presents with    Virtual Regular Visit        Encounter provider Erasmo Cheung LCSW    Provider located at [address]  [facility] [address] [phone]      Recent Visits  No visits were found meeting these conditions  Showing recent visits within past 7 days and meeting all other requirements  Future Appointments  No visits were found meeting these conditions  Showing future appointments within next 150 days and meeting all other requirements       The patient was identified by name and date of birth  Vidya Vaca was informed that this is a telemedicine visit and that the visit is being conducted through[facility] and patient was informed that this is a secure, HIPAA-compliant platform  She agrees to proceed     My office door was closed  No one else was in the room  She acknowledged consent and understanding of privacy and security of the video platform  The patient has agreed to participate and understands they can discontinue the visit at any time  Patient is aware this is a billable service       Garrison Hercules is a 48 y o  female   Feels unsettled with livvy's situation  Frustration with CYS syatem - is livvy going to be safe  Feels antsy when didn't do a lot  Has trouble sleeping, anxiety, idle mind - self-awareness  Missing mother   Feels kids are missing out  Looking to the future, seeing new normal, starting soccer  Seeing a future that is not restrained by Anne OTOOLE     Past Medical History:   Diagnosis Date    Allergic     seasonal, food allergies    Asthma     GERD (gastroesophageal reflux disease)     Hypertension     Irritable bowel syndrome     Migraine without aura     Shoulder subluxation, left     Tennis elbow        Past Surgical History:   Procedure Laterality Date    TONSILLECTOMY      US GUIDED MSK PROCEDURE  1/14/2020    WRIST SURGERY         Current Outpatient Medications   Medication Sig Dispense Refill    albuterol (PROAIR HFA) 90 mcg/act inhaler Inhale      Ascorbic Acid (VITAMIN C) 500 MG CAPS Take by mouth      Calcium 250 MG CAPS Take by mouth      desogestrel-ethinyl estradiol (Viorele) 0 15-0 02/0 01 MG (21/5) per tablet Take 1 tablet by mouth daily 112 tablet 3    EPINEPHrine (Auvi-Q) 0 3 mg/0 3 mL SOAJ as directed      fexofenadine (ALLEGRA ALLERGY) 180 MG tablet Take by mouth      lisinopril (ZESTRIL) 5 mg tablet Take 1 tablet (5 mg total) by mouth daily 90 tablet 1    montelukast (Singulair) 10 mg tablet Take 1 tablet by mouth each evening      Multiple Vitamin (MULTI-VITAMIN DAILY) TABS Take by mouth      Omega-3 Fatty Acids (Fish Oil) 1000 MG CPDR Take by mouth 2 (two) times a day 180 capsule 0    Sod Picosulfate-Mag Ox-Cit Acd (Clenpiq) 10-3 5-12 MG-GM -GM/160ML SOLN Take 1 Package by mouth see administration instructions 320 mL 0    triamcinolone (KENALOG) 0 1 % cream  (Patient not taking: Reported on 1/12/2022 )       No current facility-administered medications for this visit          Allergies   Allergen Reactions    Cat Hair Extract     Erythromycin     Hctz [Hydrochlorothiazide] Dizziness    Peanut [Nuts - Food Allergy]     Seasonal Ic  [Cholestatin]    Consolidated Lopez Screen Spf 30 [Albolene] Rash    Latex Rash    Medical Tape Rash       Review of Systems   Psychiatric/Behavioral: Negative for agitation, behavioral problems, confusion, decreased concentration, dysphoric mood, hallucinations, self-injury, sleep disturbance and suicidal ideas  The patient is nervous/anxious  The patient is not hyperactive  Video Exam    There were no vitals filed for this visit  Physical Exam  Psychiatric:         Attention and Perception: Attention and perception normal          Mood and Affect: Mood is anxious  Speech: Speech normal          Behavior: Behavior normal  Behavior is cooperative  Thought Content: Thought content normal          Cognition and Memory: Cognition and memory normal          Judgment: Judgment normal       Comments: Patient presents with anxious mood and congruent affect  Patient is well-groomed, with good eye contact and good focus in session  Patient's speech is normal rate and rhythm  Patient is alert, oriented, engaged, and open  Patient's thought process is organized and thought content is future-directed and goal-oriented  Patient's memory and cognition appear intact  Patient denies SI/HI/AH/VH and self-harm  VIRTUAL VISIT DISCLAIMER    Linda Gavin verbally agrees to participate in [employer]  Pt is aware that [employer] could be limited without vital signs or the ability to perform a full hands-on physical Ky De La Torre understands she or the provider may request at any time to terminate the video visit and request the patient to seek care or treatment in person

## 2022-04-03 NOTE — PATIENT INSTRUCTIONS
Continue to take all medications as prescribed  Attend all scheduled medical appointments  Follow up with therapist     If you are experiencing a mental health emergency, please call 911 for emergent care, or one of the following numbers for someone to talk to 24 hours a day, 7 days a week:    [facility] ([facility]) AT [place] Intervention: [address] Crisis Intervention: [phone]  CrisisTextLine:  Text [place] to 832410  [place]'s Support and Referral Hotline: Rui [address]:  986.135.8919    If you would like to leave a phone message for your therapist, please call the [address] [place] [address] [address] at [phone]  If you need to reach [address] [place] [address] E after hours, please call [phone] for on-call service

## 2022-04-05 LAB — COLOGUARD RESULT REPORTABLE: NEGATIVE

## 2022-04-29 ENCOUNTER — TELEMEDICINE (OUTPATIENT)
Dept: BEHAVIORAL/MENTAL HEALTH CLINIC | Facility: CLINIC | Age: 47
End: 2022-04-29
Payer: COMMERCIAL

## 2022-04-29 DIAGNOSIS — F41.1 GAD (GENERALIZED ANXIETY DISORDER): Primary | ICD-10-CM

## 2022-04-29 PROCEDURE — 90834 PSYTX W PT 45 MINUTES: CPT | Performed by: SOCIAL WORKER

## 2022-04-29 NOTE — PSYCH
Psychotherapy Provided: Individual Psychotherapy 46 minutes, from [id number]    Length of time in session: 46 minutes, follow up in 2 weeks    Encounter Diagnosis     ICD-10-CM    1  EDELMIRA (generalized anxiety disorder)  F41 1        Goals addressed in session: Goal 1 Reduce anxiety    Current suicide risk : Low       Virtual Regular Visit    Verification of patient location:    Patient is located in the following state in which I hold an active license [place]      Assessment/Plan:    Problem List Items Addressed This Visit        Other    EDELMIRA (generalized anxiety disorder) - Primary                   Reason for visit is   Chief Complaint   Patient presents with    Virtual Regular Visit        Encounter provider Kb Vivar LCSW    Provider located at [address]  [facility] [address] [phone]      Recent Visits  No visits were found meeting these conditions  Showing recent visits within past 7 days and meeting all other requirements  Future Appointments  No visits were found meeting these conditions  Showing future appointments within next 150 days and meeting all other requirements       The patient was identified by name and date of birth  Lucy Hadley was informed that this is a telemedicine visit and that the [facility] and patient was informed that this is a secure, HIPAA-compliant platform  She agrees to proceed     My office door was closed  No one else was in the room  She acknowledged consent and understanding of privacy and security of the video platform  The patient has agreed to participate and understands they can discontinue the visit at any time  Patient is aware this is a billable service  Subjective  Lucy Hadley is a 48 y o  female presenting for follow up appointment with [facility]    Daughter's birthday party went well  Got first vacinne shot  Some anxiety in younger daughter, thinks she has some anxiety, gets worried that she is going to end up like [address] [place] over livvy  When things don't go right, she immediately jumps to worse case scenario, example daughter said she was sick, stomach immediately gets upset     Catastrophic thinking    Cognitive distorions  Type a personality  Acknowledgig that she has been in survivial mode  Mom and siblings wre very angry - siblings would yell and screm and throw things - when she was 2  Academics became a part of her identiy  Remembers being told in 1st grade that she worried a lot   Has difficulty finding woody  Putting herself into situations that are difficut control    HPI     Past Medical History:   Diagnosis Date    Allergic     seasonal, food allergies    Asthma     GERD (gastroesophageal reflux disease)     Hypertension     Irritable bowel syndrome     Migraine without aura     Shoulder subluxation, left     Tennis elbow        Past Surgical History:   Procedure Laterality Date    TONSILLECTOMY      US GUIDED MSK PROCEDURE  1/14/2020    WRIST SURGERY         Current Outpatient Medications   Medication Sig Dispense Refill    albuterol (PROAIR HFA) 90 mcg/act inhaler Inhale      Ascorbic Acid (VITAMIN C) 500 MG CAPS Take by mouth      Calcium 250 MG CAPS Take by mouth      desogestrel-ethinyl estradiol (Viorele) 0 15-0 02/0 01 MG (21/5) per tablet Take 1 tablet by mouth daily 112 tablet 3    EPINEPHrine (Auvi-Q) 0 3 mg/0 3 mL SOAJ as directed      fexofenadine (ALLEGRA ALLERGY) 180 MG tablet Take by mouth      lisinopril (ZESTRIL) 5 mg tablet Take 1 tablet (5 mg total) by mouth daily 90 tablet 1    montelukast (Singulair) 10 mg tablet Take 1 tablet by mouth each evening      Multiple Vitamin (MULTI-VITAMIN DAILY) TABS Take by mouth      Omega-3 Fatty Acids (Fish Oil) 1000 MG CPDR Take by mouth 2 (two) times a day 180 capsule 0    Sod Picosulfate-Mag Ox-Cit Acd (Clenpiq) 10-3 5-12 MG-GM -GM/160ML SOLN Take 1 Package by mouth see administration instructions 320 mL 0    triamcinolone (KENALOG) 0 1 % cream  (Patient not taking: Reported on 1/12/2022 )       No current facility-administered medications for this visit  Allergies   Allergen Reactions    Cat Hair Extract     Erythromycin     Hctz [Hydrochlorothiazide] Dizziness    Peanut [Nuts - Food Allergy]     Seasonal Ic  [Cholestatin]     Trish Sun Screen Spf 30 [Albolene] Rash    Latex Rash    Medical Tape Rash       Review of Systems   Psychiatric/Behavioral: Negative for agitation, behavioral problems, confusion, decreased concentration, dysphoric mood, hallucinations, self-injury, sleep disturbance and suicidal ideas  The patient is nervous/anxious  The patient is not hyperactive  Video Exam    There were no vitals filed for this visit  Physical Exam  Psychiatric:         Attention and Perception: Attention and perception normal          Mood and Affect: Mood is anxious  Speech: Speech normal          Behavior: Behavior normal  Behavior is cooperative  Thought Content: Thought content normal          Cognition and Memory: Cognition and memory normal          Judgment: Judgment normal       Comments: Patient presents with anxious mood and congruent affect  Patient is well-groomed, with good eye contact and good focus in session  Patient's speech is normal rate and rhythm  Patient is alert, oriented, engaged, and open  Patient's thought process is organized and thought content is shifting from crisis to trauma  Patient's memory and cognition appear intact  Patient denies SI/HI/AH/VH and self-harm  VIRTUAL VISIT DISCLAIMER    Nayeli Amador verbally agrees to participate in [employer]   Pt is aware that [employer] could be limited without vital signs or the ability to perform a full hands-on physical Raenell Most understands she or the provider may request at any time to terminate the video visit and request the patient to seek care or treatment in person

## 2022-04-30 ENCOUNTER — AMB VIDEO VISIT (OUTPATIENT)
Dept: OTHER | Facility: HOSPITAL | Age: 47
End: 2022-04-30
Payer: COMMERCIAL

## 2022-04-30 DIAGNOSIS — J01.40 ACUTE PANSINUSITIS, RECURRENCE NOT SPECIFIED: Primary | ICD-10-CM

## 2022-04-30 PROCEDURE — 99212 OFFICE O/P EST SF 10 MIN: CPT | Performed by: NURSE PRACTITIONER

## 2022-04-30 RX ORDER — AMOXICILLIN AND CLAVULANATE POTASSIUM 875; 125 MG/1; MG/1
1 TABLET, FILM COATED ORAL EVERY 12 HOURS SCHEDULED
Qty: 20 TABLET | Refills: 0 | Status: SHIPPED | OUTPATIENT
Start: 2022-04-30 | End: 2022-05-10

## 2022-04-30 NOTE — PROGRESS NOTES
Video Visit - Davian Arias 48 y o  female MRN: [id number]    REQUIRED DOCUMENTATION:         1  [facility]  2  Provider located at [address] [place] [id number]  3  [facility] provider: DIAMOND Haddad  4  Identify all parties in room with patient during [facility] visit:  Patient   5  After connecting through [employer], patient was identified by name and date of birth  Patient was then informed that this was a Telemedicine visit and that the exam was being conducted confidentially over secure lines  My office door was closed  No one else was in the room  Patient acknowledged consent and understanding of privacy and security of the Telemedicine visit  I informed the patient that I have reviewed their record in Epic and presented the opportunity for them to ask any questions regarding the visit today  The patient agreed to participate  This is a 48year old female here today for video visit  She states she started with cold like symptoms about 9 days ago  She states symptoms improved but then returned  She is now having thick yellow nasal discharge  No fever, body aches or chills  No sob or chest pain  Slight cough from post nasal drip  She is vaccinted for covid and had booster  She had flu vaccine  Child were sick the week prior to onset of her symptoms  Son tested negative for covid  Review of Systems   Constitutional: Positive for fatigue  Negative for activity change and chills  HENT: Positive for congestion, sinus pressure, sinus pain and sore throat  Respiratory: Negative  Cardiovascular: Negative  Skin: Negative  Neurological: Negative  Psychiatric/Behavioral: Negative  Physical Exam  Constitutional:       General: She is not in acute distress  Appearance: Normal appearance  She is not ill-appearing or toxic-appearing  HENT:      Head: Normocephalic        Ears:      Comments: Sinus pressure Nose: Congestion present  Eyes:      Conjunctiva/sclera: Conjunctivae normal    Pulmonary:      Effort: Pulmonary effort is normal  No respiratory distress  Breath sounds: Normal breath sounds  Comments: No cough or audible wheezing during video visit   Skin:     Comments: No rash on head or neck  Neurological:      Mental Status: She is alert and oriented to person, place, and time  Psychiatric:         Mood and Affect: Mood normal          Behavior: Behavior normal          Thought Content: Thought content normal          Judgment: Judgment normal        Diagnoses and all orders for this visit:    Acute pansinusitis, recurrence not specified  -     amoxicillin-clavulanate (AUGMENTIN) 875-125 mg per tablet; Take 1 tablet by mouth every 12 (twelve) hours for 10 days      Patient Instructions   Start antibiotic  Take probiotic  Nasal saline flushes and jorge pot as needed  Tylenol or motrin as needed  OTC mucinex as needed  Follow up with PCP if no improvement  Go to ER with any worsening symptoms chest pain or sob  Follow up with PCP if not improved, if symptoms are worse, go to the ER

## 2022-04-30 NOTE — CARE ANYWHERE EVISITS
Visit Summary for [facility] - Gender: Female - Date of Birth: [id number]  Date: [id number] - Duration: 6 minutes  Patient: [facility]  Provider: Tunde FIELDS    Patient Contact Information  Address  [address]  [facility] [address]  [phone]    Visit Topics  Sinus Infection [Added By: Self - 2022-04-30]    Triage Questions   What is your current physical address in the event of a medical emergency? Answer []  Are you allergic to any medications? Answer []  Are you now or could you be pregnant? Answer []  Do you have any immune system compromise or chronic lung   disease? Answer []  Do you have any vulnerable family members in the home (infant, pregnant, cancer, elderly)? Answer []     Conversation Transcripts  [0A][0A] [Notification] You are connected with Lita Zimmerman, Urgent Care Specialist [0A][Notification] Kai Figueroa is located in [place]  [0A][Notification] Kai Figueroa has shared health history  Suzie White  [0A]    Diagnosis  Acute pansinusitis    Procedures  Value: 17280 Code: CPT-4 UNLISTED E&M SERVICE    Medications Prescribed    No prescriptions ordered    Electronically signed by: Lita Polanco(NPI [id number])

## 2022-04-30 NOTE — PATIENT INSTRUCTIONS
Start antibiotic  Take probiotic  Nasal saline flushes and jorge pot as needed  Tylenol or motrin as needed  OTC mucinex as needed  Follow up with PCP if no improvement  Go to ER with any worsening symptoms chest pain or sob

## 2022-05-02 NOTE — PATIENT INSTRUCTIONS
Continue to take all medications as prescribed  Attend all scheduled medical appointments  Follow up with therapist     If you are experiencing a mental health emergency, please call 911 for emergent care, or one of the following numbers for someone to talk to 24 hours a day, 7 days a week:    [facility] ([facility]) AT [place] Intervention: [address] Crisis Intervention: [phone]  CrisisTextLine:  Text PA to 907709  PA's Support and Referral Hotline: Rui 58:  856.624.4214    If you would like to leave a phone message for your therapist, please call the [address] [place] [address] [address] Integration Department at [phone]  If you need to reach [address] [place] [address] E after hours, please call [phone] for on-call service

## 2022-05-06 ENCOUNTER — TELEPHONE (OUTPATIENT)
Dept: FAMILY MEDICINE CLINIC | Facility: CLINIC | Age: 47
End: 2022-05-06

## 2022-05-06 NOTE — TELEPHONE ENCOUNTER
Patient called regarding her antibiotic she has had some diarrhea from taking it  Advised to take probiotic and yogurt  And make sure eating when taking the medication

## 2022-05-13 ENCOUNTER — TELEMEDICINE (OUTPATIENT)
Dept: BEHAVIORAL/MENTAL HEALTH CLINIC | Facility: CLINIC | Age: 47
End: 2022-05-13
Payer: COMMERCIAL

## 2022-05-13 DIAGNOSIS — F41.1 GAD (GENERALIZED ANXIETY DISORDER): Primary | ICD-10-CM

## 2022-05-13 PROCEDURE — 90834 PSYTX W PT 45 MINUTES: CPT | Performed by: SOCIAL WORKER

## 2022-05-13 NOTE — PSYCH
Psychotherapy Provided: Individual Psychotherapy 47 minutes, from [id number]  Length of time in session:47 minutes, follow up in 2 week    Encounter Diagnosis     ICD-10-CM    1  EDELMIRA (generalized anxiety disorder)  F41 1        Goals addressed in session: Goal 1 Decrease anxiety    Current suicide risk : Low     Virtual Regular Visit    Verification of patient location:    Patient is located in the following state in which I hold an active license [place]      Assessment/Plan:    Problem List Items Addressed This Visit        Other    EDELMIRA (generalized anxiety disorder) - Primary                 Reason for visit is   Chief Complaint   Patient presents with    Virtual Regular Visit        Encounter provider Dodie Celeste LCSW    Provider located at [address]  [facility] [address] [phone]      Recent Visits  No visits were found meeting these conditions  Showing recent visits within past 7 days and meeting all other requirements  Future Appointments  No visits were found meeting these conditions  Showing future appointments within next 150 days and meeting all other requirements       The patient was identified by name and date of birth  Linnea Reyes was informed that this is a telemedicine visit and that the [facility] and patient was informed that this is a secure, HIPAA-compliant platform  She agrees to proceed     My office door was closed  No one else was in the room  She acknowledged consent and understanding of privacy and security of the video platform  The patient has agreed to participate and understands they can discontinue the visit at any time  Patient is aware this is a billable service  Subjective  Linnea Reyes is a 48 y o  female presenting for follow up appointment with Integration Services    Patient reports she has had a stressful couple of weeks at work  HPI     Past Medical History:   Diagnosis Date    Allergic     seasonal, food allergies    Asthma     GERD (gastroesophageal reflux disease)     Hypertension     Irritable bowel syndrome     Migraine without aura     Shoulder subluxation, left     Tennis elbow        Past Surgical History:   Procedure Laterality Date    TONSILLECTOMY      US GUIDED MSK PROCEDURE  1/14/2020    WRIST SURGERY         Current Outpatient Medications   Medication Sig Dispense Refill    albuterol (PROAIR HFA) 90 mcg/act inhaler Inhale      Ascorbic Acid (VITAMIN C) 500 MG CAPS Take by mouth      Calcium 250 MG CAPS Take by mouth      desogestrel-ethinyl estradiol (Viorele) 0 15-0 02/0 01 MG (21/5) per tablet Take 1 tablet by mouth daily 112 tablet 3    EPINEPHrine (Auvi-Q) 0 3 mg/0 3 mL SOAJ as directed      fexofenadine (ALLEGRA ALLERGY) 180 MG tablet Take by mouth      lisinopril (ZESTRIL) 5 mg tablet Take 1 tablet (5 mg total) by mouth daily 90 tablet 1    montelukast (Singulair) 10 mg tablet Take 1 tablet by mouth each evening      Multiple Vitamin (MULTI-VITAMIN DAILY) TABS Take by mouth      Omega-3 Fatty Acids (Fish Oil) 1000 MG CPDR Take by mouth 2 (two) times a day 180 capsule 0    Sod Picosulfate-Mag Ox-Cit Acd (Clenpiq) 10-3 5-12 MG-GM -GM/160ML SOLN Take 1 Package by mouth see administration instructions 320 mL 0    triamcinolone (KENALOG) 0 1 % cream  (Patient not taking: Reported on 1/12/2022 )       No current facility-administered medications for this visit          Allergies   Allergen Reactions    Cat Hair Extract     Erythromycin     Hctz [Hydrochlorothiazide] Dizziness    Peanut [Nuts - Food Allergy]     Seasonal Ic  [Cholestatin]     Trish Sun Screen Spf 30 [Albolene] Rash    Latex Rash    Medical Tape Rash       Review of Systems   Psychiatric/Behavioral: Negative for agitation, behavioral problems, confusion, decreased concentration, dysphoric mood, hallucinations, self-injury, sleep disturbance and suicidal ideas  The patient is nervous/anxious  The patient is not hyperactive  Video Exam    There were no vitals filed for this visit  Physical Exam  Psychiatric:         Attention and Perception: Attention and perception normal          Mood and Affect: Mood and affect normal          Speech: Speech normal          Behavior: Behavior normal  Behavior is cooperative  Thought Content: Thought content normal          Cognition and Memory: Cognition and memory normal          Judgment: Judgment normal       Comments: Patient presents with euthymic mood and congruent affect  Patient is well-groomed, with good eye contact and good focus in session  Patient's speech is normal rate and rhythm  Patient is alert, oriented, engaged, and open  Patient's thought process is organized and thought content is future-directed and goal-oriented  Patient's memory and cognition appear intact  Patient denies SI/HI/AH/VH and self-harm  VIRTUAL VISIT DISCLAIMER    Rach Marion verbally agrees to participate in [employer]  Pt is aware that [employer] could be limited without vital signs or the ability to perform a full hands-on physical [place] understands she or the provider may request at any time to terminate the video visit and request the patient to seek care or treatment in person

## 2022-05-15 NOTE — PATIENT INSTRUCTIONS
Continue to take all medications as prescribed  Attend all scheduled medical appointments  Follow up with therapist     If you are experiencing a mental health emergency, please call 911 for emergent care, or one of the following numbers for someone to talk to 24 hours a day, 7 days a week:    [facility] ([facility]) AT [place] Intervention: [address] Crisis Intervention: [phone]  CrisisTextLine:  Text PA to 758415  PA's Support and Referral Hotline: Rui 58:  [phone]    If you would like to leave a phone message for your therapist, please call the [address] [place] [address] [address] Integration Department at [phone]  If you need to reach [address] [place] [address] E after hours, please call [phone] for on-call service

## 2022-05-27 ENCOUNTER — TELEMEDICINE (OUTPATIENT)
Dept: BEHAVIORAL/MENTAL HEALTH CLINIC | Facility: CLINIC | Age: 47
End: 2022-05-27
Payer: COMMERCIAL

## 2022-05-27 DIAGNOSIS — F41.1 GAD (GENERALIZED ANXIETY DISORDER): Primary | ICD-10-CM

## 2022-05-27 PROCEDURE — 90834 PSYTX W PT 45 MINUTES: CPT | Performed by: SOCIAL WORKER

## 2022-05-27 NOTE — PSYCH
Psychotherapy Provided: Individual Psychotherapy 48 minutes, from [id number]    Length of time in session: 48 minutes, follow up in 2 weeks  Encounter Diagnosis     ICD-10-CM    1  EDELMIRA (generalized anxiety disorder)  F41 1        Goals addressed in session: Goal 1 Reduce anxiety      Current suicide risk : Low         Virtual Regular Visit    Verification of patient location:    Patient is located in the following state in which I hold an active license [place]      Assessment/Plan:    Problem List Items Addressed This Visit        Other    EDELMIRA (generalized anxiety disorder) - Primary                   Reason for visit is   Chief Complaint   Patient presents with    Virtual Regular Visit        Encounter provider Fadumo Issa LCSW    Provider located at [address]  [facility] [address] [phone]      Recent Visits  No visits were found meeting these conditions  Showing recent visits within past 7 days and meeting all other requirements  Future Appointments  No visits were found meeting these conditions  Showing future appointments within next 150 days and meeting all other requirements       The patient was identified by name and date of birth  Josiane Notice was informed that this is a telemedicine visit and that the [facility] and patient was informed that this is a secure, HIPAA-compliant platform  She agrees to proceed     My office door was closed  No one else was in the room  She acknowledged consent and understanding of privacy and security of the video platform  The patient has agreed to participate and understands they can discontinue the visit at any time  Patient is aware this is a billable service       Mandy Smiht is a 48 y o  female       HPI     Past Medical History:   Diagnosis Date    Allergic     seasonal, food allergies    Asthma     GERD (gastroesophageal reflux disease)     Hypertension     Irritable bowel syndrome     Migraine without aura     Shoulder subluxation, left     Tennis elbow        Past Surgical History:   Procedure Laterality Date    TONSILLECTOMY      US GUIDED MSK PROCEDURE  1/14/2020    WRIST SURGERY         Current Outpatient Medications   Medication Sig Dispense Refill    albuterol (PROAIR HFA) 90 mcg/act inhaler Inhale      Ascorbic Acid (VITAMIN C) 500 MG CAPS Take by mouth      Calcium 250 MG CAPS Take by mouth      desogestrel-ethinyl estradiol (Viorele) 0 15-0 02/0 01 MG (21/5) per tablet Take 1 tablet by mouth daily 112 tablet 3    EPINEPHrine (Auvi-Q) 0 3 mg/0 3 mL SOAJ as directed      fexofenadine (ALLEGRA ALLERGY) 180 MG tablet Take by mouth      lisinopril (ZESTRIL) 5 mg tablet Take 1 tablet (5 mg total) by mouth daily 90 tablet 1    montelukast (Singulair) 10 mg tablet Take 1 tablet by mouth each evening      Multiple Vitamin (MULTI-VITAMIN DAILY) TABS Take by mouth      Omega-3 Fatty Acids (Fish Oil) 1000 MG CPDR Take by mouth 2 (two) times a day 180 capsule 0    Sod Picosulfate-Mag Ox-Cit Acd (Clenpiq) 10-3 5-12 MG-GM -GM/160ML SOLN Take 1 Package by mouth see administration instructions 320 mL 0    triamcinolone (KENALOG) 0 1 % cream  (Patient not taking: Reported on 1/12/2022 )       No current facility-administered medications for this visit  Allergies   Allergen Reactions    Cat Hair Extract     Erythromycin     Hctz [Hydrochlorothiazide] Dizziness    Peanut [Nuts - Food Allergy]     Seasonal Ic  [Cholestatin]     Trish Sun Screen Spf 30 [Albolene] Rash    Latex Rash    Medical Tape Rash       Review of Systems   Psychiatric/Behavioral: Negative for agitation, behavioral problems, confusion, decreased concentration, dysphoric mood, hallucinations, self-injury, sleep disturbance and suicidal ideas  The patient is not nervous/anxious and is not hyperactive          Video Exam    There were no vitals filed for this visit  Physical Exam  Psychiatric:         Attention and Perception: Attention and perception normal          Mood and Affect: Mood and affect normal          Speech: Speech normal          Behavior: Behavior normal  Behavior is cooperative  Thought Content: Thought content normal          Cognition and Memory: Cognition and memory normal          Judgment: Judgment normal       Comments: Patient presents with euthymic mood and congruent affect  Patient is well-groomed, with good eye contact and good focus in session  Patient's speech is normal rate and rhythm  Patient is alert, oriented, engaged, and open  Patient's thought process is organized and thought content is future-directed and goal-oriented  Patient's memory and cognition appear intact  Patient denies SI/HI/AH/VH and self-harm  VIRTUAL VISIT DISCLAIMER    Schuyler Bejarano verbally agrees to participate in [facility]  Pt is aware that [facility] could be limited without vital signs or the ability to perform a full hands-on physical Stephaniejadiel Lopez understands she or the provider may request at any time to terminate the video visit and request the patient to seek care or treatment in person

## 2022-06-01 NOTE — PATIENT INSTRUCTIONS
Continue to take all medications as prescribed  Attend all scheduled medical appointments  Follow up with therapist     If you are experiencing a mental health emergency, please call 911 for emergent care, or one of the following numbers for someone to talk to 24 hours a day, 7 days a week:    [facility] ([facility]) AT [place] Intervention: [address] Crisis Intervention: [phone]  CrisisTextLine:  Text PA to 201904  PA's Support and Referral Hotline: Rui 58:  455.737.9438    If you would like to leave a phone message for your therapist, please call the [address] [place] [address] [address] Integration Department at [phone]  If you need to reach [address] [place] [address] E after hours, please call [phone] for on-call service

## 2022-06-10 ENCOUNTER — TELEMEDICINE (OUTPATIENT)
Dept: BEHAVIORAL/MENTAL HEALTH CLINIC | Facility: CLINIC | Age: 47
End: 2022-06-10
Payer: COMMERCIAL

## 2022-06-10 DIAGNOSIS — F41.1 GAD (GENERALIZED ANXIETY DISORDER): Primary | ICD-10-CM

## 2022-06-10 PROCEDURE — 90834 PSYTX W PT 45 MINUTES: CPT | Performed by: SOCIAL WORKER

## 2022-06-10 NOTE — PATIENT INSTRUCTIONS
Continue to take all medications as prescribed  Attend all scheduled medical appointments  Follow up with therapist     If you are experiencing a mental health emergency, please call 911 for emergent care, or one of the following numbers for someone to talk to 24 hours a day, 7 days a week:    [facility] ([facility]) AT [place] Intervention: [address] Crisis Intervention: [phone]  CrisisTextLine:  Text PA to 327678  PA's Support and Referral Hotline: Rui 58:  120.501.2506    If you would like to leave a phone message for your therapist, please call the [address] [place] [address] [address] Integration Department at [phone]  If you need to reach [address] [place] [address] E after hours, please call [phone] for on-call service

## 2022-06-10 NOTE — PSYCH
Psychotherapy Provided: Individual Psychotherapy 48 minutes, from [id number]  Length of time in session: 48 minutes, follow up in 4 weeks    Encounter Diagnosis     ICD-10-CM    1  EDELMIRA (generalized anxiety disorder)  F41 1        Goals addressed in session: Goal 1     Current suicide risk : Low     Virtual Regular Visit    Verification of patient location:    Patient is located in the following state in which I hold an active license [place]      Assessment/Plan:    Problem List Items Addressed This Visit        Other    EDELMIRA (generalized anxiety disorder) - Primary               Reason for visit is   Chief Complaint   Patient presents with    Virtual Regular Visit        Encounter provider Arelis Mejía LCSW    Provider located at [address]  [facility] [address] [phone]      Recent Visits  No visits were found meeting these conditions  Showing recent visits within past 7 days and meeting all other requirements  Future Appointments  No visits were found meeting these conditions  Showing future appointments within next 150 days and meeting all other requirements       The patient was identified by name and date of birth  Junior Conklin was informed that this is a telemedicine visit and [facility] and patient was informed that this is a secure, HIPAA-compliant platform  She agrees to proceed     My office door was closed  No one else was in the room  She acknowledged consent and understanding of privacy and security of the video platform  The patient has agreed to participate and understands they can discontinue the visit at any time  Patient is aware this is a billable service  Subjective  Junior Conklin is a 48 y o  female presenting for follow up appointment with Integration Services    Patient is feeling well, with minor anxiety around going to the beach this coming week  Patient did describe anxiety during one of her daughter's temper tantrums - goes down a negative thinking path - we identified distorted thought processes, including catastrophizing  Acknowledges that she manages anxiety by being "overprepared" - this is not always a realistic solution  Got a call from [facility] , had to have interview with [employer] over incident  - patient asked for input  She is trying to balance not caring for [address] with still being her legal guardian  Plans for beach trip with daughters that she is looking forward to  Plans to remain flexible         HPI     Past Medical History:   Diagnosis Date    Allergic     seasonal, food allergies    Asthma     GERD (gastroesophageal reflux disease)     Hypertension     Irritable bowel syndrome     Migraine without aura     Shoulder subluxation, left     Tennis elbow        Past Surgical History:   Procedure Laterality Date    TONSILLECTOMY      US GUIDED MSK PROCEDURE  1/14/2020    WRIST SURGERY         Current Outpatient Medications   Medication Sig Dispense Refill    albuterol (PROAIR HFA) 90 mcg/act inhaler Inhale      Ascorbic Acid (VITAMIN C) 500 MG CAPS Take by mouth      Calcium 250 MG CAPS Take by mouth      desogestrel-ethinyl estradiol (Viorele) 0 15-0 02/0 01 MG (21/5) per tablet Take 1 tablet by mouth daily 112 tablet 3    EPINEPHrine (Auvi-Q) 0 3 mg/0 3 mL SOAJ as directed      fexofenadine (ALLEGRA ALLERGY) 180 MG tablet Take by mouth      lisinopril (ZESTRIL) 5 mg tablet Take 1 tablet (5 mg total) by mouth daily 90 tablet 1    montelukast (Singulair) 10 mg tablet Take 1 tablet by mouth each evening      Multiple Vitamin (MULTI-VITAMIN DAILY) TABS Take by mouth      Omega-3 Fatty Acids (Fish Oil) 1000 MG CPDR Take by mouth 2 (two) times a day 180 capsule 0    Sod Picosulfate-Mag Ox-Cit Acd (Clenpiq) 10-3 5-12 MG-GM -GM/160ML SOLN Take 1 Package by mouth see administration instructions 320 mL 0    triamcinolone (KENALOG) 0 1 % cream  (Patient not taking: Reported on 1/12/2022 )       No current facility-administered medications for this visit  Allergies   Allergen Reactions    Cat Hair Extract     Erythromycin     Hctz [Hydrochlorothiazide] Dizziness    Peanut [Nuts - Food Allergy]     Seasonal Ic  [Cholestatin]     Trish Sun Screen Spf 30 [Albolene] Rash    Latex Rash    Medical Tape Rash       Review of Systems   Psychiatric/Behavioral: Negative for agitation, behavioral problems, confusion, decreased concentration, dysphoric mood, hallucinations, self-injury, sleep disturbance and suicidal ideas  The patient is nervous/anxious  The patient is not hyperactive  Video Exam    There were no vitals filed for this visit  Physical Exam  Psychiatric:         Attention and Perception: Attention and perception normal          Mood and Affect: Mood is anxious  Speech: Speech normal          Behavior: Behavior normal  Behavior is cooperative  Thought Content: Thought content normal          Cognition and Memory: Cognition and memory normal          Judgment: Judgment normal       Comments: Patient presents with anxious mood and congruent affect  Patient is well-groomed, with good eye contact and good focus in session  Patient's speech is normal rate and rhythm  Patient is alert, oriented, engaged, and open  Patient's thought process is organized and thought content is future-directed and goal-oriented  Patient's memory and cognition appear intact  Patient denies SI/HI/AH/VH and self-harm  VIRTUAL VISIT DISCLAIMER    Lisa Naqvi verbally agrees to participate in [employer]   Pt is aware that [employer] could be limited without vital signs or the ability to perform a full hands-on physical Lacretia Yulisa understands she or the provider may request at any time to terminate the video visit and request the patient to seek care or treatment in person

## 2022-06-20 DIAGNOSIS — I10 HYPERTENSION, UNSPECIFIED TYPE: ICD-10-CM

## 2022-06-20 RX ORDER — LISINOPRIL 5 MG/1
5 TABLET ORAL DAILY
Qty: 90 TABLET | Refills: 1 | Status: SHIPPED | OUTPATIENT
Start: 2022-06-20 | End: 2022-07-22 | Stop reason: SDUPTHER

## 2022-06-20 NOTE — TELEPHONE ENCOUNTER
Patient just returned from vacation and left her lisinopril RX there  Patient is requesting that a new RX be sent to mail order and a 90 day sent to local   She had just had the 90 day filled and left it   A 2 week supply was called to the local

## 2022-07-08 ENCOUNTER — TELEMEDICINE (OUTPATIENT)
Dept: BEHAVIORAL/MENTAL HEALTH CLINIC | Facility: CLINIC | Age: 47
End: 2022-07-08
Payer: COMMERCIAL

## 2022-07-08 DIAGNOSIS — F41.1 GAD (GENERALIZED ANXIETY DISORDER): Primary | ICD-10-CM

## 2022-07-08 PROCEDURE — 90834 PSYTX W PT 45 MINUTES: CPT | Performed by: SOCIAL WORKER

## 2022-07-08 NOTE — BH TREATMENT PLAN
Romeo Mando  1975       Date of Initial Treatment Plan: 7/8/2022  Date of Current Treatment Plan: 07/08/22    Treatment Plan Number 1    Strengths/Personal Resources for Self Care: voluntarily attends therapy, educated, family support, insightful, caring, able to appropriately implement boundaries, motivated, employed, seeks self-improvement    Diagnosis:   1  EDELMIRA (generalized anxiety disorder)         Area of Needs: coping tools for dealing with increased anxiety      Long Term Goal 1: Manage anxiety    Target Date: 1/8/2023  Completion Date: TBD         Short Term Objectives for Goal 1:   Learning strategies to work through triggering moments  Seek to increase mindfulness and self-care activities  Implement effective and healthy behavioral management strategies for children  Continue to implement and respect boundaries  Engage in self-care by reading, exercising, deep breathing    Balancing parenting and work tasks with self-care      GOAL 1: Modality: Individual 2x per month   Completion Date TBD  The person(s) responsible for carrying out the plan is  Jovanni Valdovinos and therapist       [address]: Diagnosis and Treatment Plan explained to Soledad Hebert relates understanding diagnosis and is agreeable to Treatment Plan  Client Comments : Please share your thoughts, feelings, need and/or experiences regarding your treatment plan: Abhijit Negrete, 1975, actively participated in the creation of this treatment plan during a virtual session, using the [employer] platform  Abhijit Negrete  provided verbal consent on 7/8/2022 at   [phone] AM  The treatment plan was transcribed into the [employer] at a later time

## 2022-07-08 NOTE — PSYCH
Psychotherapy Provided: Individual Psychotherapy 44 minutes, from [id number]  Length of time in session: 44 minutes, follow up in 2 weeks    Encounter Diagnosis     ICD-10-CM    1  EDELMIRA (generalized anxiety disorder)  F41 1        Goals addressed in session: Complete treatment plan    Current suicide risk : Low     Behavioral Health Treatment Plan [facility]: Diagnosis and Treatment Plan explained to Abhijit Cramer relates understanding diagnosis and is agreeable to Treatment Plan  Yes     Virtual Regular Visit    Verification of patient location:    Patient is located in the following state in which I hold an active license [place]      Assessment/Plan:    Problem List Items Addressed This Visit        Other    EDELMIRA (generalized anxiety disorder) - Primary                   Reason for visit is   Chief Complaint   Patient presents with    Virtual Regular Visit        Encounter provider Gordon Urrutia LCSW    Provider located at [address] [address] [phone]      Recent Visits  No visits were found meeting these conditions  Showing recent visits within past 7 days and meeting all other requirements  Today's Visits  Date Type Provider Dept   07/08/22 Telemedicine Gordon Urrutia LCSW [facility]   Showing today's visits and meeting all other requirements  Future Appointments  No visits were found meeting these conditions  Showing future appointments within next 150 days and meeting all other requirements       The patient was identified by name and date of birth  Maty Hoang was informed that this is a telemedicine visit and that the [facility] and patient was informed that this is a secure, HIPAA-compliant platform  She agrees to proceed     My office door was closed  No one else was in the room    She acknowledged consent and understanding of privacy and security of the video platform  The patient has agreed to participate and understands they can discontinue the visit at any time  Patient is aware this is a billable service  Subjective  Susan Stokes is a 48 y o  female presenting for follow up appointment with [facility]  Patient reports she is feeling some stress with the ongoing custody case with her adoptive daughter living in foster care  Patient also balances this stress with parenting a teenager and a toddler, and working as a tenured Professor/Head of the Department  Patient participated in treatment planning today and plans to focus on maintaining self-care and balance as a part of a work-life-parent balance        HPI     Past Medical History:   Diagnosis Date    Allergic     seasonal, food allergies    Asthma     GERD (gastroesophageal reflux disease)     Hypertension     Irritable bowel syndrome     Migraine without aura     Shoulder subluxation, left     Tennis elbow        Past Surgical History:   Procedure Laterality Date    TONSILLECTOMY      US GUIDED MSK PROCEDURE  1/14/2020    WRIST SURGERY         Current Outpatient Medications   Medication Sig Dispense Refill    albuterol (PROAIR HFA) 90 mcg/act inhaler Inhale      Ascorbic Acid (VITAMIN C) 500 MG CAPS Take by mouth      Calcium 250 MG CAPS Take by mouth      desogestrel-ethinyl estradiol (Viorele) 0 15-0 02/0 01 MG (21/5) per tablet Take 1 tablet by mouth daily 112 tablet 3    EPINEPHrine (Auvi-Q) 0 3 mg/0 3 mL SOAJ as directed      fexofenadine (ALLEGRA ALLERGY) 180 MG tablet Take by mouth      lisinopril (ZESTRIL) 5 mg tablet Take 1 tablet (5 mg total) by mouth daily 90 tablet 1    montelukast (Singulair) 10 mg tablet Take 1 tablet by mouth each evening      Multiple Vitamin (MULTI-VITAMIN DAILY) TABS Take by mouth      Omega-3 Fatty Acids (Fish Oil) 1000 MG CPDR Take by mouth 2 (two) times a day 180 capsule 0    Sod Picosulfate-Mag Ox-Cit Acd (Clenpiq) 10-3 5-12 MG-GM -GM/160ML SOLN Take 1 Package by mouth see administration instructions 320 mL 0    triamcinolone (KENALOG) 0 1 % cream  (Patient not taking: Reported on 1/12/2022 )       No current facility-administered medications for this visit  Allergies   Allergen Reactions    Cat Hair Extract     Erythromycin     Hctz [Hydrochlorothiazide] Dizziness    Peanut [Nuts - Food Allergy]     Seasonal Ic  [Cholestatin]     Trish Sun Screen Spf 30 [Albolene] Rash    Latex Rash    Medical Tape Rash       Review of Systems   Psychiatric/Behavioral: Negative for agitation, behavioral problems, confusion, decreased concentration, dysphoric mood, hallucinations, self-injury, sleep disturbance and suicidal ideas  The patient is not nervous/anxious and is not hyperactive  Video Exam    There were no vitals filed for this visit  Physical Exam  Psychiatric:         Attention and Perception: Attention and perception normal          Mood and Affect: Mood and affect normal          Speech: Speech normal          Behavior: Behavior normal  Behavior is cooperative  Thought Content: Thought content normal          Cognition and Memory: Cognition and memory normal          Judgment: Judgment normal       Comments: Patient presents with euthymic mood and congruent affect  Patient is well-groomed, with good eye contact and good focus in session  Patient's speech is normal rate and rhythm  Patient is alert, oriented, engaged, and open  Patient's thought process is organized and thought content is future-directed and goal-oriented  Patient's memory and cognition appear intact  Patient denies SI/HI/AH/VH and self-harm  VIRTUAL VISIT DISCLAIMER    Oneta Codding verbally agrees to participate in [employer]   Pt is aware that [employer] could be limited without vital signs or the ability to perform a full hands-on physical exam  Mee Catarina Carlton understands she or the provider may request at any time to terminate the video visit and request the patient to seek care or treatment in person

## 2022-07-18 NOTE — PATIENT INSTRUCTIONS
Continue to take all medications as prescribed  Attend all scheduled medical appointments  Follow up with therapist     If you are experiencing a mental health emergency, please call 911 for emergent care, or one of the following numbers for someone to talk to 24 hours a day, 7 days a week:    [facility] ([facility]) AT [place] Intervention: [address] Crisis Intervention: [phone]  CrisisTextLine:  Text [place] to 970210  [place]'s Support and Referral Hotline: [address]:  214.448.3604    If you would like to leave a phone message for your therapist, please call the [address] [place] [address] [address] Department at [phone]  If you need to reach [address] [place] [address] E after hours, please call [phone] for on-call service

## 2022-07-19 LAB
25(OH)D3 SERPL-MCNC: 56 NG/ML (ref 30–100)
ALBUMIN SERPL-MCNC: 3.7 G/DL (ref 3.6–5.1)
ALBUMIN/CREAT UR: 4 MCG/MG CREAT
ALBUMIN/GLOB SERPL: 1.1 (CALC) (ref 1–2.5)
ALP SERPL-CCNC: 83 U/L (ref 31–125)
ALT SERPL-CCNC: 11 U/L (ref 6–29)
AST SERPL-CCNC: 14 U/L (ref 10–35)
BASOPHILS # BLD AUTO: 52 CELLS/UL (ref 0–200)
BASOPHILS NFR BLD AUTO: 0.5 %
BILIRUB SERPL-MCNC: 0.3 MG/DL (ref 0.2–1.2)
BUN SERPL-MCNC: 15 MG/DL (ref 7–25)
BUN/CREAT SERPL: NORMAL (CALC) (ref 6–22)
CALCIUM SERPL-MCNC: 8.9 MG/DL (ref 8.6–10.2)
CHLORIDE SERPL-SCNC: 102 MMOL/L (ref 98–110)
CHOLEST SERPL-MCNC: 157 MG/DL
CHOLEST/HDLC SERPL: 2.7 (CALC)
CO2 SERPL-SCNC: 25 MMOL/L (ref 20–32)
CREAT SERPL-MCNC: 0.75 MG/DL (ref 0.5–0.99)
CREAT UR-MCNC: 114 MG/DL (ref 20–275)
EOSINOPHIL # BLD AUTO: 114 CELLS/UL (ref 15–500)
EOSINOPHIL NFR BLD AUTO: 1.1 %
ERYTHROCYTE [DISTWIDTH] IN BLOOD BY AUTOMATED COUNT: 11.8 % (ref 11–15)
GFR/BSA.PRED SERPLBLD CYS-BASED-ARV: 98 ML/MIN/1.73M2
GLOBULIN SER CALC-MCNC: 3.3 G/DL (CALC) (ref 1.9–3.7)
GLUCOSE SERPL-MCNC: 93 MG/DL (ref 65–99)
HCT VFR BLD AUTO: 38.7 % (ref 35–45)
HDLC SERPL-MCNC: 59 MG/DL
HGB BLD-MCNC: 12.7 G/DL (ref 11.7–15.5)
LDLC SERPL CALC-MCNC: 76 MG/DL (CALC)
LYMPHOCYTES # BLD AUTO: 2569 CELLS/UL (ref 850–3900)
LYMPHOCYTES NFR BLD AUTO: 24.7 %
MCH RBC QN AUTO: 28.1 PG (ref 27–33)
MCHC RBC AUTO-ENTMCNC: 32.8 G/DL (ref 32–36)
MCV RBC AUTO: 85.6 FL (ref 80–100)
MICROALBUMIN UR-MCNC: 0.5 MG/DL
MONOCYTES # BLD AUTO: 946 CELLS/UL (ref 200–950)
MONOCYTES NFR BLD AUTO: 9.1 %
NEUTROPHILS # BLD AUTO: 6718 CELLS/UL (ref 1500–7800)
NEUTROPHILS NFR BLD AUTO: 64.6 %
NONHDLC SERPL-MCNC: 98 MG/DL (CALC)
PLATELET # BLD AUTO: 315 THOUSAND/UL (ref 140–400)
PMV BLD REES-ECKER: 10.8 FL (ref 7.5–12.5)
POTASSIUM SERPL-SCNC: 4.2 MMOL/L (ref 3.5–5.3)
PROT SERPL-MCNC: 7 G/DL (ref 6.1–8.1)
RBC # BLD AUTO: 4.52 MILLION/UL (ref 3.8–5.1)
SODIUM SERPL-SCNC: 135 MMOL/L (ref 135–146)
TRIGL SERPL-MCNC: 139 MG/DL
TSH SERPL-ACNC: 1.91 MIU/L
WBC # BLD AUTO: 10.4 THOUSAND/UL (ref 3.8–10.8)

## 2022-07-22 ENCOUNTER — TELEMEDICINE (OUTPATIENT)
Dept: BEHAVIORAL/MENTAL HEALTH CLINIC | Facility: CLINIC | Age: 47
End: 2022-07-22
Payer: COMMERCIAL

## 2022-07-22 ENCOUNTER — OFFICE VISIT (OUTPATIENT)
Dept: FAMILY MEDICINE CLINIC | Facility: CLINIC | Age: 47
End: 2022-07-22
Payer: COMMERCIAL

## 2022-07-22 VITALS
SYSTOLIC BLOOD PRESSURE: 130 MMHG | HEIGHT: 64 IN | OXYGEN SATURATION: 98 % | HEART RATE: 99 BPM | BODY MASS INDEX: 25.1 KG/M2 | WEIGHT: 147 LBS | DIASTOLIC BLOOD PRESSURE: 80 MMHG

## 2022-07-22 DIAGNOSIS — I10 HYPERTENSION, UNSPECIFIED TYPE: ICD-10-CM

## 2022-07-22 DIAGNOSIS — E78.2 MIXED HYPERLIPIDEMIA: ICD-10-CM

## 2022-07-22 DIAGNOSIS — F41.1 GAD (GENERALIZED ANXIETY DISORDER): Primary | ICD-10-CM

## 2022-07-22 DIAGNOSIS — Z23 NEED FOR TDAP VACCINATION: ICD-10-CM

## 2022-07-22 DIAGNOSIS — Z00.00 ANNUAL PHYSICAL EXAM: Primary | ICD-10-CM

## 2022-07-22 DIAGNOSIS — F41.1 GAD (GENERALIZED ANXIETY DISORDER): ICD-10-CM

## 2022-07-22 DIAGNOSIS — E78.1 HYPERTRIGLYCERIDEMIA: ICD-10-CM

## 2022-07-22 DIAGNOSIS — Z12.11 COLON CANCER SCREENING: ICD-10-CM

## 2022-07-22 PROCEDURE — 99214 OFFICE O/P EST MOD 30 MIN: CPT | Performed by: FAMILY MEDICINE

## 2022-07-22 PROCEDURE — 90834 PSYTX W PT 45 MINUTES: CPT | Performed by: SOCIAL WORKER

## 2022-07-22 PROCEDURE — 90715 TDAP VACCINE 7 YRS/> IM: CPT | Performed by: FAMILY MEDICINE

## 2022-07-22 PROCEDURE — 3725F SCREEN DEPRESSION PERFORMED: CPT | Performed by: FAMILY MEDICINE

## 2022-07-22 PROCEDURE — 99396 PREV VISIT EST AGE 40-64: CPT | Performed by: FAMILY MEDICINE

## 2022-07-22 PROCEDURE — 90471 IMMUNIZATION ADMIN: CPT | Performed by: FAMILY MEDICINE

## 2022-07-22 RX ORDER — LISINOPRIL 5 MG/1
5 TABLET ORAL DAILY
Qty: 90 TABLET | Refills: 1 | Status: SHIPPED | OUTPATIENT
Start: 2022-07-22 | End: 2022-09-26 | Stop reason: SDUPTHER
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
## 2022-07-22 NOTE — PSYCH
Psychotherapy Provided: Individual Psychotherapy 47 minutes, from 4508-1805  Length of time in session: 47 minutes, follow up in 7 weeks at next available scheduled appointment  Encounter Diagnosis     ICD-10-CM    1  EDELMIRA (generalized anxiety disorder)  F41 1        Goals addressed in session:  Goals addressed in session: Long Term Goal 1: Manage anxiety     Target Date: 1/8/2023  Completion Date: TBD         Short Term Objectives for Goal 1:   Learning strategies to work through triggering moments  Seek to increase mindfulness and self-care activities  Implement effective and healthy behavioral management strategies for children  Continue to implement and respect boundaries  Engage in self-care by reading, exercising, deep breathing    Balancing parenting and work tasks with self-care       Current suicide risk : [address]: Diagnosis and Treatment Plan explained to Luna Monge relates understanding diagnosis and is agreeable to Treatment Plan  Yes     Virtual Regular Visit    Verification of patient location:    Patient is located in the following state in which I hold an active license [place]      Assessment/Plan:    Problem List Items Addressed This Visit        Other    EDELMIRA (generalized anxiety disorder) - Primary             Reason for visit is   Chief Complaint   Patient presents with    Virtual Regular Visit        Encounter provider Ivis Yoder LCSW    Provider located at [address]  [facility] [address] [phone]      Recent Visits  No visits were found meeting these conditions    Showing recent visits within past 7 days and meeting all other requirements  Today's Visits  Date Type Provider Dept   07/22/22 Office Visit Rock Gilford, DO [facility]   Showing today's visits and meeting all other requirements  Future Appointments  No visits were found meeting these conditions  Showing future appointments within next 150 days and meeting all other requirements       The patient was identified by name and date of birth  Scott Mercer was informed that this is a telemedicine visit and that the visit is being conducted through[employer] and patient was informed that this is a secure, HIPAA-compliant platform  She agrees to proceed     My office door was closed  No one else was in the room  She acknowledged consent and understanding of privacy and security of the video platform  The patient has agreed to participate and understands they can discontinue the visit at any time  Patient is aware this is a billable service  Subjective  Scott Mercer is a 48 y o  female presenting for follow up appointment with Integration Services  Practicing more self-care, alone time  Intellectually she understands her anxiety and when she is experiencing anxiety  Discussed medication as a possible intervention   How to switch thoughts - patient was sent resources on cognitive distortions - admits to "catastrophizing" also provided with instructions and resources for challenging cognitive thoughts  Patient plans to identify cognitive distortions and use tools discussed in session today to try to switch or stop these thoughts  Patient plans to follow up in 7 weeks at next available appointment        HPI     Past Medical History:   Diagnosis Date    Allergic     seasonal, food allergies    Asthma     GERD (gastroesophageal reflux disease)     Hypertension     Irritable bowel syndrome     Migraine without aura     Shoulder subluxation, left     Tennis elbow        Past Surgical History:   Procedure Laterality Date    TONSILLECTOMY      US GUIDED MSK PROCEDURE  1/14/2020    WRIST SURGERY         Current Outpatient Medications   Medication Sig Dispense Refill    albuterol (PROVENTIL HFA,VENTOLIN HFA) 90 mcg/act inhaler Inhale      Ascorbic Acid (VITAMIN C) 500 MG CAPS Take by mouth      Calcium 250 MG CAPS Take by mouth      desogestrel-ethinyl estradiol (Viorele) 0 15-0 02/0 01 MG (21/5) per tablet Take 1 tablet by mouth daily 112 tablet 3    EPINEPHrine (EPIPEN) 0 3 mg/0 3 mL SOAJ as directed      fexofenadine (ALLEGRA) 180 MG tablet Take by mouth      lisinopril (ZESTRIL) 5 mg tablet Take 1 tablet (5 mg total) by mouth daily 90 tablet 1    montelukast (SINGULAIR) 10 mg tablet Take 1 tablet by mouth each evening      Multiple Vitamin (MULTI-VITAMIN DAILY) TABS Take by mouth      Omega-3 Fatty Acids (Fish Oil) 1000 MG CPDR Take by mouth 2 (two) times a day 180 capsule 0    Sod Picosulfate-Mag Ox-Cit Acd (Clenpiq) 10-3 5-12 MG-GM -GM/160ML SOLN Take 1 Package by mouth see administration instructions 320 mL 0    triamcinolone (KENALOG) 0 1 % cream        No current facility-administered medications for this visit  Allergies   Allergen Reactions    Cat Hair Extract     Erythromycin     Hctz [Hydrochlorothiazide] Dizziness    Peanut [Nuts - Food Allergy]     Seasonal Ic  [Cholestatin]     Trish Sun Screen Spf 30 [Albolene] Rash    Latex Rash    Medical Tape Rash       Review of Systems   Psychiatric/Behavioral: Negative for agitation, behavioral problems, confusion, decreased concentration, dysphoric mood, hallucinations, self-injury, sleep disturbance and suicidal ideas  The patient is nervous/anxious  The patient is not hyperactive  Video Exam    There were no vitals filed for this visit  Physical Exam  Psychiatric:         Attention and Perception: Attention and perception normal          Mood and Affect: Mood is anxious  Speech: Speech normal          Behavior: Behavior normal  Behavior is cooperative  Thought Content: Thought content normal          Cognition and Memory: Cognition and memory normal          Judgment: Judgment normal       Comments: Patient presents with anxious mood and congruent affect    Patient is well-groomed, with good eye contact and good focus in session  Patient's speech is normal rate and rhythm  Patient is alert, oriented, engaged, and open  Patient's thought process is organized and thought content is future-directed and goal-oriented  Patient's memory and cognition appear intact  Patient denies SI/HI/AH/VH and self-harm  VIRTUAL VISIT DISCLAIMER    Sam Burger verbally agrees to participate in [facility]  Pt is aware that [facility] could be limited without vital signs or the ability to perform a full hands-on physical Trish Conte understands she or the provider may request at any time to terminate the video visit and request the patient to seek care or treatment in person
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
## 2022-07-22 NOTE — PATIENT INSTRUCTIONS

## 2022-07-24 NOTE — PATIENT INSTRUCTIONS
Continue to take all medications as prescribed  Attend all scheduled medical appointments  Follow up with therapist     If you are experiencing a mental health emergency, please call 911 for emergent care, or one of the following numbers for someone to talk to 24 hours a day, 7 days a week:    [facility] Intervention: [facility]: [phone]  CrisisTextLine:  Text PA to 802219  PA's Support and Referral Hotline: [address]:  362.978.4713    If you would like to leave a phone message for your therapist, please call the [address] [place] [address] E Integration Department at [phone]  If you need to reach [address] [place] [address] after hours, please call [phone] for on-call service

## 2022-07-25 ENCOUNTER — ANNUAL EXAM (OUTPATIENT)
Dept: OBGYN CLINIC | Facility: CLINIC | Age: 47
End: 2022-07-25
Payer: COMMERCIAL

## 2022-07-25 VITALS
SYSTOLIC BLOOD PRESSURE: 144 MMHG | WEIGHT: 147 LBS | DIASTOLIC BLOOD PRESSURE: 76 MMHG | HEIGHT: 64 IN | BODY MASS INDEX: 25.1 KG/M2

## 2022-07-25 DIAGNOSIS — Z30.41 ENCOUNTER FOR SURVEILLANCE OF CONTRACEPTIVE PILLS: ICD-10-CM

## 2022-07-25 DIAGNOSIS — Z01.419 WOMEN'S ANNUAL ROUTINE GYNECOLOGICAL EXAMINATION: Primary | ICD-10-CM

## 2022-07-25 DIAGNOSIS — Z12.31 ENCOUNTER FOR SCREENING MAMMOGRAM FOR MALIGNANT NEOPLASM OF BREAST: ICD-10-CM

## 2022-07-25 PROBLEM — Z12.11 SCREEN FOR COLON CANCER: Status: RESOLVED | Noted: 2022-03-22 | Resolved: 2022-07-25

## 2022-07-25 PROBLEM — J45.909 ASTHMA: Status: RESOLVED | Noted: 2017-05-23 | Resolved: 2022-07-25

## 2022-07-25 PROCEDURE — S0612 ANNUAL GYNECOLOGICAL EXAMINA: HCPCS | Performed by: OBSTETRICS & GYNECOLOGY

## 2022-07-25 RX ORDER — DESOGESTREL AND ETHINYL ESTRADIOL 21-5 (28)
1 KIT ORAL DAILY
Qty: 112 TABLET | Refills: 4 | Status: SHIPPED | OUTPATIENT
Start: 2022-07-25

## 2022-07-25 NOTE — PROGRESS NOTES
ASSESSMENT & PLAN:   Diagnoses and all orders for this visit:    Women's annual routine gynecological examination    Encounter for screening mammogram for malignant neoplasm of breast  -     Mammo screening bilateral w 3d & cad; Future    Encounter for surveillance of contraceptive pills  -     desogestrel-ethinyl estradiol (Viorele) 0 15-0 02/0 01 MG () per tablet; Take 1 tablet by mouth daily          The following were reviewed in today's visit: ASCCP guidelines, Gardisil vaccination, STD testing breast self exam, mammography screening ordered, use and side effects of OCPs, exercise and healthy diet  Patient to return to office in yearly for annual exam      All questions have been answered to her satisfaction  CC:  Annual Gynecologic Examination  Chief Complaint   Patient presents with    Gynecologic Exam     Pt here for her annual exam pap w/hpv 2020 neg/neg; 3d mammo 22 wnl; dexa: not indicated; and cologuard 3/31/22 neg  Pt doing well today  HPI: Kristopher Sullivan is a 48 y o  Conde Muse who presents for annual gynecologic examination    She has the following concerns:  Using OCP for heavy painful periods and HA - controlled HTN      Health Maintenance:    Exercise: intermittently  Breast exams/breast awareness: yes  Diet: well balanced diet  Last mammogram:   Colorectal cancer screenin      Past Medical History:   Diagnosis Date    Allergic     seasonal, food allergies    Asthma     GERD (gastroesophageal reflux disease)     Hypertension     Irritable bowel syndrome     Migraine without aura     Shoulder subluxation, left     Tennis elbow        Past Surgical History:   Procedure Laterality Date    TONSILLECTOMY      US GUIDED MSK PROCEDURE  2020    WRIST SURGERY         Past OB/Gyn History:  Period Cycle (Days): 90  Period Duration (Days): 5  Period Pattern: Regular  Menstrual Flow: Light  Menstrual Control: Tampon, Thin padPatient's last menstrual period was 06/01/2022 (exact date)  Menopausal status: perimenopausal  Menopausal symptoms: None    Last Pap: 2020 : no abnormalities  History of abnormal Pap smear: no    Patient is not currently sexually active  STD testing: no  Current contraception: OCP (estrogen/progesterone)      Family History  Family History   Problem Relation Age of Onset    Stroke Mother         CEREBROVASCULAR ACCIDENT (CVA), LISTED 2X    Hypertension Mother     Hyperlipidemia Mother     Hypertension Father     Hyperlipidemia Father     Skin cancer Father     Cervical cancer Neg Hx     Colon cancer Neg Hx     Ovarian cancer Neg Hx     Uterine cancer Neg Hx        Family history of uterine or ovarian cancer: no  Family history of breast cancer: no  Family history of colon cancer: no    Social History:  Social History     Socioeconomic History    Marital status: Single     Spouse name: Not on file    Number of children: Not on file    Years of education: Not on file    Highest education level: Not on file   Occupational History    Not on file   Tobacco Use    Smoking status: Never Smoker    Smokeless tobacco: Never Used   Vaping Use    Vaping Use: Never used   Substance and Sexual Activity    Alcohol use:  Yes     Alcohol/week: 1 0 standard drink     Types: 1 Glasses of wine per week     Comment: socially     Drug use: Never    Sexual activity: Not Currently     Partners: Male   Other Topics Concern    Not on file   Social History Narrative    CAFFEINE USE - 16oz/daily     EXERCISE HABITS    Adopted 3 children: girls ages 3, 10, and 13yo - all sisters     Is a  at [address] [place] [address] Strain: Not on file   Food Insecurity: Not on file   Transportation Needs: Not on file   Physical Activity: Not on file   Stress: Not on file   Social Connections: Not on file   Intimate Partner Violence: Not on file   Housing Stability: Not on file     Domestic violence screen: negative    Allergies: Allergies   Allergen Reactions    Cat Hair Extract     Erythromycin     Hctz [Hydrochlorothiazide] Dizziness    Peanut [Nuts - Food Allergy]     Seasonal Ic  [Cholestatin]     Trish Sun Screen Spf 30 [Albolene] Rash    Latex Rash    Medical Tape Rash       Medications:    Current Outpatient Medications:     albuterol (PROVENTIL HFA,VENTOLIN HFA) 90 mcg/act inhaler, Inhale, Disp: , Rfl:     Ascorbic Acid (VITAMIN C) 500 MG CAPS, Take by mouth, Disp: , Rfl:     Calcium 250 MG CAPS, Take by mouth, Disp: , Rfl:     desogestrel-ethinyl estradiol (Viorele) 0 15-0 02/0 01 MG (21/5) per tablet, Take 1 tablet by mouth daily, Disp: 112 tablet, Rfl: 4    EPINEPHrine (EPIPEN) 0 3 mg/0 3 mL SOAJ, as directed, Disp: , Rfl:     fexofenadine (ALLEGRA) 180 MG tablet, Take by mouth, Disp: , Rfl:     lisinopril (ZESTRIL) 5 mg tablet, Take 1 tablet (5 mg total) by mouth daily, Disp: 90 tablet, Rfl: 1    montelukast (SINGULAIR) 10 mg tablet, Take 1 tablet by mouth each evening, Disp: , Rfl:     Multiple Vitamin (MULTI-VITAMIN DAILY) TABS, Take by mouth, Disp: , Rfl:     Omega-3 Fatty Acids (Fish Oil) 1000 MG CPDR, Take by mouth 2 (two) times a day, Disp: 180 capsule, Rfl: 0    Review of Systems:  Review of Systems   Constitutional: Negative  HENT: Negative  Respiratory: Negative  Cardiovascular: Negative  Gastrointestinal: Negative  Genitourinary: Negative  Neurological: Negative  Psychiatric/Behavioral: Negative  Physical Exam:  /76 (BP Location: Left arm, Patient Position: Sitting, Cuff Size: Standard)   Ht 5' 3 5" (1 613 m)   Wt 66 7 kg (147 lb)   LMP 06/01/2022 (Exact Date)   Breastfeeding No   BMI 25 63 kg/m²    Physical Exam  Constitutional:       Appearance: Normal appearance  Genitourinary:      Bladder and urethral meatus normal       No lesions in the vagina        Right Labia: No rash, tenderness, lesions, skin changes or Bartholin's cyst      Left Labia: No tenderness, lesions, skin changes, Bartholin's cyst or rash  No vaginal erythema, tenderness or bleeding  Right Adnexa: not tender, not full and no mass present  Left Adnexa: not tender, not full and no mass present  Cervix is nulliparous  No cervical discharge, lesion or polyp  Uterus is not enlarged, fixed or tender  No uterine mass detected  Urethral meatus caruncle not present  No urethral tenderness or mass present  Breasts:      Right: No swelling, bleeding, inverted nipple, mass, nipple discharge, skin change or tenderness  Left: No swelling, bleeding, inverted nipple, mass, nipple discharge, skin change or tenderness  HENT:      Head: Normocephalic and atraumatic  Eyes:      Extraocular Movements: Extraocular movements intact  Conjunctiva/sclera: Conjunctivae normal       Pupils: Pupils are equal, round, and reactive to light  Cardiovascular:      Rate and Rhythm: Normal rate and regular rhythm  Heart sounds: Normal heart sounds  No murmur heard  Pulmonary:      Effort: Pulmonary effort is normal  No respiratory distress  Breath sounds: Normal breath sounds  No wheezing or rales  Abdominal:      General: There is no distension  Palpations: Abdomen is soft  Tenderness: There is no abdominal tenderness  There is no guarding  Neurological:      General: No focal deficit present  Mental Status: She is alert and oriented to person, place, and time  Skin:     General: Skin is warm and dry  Psychiatric:         Mood and Affect: Mood normal          Behavior: Behavior normal    Vitals and nursing note reviewed

## 2022-09-08 ENCOUNTER — TELEPHONE (OUTPATIENT)
Dept: FAMILY MEDICINE CLINIC | Facility: CLINIC | Age: 47
End: 2022-09-08

## 2022-09-08 NOTE — TELEPHONE ENCOUNTER
Patient tested positive last night with a rapid test  She said she has a terrible headache and spiked a fever last night  She doesn't believe she needs to be seen at this time

## 2022-09-09 ENCOUNTER — TELEMEDICINE (OUTPATIENT)
Dept: FAMILY MEDICINE CLINIC | Facility: CLINIC | Age: 47
End: 2022-09-09
Payer: COMMERCIAL

## 2022-09-09 DIAGNOSIS — U07.1 COVID-19: Primary | ICD-10-CM

## 2022-09-09 DIAGNOSIS — J02.9 SORE THROAT: ICD-10-CM

## 2022-09-09 PROCEDURE — 99213 OFFICE O/P EST LOW 20 MIN: CPT | Performed by: FAMILY MEDICINE

## 2022-09-09 RX ORDER — NIRMATRELVIR AND RITONAVIR 300-100 MG
3 KIT ORAL 2 TIMES DAILY
Qty: 30 TABLET | Refills: 0 | Status: SHIPPED | OUTPATIENT
Start: 2022-09-09 | End: 2022-09-14

## 2022-09-09 NOTE — PROGRESS NOTES
COVID-19 Outpatient Progress Note    Assessment/Plan:  Problem List Items Addressed This Visit    None     Visit Diagnoses     COVID-19    -  Primary    Relevant Medications    nirmatrelvir & ritonavir (Paxlovid, 300/100,) tablet therapy pack  - UTD with COVID IMMs and Booster  - tested POS on 9/6/2022  - Paxlovid  - OTC symptomatic relief  - f/u PRN      Sore throat        Relevant Medications    phenol (CHLORASEPTIC) 1 4 % mucosal liquid         Disposition:     Patient has asymptomatic or mild COVID-19 infection  Based off CDC guidelines, they were recommended to isolate for 5 days  If they are asymptomatic or symptoms are improving with no fevers in the past 24 hours, isolation may be ended followed by 5 days of wearing a mask when around othes to minimize risk of infecting others  If still have a fever or other symptoms have not improved, continue to isolate until they improve  Regardless of when they end isolation, avoid being around people who are more likely to get very sick from COVID-19 until at least day 11  Discussed symptom directed medication options with patient  Patient meets criteria for PAXLOVID and they have been counseled appropriately according to EUA documentation released by the FDA  After discussion, patient agrees to treatment  Rasheed Factor is an investigational medicine used to treat mild-to-moderate COVID-19 in adults and children (15years of age and older weighing at least 80 pounds (40 kg)) with positive results of direct SARS-CoV-2 viral testing, and who are at high risk for progression to severe COVID-19, including hospitalization or death  PAXLOVID is investigational because it is still being studied  There is limited information about the safety and effectiveness of using PAXLOVID to treat people with mild-to-moderate COVID-19      The FDA has authorized the emergency use of PAXLOVID for the treatment of mild-tomoderate COVID-19 in adults and children (15years of age and older weighing at least 88 pounds (40 kg)) with a positive test for the virus that causes COVID-19, and who are at high risk for progression to severe COVID-19, including hospitalization or death, under an EUA  What should I tell my healthcare provider before I take PAXLOVID? Tell your healthcare provider if you:  - Have any allergies  - Have liver or kidney disease  - Are pregnant or plan to become pregnant  - Are breastfeeding a child  - Have any serious illnesses    Tell your healthcare provider about all the medicines you take, including prescription and over-the-counter medicines, vitamins, and herbal supplements  Some medicines may interact with PAXLOVID and may cause serious side effects  Keep a list of your medicines to show your healthcare provider and pharmacist when you get a new medicine  You can ask your healthcare provider or pharmacist for a list of medicines that interact with PAXLOVID  Do not start taking a new medicine without telling your healthcare provider  Your healthcare provider can tell you if it is safe to take PAXLOVID with other medicines  Tell your healthcare provider if you are taking combined hormonal contraceptive  PAXLOVID may affect how your birth control pills work  Females who are able to become pregnant should use another effective alternative form of contraception or an additional barrier method of contraception  Talk to your healthcare provider if you have any questions about contraceptive methods that might be right for you  How do I take PAXLOVID? PAXLOVID consists of 2 medicines: nirmatrelvir and ritonavir  - Take 2 pink tablets of nirmatrelvir with 1 white tablet of ritonavir by mouth 2 times each day (in the morning and in the evening) for 5 days  For each dose, take all 3 tablets at the same time  - If you have kidney disease, talk to your healthcare provider  You may need a different dose  - Swallow the tablets whole   Do not chew, break, or crush the tablets  - Take PAXLOVID with or without food  - Do not stop taking PAXLOVID without talking to your healthcare provider, even if you feel better  - If you miss a dose of PAXLOVID within 8 hours of the time it is usually taken, take it as soon as you remember  If you miss a dose by more than 8 hours, skip the missed dose and take the next dose at your regular time  Do not take 2 doses of PAXLOVID at the same time  - If you take too much PAXLOVID, call your healthcare provider or go to the nearest hospital emergency room right away  - If you are taking a ritonavir- or cobicistat-containing medicine to treat hepatitis C or Human Immunodeficiency Virus (HIV), you should continue to take your medicine as prescribed by your healthcare provider   - Talk to your healthcare provider if you do not feel better or if you feel worse after 5 days  Who should generally not take PAXLOVID? Do not take PAXLOVID if:  You are allergic to nirmatrelvir, ritonavir, or any of the ingredients in PAXLOVID  You are taking any of the following medicines:  - Alfuzosin  - Pethidine, piroxicam, propoxyphene  - Ranolazine  - Amiodarone, dronedarone, flecainide, propafenone, quinidine  - Colchicine  - Lurasidone, pimozide, clozapine  - Dihydroergotamine, ergotamine, methylergonovine  - Lovastatin, simvastatin  - Sildenafil (Revatio®) for pulmonary arterial hypertension (PAH)  - Triazolam, oral midazolam  - Apalutamide  - Carbamazepine, phenobarbital, phenytoin  - Rifampin  - St  Tobiass Wort (hypericum perforatum)    What are the important possible side effects of PAXLOVID? Possible side effects of PAXLOVID are:  - Liver Problems  Tell your healthcare provider right away if you have any of these signs and symptoms of liver problems: loss of appetite, yellowing of your skin and the whites of eyes (jaundice), dark-colored urine, pale colored stools and itchy skin, stomach area (abdominal) pain  - Resistance to HIV Medicines   If you have untreated HIV infection, PAXLOVID may lead to some HIV medicines not working as well in the future  - Other possible side effects include: altered sense of taste, diarrhea, high blood pressure, or muscle aches    These are not all the possible side effects of PAXLOVID  Not many people have taken PAXLOVID  Serious and unexpected side effects may happen  [address] is still being studied, so it is possible that all of the risks are not known at this time  What other treatment choices are there? Like Farooq Rideau may allow for the emergency use of other medicines to treat people with COVID-19  Go to https://[employer]/ for information on the emergency use of other medicines that are authorized by FDA to treat people with COVID-19  Your healthcare provider may talk with you about clinical trials for which you may be eligible  It is your choice to be treated or not to be treated with PAXLOVID  Should you decide not to receive it or for your child not to receive it, it will not change your standard medical care  What if I am pregnant or breastfeeding? There is no experience treating pregnant women or breastfeeding mothers with PAXLOVID  For a mother and unborn baby, the benefit of taking PAXLOVID may be greater than the risk from the treatment  If you are pregnant, discuss your options and specific situation with your healthcare provider  It is recommended that you use effective barrier contraception or do not have sexual activity while taking PAXLOVID  If you are breastfeeding, discuss your options and specific situation with your healthcare provider  How do I report side effects with PAXLOVID? Contact your healthcare provider if you have any side effects that bother you or do not go away      Report side effects to FDA MedWatch at www fda gov/medwatch or call 9-226-UIE0431 or you can report side effects to [facility]  at the contact information provided below  Website Fax number Telephone number   Jump [employer] Fall [phone]     How should I store [address]? Store PAXLOVID tablets at room temperature between 68°F to 77°F (20°C to 25°C)  Full fact sheet for patients, parents, and caregivers can be found at: [facility]    I have spent 15 minutes directly with the patient  Greater than 50% of this time was spent in counseling/coordination of care regarding: diagnostic results, prognosis, risks and benefits of treatment options, instructions for management, patient and family education, importance of treatment compliance, risk factor reductions and impressions  Encounter provider: Sandra Duncan DO     Provider located at: [address] [place] [id number]     Recent Visits  Date Type Provider Dept   09/08/22 Telephone Eleanor Dooley DO [address] [place] [address] [place] recent visits within past 7 days and meeting all other requirements  Today's Visits  Date Type Provider Dept   09/09/22 Telemedicine Eleanor Dooley DO [facility]   Showing today's visits and meeting all other requirements  Future Appointments  No visits were found meeting these conditions  Showing future appointments within next 150 days and meeting all other requirements     This virtual check-in was done via [employer] and patient was informed that this is a secure, HIPAA-compliant platform  She agrees to proceed  Patient agrees to participate in a virtual check in via telephone or video visit instead of presenting to the office to address urgent/immediate medical needs  Patient is aware this is a billable service  She acknowledged consent and understanding of privacy and security of the video platform   The patient has agreed to participate and understands they can discontinue the visit at any time     After connecting through [facility], the patient was identified by name and date of birth  Lobo King was informed that this was a telemedicine visit and that the exam was being conducted confidentially over secure lines  My office door was closed  No one else was in the room  Lobo King acknowledged consent and understanding of privacy and security of the telemedicine visit  I informed the patient that I have reviewed her record in Epic and presented the opportunity for her to ask any questions regarding the visit today  The patient agreed to participate  Verification of patient location:  Patient is located in the following state in which I hold an active license: [place]    Subjective: Lobo King is a 48 y o  female who has been screened for COVID-19  Symptom change since last report: worsening  Patient's symptoms include fever, chills, nasal congestion, sore throat and headache  Patient denies anosmia, loss of taste, shortness of breath, chest tightness, nausea and vomiting      - Date of symptom onset: 9/4/2022  - Date of positive COVID-19 test: 9/6/2022  Type of test: PCR  COVID-19 vaccination status: Fully vaccinated with booster    Laura Grace has been staying home and has isolated themselves in her home  She is taking care to not share personal items and is cleaning all surfaces that are touched often, like counters, tabletops, and doorknobs using household cleaning sprays or wipes  She is wearing a mask when she leaves her room       - Lorri got COVID 8/20/2022 and then Krupa had it last week   - pt started with s/s on 9/4/2022 and tested POS on 9/6/2022      Lab Results   Component Value Date    SARSCOV2 Negative 09/06/2022     Past Medical History:   Diagnosis Date    Allergic     seasonal, food allergies    Asthma     GERD (gastroesophageal reflux disease)     Hypertension     Irritable bowel syndrome     Migraine without aura     Shoulder subluxation, left     Tennis elbow      Past Surgical History:   Procedure Laterality Date    TONSILLECTOMY      US GUIDED MSK PROCEDURE  1/14/2020    WRIST SURGERY       Current Outpatient Medications   Medication Sig Dispense Refill    nirmatrelvir & ritonavir (Paxlovid, 300/100,) tablet therapy pack Take 3 tablets by mouth 2 (two) times a day for 5 days Take 2 nirmatrelvir tablets + 1 ritonavir tablet together per dose 30 tablet 0    phenol (CHLORASEPTIC) 1 4 % mucosal liquid Apply 1 spray to the mouth or throat every 2 (two) hours as needed (sore throat) 20 mL 0    albuterol (PROVENTIL HFA,VENTOLIN HFA) 90 mcg/act inhaler Inhale      Ascorbic Acid (VITAMIN C) 500 MG CAPS Take by mouth      Calcium 250 MG CAPS Take by mouth      desogestrel-ethinyl estradiol (Viorele) 0 15-0 02/0 01 MG (21/5) per tablet Take 1 tablet by mouth daily 112 tablet 4    EPINEPHrine (EPIPEN) 0 3 mg/0 3 mL SOAJ as directed      fexofenadine (ALLEGRA) 180 MG tablet Take by mouth      lisinopril (ZESTRIL) 5 mg tablet Take 1 tablet (5 mg total) by mouth daily 90 tablet 1    montelukast (SINGULAIR) 10 mg tablet Take 1 tablet by mouth each evening      Multiple Vitamin (MULTI-VITAMIN DAILY) TABS Take by mouth      Omega-3 Fatty Acids (Fish Oil) 1000 MG CPDR Take by mouth 2 (two) times a day 180 capsule 0     No current facility-administered medications for this visit  Allergies   Allergen Reactions    Cat Hair Extract     Erythromycin     Hctz [Hydrochlorothiazide] Dizziness    Peanut [Nuts - Food Allergy]     Seasonal Ic  [Cholestatin]     Trish Sun Screen Spf 30 [Albolene] Rash    Latex Rash    Medical Tape Rash       Review of Systems   Constitutional: Positive for chills and fever  HENT: Positive for congestion and sore throat  Respiratory: Negative for chest tightness and shortness of breath  Gastrointestinal: Negative for nausea and vomiting  Neurological: Positive for headaches  Objective: There were no vitals filed for this visit      Physical Exam   General: AAOx3, NAD  HEENT: NC/AT, EOMI, clear conjunctiva, nml external ear and nose   Cardio: deferred  Pulm: no acute respiratory distress, able to talk in complete sentences w/o getting short of breath   Abd: deferred   Psych: nml mood/affect/behavior

## 2022-09-12 ENCOUNTER — TELEPHONE (OUTPATIENT)
Dept: FAMILY MEDICINE CLINIC | Facility: CLINIC | Age: 47
End: 2022-09-12

## 2022-09-12 NOTE — TELEPHONE ENCOUNTER
Patient called and advised that she was prescribed Paxlovid and has now started with severe diarrhea  Patient was advised per Dr Lia Shepherd to stop the medication  Patient was in agreement

## 2022-09-26 ENCOUNTER — TELEPHONE (OUTPATIENT)
Dept: FAMILY MEDICINE CLINIC | Facility: CLINIC | Age: 47
End: 2022-09-26

## 2022-09-26 DIAGNOSIS — I10 HYPERTENSION, UNSPECIFIED TYPE: ICD-10-CM

## 2022-09-26 RX ORDER — LISINOPRIL 5 MG/1
5 TABLET ORAL DAILY
Qty: 8 TABLET | Refills: 0 | Status: SHIPPED | OUTPATIENT
Start: 2022-09-26

## 2022-09-26 NOTE — TELEPHONE ENCOUNTER
Patient called and stated she has recovered from Covid and tested negative  12 days ago  She took Paxlovid for 3 days while positive but stopped due to a reaction  She is now feeling better and would like to know how long she should wait before having a flu shot

## 2022-10-05 ENCOUNTER — VBI (OUTPATIENT)
Dept: ADMINISTRATIVE | Facility: OTHER | Age: 47
End: 2022-10-05

## 2022-10-07 ENCOUNTER — TELEMEDICINE (OUTPATIENT)
Dept: BEHAVIORAL/MENTAL HEALTH CLINIC | Facility: CLINIC | Age: 47
End: 2022-10-07
Payer: COMMERCIAL

## 2022-10-07 DIAGNOSIS — F41.1 GAD (GENERALIZED ANXIETY DISORDER): Primary | ICD-10-CM

## 2022-10-07 PROCEDURE — 90834 PSYTX W PT 45 MINUTES: CPT | Performed by: SOCIAL WORKER

## 2022-10-07 NOTE — PATIENT INSTRUCTIONS
Continue to take all medications as prescribed  Attend all scheduled medical appointments  Follow up with therapist     If you are experiencing a mental health emergency, please call 911 for emergent care, or one of the following numbers for someone to talk to 24 hours a day, 7 days a week:    [facility] AT [place] Intervention: [address] Crisis Intervention: [phone]  CrisisTextLine:  Text [place] to 676981  [place]'s Support and Referral Hotline: [address]:  [phone]    If you would like to leave a phone message for your therapist, please call the [address] [place] [address] [address] at [phone]  If you need to reach [address] [place] [address] E after hours, please call [phone] for on-call service

## 2022-10-07 NOTE — PSYCH
Psychotherapy Provided: Individual Psychotherapy 50 minutes, from [id number]  Length of time in session: 50 minutes, follow up in 2 weeks  Encounter Diagnosis     ICD-10-CM    1  EDELMIRA (generalized anxiety disorder)  F41 1        Goals addressed in session: Long Term Goal 1: Manage anxiety     Target Date: 1/8/2023  Completion Date: TBD         Short Term Objectives for Goal 1:   Learning strategies to work through triggering moments  Seek to increase mindfulness and self-care activities  Implement effective and healthy behavioral management strategies for children  Continue to implement and respect boundaries  Engage in self-care by reading, exercising, deep breathing    Balancing parenting and work tasks with self-care    Current suicide risk : [address]: Diagnosis and Treatment Plan explained to Love Suri relates understanding diagnosis and is agreeable to Treatment Plan  Yes     Virtual Regular Visit    Verification of patient location:    Patient is located in the following state in which I hold an active license [place]      Assessment/Plan:    Problem List Items Addressed This Visit        Other    EDELMIRA (generalized anxiety disorder) - Primary               Reason for visit is   Chief Complaint   Patient presents with    Virtual Regular Visit        Encounter provider Vito Castellon LCSW    Provider located at [address] [id number] [phone]      Recent Visits  No visits were found meeting these conditions  Showing recent visits within past 7 days and meeting all other requirements  Today's Visits  Date Type Provider Dept   10/07/22 Telemedicine Vito Castellon LCSW [facility]   Showing today's visits and meeting all other requirements  Future Appointments  No visits were found meeting these conditions    Showing future appointments within next 150 days and meeting all other requirements       The patient was identified by name and date of birth  Flavio Remy was informed that this is a telemedicine visit and that the visit is being conducted [facility] and patient was informed that this is a secure, HIPAA-compliant platform  She agrees to proceed     My office door was closed  No one else was in the room  She acknowledged consent and understanding of privacy and security of the video platform  The patient has agreed to participate and understands they can discontinue the visit at any time  Patient is aware this is a billable service  Subjective  Flavio Remy is a 48 y o  female presenting for follow up appointment with [facility]  Patient reports the entire family had Covid and another viral illness  Stressful - they have been sick in some form since August     Very busy with work - therapist emphasized the importance of rest for mental and physical health - patient has upcoming opportunity for rest, as it is fall break  Patient encouraged to use this time to restore herself physically and mentally, rest is productive  Patient plans to follow up in two weeks        HPI     Past Medical History:   Diagnosis Date    Allergic     seasonal, food allergies    Asthma     GERD (gastroesophageal reflux disease)     Hypertension     Irritable bowel syndrome     Migraine without aura     Shoulder subluxation, left     Tennis elbow        Past Surgical History:   Procedure Laterality Date    TONSILLECTOMY      US GUIDED MSK PROCEDURE  1/14/2020    WRIST SURGERY         Current Outpatient Medications   Medication Sig Dispense Refill    albuterol (PROVENTIL HFA,VENTOLIN HFA) 90 mcg/act inhaler Inhale      Ascorbic Acid (VITAMIN C) 500 MG CAPS Take by mouth      Calcium 250 MG CAPS Take by mouth      desogestrel-ethinyl estradiol (Viorele) 0 15-0 02/0 01 MG (21/5) per tablet Take 1 tablet by mouth daily 112 tablet 4    EPINEPHrine (EPIPEN) 0 3 mg/0 3 mL SOAJ as directed      fexofenadine (ALLEGRA) 180 MG tablet Take by mouth      lisinopril (ZESTRIL) 5 mg tablet Take 1 tablet (5 mg total) by mouth daily 8 tablet 0    montelukast (SINGULAIR) 10 mg tablet Take 1 tablet by mouth each evening      Multiple Vitamin (MULTI-VITAMIN DAILY) TABS Take by mouth      Omega-3 Fatty Acids (Fish Oil) 1000 MG CPDR Take by mouth 2 (two) times a day 180 capsule 0    phenol (CHLORASEPTIC) 1 4 % mucosal liquid Apply 1 spray to the mouth or throat every 2 (two) hours as needed (sore throat) 20 mL 0     No current facility-administered medications for this visit  Allergies   Allergen Reactions    Cat Hair Extract     Erythromycin     Hctz [Hydrochlorothiazide] Dizziness    Peanut [Nuts - Food Allergy]     Seasonal Ic  [Cholestatin]     Trish Sun Screen Spf 30 [Albolene] Rash    Latex Rash    Medical Tape Rash       Review of Systems   Psychiatric/Behavioral: Positive for sleep disturbance (not getting enough sleep)  Negative for agitation, behavioral problems, confusion, decreased concentration, dysphoric mood, hallucinations, self-injury and suicidal ideas  The patient is nervous/anxious  The patient is not hyperactive  Video Exam    There were no vitals filed for this visit  Physical Exam  Psychiatric:         Attention and Perception: Attention and perception normal          Mood and Affect: Mood is anxious  Speech: Speech normal          Behavior: Behavior normal  Behavior is cooperative  Thought Content: Thought content normal          Cognition and Memory: Cognition and memory normal          Judgment: Judgment normal       Comments: Patient presents with anxious mood and congruent affect  Patient is well-groomed, with good eye contact and good focus in session  Patient's speech is normal rate and rhythm  Patient is alert, oriented, engaged, and open    Patient's thought process is organized and thought content is future-directed and goal-oriented  Patient's memory and cognition appear intact  Patient denies SI/HI/AH/VH and self-harm  I spent 45 minutes directly with the patient during this visit, from [id number]

## 2022-10-21 ENCOUNTER — TELEMEDICINE (OUTPATIENT)
Dept: BEHAVIORAL/MENTAL HEALTH CLINIC | Facility: CLINIC | Age: 47
End: 2022-10-21
Payer: COMMERCIAL

## 2022-10-21 DIAGNOSIS — F41.1 GAD (GENERALIZED ANXIETY DISORDER): Primary | ICD-10-CM

## 2022-10-21 PROCEDURE — 90834 PSYTX W PT 45 MINUTES: CPT | Performed by: SOCIAL WORKER

## 2022-10-21 NOTE — PSYCH
Psychotherapy Provided: Individual Psychotherapy 48 minutes, from [id number]  Length of time in session: 48 minutes, follow up in 2 weeks    Encounter Diagnosis     ICD-10-CM    1  EDELMIRA (generalized anxiety disorder)  F41 1        Goals addressed in session: Long Term Goal 1: Manage anxiety     Target Date: 1/8/2023  Completion Date: TBD         Short Term Objectives for Goal 1:   Learning strategies to work through triggering moments  Seek to increase mindfulness and self-care activities  Implement effective and healthy behavioral management strategies for children  Continue to implement and respect boundaries  Engage in self-care by reading, exercising, deep breathing    Balancing parenting and work tasks with self-care    Current suicide risk : [address]: Diagnosis and Treatment Plan explained to Marlen Sykes relates understanding diagnosis and is agreeable to Treatment Plan  Yes     Virtual Regular Visit    Verification of patient location:    Patient is located in the following state in which I hold an active license [place]      Assessment/Plan:    Problem List Items Addressed This Visit        Other    EDELMIRA (generalized anxiety disorder) - Primary             Reason for visit is   Chief Complaint   Patient presents with   • Virtual Regular Visit        Encounter provider Tone Bloom LCSW    Provider located at [address]  [facility] [id number] [phone]      Recent Visits  No visits were found meeting these conditions  Showing recent visits within past 7 days and meeting all other requirements  Today's Visits  Date Type Provider Dept   10/21/22 Telemedicine Tone Bloom LCSW [facility]   Showing today's visits and meeting all other requirements  Future Appointments  No visits were found meeting these conditions    Showing future appointments within next 150 days and meeting all other requirements       The patient was identified by name and date of birth  Burke Patrick was informed that this is a telemedicine visit and that the visit is being conducted throughthe [employer] platform  She agrees to proceed     My office door was closed  No one else was in the room  She acknowledged consent and understanding of privacy and security of the video platform  The patient has agreed to participate and understands they can discontinue the visit at any time  Patient is aware this is a billable service  Subjective  Burke Patrick is a 50 y o  female presenting for follow up appointment with [facility]  Reports "my stress levels are not good "  "When something stressful happens, I start shaking, my stomach hurts "  Describes symptoms of panic attacks happening recently, most recent when daughter woke up coughing in the night  Patient provided with psychoeducation on panic attacks and we discussed mindfulness to get through these and even avoid them  Realizing many thoughts are catastrophic/distorted  "Intellectually" she knows everything is fine - we discussed rational versus emotional experiences  Patient received validation and psychoeducation about panic attacks - therapist recommended resources for mindfulness  Patient plans to follow up in two weeks      HPI     Past Medical History:   Diagnosis Date   • Allergic     seasonal, food allergies   • Asthma    • GERD (gastroesophageal reflux disease)    • Hypertension    • Irritable bowel syndrome    • Migraine without aura    • Shoulder subluxation, left    • Tennis elbow        Past Surgical History:   Procedure Laterality Date   • TONSILLECTOMY     • US GUIDED MSK PROCEDURE  1/14/2020   • WRIST SURGERY         Current Outpatient Medications   Medication Sig Dispense Refill   • albuterol (PROVENTIL HFA,VENTOLIN HFA) 90 mcg/act inhaler Inhale     • Ascorbic Acid (VITAMIN C) 500 MG CAPS Take by mouth     • Calcium 250 MG CAPS Take by mouth     • desogestrel-ethinyl estradiol (Viorele) 0 15-0 02/0 01 MG (21/5) per tablet Take 1 tablet by mouth daily 112 tablet 4   • EPINEPHrine (EPIPEN) 0 3 mg/0 3 mL SOAJ as directed     • fexofenadine (ALLEGRA) 180 MG tablet Take by mouth     • lisinopril (ZESTRIL) 5 mg tablet Take 1 tablet (5 mg total) by mouth daily 8 tablet 0   • montelukast (SINGULAIR) 10 mg tablet Take 1 tablet by mouth each evening     • Multiple Vitamin (MULTI-VITAMIN DAILY) TABS Take by mouth     • Omega-3 Fatty Acids (Fish Oil) 1000 MG CPDR Take by mouth 2 (two) times a day 180 capsule 0   • phenol (CHLORASEPTIC) 1 4 % mucosal liquid Apply 1 spray to the mouth or throat every 2 (two) hours as needed (sore throat) 20 mL 0     No current facility-administered medications for this visit  Allergies   Allergen Reactions   • Cat Hair Extract    • Erythromycin    • Hctz [Hydrochlorothiazide] Dizziness   • Peanut [Nuts - Food Allergy]    • Seasonal Ic  [Cholestatin]    • Trish Sun Screen Spf 30 [Albolene] Rash   • Latex Rash   • Medical Tape Rash       Review of Systems   Psychiatric/Behavioral: Negative for agitation, behavioral problems, confusion, decreased concentration, dysphoric mood, hallucinations, self-injury, sleep disturbance and suicidal ideas  The patient is nervous/anxious  The patient is not hyperactive  Video Exam    There were no vitals filed for this visit  Physical Exam  Psychiatric:         Attention and Perception: Attention and perception normal          Mood and Affect: Mood is anxious  Speech: Speech normal          Behavior: Behavior normal  Behavior is cooperative  Thought Content: Thought content normal          Cognition and Memory: Cognition and memory normal          Judgment: Judgment normal       Comments: Patient presents with anxious mood and congruent affect    Patient is well-groomed, with good eye contact and good focus in session  Patient's speech is normal rate and rhythm  Patient is alert, oriented, engaged, and open  Patient's thought process is organized and thought content is future-directed and goal-oriented  Patient's memory and cognition appear intact  Patient denies SI/HI/AH/VH and self-harm                Visit Time    Visit Start Time: 9787  Visit Stop Time: 0950  Total Visit Duration: 51 min

## 2022-10-21 NOTE — PATIENT INSTRUCTIONS
Continue to take all medications as prescribed  Attend all scheduled medical appointments  Follow up with therapist     If you are experiencing a mental health emergency, please call 911 for emergent care, or one of the following numbers for someone to talk to 24 hours a day, 7 days a week:    [facility] ([facility]) AT [place] Intervention: [address] Crisis Intervention: [phone]  CrisisTextLine:  Text [place] to 110157  [place]'s Support and Referral Hotline: [address]:  [phone]    If you would like to leave a phone message for your therapist, please call the [address] [place] [address] [address] at [phone]  If you need to reach [address] [place] [address] E after hours, please call [phone] for on-call service

## 2022-10-25 ENCOUNTER — OFFICE VISIT (OUTPATIENT)
Dept: FAMILY MEDICINE CLINIC | Facility: CLINIC | Age: 47
End: 2022-10-25
Payer: COMMERCIAL

## 2022-10-25 VITALS
WEIGHT: 147 LBS | SYSTOLIC BLOOD PRESSURE: 132 MMHG | HEART RATE: 88 BPM | OXYGEN SATURATION: 100 % | DIASTOLIC BLOOD PRESSURE: 72 MMHG | HEIGHT: 64 IN | BODY MASS INDEX: 25.1 KG/M2

## 2022-10-25 DIAGNOSIS — M62.838 TRAPEZIUS MUSCLE SPASM: ICD-10-CM

## 2022-10-25 DIAGNOSIS — M62.830 MUSCLE SPASM OF BACK: Primary | ICD-10-CM

## 2022-10-25 PROCEDURE — 99213 OFFICE O/P EST LOW 20 MIN: CPT | Performed by: FAMILY MEDICINE

## 2022-10-25 RX ORDER — TIZANIDINE HYDROCHLORIDE 2 MG/1
2 CAPSULE, GELATIN COATED ORAL 3 TIMES DAILY PRN
Qty: 30 CAPSULE | Refills: 0 | Status: SHIPPED | OUTPATIENT
Start: 2022-10-25

## 2022-10-25 RX ORDER — PREDNISONE 20 MG/1
40 TABLET ORAL DAILY
Qty: 10 TABLET | Refills: 0 | Status: SHIPPED | OUTPATIENT
Start: 2022-10-25 | End: 2022-10-30

## 2022-10-25 NOTE — PROGRESS NOTES
Outpatient Note- Follow up     HPI:     Daisy Flanagan , 50 y o  female  presents today for upper left trap pain  The issue started about 2 days ago  The patient initially had a small amount of pain after waking one morning  This was then complicated by attempting to move a bird bath that had significant weight  The following day she worked a long shift attempting to get a project done with her left requiring multiple hours on computer using left shoulder and mouse  The pain is primarily in the left upper trap at the base of the cervical spine region  Intensity of the pain ranges from a 4/10 after using heat to a a 10/10 at its worst   This is typically with turning her head to the right  She has mild decreased strength and numbness/tingling in the left hand and upper extremity  This is accompanied by shooting pains up into the neck and down the arm  Onset of back pain:  2 days ago  Intensity of pain:  4/10 at its best, 10/10 worst, currently 8/10  Location of Pain: neck, upper tap  Radiation:  Numbness and tingling on left hand, previously present  Character of Pain:  Sharp,   Constant/ intermittent: constant  Exacerbating factors: driving, turning head, extension of head  Relieving Factors: heating helped a little  Associated Symptoms:    +  Erythema, Numbness, tingling  +  Nausea, vomiting  +  Decreased ROM  -  Decreased Strength    Inciting Event/ Trauma: possible bird bath moving, chronic use of mouse  Progression:  progressively worse  Previous Episodes: None  Specialist Follow up: None  Hx of Physical Therapy:  None  Prior medications:  Tylenol, advil, heat, and ice       Past Medical History:   Diagnosis Date   • Allergic     seasonal, food allergies   • Asthma    • GERD (gastroesophageal reflux disease)    • Hypertension    • Irritable bowel syndrome    • Migraine without aura    • Shoulder subluxation, left    • Tennis elbow       ROS:   Review of Systems   See HPI    OBJECTIVE  Vitals: 10/25/22 1355   BP: 132/72   Pulse: 88   SpO2: 100%        Physical Exam  Constitutional:       General: She is in acute distress (mild to moderate 2/2 to pain)  Appearance: Normal appearance  She is not ill-appearing, toxic-appearing or diaphoretic  HENT:      Head: Normocephalic and atraumatic  Mouth/Throat:      Mouth: Mucous membranes are moist       Pharynx: Oropharynx is clear  No oropharyngeal exudate or posterior oropharyngeal erythema  Eyes:      General:         Right eye: No discharge  Left eye: No discharge  Conjunctiva/sclera: Conjunctivae normal       Pupils: Pupils are equal, round, and reactive to light  Neck:      Comments: Severe spasm and tenderness to palpation of the base of the neck  Tissue texture changes noted  Reduction in ROM of the neck when turning to the right to about 70 degrees  Left side is restricted to about 80 degrees  Cardiovascular:      Rate and Rhythm: Normal rate and regular rhythm  Pulses: Normal pulses  Heart sounds: Normal heart sounds  No murmur heard  No friction rub  No gallop  Pulmonary:      Effort: Pulmonary effort is normal  No respiratory distress  Breath sounds: Normal breath sounds  No stridor  No wheezing, rhonchi or rales  Chest:      Chest wall: No tenderness  Musculoskeletal:      Cervical back: Tenderness present  Skin:     General: Skin is warm and dry  Capillary Refill: Capillary refill takes less than 2 seconds  Findings: Erythema (mild over area of tenderness  Patient recently massaged area ) present  Neurological:      Mental Status: She is alert  Lemon Mcburney was seen today for neck pain  Diagnoses and all orders for this visit:    Muscle spasm of back  Trapezius muscle spasm  Patient has significant tissue texture changes over the area of pain  It feels to be tight with occasional spasm of the actual muscle    For short acting pain relief will recommend a low dose of muscle relaxer since she is unable to take higher doses with being 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

## 2022-10-25 NOTE — PATIENT INSTRUCTIONS
Take Zanaflex tonight 1 hour before going to bed  This may make you little groggy in the morning, if you feel that way no that is probably going to effect to that way during the day  I do recommend starting the prednisone today  This may make it more difficult to sleep later on tonight, but I think that it is important that you get the steroids in so that they start to work  You may continue to use a heating pad for greater than 20 minutes, make sure your giving it a break every needle 40 minutes to an hour and do not keep it at the highest setting so you avoid burns to the area  Warm showers can also be helpful in the area  I understand this may be difficult with a 11year-old  Tizanidine (By mouth)   Tizanidine (qei-CBR-x-stacia)  Treats muscle spasticity  Brand Name(s): Zanaflex, Zanaflex Capsule   There may be other brand names for this medicine  When This Medicine Should Not Be Used: This medicine is not right for everyone  Do not use if you had an allergic reaction to tizanidine  How to Use This Medicine:   Capsule, Tablet  Take your medicine as directed  Your dose may need to be changed several times to find what works best for you  You may take this medicine with or without food, but always take it the same way every time  Tizanidine works differently depending on whether you take it on an empty stomach or a full stomach  Talk to your doctor if you have any questions about this  Missed dose: Take a dose as soon as you remember  If it is almost time for your next dose, wait until then and take a regular dose  Do not take extra medicine to make up for a missed dose  Store the medicine in a closed container at room temperature, away from heat, moisture, and direct light  Drugs and Foods to Avoid:   Ask your doctor or pharmacist before using any other medicine, including over-the-counter medicines, vitamins, and herbal products    Do not use this medicine together with ciprofloxacin or fluvoxamine  Some foods and medicines can affect how tizanidine works  Tell your doctor if you are using any of the following:  Acyclovir, baclofen, cimetidine, famotidine, ticlopidine, verapamil, zileuton  Birth control pills, blood pressure medicine, medicine for heart rhythm problems (such as amiodarone, mexiletine, propafenone), or medicine to treat an infection (such as levofloxacin, moxifloxacin)  Do not drink alcohol while you are using this medicine  Tell your doctor if you use anything else that makes you sleepy  Some examples are allergy medicine, narcotic pain medicine, and alcohol  Warnings While Using This Medicine:   Tell your doctor if you are pregnant or breastfeeding, or if you have kidney disease or liver disease  This medicine may cause the following problems:  Low blood pressure  Liver damage  This medicine may make you dizzy or drowsy  Do not drive or do anything else that could be dangerous until you know how this medicine affects you  Stand or sit up slowly if you are dizzy  Do not stop using this medicine suddenly  Your doctor will need to slowly decrease your dose before you stop it completely  Your doctor will do lab tests at regular visits to check on the effects of this medicine  Keep all appointments  Keep all medicine out of the reach of children  Never share your medicine with anyone  Possible Side Effects While Using This Medicine:   Call your doctor right away if you notice any of these side effects:   Allergic reaction: Itching or hives, swelling in your face or hands, swelling or tingling in your mouth or throat, chest tightness, trouble breathing  Dark urine or pale stools, nausea, vomiting, loss of appetite, stomach pain, yellow skin or eyes  Lightheadedness, dizziness, or fainting  Seeing or hearing things that are not really there  Slow heartbeat  If you notice these less serious side effects, talk with your doctor:   Dry mouth  Drowsiness or sleepiness  Weakness  If you notice other side effects that you think are caused by this medicine, tell your doctor  Call your doctor for medical advice about side effects  You may report side effects to FDA at 6-302-KOU-1004    © Copyright [facility] 2022 Information is for End User's use only and may not be sold, redistributed or otherwise used for commercial purposes  The above information is an  only  It is not intended as medical advice for individual conditions or treatments  Talk to your doctor, nurse or pharmacist before following any medical regimen to see if it is safe and effective for you  Prednisone (By mouth)   Prednisone (PRED-ni-sone)  Treats many diseases and conditions, especially problems related to inflammation  This medicine is a corticosteroid  Brand Name(s): Tyler, predniSONE Intensol   There may be other brand names for this medicine  When This Medicine Should Not Be Used: This medicine is not right for everyone  Do not use if you had an allergic reaction to prednisone or if you are pregnant  How to Use This Medicine:   Liquid, Tablet, Delayed Release Tablet  Take your medicine as directed  Your dose may need to be changed several times to find what works best for you  It is best to take this medicine with food or milk  Swallow the delayed-release tablet whole  Do not crush, break, or chew it  Measure the oral liquid medicine with a marked measuring spoon, oral syringe, or medicine cup  Missed dose: Take a dose as soon as you remember  If it is almost time for your next dose, wait until then and take a regular dose  Do not take extra medicine to make up for a missed dose  Store the medicine in a closed container at room temperature, away from heat, moisture, and direct light  Do not freeze the oral liquid  Drugs and Foods to Avoid:   Ask your doctor or pharmacist before using any other medicine, including over-the-counter medicines, vitamins, and herbal products    Tell your doctor if you use any of the following:  Aminoglutethimide, amphotericin B, carbamazepine, cholestyramine, cyclosporine, digoxin, isoniazid, ketoconazole, phenobarbital, phenytoin, or rifampin  Blood thinner, such as warfarin  NSAID pain or arthritis medicine, such as aspirin, diclofenac, ibuprofen, naproxen, celecoxib  Diuretic (water pill)  Diabetes medicine  Macrolide antibiotic, such as azithromycin, clarithromycin, erythromycin  Estrogen, including birth control pills or hormone replacement therapy  This medicine may interfere with vaccines  Ask your doctor before you get a flu shot or any other vaccines  Warnings While Using This Medicine: It is not safe to take this medicine during pregnancy  It could harm an unborn baby  Tell your doctor right away if you become pregnant  Tell your doctor if you are breastfeeding or if you have kidney problems, heart failure, high blood pressure, a recent heart attack, diabetes, glaucoma, osteoporosis, or thyroid problems  Tell your doctor about any infection you have  Also tell your doctor if you have had mental or emotional problems (such as depression) or stomach or bowel problems (such as an ulcer or diverticulitis)  This medicine may cause the following problems:  Mood or behavior changes  Higher blood pressure, retaining water, changes in salt or potassium levels in your body  Cataracts or glaucoma (with long-term use)  Weak bones or osteoporosis (with long-term use)  Slow growth in children (with long-term use)  Muscle problems (with high doses, especially if you have myasthenia gravis or similar nerve and muscle problems)  Do not stop using this medicine suddenly  Your doctor will need to slowly decrease your dose before you stop it completely  This medicine could cause you to get infections more easily  Tell your doctor right away if you are exposed to chicken pox, measles, or other serious infection   Tell your doctor if you had a serious infection in the past, such as tuberculosis or herpes  Tell your doctor about any extra stress or anxiety in your life  Your dose might need to be changed for a short time  Tell any doctor or dentist who treats you that you are using this medicine  This medicine may affect certain medical test results  Keep all medicine out of the reach of children  Never share your medicine with anyone  Possible Side Effects While Using This Medicine:   Call your doctor right away if you notice any of these side effects: Allergic reaction: Itching or hives, swelling in your face or hands, swelling or tingling in your mouth or throat, chest tightness, trouble breathing  Dark freckles, skin color changes, coldness, weakness, tiredness, nausea, vomiting, weight loss  Depression, unusual thoughts, feelings, or behaviors, trouble sleeping  Fever, chills, cough, sore throat, and body aches  Muscle pain or weakness  Rapid weight gain, swelling in your hands, ankles, or feet  Severe stomach pain, nausea, vomiting, or red or black stools  Skin changes or growths  Trouble seeing, eye pain, headache  If you notice these less serious side effects, talk with your doctor: Increased appetite  Round, puffy face  Weight gain around your neck, upper back, breast, face, or waist  If you notice other side effects that you think are caused by this medicine, tell your doctor  Call your doctor for medical advice about side effects  You may report side effects to FDA at 5-520-FDA-7518    © Copyright [employer] 2022 Information is for End User's use only and may not be sold, redistributed or otherwise used for commercial purposes  The above information is an  only  It is not intended as medical advice for individual conditions or treatments  Talk to your doctor, nurse or pharmacist before following any medical regimen to see if it is safe and effective for you

## 2022-10-29 ENCOUNTER — TELEPHONE (OUTPATIENT)
Dept: FAMILY MEDICINE CLINIC | Facility: CLINIC | Age: 47
End: 2022-10-29

## 2022-10-29 DIAGNOSIS — M62.830 MUSCLE SPASM OF BACK: Primary | ICD-10-CM

## 2022-10-29 DIAGNOSIS — M62.838 TRAPEZIUS MUSCLE SPASM: ICD-10-CM

## 2022-10-29 NOTE — TELEPHONE ENCOUNTER
----- Message from Kirk Dykes DO sent at 10/26/2022  4:37 AM EDT -----  Follow up with back pain started on steroids and zanaflex      Creed DO Vandana  [facility]  10/26/2022 4:37 AM

## 2022-10-29 NOTE — TELEPHONE ENCOUNTER
Patient is at about 75% of baseline  She is having more pain when she wakes in the morning but during the day she has been managing  She had her last dose of steroids today  Offered to extend and taper but she is interested in looking into PT in a few days if she is not feeling better

## 2022-11-04 ENCOUNTER — TELEMEDICINE (OUTPATIENT)
Dept: BEHAVIORAL/MENTAL HEALTH CLINIC | Facility: CLINIC | Age: 47
End: 2022-11-04

## 2022-11-04 DIAGNOSIS — F41.1 GAD (GENERALIZED ANXIETY DISORDER): Primary | ICD-10-CM

## 2022-11-04 NOTE — PSYCH
Virtual Regular Visit    Verification of patient location:    Patient is located in the following state in which I hold an active license [place]      Assessment/Plan:    Problem List Items Addressed This Visit        Other    EDELMIRA (generalized anxiety disorder) - Primary          Goals addressed in session:   Long Term Goal 1: Manage anxiety     Target Date: 1/8/2023  Completion Date: TBD         Short Term Objectives for Goal 1:   Learning strategies to work through triggering moments  Seek to increase mindfulness and self-care activities  Implement effective and healthy behavioral management strategies for children  Continue to implement and respect boundaries  Engage in self-care by reading, exercising, deep breathing    Balancing parenting and work tasks with self-care         Reason for visit is   Chief Complaint   Patient presents with   • Virtual Regular Visit        Encounter provider Alphonse Dixon LCSW    Provider located at [address] [address] [phone]      Recent Visits  No visits were found meeting these conditions  Showing recent visits within past 7 days and meeting all other requirements  Today's Visits  Date Type Provider Dept   11/04/22 Telemedicine Alphonse Dixon LCSW [facility]   Showing today's visits and meeting all other requirements  Future Appointments  No visits were found meeting these conditions  Showing future appointments within next 150 days and meeting all other requirements       The patient was identified by name and date of birth  Daisy Radhas was informed that this is a telemedicine visit and that the visit is being conducted throughthe [employer] platform  She agrees to proceed     My office door was closed  No one else was in the room    She acknowledged consent and understanding of privacy and security of the video platform  The patient has agreed to participate and understands they can discontinue the visit at any time  Patient is aware this is a billable service  Subjective  Oniel Lovett is a 50 y o  female presenting for follow up appointment with [facility]  Patient reports that since identifying her symptoms as panic attacks, she feels relieved  She continues to feel stressed with work, and is looking forward to down time  We discussed "good enough" type thinking in order to dispel patient's need for "100%" in whatever she does  Patient was encouraged to treat herself with some of the same compassion she uses with daughters  Patient has been admittedly working a lot with less time to regulate things at home, which she feels may be contributing to some of her daughter's tantrums  Patient is trying not to become triggered by tantrums that remind her of her daughter Pamela White who is currently in foster care  Patient plans to follow up with therapist in two weeks           HPI     Past Medical History:   Diagnosis Date   • Allergic     seasonal, food allergies   • Asthma    • GERD (gastroesophageal reflux disease)    • Hypertension    • Irritable bowel syndrome    • Migraine without aura    • Shoulder subluxation, left    • Tennis elbow        Past Surgical History:   Procedure Laterality Date   • TONSILLECTOMY     • US GUIDED MSK PROCEDURE  1/14/2020   • WRIST SURGERY         Current Outpatient Medications   Medication Sig Dispense Refill   • albuterol (PROVENTIL HFA,VENTOLIN HFA) 90 mcg/act inhaler Inhale     • Ascorbic Acid (VITAMIN C) 500 MG CAPS Take by mouth     • Calcium 250 MG CAPS Take by mouth     • desogestrel-ethinyl estradiol (Viorele) 0 15-0 02/0 01 MG (21/5) per tablet Take 1 tablet by mouth daily 112 tablet 4   • EPINEPHrine (EPIPEN) 0 3 mg/0 3 mL SOAJ as directed     • fexofenadine (ALLEGRA) 180 MG tablet Take by mouth     • lisinopril (ZESTRIL) 5 mg tablet Take 1 tablet (5 mg total) by mouth daily 8 tablet 0   • montelukast (SINGULAIR) 10 mg tablet Take 1 tablet by mouth each evening     • Multiple Vitamin (MULTI-VITAMIN DAILY) TABS Take by mouth     • Omega-3 Fatty Acids (Fish Oil) 1000 MG CPDR Take by mouth 2 (two) times a day 180 capsule 0   • phenol (CHLORASEPTIC) 1 4 % mucosal liquid Apply 1 spray to the mouth or throat every 2 (two) hours as needed (sore throat) 20 mL 0   • TiZANidine (Zanaflex) 2 MG capsule Take 1 capsule (2 mg total) by mouth 3 (three) times a day as needed for muscle spasms 30 capsule 0     No current facility-administered medications for this visit  Allergies   Allergen Reactions   • Cat Hair Extract    • Erythromycin    • Hctz [Hydrochlorothiazide] Dizziness   • Peanut [Nuts - Food Allergy]    • Seasonal Ic  [Cholestatin]    • Trish Sun Screen Spf 30 [Albolene] Rash   • Latex Rash   • Medical Tape Rash       Review of Systems   Psychiatric/Behavioral: Negative for agitation, behavioral problems, confusion, decreased concentration, dysphoric mood, hallucinations, self-injury, sleep disturbance and suicidal ideas  The patient is nervous/anxious  The patient is not hyperactive  Video Exam    There were no vitals filed for this visit  Physical Exam  Psychiatric:         Attention and Perception: Attention and perception normal          Mood and Affect: Mood is anxious  Speech: Speech normal          Behavior: Behavior normal  Behavior is cooperative  Thought Content: Thought content normal          Cognition and Memory: Cognition and memory normal          Judgment: Judgment normal       Comments: Patient presents with anxious mood and congruent affect  Patient is well-groomed, with good eye contact and good focus in session  Patient's speech is normal rate and rhythm  Patient is alert, oriented, engaged, and open  Patient's thought process is organized and thought content is future-directed and goal-oriented    Patient's memory and cognition appear intact  Patient denies SI/HI/AH/VH and self-harm               Visit Time    Visit Start Time: 1497  Visit Stop Time: 7898  Total Visit Duration: 47 min

## 2022-11-04 NOTE — PATIENT INSTRUCTIONS
Continue to take all medications as prescribed  Attend all scheduled medical appointments  Follow up with therapist     If you are experiencing a mental health emergency, please call 911 for emergent care, or one of the following numbers for someone to talk to 24 hours a day, 7 days a week:    [facility] ([facility]) AT [place] Intervention: [address] Crisis Intervention: [phone]  CrisisTextLine:  Text [place] to [phone]  PA's Support and Referral Hotline: Rui 58:  [phone]    If you would like to leave a phone message for your therapist, please call the [address] [place] [address] [address] Integration Department at [phone]  If you need to reach [address] [place] [address] E after hours, please call [phone] for on-call service

## 2022-11-15 ENCOUNTER — EVALUATION (OUTPATIENT)
Dept: PHYSICAL THERAPY | Facility: CLINIC | Age: 47
End: 2022-11-15

## 2022-11-15 DIAGNOSIS — M62.830 MUSCLE SPASM OF BACK: ICD-10-CM

## 2022-11-15 DIAGNOSIS — M62.838 TRAPEZIUS MUSCLE SPASM: ICD-10-CM

## 2022-11-15 NOTE — PROGRESS NOTES
PT Evaluation     Today's date: 11/15/2022  Patient name: Mimi Dotson  : 1975  MRN: [id number]  Referring provider: Fernando Hodge, *  Dx:   Encounter Diagnosis     ICD-10-CM    1  Muscle spasm of back  M62 830 Ambulatory Referral to Physical Therapy   2  Trapezius muscle spasm  M62 838 Ambulatory Referral to Physical Therapy                  Assessment  Assessment details: Problem List:  1) hypertonicity upper trap/levator scapulae  2) postural imbalance    Mimi Dotson is a pleasant 50 y o  female who presents with acute neck pain resulting in difficulty with ADLs and functional activities  She has limitations in AROM and impaired motor control of cervical AROM resulting in neck pain  She does not appear to be radiculopathy in nature, but she does have trigger points causing referred pain into L arm  Her greatest concerns are the pain she is experiencing, worry over not knowing what's wrong, concern at no signs of improvement, fear of not being able to keep active and recurrence of symptoms  No further referral appears necessary at this time based upon examination results  She was agreeable to POC 2x/week to address deficits and allow return to PLOF  Positive prognostic indicators include positive attitude toward recovery, good understanding of diagnosis and treatment plan options, absence of peripheralization and absence of observed red flags  Negative prognostic indicators include anxiety and minimal changes in pain with movement  Impairments: abnormal muscle firing, abnormal muscle tone, abnormal or restricted ROM, abnormal movement, activity intolerance, impaired physical strength, lacks appropriate home exercise program, pain with function, scapular dyskinesis and poor posture     Symptom irritability: moderateUnderstanding of Dx/Px/POC: excellent  Goals  ST  Patient will demonstrate full cervical AROM without increase in symptoms in 4 weeks     2  Patient will be able to perform cervical retraction without upper trap compensation in 4 weeks  LT  Patient will be able to sleep without waking from pain in 8 weeks  2  Patient will be able to look up without increase in symptoms in 8 weeks  3  Patient will be independent with home exercise program    4  Patient will be able to manage symptoms independently  Plan  Patient would benefit from: skilled physical therapy  Planned modality interventions: thermotherapy: hydrocollator packs and TENS  Planned therapy interventions: home exercise program, flexibility, functional ROM exercises, gait training, graded activity, strengthening, stretching, therapeutic exercise, therapeutic activities, neuromuscular re-education, patient education, postural training, manual therapy, joint mobilization, abdominal trunk stabilization, balance/weight bearing training, balance and activity modification  Frequency: 2x week  Duration in weeks: 8  Treatment plan discussed with: patient, PTA and referring physician        Subjective Evaluation    History of Present Illness  Mechanism of injury: 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  She started with pain about 3 weeks ago  They day before this started she had been doing some yard work  She had moved a heavy bird bath  She felt a pull in her L sided neck muscle  It didn't improve  She could feel muscle spasms  She went into the doctor and they gave her muscle relaxants and steroid pack  After a few days, it improved  She thinks she had about 75% improvement  She notes it was pretty good for a few days and then it returned  It bothers her most when she sleeps  It wakes her at night  It does seem to be cyclic  She called the PCP and they referred her for PT  She did have headaches when it first started, but not lately  She initially had some radiating pain in the upper arm  She denies any R sided pain or n/t  She works for [employer]   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  Pain  Current pain ratin  At best pain ratin  At worst pain ratin  Location: L upper trap   Quality: tight, sharp and dull ache  Relieving factors: heat          Objective     Postural Observations    Additional Postural Observation Details  Bilateral rounded shoulders, forward head    Palpation   Left   Hypertonic in the levator scapulae and upper trapezius  Muscle spasm in the levator scapulae and upper trapezius  Tenderness of the levator scapulae and upper trapezius  Trigger point to levator scapulae and upper trapezius       Neurological Testing     Sensation   Cervical/Thoracic   Left   Intact: light touch    Right   Intact: light touch    Active Range of Motion   Cervical/Thoracic Spine       Cervical    Flexion: 60 degrees   Extension: 30 degrees      Left lateral flexion: 40 degrees      Right lateral flexion: 50 degrees        Thoracic    Flexion: 70 degrees   Extension: 60 degrees        Strength/Myotome Testing   Cervical Spine     Left   Normal strength    Right   Normal strength              Diagnosis: Upper trap/Levator Scapulae spasm (postual imbalance)   Precautions: Anxiety      Manuals 11/15            Gentle IASTM/STM Ut/Levator RS            TPR UT                                       Neuro Re-Ed                          Chin tucks             scap retractions             ABUNDIO chin tucks             DNF laser                                       Ther Ex             UBE retro             UT stretch             Levator stretch             Theracane                          rows                                       Ther Activity                                       Gait Training                                       Modalities

## 2022-11-18 ENCOUNTER — TELEMEDICINE (OUTPATIENT)
Dept: BEHAVIORAL/MENTAL HEALTH CLINIC | Facility: CLINIC | Age: 47
End: 2022-11-18

## 2022-11-18 DIAGNOSIS — F41.1 GAD (GENERALIZED ANXIETY DISORDER): Primary | ICD-10-CM

## 2022-11-18 NOTE — PATIENT INSTRUCTIONS
Continue to take all medications as prescribed  Attend all scheduled medical appointments  Follow up with therapist     If you are experiencing a mental health emergency, please call 911 for emergent care, or one of the following numbers for someone to talk to 24 hours a day, 7 days a week:    [facility] ([facility]) AT [place] Intervention: [address] Crisis Intervention: [phone]  CrisisTextLine:  Text [place] to [phone]  PA's Support and Referral Hotline: Rui 58:  257.995.5765    If you would like to leave a phone message for your therapist, please call the [address] [place] [address] [address] Integration Department at [phone]  If you need to reach [address] [place] [address] E after hours, please call [phone] for on-call service

## 2022-11-18 NOTE — PSYCH
Psychotherapy Provided: Individual psychotherapy for 44 minutes from [id number]     Length of time in session: 44 minutes, follow up in 2 weeks    Encounter Diagnosis     ICD-10-CM    1  EDELMIRA (generalized anxiety disorder)  F41 1           Goals addressed in session: Goal 1     Current suicide risk : [address]: Diagnosis and Treatment Plan explained to Katelynn Graham relates understanding diagnosis and is agreeable to Treatment Plan  Yes     Visit start and stop times:    11/18/22  Start Time: 0906  Stop Time: 3420  Total Visit Time: 44 minutes    Virtual Regular Visit    Verification of patient location:    Patient is located in the following state in which I hold an active license [place]      Assessment/Plan:    Problem List Items Addressed This Visit        Other    EDELMIRA (generalized anxiety disorder) - Primary              Reason for visit is   Chief Complaint   Patient presents with   • Virtual Regular Visit        Encounter provider Monica Yin LCSW    Provider located at [address] [address] [phone]      Recent Visits  No visits were found meeting these conditions  Showing recent visits within past 7 days and meeting all other requirements  Today's Visits  Date Type Provider Dept   11/18/22 Telemedicine Monica Yin LCSW [facility]   Showing today's visits and meeting all other requirements  Future Appointments  No visits were found meeting these conditions  Showing future appointments within next 150 days and meeting all other requirements       The patient was identified by name and date of birth  Janae Sifuentes was informed that this is a telemedicine visit and that the visit is being conducted throughthe [employer] platform  She agrees to proceed     My office door was closed  No one else was in the room    She acknowledged consent and understanding of privacy and security of the video platform  The patient has agreed to participate and understands they can discontinue the visit at any time  Patient is aware this is a billable service  Brigido Marquez is a 50 y o  female presenting for follow up appointment with Integration Services  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  Patient is almost at the end of termination of parental rights with her middle adoptive daughter and she is feeling hopeful that will contribute to stress reduction  Patient describes some guilt related to feeling "vindicated" by adoptive daughter's tantrums in her new setting  Patient continues to benefit from processing stress and learning techniques for reframing negative thought processes  Patient plans to follow up in two weeks           HPI     Past Medical History:   Diagnosis Date   • Allergic     seasonal, food allergies   • Asthma    • GERD (gastroesophageal reflux disease)    • Hypertension    • Irritable bowel syndrome    • Migraine without aura    • Shoulder subluxation, left    • Tennis elbow        Past Surgical History:   Procedure Laterality Date   • TONSILLECTOMY     • US GUIDED MSK PROCEDURE  1/14/2020   • WRIST SURGERY         Current Outpatient Medications   Medication Sig Dispense Refill   • albuterol (PROVENTIL HFA,VENTOLIN HFA) 90 mcg/act inhaler Inhale     • Ascorbic Acid (VITAMIN C) 500 MG CAPS Take by mouth     • Calcium 250 MG CAPS Take by mouth     • desogestrel-ethinyl estradiol (Viorele) 0 15-0 02/0 01 MG (21/5) per tablet Take 1 tablet by mouth daily 112 tablet 4   • EPINEPHrine (EPIPEN) 0 3 mg/0 3 mL SOAJ as directed     • fexofenadine (ALLEGRA) 180 MG tablet Take by mouth     • lisinopril (ZESTRIL) 5 mg tablet Take 1 tablet (5 mg total) by mouth daily 8 tablet 0   • montelukast (SINGULAIR) 10 mg tablet Take 1 tablet by mouth each evening     • Multiple Vitamin (MULTI-VITAMIN DAILY) TABS Take by mouth     • Omega-3 Fatty Acids (Fish Oil) 1000 MG CPDR Take by mouth 2 (two) times a day 180 capsule 0   • phenol (CHLORASEPTIC) 1 4 % mucosal liquid Apply 1 spray to the mouth or throat every 2 (two) hours as needed (sore throat) 20 mL 0   • TiZANidine (Zanaflex) 2 MG capsule Take 1 capsule (2 mg total) by mouth 3 (three) times a day as needed for muscle spasms 30 capsule 0     No current facility-administered medications for this visit  Allergies   Allergen Reactions   • Cat Hair Extract    • Erythromycin    • Hctz [Hydrochlorothiazide] Dizziness   • Peanut [Nuts - Food Allergy]    • Seasonal Ic  [Cholestatin]    • Trish Sun Screen Spf 30 [Albolene] Rash   • Latex Rash   • Medical Tape Rash       Review of Systems   Psychiatric/Behavioral: Negative for agitation, behavioral problems, confusion, decreased concentration, dysphoric mood, hallucinations, self-injury, sleep disturbance and suicidal ideas  The patient is nervous/anxious (improving)  The patient is not hyperactive  Video Exam    There were no vitals filed for this visit  Physical Exam  Psychiatric:         Attention and Perception: Attention and perception normal          Mood and Affect: Mood is anxious  Speech: Speech normal          Behavior: Behavior normal  Behavior is cooperative  Thought Content: Thought content normal          Cognition and Memory: Cognition and memory normal          Judgment: Judgment normal       Comments: Patient presents with anxious mood and congruent affect  Anxiety is improved  Patient is well-groomed, with good eye contact and good focus in session  Patient's speech is normal rate and rhythm  Patient is alert, oriented, engaged, and open  Patient's thought process is organized and thought content is future-directed and goal-oriented  Patient's memory and cognition appear intact  Patient denies SI/HI/AH/VH and self-harm               Visit Time    Visit Start Time: 3708  Visit Stop Time: 0014  Total Visit Duration: 44 minutes

## 2022-11-22 ENCOUNTER — OFFICE VISIT (OUTPATIENT)
Dept: PHYSICAL THERAPY | Facility: CLINIC | Age: 47
End: 2022-11-22

## 2022-11-22 DIAGNOSIS — M62.838 TRAPEZIUS MUSCLE SPASM: ICD-10-CM

## 2022-11-22 DIAGNOSIS — M62.830 MUSCLE SPASM OF BACK: Primary | ICD-10-CM

## 2022-11-22 NOTE — PROGRESS NOTES
Daily Note     Today's date: 2022  Patient name: Lucas Hayden  : 1975  MRN: [id number]  Referring provider: Willem Jones, *  Dx:   Encounter Diagnosis     ICD-10-CM    1  Muscle spasm of back  M62 830       2  Trapezius muscle spasm  M62 838                      Subjective: Pt states that she felt good for a couple of days after her appointment but this past weekend she had increased pain again  Pt reports that sleeping does not go well then and last night was really not good  Objective: See treatment diary below      Assessment: Tolerated treatment well  Focused on gentle AROM movements and manual therapy this visit  Pt has increased pain sx's with chin tucks in seated position  Attempted in supine position with continued discomfort, however pt was able to perform in smaller range for 10 reps  Pt also challenged with scap retraction and has increased pain with 10 reps  Cues are given to perform at a lesser intensity which pt was able to better tolerate  SOR nearly abolished pt's headache  Pt able to tolerate min/mod pressure during STM/TPR to L UT  Decreased pain and tightness noted after  Discussed HEP with pt reporting understanding  Pt will benefit from continued therapy          Plan: Continue per plan of care     Diagnosis: Upper trap/Levator Scapulae spasm (postual imbalance)   Precautions: Anxiety      Manuals 11/15 11/22           Gentle IASTM/STM Ut/Levator RS STM  TH           TPR UT             SOR  TH                        Neuro Re-Ed                          Supine chin tucks  10x           Chin tucks  2x10           scap retractions  10x   50%           ABUNDIO chin tucks             DNF laser                                       Ther Ex             UBE retro             UT stretch             Levator stretch             Theracane             CS AROM rotation  10x ea           rows                                       Ther Activity Gait Training                                       Modalities             HP  10' seated

## 2022-11-23 ENCOUNTER — OFFICE VISIT (OUTPATIENT)
Dept: PHYSICAL THERAPY | Facility: CLINIC | Age: 47
End: 2022-11-23

## 2022-11-23 DIAGNOSIS — M62.830 MUSCLE SPASM OF BACK: Primary | ICD-10-CM

## 2022-11-23 DIAGNOSIS — M62.838 TRAPEZIUS MUSCLE SPASM: ICD-10-CM

## 2022-11-23 NOTE — PROGRESS NOTES
Daily Note     Today's date: 2022  Patient name: Edgardo Whalen  : 1975  MRN: [id number]  Referring provider: Nayeli Bruce, *  Dx:   Encounter Diagnosis     ICD-10-CM    1  Muscle spasm of back  M62 830       2  Trapezius muscle spasm  M62 838                      Subjective: She reports that she is feeling much better today  She slept with the towel roll last night and it helped  Objective: See treatment diary below      Assessment: Tolerated treatment well  Patient would benefit from continued PT  She was limited by pain with trigger point release and STM this date to L UT and levator scapulae  She tolerated open books well  She was challenged to coordinate scapular depression/retraction  She had greater restriction into L rotation this date  She demonstrates improved suboccipital and upper trap soft tissue mobility  She does continue with muscle spasm and twitch after scapular retraction training  Plan: Continue per plan of care        Diagnosis: Upper trap/Levator Scapulae spasm (postual imbalance)   Precautions: Anxiety      Manuals 11/15 11/22 11/23          Gentle IASTM/STM Ut/Levator RS STM  TH           TPR UT   RS          SOR  TH RS                       Neuro Re-Ed                          Supine chin tucks  10x           Chin tucks  2x10           sidelying depression    5"x10          scap retractions  10x   50%           ABUNDIO chin tucks             DNF laser                                       Ther Ex             UBE retro   3' retro          UT stretch             Levator stretch             Theracane   3'           Open books   10x          CS AROM rotation  10x ea 10xea          C/S flx/ext   10x ext          rows                                       Ther Activity                                       Gait Training                                       Modalities             HP  10' seated 10' t/s seated

## 2022-11-25 ENCOUNTER — APPOINTMENT (OUTPATIENT)
Dept: PHYSICAL THERAPY | Facility: CLINIC | Age: 47
End: 2022-11-25

## 2022-11-29 ENCOUNTER — OFFICE VISIT (OUTPATIENT)
Dept: PHYSICAL THERAPY | Facility: CLINIC | Age: 47
End: 2022-11-29

## 2022-11-29 DIAGNOSIS — M54.12 CERVICAL RADICULOPATHY: ICD-10-CM

## 2022-11-29 DIAGNOSIS — M62.838 TRAPEZIUS MUSCLE SPASM: ICD-10-CM

## 2022-11-29 DIAGNOSIS — M62.830 MUSCLE SPASM OF BACK: Primary | ICD-10-CM

## 2022-11-29 NOTE — PROGRESS NOTES
Daily Note     Today's date: 2022  Patient name: Linnea Reyes  : 1975  MRN: [id number]  Referring provider: Matt Francois, *  Dx:   Encounter Diagnosis     ICD-10-CM    1  Muscle spasm of back  M62 830       2  Trapezius muscle spasm  M62 838       3  Cervical radiculopathy  M54 12                      Subjective: Pt states that she has been feeling much better ever since sleeping with a rolled towel under her neck  She reports that she hasn't had much pain at all for about 3 days  Objective: See treatment diary below      Assessment: Tolerated treatment well  Progressed pt as tolerated  Pt challenged with laser maze due to muscle fatigue and control  Pt had minimal increased in pain sx's with increased CS flexion  CS rotations with OP and UT stretch aggravated her sx's with increased reps  Pt also challenged with scapular retraction and reports increased pain at L vertebral border of her scapula, with low tolerance to STM  Tenderness is noted during manual therapy with trigger point noted in her L UT  Decreased pain and tightness noted after manual therapy  Discussed HEP and performing ex's as tolerated and not forcing into pain  Pt reports understanding  Will continue to progress as able          Plan: Continue per plan of care     Diagnosis: Upper trap/Levator Scapulae spasm (postual imbalance)   Precautions: Anxiety      Manuals 11/15 11/22 11/23 11/29         Gentle IASTM/STM Ut/Levator RS STM  TH           TPR UT   RS TH         SOR  TH RS TH         STM L TS paraspinal    TH         Neuro Re-Ed                          Supine chin tucks  10x  8x         Chin tucks  2x10           sidelying depression    5"x10          scap retractions  10x   50%  8x3"  50%         ABUNDIO chin tucks             DNF laser    2x                                   Ther Ex             UBE retro   3' retro 4' retro         UT stretch    10x10" ea         Levator stretch             Theracane   3' Open books   10x          CS AROM rotation  10x ea 10xea With OP 10x ea         C/S flx/ext   10x ext          rows                                       Ther Activity                                       Gait Training                                       Modalities             HP  10' seated 10' t/s seated 10' TS  seated

## 2022-12-01 ENCOUNTER — OFFICE VISIT (OUTPATIENT)
Dept: PHYSICAL THERAPY | Facility: CLINIC | Age: 47
End: 2022-12-01

## 2022-12-01 DIAGNOSIS — M62.830 MUSCLE SPASM OF BACK: Primary | ICD-10-CM

## 2022-12-01 DIAGNOSIS — M62.838 TRAPEZIUS MUSCLE SPASM: ICD-10-CM

## 2022-12-01 DIAGNOSIS — M54.12 CERVICAL RADICULOPATHY: ICD-10-CM

## 2022-12-01 NOTE — PROGRESS NOTES
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 and her daughter not sleeping well  Pt has a headache today and her neck is sore  Objective: See treatment diary below      Assessment: Tolerated treatment well  Session modified due to pt's report of increased sx's  Focused on gentle AROM along with manual therapy  SOR nearly abolished pt's headache  STM was performed gently to pt's L UT to tolerance with less restricions noted since LV  Reviewed HEP with pt reporting understanding  Will progress nv as able        Plan: Continue per plan of care     Diagnosis: Upper trap/Levator Scapulae spasm (postual imbalance)   Precautions: Anxiety      Manuals 11/15 11/22 11/23 11/29 12/1        Gentle IASTM/STM Ut/Levator RS STM  TH   STM  TH        TPR UT   RS TH         SOR  TH RS TH TH        STM L TS paraspinal    TH         Neuro Re-Ed                          Supine chin tucks  10x  8x 10x        Chin tucks  2x10           sidelying depression    5"x10          scap retractions  10x   50%  8x3"  50%         ABUNDIO chin tucks             DNF laser    2x                                   Ther Ex             UBE retro   3' retro 4' retro 4'        UT stretch    10x10" ea         Levator stretch             Theracane   3'           Open books   10x          CS AROM rotation  10x ea 10xea With OP 10x ea 10x ea: no OP        C/S flx/ext 10x ext          rows                                       Ther Activity                                       Gait Training                                       Modalities             HP  10' seated 10' t/s seated 10' TS  seated 10' to CS

## 2022-12-06 ENCOUNTER — OFFICE VISIT (OUTPATIENT)
Dept: PHYSICAL THERAPY | Facility: CLINIC | Age: 47
End: 2022-12-06

## 2022-12-06 DIAGNOSIS — M62.838 TRAPEZIUS MUSCLE SPASM: ICD-10-CM

## 2022-12-06 DIAGNOSIS — M62.830 MUSCLE SPASM OF BACK: Primary | ICD-10-CM

## 2022-12-06 DIAGNOSIS — M54.12 CERVICAL RADICULOPATHY: ICD-10-CM

## 2022-12-06 NOTE — PROGRESS NOTES
Daily Note     Today's date: 2022  Patient name: Maty Hoang  : 1975  MRN: [id number]  Referring provider: Mi Hastings, *  Dx:   Encounter Diagnosis     ICD-10-CM    1  Muscle spasm of back  M62 830       2  Trapezius muscle spasm  M62 838       3  Cervical radiculopathy  M54 12                      Subjective: Pt states that she realizes that carrying her daughter irritates her neck, she doesn't do it often but that did bother her  Objective: See treatment diary below      Assessment: Tolerated treatment well  Continued with outlined program and added levator stretch back in this visit with pt tolerating 3 reps for 10 sec hold  Pt reports intense stretch and is only able to tolerate low reps  DNF laser performed to improve motor control with pt being challenged, especially in increased flexion  Cues are needed during CS AROM and UT stretch to improve her posture and maintain minimal CS retraction  Less headache noted  Pt continues to tolerate low reps of CS motions due to increased headache/pain  Manual therapy performed very gently with tenderness noted, especially in L sub occipital with pt requesting less pressure  Muscle spasm noted in L UT  Muscle spasm relieved with gentle STM with pt reporting decreased pain and improved ROM after  Discussed HEP and  Questions/concerns regarding how to perform when having pain  Pt reports understanding  Will progress as able nv        Plan: Continue per plan of care     Diagnosis: Upper trap/Levator Scapulae spasm (postual imbalance)   Precautions: Anxiety      Manuals 11/15 11/22 11/23 11/29 12/1 12/5       Gentle IASTM/STM Ut/Levator RS STM  TH   STM  TH STM  TH       TPR UT   RS TH         SOR  TH RS TH TH        STM L TS paraspinal    TH         Neuro Re-Ed                          Supine chin tucks  10x  8x 10x        Chin tucks  2x10           sidelying depression    5"x10          scap retractions  10x   50%  8x3"  50% ABUNDIO chin tucks             DNF laser    2x  2x                                 Ther Ex             UBE retro   3' retro 4' retro 4' 4'        UT stretch    10x10" ea  10x10" ea       Levator stretch      3x10"       Theracane   3'           Open books   10x          CS AROM rotation  10x ea 10xea With OP 10x ea 10x ea: no OP 8x ea: no OP       C/S flx/ext   10x ext          rows                                       Ther Activity                                       Gait Training                                       Modalities             HP  10' seated 10' t/s seated 10' TS  seated 10' to CS

## 2022-12-08 ENCOUNTER — OFFICE VISIT (OUTPATIENT)
Dept: PHYSICAL THERAPY | Facility: CLINIC | Age: 47
End: 2022-12-08

## 2022-12-08 DIAGNOSIS — M62.830 MUSCLE SPASM OF BACK: Primary | ICD-10-CM

## 2022-12-08 DIAGNOSIS — M54.12 CERVICAL RADICULOPATHY: ICD-10-CM

## 2022-12-08 DIAGNOSIS — M62.838 TRAPEZIUS MUSCLE SPASM: ICD-10-CM

## 2022-12-08 NOTE — PROGRESS NOTES
Daily Note     Today's date: 2022  Patient name: Floyd Nichols  : 1975  MRN: [id number]  Referring provider: Lyndsey Luo, *  Dx:   Encounter Diagnosis     ICD-10-CM    1  Muscle spasm of back  M62 830       2  Trapezius muscle spasm  M62 838       3  Cervical radiculopathy  M54 12                      Subjective: Pt states that she was sore after her last visit, nothing as bad as before  Just in the one spot  Objective: See treatment diary below      Assessment: Tolerated treatment well  Standing scapular strengthening performed with no reports of increased pain sx's  Pt reports fatigue with UBE this visit  Cues during seated levator stretch to improve her posture and avoid rounded shoulders  Pt able to tolerate 8 reps this visit  Pt challenged due to muscle fatigue and control  Pt has difficulty with scap retraction reporting fatigue and discomfort  She was able to perform 10 reps  Decreased muscle spasms noted this visit during manual therapy  Pt able to tolerate slightly more pressure, but continues with low tolerance to increased pressure during manuals  Discussed HEP with pt reporting understanding and compliance          Plan: Continue per plan of care     Diagnosis: Upper trap/Levator Scapulae spasm (postual imbalance)   Precautions: Anxiety      Manuals 11/15 11/22 11/23 11/29 12/1 12/5 12/8      Gentle IASTM/STM Ut/Levator RS STM  TH   STM  TH STM  TH STM  TH      TPR UT   RS TH         SOR  TH RS TH TH  TH      STM L TS paraspinal    TH   TH      Neuro Re-Ed                          Supine chin tucks  10x  8x 10x  10x      Chin tucks  2x10           sidelying depression    5"x10          scap retractions  10x   50%  8x3"  50%   10x3"  50%      ABUNDIO chin tucks             DNF laser    2x  2x       No monies at wall       20x                   Ther Ex             UBE retro   3' retro 4' retro 4' 4'  4'      UT stretch    10x10" ea  10x10" ea       Levator stretch      3x10" 8x10"      Theracane   3'           Open books   10x          CS AROM rotation  10x ea 10xea With OP 10x ea 10x ea: no OP 8x ea: no OP       C/S flx/ext   10x ext          tband rows       Red: 20x                                Ther Activity                                       Gait Training                                       Modalities             HP  10' seated 10' t/s seated 10' TS  seated 10' to CS  10' to TS S/L

## 2022-12-12 ENCOUNTER — APPOINTMENT (OUTPATIENT)
Dept: PHYSICAL THERAPY | Facility: CLINIC | Age: 47
End: 2022-12-12

## 2022-12-13 ENCOUNTER — EVALUATION (OUTPATIENT)
Dept: PHYSICAL THERAPY | Facility: CLINIC | Age: 47
End: 2022-12-13

## 2022-12-13 DIAGNOSIS — M54.12 CERVICAL RADICULOPATHY: ICD-10-CM

## 2022-12-13 DIAGNOSIS — M62.838 TRAPEZIUS MUSCLE SPASM: ICD-10-CM

## 2022-12-13 DIAGNOSIS — M62.830 MUSCLE SPASM OF BACK: Primary | ICD-10-CM

## 2022-12-13 NOTE — PROGRESS NOTES
PT Re-Evaluation     Today's date: 2022  Patient name: Makayla Veronica  : 1975  MRN: [id number]  Referring provider: Brigitte Jones, *  Dx:   Encounter Diagnosis     ICD-10-CM    1  Muscle spasm of back  M62 830       2  Trapezius muscle spasm  M62 838       3  Cervical radiculopathy  M54 12                      Assessment  Assessment details: Problem List:  1) hypertonicity upper trap/levator scapulae  2) postural imbalance    Makayla Veronica is a pleasant 50 y o  female who presents with acute neck pain resulting in difficulty with ADLs and functional activities  She has improvements in AROM and her impaired motor control of cervical AROM resulting in neck pain  She has significant deficits in DNF endurance with SCM compensations evident  She fatigues quickly with any cervical stability exercises which are consistent with her residual difficulty with higher level functional tasks  Her greatest concerns are the pain she is experiencing, worry over not knowing what's wrong, concern at no signs of improvement, fear of not being able to keep active and recurrence of symptoms  No further referral appears necessary at this time based upon examination results  She was agreeable to continued POC 2x/week to address deficits and allow return to PLOF  Positive prognostic indicators include positive attitude toward recovery, good understanding of diagnosis and treatment plan options, absence of peripheralization and absence of observed red flags  Negative prognostic indicators include anxiety and minimal changes in pain with movement  Impairments: abnormal muscle firing, abnormal muscle tone, abnormal or restricted ROM, abnormal movement, activity intolerance, impaired physical strength, lacks appropriate home exercise program, pain with function, scapular dyskinesis and poor posture     Symptom irritability: moderateUnderstanding of Dx/Px/POC: excellent  Goals  ST   Patient will demonstrate full cervical AROM without increase in symptoms in 4 weeks  - Progressing  2  Patient will be able to perform cervical retraction without upper trap compensation in 4 weeks  - Progressing  3  Patient will be able to perform DNF endurance > 20s in 4 weeks  LT  Patient will be able to sleep without waking from pain in 8 weeks  - met  2  Patient will be able to look up without increase in symptoms in 8 weeks  - met  3  Patient will be independent with home exercise program    4  Patient will be able to manage symptoms independently  5  Patient will be able to turn and look in backseat of car in 8 weeks  6  Patient will be able to maintain cervical retraction against gravity while looking down in 8 weeks  Plan  Patient would benefit from: skilled physical therapy  Planned modality interventions: thermotherapy: hydrocollator packs and TENS  Planned therapy interventions: home exercise program, flexibility, functional ROM exercises, gait training, graded activity, strengthening, stretching, therapeutic exercise, therapeutic activities, neuromuscular re-education, patient education, postural training, manual therapy, joint mobilization, abdominal trunk stabilization, balance/weight bearing training, balance and activity modification  Frequency: 2x week  Duration in weeks: 4  Treatment plan discussed with: patient, PTA and referring physician        Subjective Evaluation    History of Present Illness  Mechanism of injury: 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  She started with pain about 3 weeks ago  They day before this started she had been doing some yard work  She had moved a heavy bird bath  She felt a pull in her L sided neck muscle  It didn't improve  She could feel muscle spasms  She went into the doctor and they gave her muscle relaxants and steroid pack  After a few days, it improved  She thinks she had about 75% improvement   She notes it was pretty good for a few days and then it returned  It bothers her most when she sleeps  It wakes her at night  It does seem to be cyclic  She called the PCP and they referred her for PT  She did have headaches when it first started, but not lately  She initially had some radiating pain in the upper arm  She denies any R sided pain or n/t  She works for [employer]  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  Pain  Current pain ratin  At best pain ratin  At worst pain ratin  Location: L upper trap   Quality: tight, sharp and dull ache  Relieving factors: heat      Patient reports 75% improvement since start of PT  She notes that it is generally much better  She notes that she can sleep through the night after trial and error of pillows  She reports that sleeping in a good position she's not waking up in pain  She notes that she is still limited because it will flare-up  She notes that if she is overly vigorous in PT even if they're beneficial will aggravate  She notes that lifting her daughter is still problematic  She notes that rotation turning to look at her in the back seat is painful  She notes that when she gets pain it resolves more quickly  She notes that usually it's in the neck  She notes that she does get the pain lower in the shoulder blade region  Objective     Postural Observations    Additional Postural Observation Details  Bilateral rounded shoulders, forward head    Palpation   Left   Hypertonic in the levator scapulae and upper trapezius  Muscle spasm in the levator scapulae and upper trapezius  Tenderness of the levator scapulae and upper trapezius  Trigger point to levator scapulae and upper trapezius       Neurological Testing     Sensation   Cervical/Thoracic   Left   Intact: light touch    Right   Intact: light touch    Active Range of Motion   Cervical/Thoracic Spine       Cervical    Flexion: 65 degrees   Extension: 50 degrees      Left lateral flexion: 45 degrees      Right lateral flexion: 55 degrees      Left rotation: 70 degrees  Right rotation: 80 degrees             Strength/Myotome Testing   Cervical Spine     Left   Normal strength    Right   Normal strength    Additional Strength Details  DNF endurance: 7s with SCM compensation initially; 8s with less SCM compensation      Flowsheet Rows    Flowsheet Row Most Recent Value   PT/OT G-Codes    Current Score 86   Projected Score 83   FOTO information reviewed Yes              Diagnosis: Upper trap/Levator Scapulae spasm (postual imbalance)   Precautions: Anxiety      Manuals 11/15 11/22 11/23 11/29 12/1 12/5 12/8 12/13     Gentle IASTM/STM Ut/Levator RS STM  TH   STM  TH STM  TH STM  TH      TPR UT   RS TH         SOR  TH RS TH TH  TH RS     Gentle lateral glides        RS     STM L TS paraspinal    TH   TH      Re-Evaluation        RS     Neuro Re-Ed                          Supine chin tucks  10x  8x 10x  10x      Chin tucks  2x10           sidelying depression    5"x10          scap retractions  10x   50%  8x3"  50%   10x3"  50%      ABUNDIO chin tucks             DNF laser    2x  2x  1x     No monies at wall       20x                   Ther Ex             UBE retro   3' retro 4' retro 4' 4'  4'      UT stretch    10x10" ea  10x10" ea       Levator stretch      3x10" 8x10"      Theracane   3'           Open books   10x          CS AROM rotation  10x ea 10xea With OP 10x ea 10x ea: no OP 8x ea: no OP       C/S flx/ext   10x ext          tband rows       Red: 20x      FR forearm wall slides for thoracic ext        10x                  Ther Activity                                       Gait Training                                       Modalities             HP  10' seated 10' t/s seated 10' TS  seated 10' to CS  10' to TS S/L

## 2022-12-15 ENCOUNTER — OFFICE VISIT (OUTPATIENT)
Dept: PHYSICAL THERAPY | Facility: CLINIC | Age: 47
End: 2022-12-15

## 2022-12-15 DIAGNOSIS — M62.830 MUSCLE SPASM OF BACK: Primary | ICD-10-CM

## 2022-12-15 DIAGNOSIS — M54.12 CERVICAL RADICULOPATHY: ICD-10-CM

## 2022-12-15 DIAGNOSIS — M62.838 TRAPEZIUS MUSCLE SPASM: ICD-10-CM

## 2022-12-15 NOTE — PROGRESS NOTES
Daily Note     Today's date: 12/15/2022  Patient name: Jackelyn Anthony  : 1975  MRN: [id number]  Referring provider: Ivis Nicholson, *  Dx:   Encounter Diagnosis     ICD-10-CM    1  Muscle spasm of back  M62 830       2  Cervical radiculopathy  M54 12       3  Trapezius muscle spasm  M62 838                      Subjective: Patient notes her neck is a little sore      Objective: See treatment diary below      Assessment: Tolerated treatment well  Patient did have slight soreness in upper trap today  She tolerated scap squeezes fair as it did increase symptoms  She noted challenge with the maze but did have improvement when adjusting the laser for the bottom half of the maze  Patient will continue to benefit from postural exercises and motor control training  Patient demonstrated fatigue post treatment, exhibited good technique with therapeutic exercises and would benefit from continued PT      Plan: Continue per plan of care  Progress treatment as tolerated         Diagnosis: Upper trap/Levator Scapulae spasm (postual imbalance)   Precautions: Anxiety      Manuals 11/15 11/22 11/23 11/29 12/1 12/5 12/8 12/13 12/15    Gentle IASTM/STM Ut/Levator RS STM  TH   STM  TH STM  TH STM  TH  SP    TPR UT   RS TH         SOR  TH RS TH TH  TH RS SP    Gentle lateral glides        RS SP    STM L TS paraspinal    TH   TH      Re-Evaluation        RS     Neuro Re-Ed                          Supine chin tucks  10x  8x 10x  10x  10x    Chin tucks  2x10           sidelying depression    5"x10          scap retractions  10x   50%  8x3"  50%   10x3"  50%  5x3"     ABUNDIO chin tucks             DNF laser    2x  2x  1x 2x     No monies at wall       20x                   Ther Ex             UBE retro   3' retro 4' retro 4' 4'  4'      UT stretch    10x10" ea  10x10" ea       Levator stretch      3x10" 8x10"  4x10"     Theracane   3'           Open books   10x          CS AROM rotation  10x ea 10xea With OP 10x ea 10x ea: no OP 8x ea: no OP       C/S flx/ext   10x ext          tband rows       Red: 20x      FR forearm wall slides for thoracic ext        10x 10x                 Ther Activity                                       Gait Training                                       Modalities             HP  10' seated 10' t/s seated 10' TS  seated 10' to CS  10' to TS S/L

## 2022-12-16 ENCOUNTER — TELEMEDICINE (OUTPATIENT)
Dept: BEHAVIORAL/MENTAL HEALTH CLINIC | Facility: CLINIC | Age: 47
End: 2022-12-16

## 2022-12-16 DIAGNOSIS — F41.1 GAD (GENERALIZED ANXIETY DISORDER): Primary | ICD-10-CM

## 2022-12-16 NOTE — PSYCH
Psychotherapy Provided: Individual Psychotherapy 45 minutes, from [id number]    Length of time in session: 45 minutes, follow up with therapist at [address]  Encounter Diagnosis     ICD-10-CM    1  EDELMIRA (generalized anxiety disorder)  F41 1           Goals addressed in session: Goal 1     Current suicide risk : [address]: Diagnosis and Treatment Plan explained to Dariela Comer relates understanding diagnosis and is agreeable to Treatment Plan  Yes     Visit start and stop times:    12/16/22  Start Time: 0906  Stop Time: 2847  Total Visit Time: 45 minutes    Virtual Regular Visit    Verification of patient location:    Patient is located in the following state in which I hold an active license [place]      Assessment/Plan:    Problem List Items Addressed This Visit        Other    EDELMIRA (generalized anxiety disorder) - Primary          Reason for visit is   Chief Complaint   Patient presents with   • Virtual Regular Visit        Encounter provider Alonso Sr LCSW    Provider located at [address] [facility] [address] [place] [id number] [phone]      Recent Visits  No visits were found meeting these conditions  Showing recent visits within past 7 days and meeting all other requirements  Today's Visits  Date Type Provider Dept   12/16/22 Telemedicine Alonso Sr LCSW [facility]   Showing today's visits and meeting all other requirements  Future Appointments  No visits were found meeting these conditions  Showing future appointments within next 150 days and meeting all other requirements       The patient was identified by name and date of birth  Xin Guthrie was informed that this is a telemedicine visit and that the visit is being conducted throughthe LiveGO platform  She agrees to proceed     My office door was closed   No one else was in the room  She acknowledged consent and understanding of privacy and security of the video platform  The patient has agreed to participate and understands they can discontinue the visit at any time  Patient is aware this is a billable service  Subjective  Symone Gil is a 50 y o  female presenting for follow up appointment with [facility]  Patient is feeling less stressed, finished a lot of projects for the end of the year and is going to be able to relax this evening  Older daughter was accepted to college with a full scholarship  Is looking forward to the holidays, found that she is singing more  Patient encouraged to recognize this as an external sign of mood shift  Patient's mood is stable and she coping well with stressors  Patient plans to follow up in 1/2023 at [address] [place] [address]     Past Medical History:   Diagnosis Date   • Allergic     seasonal, food allergies   • Asthma    • GERD (gastroesophageal reflux disease)    • Hypertension    • Irritable bowel syndrome    • Migraine without aura    • Shoulder subluxation, left    • Tennis elbow        Past Surgical History:   Procedure Laterality Date   • TONSILLECTOMY     • US GUIDED MSK PROCEDURE  1/14/2020   • WRIST SURGERY         Current Outpatient Medications   Medication Sig Dispense Refill   • albuterol (PROVENTIL HFA,VENTOLIN HFA) 90 mcg/act inhaler Inhale     • Ascorbic Acid (VITAMIN C) 500 MG CAPS Take by mouth     • Calcium 250 MG CAPS Take by mouth     • desogestrel-ethinyl estradiol (Viorele) 0 15-0 02/0 01 MG (21/5) per tablet Take 1 tablet by mouth daily 112 tablet 4   • EPINEPHrine (EPIPEN) 0 3 mg/0 3 mL SOAJ as directed     • fexofenadine (ALLEGRA) 180 MG tablet Take by mouth     • lisinopril (ZESTRIL) 5 mg tablet Take 1 tablet (5 mg total) by mouth daily 8 tablet 0   • montelukast (SINGULAIR) 10 mg tablet Take 1 tablet by mouth each evening     • Multiple Vitamin (MULTI-VITAMIN DAILY) TABS Take by mouth     • Omega-3 Fatty Acids (Fish Oil) 1000 MG CPDR Take by mouth 2 (two) times a day 180 capsule 0   • phenol (CHLORASEPTIC) 1 4 % mucosal liquid Apply 1 spray to the mouth or throat every 2 (two) hours as needed (sore throat) 20 mL 0   • TiZANidine (Zanaflex) 2 MG capsule Take 1 capsule (2 mg total) by mouth 3 (three) times a day as needed for muscle spasms 30 capsule 0     No current facility-administered medications for this visit  Allergies   Allergen Reactions   • Cat Hair Extract    • Erythromycin    • Hctz [Hydrochlorothiazide] Dizziness   • Peanut [Nuts - Food Allergy]    • Seasonal Ic  [Cholestatin]    • Trish Sun Screen Spf 30 [Albolene] Rash   • Latex Rash   • Medical Tape Rash       Review of Systems   Psychiatric/Behavioral: Negative for agitation, behavioral problems, confusion, decreased concentration, dysphoric mood, hallucinations, self-injury, sleep disturbance and suicidal ideas  The patient is not nervous/anxious and is not hyperactive  Video Exam    There were no vitals filed for this visit  Physical Exam  Psychiatric:         Attention and Perception: Attention and perception normal          Mood and Affect: Mood and affect normal          Speech: Speech normal          Behavior: Behavior normal  Behavior is cooperative  Thought Content: Thought content normal          Cognition and Memory: Cognition and memory normal          Judgment: Judgment normal       Comments: Patient presents with euthymic mood and congruent affect  Patient is well-groomed, with good eye contact and good focus in session  Patient's speech is normal rate and rhythm  Patient is alert, oriented, engaged, and open  Patient's thought process is organized and thought content is future-directed and goal-oriented  Patient's memory and cognition appear intact  Patient denies SI/HI/AH/VH and self-harm               Visit Time    Visit Start Time: 5188  Visit Stop Time: 3638  Total Visit Duration: 45 minutes

## 2022-12-16 NOTE — PATIENT INSTRUCTIONS
Continue to take all medications as prescribed  Attend all scheduled medical appointments  Follow up with therapist at [address] [place] [address]  If you are experiencing a mental health emergency, please call 911 for emergent care, or one of the following numbers for someone to talk to 24 hours a day, 7 days a week:    [facility] ([facility], located within [facility]) AT [place] [facility]: [address] Crisis Intervention: [phone]  CrisisTextLine:  Text [place] to 997158  PA's Support and Referral Hotline: Rui [address]:  929.813.1959    If you would like to leave a phone message for your therapist, please call the [address] [place] [address] [address] [facility] Department at [phone]  If you need to reach [address] [place] [address] E after hours, please call [phone] for on-call service

## 2022-12-19 ENCOUNTER — OFFICE VISIT (OUTPATIENT)
Dept: PHYSICAL THERAPY | Facility: CLINIC | Age: 47
End: 2022-12-19

## 2022-12-19 DIAGNOSIS — M62.830 MUSCLE SPASM OF BACK: Primary | ICD-10-CM

## 2022-12-19 DIAGNOSIS — M62.838 TRAPEZIUS MUSCLE SPASM: ICD-10-CM

## 2022-12-19 DIAGNOSIS — M54.12 CERVICAL RADICULOPATHY: ICD-10-CM

## 2022-12-19 NOTE — PROGRESS NOTES
Daily Note     Today's date: 2022  Patient name: Jorge Luis Parish  : 1975  MRN: [id number]  Referring provider: Tra Werner, *  Dx:   Encounter Diagnosis     ICD-10-CM    1  Muscle spasm of back  M62 830       2  Cervical radiculopathy  M54 12       3  Trapezius muscle spasm  M62 838                      Subjective: Patient notes that she had her COVID booster on Friday and had a reaction  She notes that her R arm was compensating to lift things so R neck is tighter  She notes that overall her neck is better today  Objective: See treatment diary below      Assessment: Tolerated treatment fair  Patient would benefit from continued PT  She demonstrated greater soft tissue restriction on R UT vs  L this date  She remains challenged to achieve DNF liftoff without SCM and accessory compensation  She was able to perform a modified thoracic mobility with FR at table vs  Against gravity at wall  She does demonstrate loss of well maintained scapular retraction to maintain upright posture  She had no increase in headaches by the end of the session  Plan: Continue per plan of care        Diagnosis: Upper trap/Levator Scapulae spasm (postual imbalance)   Precautions: Anxiety      Manuals 11/15 11/22 11/23 11/29 12/1 12/5 12/8 12/13 12/15 12/19   Gentle IASTM/STM Ut/Levator RS STM  TH   STM  TH STM  TH STM  TH  SP    TPR UT   RS TH      RS R UT   SOR  TH RS TH TH  TH RS SP RS   Gentle lateral glides        RS SP    STM L TS paraspinal    TH   TH      Re-Evaluation        RS     Neuro Re-Ed                          Supine chin tucks  10x  8x 10x  10x  10x 15x5"   Chin tucks  2x10           sidelying depression    5"x10          scap retractions  10x   50%  8x3"  50%   10x3"  50%  5x3"     ABUNDIO chin tucks             DNF laser    2x  2x  1x 2x  2x   No monies at wall       20x      DNF lift           3" ~10x   Ther Ex             UBE retro   3' retro 4' retro 4' 4'  4'      UT stretch    10x10" ea  10x10" ea       Levator stretch      3x10" 8x10"  4x10"     Theracane   3'           Open books   10x          CS AROM rotation  10x ea 10xea With OP 10x ea 10x ea: no OP 8x ea: no OP       C/S flx/ext   10x ext          tband rows       Red: 20x      FR forearm wall slides for thoracic ext        10x 10x At table 15x                Ther Activity                                       Gait Training                                       Modalities             HP  10' seated 10' t/s seated 10' TS  seated 10' to CS  10' to TS S/L

## 2022-12-20 ENCOUNTER — VBI (OUTPATIENT)
Dept: ADMINISTRATIVE | Facility: OTHER | Age: 47
End: 2022-12-20

## 2022-12-22 ENCOUNTER — OFFICE VISIT (OUTPATIENT)
Dept: PHYSICAL THERAPY | Facility: CLINIC | Age: 47
End: 2022-12-22

## 2022-12-22 DIAGNOSIS — M62.838 TRAPEZIUS MUSCLE SPASM: ICD-10-CM

## 2022-12-22 DIAGNOSIS — M54.12 CERVICAL RADICULOPATHY: ICD-10-CM

## 2022-12-22 DIAGNOSIS — M62.830 MUSCLE SPASM OF BACK: Primary | ICD-10-CM

## 2022-12-22 NOTE — PROGRESS NOTES
Daily Note     Today's date: 2022  Patient name: Leonel Bledsoe  : 1975  MRN: [id number]  Referring provider: Negra Duncan, *  Dx:   Encounter Diagnosis     ICD-10-CM    1  Muscle spasm of back  M62 830       2  Cervical radiculopathy  M54 12       3  Trapezius muscle spasm  M62 838                      Subjective: She notes that she was pretty good after last session  Objective: See treatment diary below      Assessment: Tolerated treatment well  Patient would benefit from continued PT  She demonstrates improved endurance with DNF laser and also improved overall motor control  She demonstrates well maintained improvements in her UT soft tissue restriction  She remains challenged to achieve DNF lift off without significant SCM or scalene compensation  She required frequent verbal cues  She struggled to dissociate scapular protraction in ABUNDIO vs  Cervical retraction  Plan: Continue per plan of care        Diagnosis: Upper trap/Levator Scapulae spasm (postual imbalance)   Precautions: Anxiety      Manuals 12/22  11/23 11/29 12/1 12/5 12/8 12/13 12/15 12/19   Gentle IASTM/STM Ut/Levator     STM  TH STM  TH STM  TH  SP    TPR UT RS  RS TH      RS R UT   SOR RS  RS TH TH  TH RS SP RS   Gentle lateral glides        RS SP    STM L TS paraspinal    TH   TH      Re-Evaluation        RS     Neuro Re-Ed                          Supine chin tucks    8x 10x  10x  10x 15x5"   Chin tucks             sidelying depression    5"x10          scap retractions    8x3"  50%   10x3"  50%  5x3"     ABUNDIO chin tucks 10x            DNF laser 2x   2x  2x  1x 2x  2x   No monies at wall       20x      DNF lift  5"x10         3" ~10x   Ther Ex             UBE retro   3' retro 4' retro 4' 4'  4'      UT stretch    10x10" ea  10x10" ea       Levator stretch      3x10" 8x10"  4x10"     Theracane   3'           Open books   10x          CS AROM rotation   10xea With OP 10x ea 10x ea: no OP 8x ea: no OP       T/S Ext seated 1/2 FR 20x            C/S flx/ext   10x ext          tband rows       Red: 20x      FR forearm wall slides for thoracic ext        10x 10x At table 15x                Ther Activity                                       Gait Training                                       Modalities             HP   10' t/s seated 10' TS  seated 10' to CS  10' to TS S/L

## 2022-12-28 ENCOUNTER — OFFICE VISIT (OUTPATIENT)
Dept: PHYSICAL THERAPY | Facility: CLINIC | Age: 47
End: 2022-12-28

## 2022-12-28 DIAGNOSIS — M62.838 TRAPEZIUS MUSCLE SPASM: ICD-10-CM

## 2022-12-28 DIAGNOSIS — M54.12 CERVICAL RADICULOPATHY: ICD-10-CM

## 2022-12-28 DIAGNOSIS — M62.830 MUSCLE SPASM OF BACK: Primary | ICD-10-CM

## 2022-12-28 NOTE — PROGRESS NOTES
Daily Note     Today's date: 2022  Patient name: Hemal Corcoran  : 1975  MRN: [id number]  Referring provider: Ranulfo Sal, *  Dx:   Encounter Diagnosis     ICD-10-CM    1  Muscle spasm of back  M62 830       2  Cervical radiculopathy  M54 12       3  Trapezius muscle spasm  M62 838                      Subjective: She notes that she is feeling much better  Objective: See treatment diary below      Assessment: Tolerated treatment well  Patient would benefit from continued PT  She does demonstrate loss of endurance with DNF maze, but is able to quickly correct with verbal cues  She remains challenged with DNF lift off with difficulty performing without significant SCM compensation  She was able to perform thoracic extension well in ABUNDIO  She did require cues for prone on elbows chin tucks  Plan: Continue per plan of care        Diagnosis: Upper trap/Levator Scapulae spasm (postual imbalance)   Precautions: Anxiety      Manuals 12/22 12/28  11/29 12/1 12/5 12/8 12/13 12/15 12/19   Gentle IASTM/STM Ut/Levator     STM  TH STM  TH STM  TH  SP    TPR UT RS   TH      RS R UT   SOR RS   TH TH  TH RS SP RS   Gentle lateral glides        RS SP    STM L TS paraspinal    TH   TH      Re-Evaluation        RS     Neuro Re-Ed                          Supine chin tucks    8x 10x  10x  10x 15x5"   Chin tucks             sidelying depression              scap retractions    8x3"  50%   10x3"  50%  5x3"     ABUNDIO chin tucks 10x 5"x20            DNF laser 2x 2x  2x  2x  1x 2x  2x   No monies at wall       20x      DNF lift  5"x10 15x no hold        3" ~10x   Ther Ex             UBE retro  5'   4' retro 4' 4'  4'      UT stretch    10x10" ea  10x10" ea       Levator stretch      3x10" 8x10"  4x10"     Theracane  2'            Open books             CS AROM rotation    With OP 10x ea 10x ea: no OP 8x ea: no OP       T/S Ext seated 1/2 FR 20x 20x           C/S flx/ext             tband rows/ext Black  +ext  20xea     Red: 20x      FR forearm wall slides for thoracic ext        10x 10x At table 15x   ABUNDIO t/s ext chest sag  20x           Ther Activity                                       Gait Training                                       Modalities             HP    10' TS  seated 10' to CS  10' to TS S/L

## 2023-01-03 ENCOUNTER — APPOINTMENT (OUTPATIENT)
Dept: PHYSICAL THERAPY | Facility: CLINIC | Age: 48
End: 2023-01-03

## 2023-01-05 ENCOUNTER — OFFICE VISIT (OUTPATIENT)
Dept: PHYSICAL THERAPY | Facility: CLINIC | Age: 48
End: 2023-01-05

## 2023-01-05 DIAGNOSIS — M54.12 CERVICAL RADICULOPATHY: ICD-10-CM

## 2023-01-05 DIAGNOSIS — M62.838 TRAPEZIUS MUSCLE SPASM: ICD-10-CM

## 2023-01-05 DIAGNOSIS — M62.830 MUSCLE SPASM OF BACK: Primary | ICD-10-CM

## 2023-01-05 NOTE — PROGRESS NOTES
Discharge    Today's date: 2023  Patient name: Sugar Whitfield  : 1975  MRN: [id number]  Referring provider: Amy Gamino, *  Dx:   Encounter Diagnosis     ICD-10-CM    1  Muscle spasm of back  M62 830       2  Cervical radiculopathy  M54 12       3  Trapezius muscle spasm  M62 838                      Subjective: Pt reports that she has continued to be pain free for the past 2 weeks  Objective: See treatment diary below      Assessment: Tolerated treatment well  Session focused on reviewing ex's for HEP due to pt request to be discharged at this time  Pt able to perform with good form and technique  Few cues given were noted with good carryover  No reports of increased sx's  Pt demonstrates good understanding of her HEP and will be discharged at this time          Plan: Discharge     Diagnosis: Upper trap/Levator Scapulae spasm (postual imbalance)   Precautions: Anxiety      Manuals 12/22 12/28 1/5  12/1 12/5 12/8 12/13 12/15 12/19   Gentle IASTM/STM Ut/Levator     STM  TH STM  TH STM  TH  SP    TPR UT RS         RS R UT   SOR RS    TH  TH RS SP RS   Gentle lateral glides        RS SP    STM L TS paraspinal       TH      Re-Evaluation        RS     Neuro Re-Ed                          Supine chin tucks     10x  10x  10x 15x5"   Chin tucks             sidelying depression              scap retractions       10x3"  50%  5x3"     ABUNDIO chin tucks 10x 5"x20            DNF laser 2x 2x 2x   2x  1x 2x  2x   No monies at wall   RTB  20x    20x      DNF lift  5"x10 15x no hold        3" ~10x   Ther Ex             UBE retro  5'  5'  4' 4'  4'      UT stretch      10x10" ea       Levator stretch      3x10" 8x10"  4x10"     Theracane  2'            Open books             CS AROM rotation     10x ea: no OP 8x ea: no OP       T/S Ext seated 1/2 FR 20x 20x 20x          C/S flx/ext             tband rows/ext  Black  +ext  20xea Black  +ext  20x ea    Red: 20x      FR forearm wall slides for thoracic ext 10x 10x At table 15x   ABUNDIO t/s ext chest sag  20x           Ther Activity                                       Gait Training                                       Modalities             HP     10' to CS  10' to TS S/L

## 2023-01-09 ENCOUNTER — APPOINTMENT (OUTPATIENT)
Dept: PHYSICAL THERAPY | Facility: CLINIC | Age: 48
End: 2023-01-09

## 2023-01-11 ENCOUNTER — RA CDI HCC (OUTPATIENT)
Dept: OTHER | Facility: HOSPITAL | Age: 48
End: 2023-01-11

## 2023-01-11 NOTE — PROGRESS NOTES
[facility]  [facility]          Chart Reviewed number of suggestions sent to Provider: 1   j45 20    This is a reminder to address ALL [facility]  (risk adjustment) codes as found on active problem list for 2023 as patient scores reset to zero JOSE  Patients Insurance        Commercial Insurance: [employer]

## 2023-01-13 ENCOUNTER — APPOINTMENT (OUTPATIENT)
Dept: PHYSICAL THERAPY | Facility: CLINIC | Age: 48
End: 2023-01-13

## 2023-01-17 ENCOUNTER — APPOINTMENT (OUTPATIENT)
Dept: PHYSICAL THERAPY | Facility: CLINIC | Age: 48
End: 2023-01-17

## 2023-01-18 ENCOUNTER — OFFICE VISIT (OUTPATIENT)
Dept: FAMILY MEDICINE CLINIC | Facility: CLINIC | Age: 48
End: 2023-01-18

## 2023-01-18 VITALS
BODY MASS INDEX: 25.1 KG/M2 | SYSTOLIC BLOOD PRESSURE: 130 MMHG | OXYGEN SATURATION: 99 % | DIASTOLIC BLOOD PRESSURE: 80 MMHG | HEART RATE: 104 BPM | WEIGHT: 147 LBS | HEIGHT: 64 IN | RESPIRATION RATE: 16 BRPM

## 2023-01-18 DIAGNOSIS — Z13.0 SCREENING FOR DEFICIENCY ANEMIA: ICD-10-CM

## 2023-01-18 DIAGNOSIS — F41.1 GAD (GENERALIZED ANXIETY DISORDER): ICD-10-CM

## 2023-01-18 DIAGNOSIS — E78.2 MIXED HYPERLIPIDEMIA: ICD-10-CM

## 2023-01-18 DIAGNOSIS — I10 HYPERTENSION, UNSPECIFIED TYPE: Primary | ICD-10-CM

## 2023-01-18 DIAGNOSIS — E78.1 HYPERTRIGLYCERIDEMIA: ICD-10-CM

## 2023-01-18 RX ORDER — LISINOPRIL 5 MG/1
5 TABLET ORAL DAILY
Qty: 90 TABLET | Refills: 1 | Status: SHIPPED | OUTPATIENT
Start: 2023-01-18

## 2023-01-18 NOTE — PROGRESS NOTES
Assessment/Plan:   Diagnoses and all orders for this visit:    Hypertension, unspecified type  -     Microalbumin / creatinine urine ratio; Future  -     Comprehensive metabolic panel; Future  -     lisinopril (ZESTRIL) 5 mg tablet; Take 1 tablet (5 mg total) by mouth daily  - BP stable in the office   - currently on ACEI 5mg and tolerating it well - cont current regimen   - UTD with COVID IMMs and Booster x2  - UTD with annual Flu vaccine   - UTD with Optho   - not a smoker, rare social drinker   - RTO in 6months, with labs, for f/u and annual exam - pt aware and agreeable     EDELMIRA (generalized anxiety disorder)  -     TSH, 3rd generation with Free T4 reflex; Future  -     Vitamin D 25 hydroxy; Future  - PHQ-2 score of 0  - denies SI/HI  - EDELMIRA-7 score of 3  - has been seeing Therapist regularly and finds it helpful   - things calmer now: Alina Boudreaux is in [facility], [facility] [place] Daughter is now officially with a new Sunshine Carril In[address] as of 1/3/2023, Gee Phil is in [place] at [facility]   - 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, 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   - 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   - RTO in 6months, with labs, for f/u and annual exam - pt aware and agreeable    Mixed hyperlipidemia  -     Lipid panel; Future  - RTO in 6months, with labs, for f/u and annual exam - pt aware and agreeable  - The 10-year ASCVD risk score (Amanda ESCAMILLA, et al , 2019) is: 1%    Values used to calculate the score:      Age: 50 years      Sex: Female      Is Non- : No      Diabetic: No      Tobacco smoker: No      Systolic Blood Pressure: 750 mmHg      Is BP treated: Yes      HDL Cholesterol: 59 mg/dL      Total Cholesterol: 157 mg/dL  Hypertriglyceridemia    Screening for deficiency anemia  -     CBC and differential; Future          Subjective:    Patient ID: Alaina Kramer is a 50 y o  female    HPI   1) HTN  - BP in the office 136/82 and    - on my repeat /80  - currently on ACEI 5mg and tolerating it well - denies swelling or dry cough   - her Middle Child is now officially with a new Ming Baez as of 1/3/2023  - UTD with COVID IMMs and Booster x2  - denies F/C/N/V/HA/change in vision/CP/palpitations/SOB/wheezing/cough/abd pain/D/C/LE edema   2) EDELMIRA  - PHQ-9 score of 0 <-- 1 <-- 0 <-- 1 <-- 2 <-- 7 <-- 1   - EDELMIRA-7 score of 3 <-- 6 <-- 8 <-- 5 <-- 8 <-- 14 <-- 16 <-- 12  - has been seeing Therapist regularly and finds it helpful   - youngest is in [address] is going to [facility] and got her 's license   - 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, 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    - 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   3) General   - UTD with COVID IMMs and Boosters x2   - UTD with annual Flu vaccine   - UTD with Tdap   - UTD with Cologuard (2022)   - UTD with annual GYN exam   - has Mammo scheduled       The following portions of the patient's history were reviewed and updated as appropriate: allergies, current medications, past family history, past medical history, past social history, past surgical history and problem list     Review of Systems  as per HPI    Objective:  /80 (BP Location: Left arm, Patient Position: Sitting, Cuff Size: Standard)   Pulse 104   Resp 16   Ht 5' 3 5" (1 613 m)   Wt 66 7 kg (147 lb)   SpO2 99%   BMI 25 63 kg/m²    Physical Exam  Vitals reviewed  Constitutional:       General: She is not in acute distress  Appearance: Normal appearance  She is not ill-appearing, toxic-appearing or diaphoretic  HENT:      Head: Normocephalic and atraumatic  Right Ear: External ear normal       Left Ear: External ear normal       Nose: Nose normal       Mouth/Throat:      Mouth: Mucous membranes are moist       Pharynx: Oropharynx is clear  Posterior oropharyngeal erythema present  No oropharyngeal exudate  Eyes:      General: No scleral icterus  Right eye: No discharge  Left eye: No discharge  Extraocular Movements: Extraocular movements intact        Conjunctiva/sclera: Conjunctivae normal    Cardiovascular:      Rate and Rhythm: Normal rate and regular rhythm  Heart sounds: Normal heart sounds  Pulmonary:      Effort: Pulmonary effort is normal       Breath sounds: Normal breath sounds  Abdominal:      Palpations: Abdomen is soft  Musculoskeletal:         General: Normal range of motion  Cervical back: Normal range of motion  Right lower leg: No edema  Left lower leg: No edema  Skin:     General: Skin is warm  Neurological:      General: No focal deficit present  Mental Status: She is alert and oriented to person, place, and time  Psychiatric:         Mood and Affect: Mood and affect normal          Speech: Speech normal  She is communicative  Speech is not rapid and pressured  Behavior: Behavior normal  Behavior is cooperative  Thought Content: Thought content normal  Thought content does not include homicidal or suicidal ideation  Thought content does not include homicidal or suicidal plan  Cognition and Memory: Cognition normal          Judgment: Judgment normal       Comments: PHQ-2 score of 0, denies SI/HI, EDELMIRA-7 score of 3         Depression Screening Follow-up Plan: Patient's depression screening was positive with a PHQ-2 score of 0  Their PHQ-9 score was   Continue regular follow-up with their psychologist/therapist/psychiatrist who is managing their mental health condition(s)

## 2023-01-20 ENCOUNTER — APPOINTMENT (OUTPATIENT)
Dept: PHYSICAL THERAPY | Facility: CLINIC | Age: 48
End: 2023-01-20

## 2023-02-10 ENCOUNTER — OFFICE VISIT (OUTPATIENT)
Dept: FAMILY MEDICINE CLINIC | Facility: CLINIC | Age: 48
End: 2023-02-10

## 2023-02-10 VITALS
HEART RATE: 106 BPM | OXYGEN SATURATION: 99 % | TEMPERATURE: 97.2 F | SYSTOLIC BLOOD PRESSURE: 140 MMHG | WEIGHT: 147 LBS | DIASTOLIC BLOOD PRESSURE: 70 MMHG | BODY MASS INDEX: 25.1 KG/M2 | HEIGHT: 64 IN | RESPIRATION RATE: 16 BRPM

## 2023-02-10 DIAGNOSIS — H93.8X3 CONGESTION OF BOTH EARS: ICD-10-CM

## 2023-02-10 DIAGNOSIS — T36.95XA ANTIBIOTIC-INDUCED YEAST INFECTION: ICD-10-CM

## 2023-02-10 DIAGNOSIS — J02.9 PHARYNGITIS, UNSPECIFIED ETIOLOGY: Primary | ICD-10-CM

## 2023-02-10 DIAGNOSIS — B37.9 ANTIBIOTIC-INDUCED YEAST INFECTION: ICD-10-CM

## 2023-02-10 RX ORDER — AMOXICILLIN AND CLAVULANATE POTASSIUM 875; 125 MG/1; MG/1
1 TABLET, FILM COATED ORAL EVERY 12 HOURS SCHEDULED
Qty: 20 TABLET | Refills: 0 | Status: SHIPPED | OUTPATIENT
Start: 2023-02-10 | End: 2023-02-13

## 2023-02-10 RX ORDER — FLUCONAZOLE 150 MG/1
150 TABLET ORAL ONCE
Qty: 1 TABLET | Refills: 0 | Status: SHIPPED | OUTPATIENT
Start: 2023-02-10 | End: 2023-02-10

## 2023-02-10 NOTE — PROGRESS NOTES
Assessment/Plan:   Diagnoses and all orders for this visit:    Pharyngitis, unspecified etiology  -     amoxicillin-clavulanate (Augmentin) 875-125 mg per tablet; Take 1 tablet by mouth every 12 (twelve) hours for 10 days  - hot tea with honey to soothe lining of throat   - f/u PRN     Congestion of both ears  - cont Allegra and start Singulair QHS  - OTC Mucinex     Antibiotic-induced yeast infection  -     fluconazole (DIFLUCAN) 150 mg tablet; Take 1 tablet (150 mg total) by mouth once for 1 dose          Subjective:    Patient ID: Popeye Molina is a 50 y o  female  HPI  - (+) sore throat for the past month   - (+) "ears hurt when I swallow and its waking me up at night" - started last week   - in Jan - had pink eye   - denies F/C/N/V/congestion/pressure in face  - had a HA a few days ago   - does hurt to swallow which started last week   - has been taking "a lot of Tylenol" - which keeps s/s at [facility]   - ringing in R-ear      The following portions of the patient's history were reviewed and updated as appropriate: allergies, current medications, past family history, past medical history, past social history, past surgical history and problem list     Review of Systems  as per HPI    Objective:  /70   Pulse (!) 106   Temp (!) 97 2 °F (36 2 °C)   Resp 16   Ht 5' 3 5" (1 613 m)   Wt 66 7 kg (147 lb)   SpO2 99%   BMI 25 63 kg/m²    Physical Exam  Vitals reviewed  Constitutional:       General: She is not in acute distress  Appearance: She is well-developed  She is not ill-appearing, toxic-appearing or diaphoretic  HENT:      Head: Normocephalic and atraumatic  Right Ear: Ear canal normal  No drainage  A middle ear effusion is present  Tympanic membrane is not erythematous  Left Ear: Ear canal normal  No drainage  A middle ear effusion is present  Tympanic membrane is not erythematous  Nose: No congestion  Mouth/Throat:      Mouth: Mucous membranes are moist  No oral lesions  Pharynx: Oropharynx is clear  Uvula midline  Posterior oropharyngeal erythema present  No pharyngeal swelling, oropharyngeal exudate or uvula swelling  Tonsils: No tonsillar exudate or tonsillar abscesses  Eyes:      Extraocular Movements:      Right eye: Normal extraocular motion  Left eye: Normal extraocular motion  Conjunctiva/sclera: Conjunctivae normal    Pulmonary:      Effort: Pulmonary effort is normal    Musculoskeletal:      Cervical back: Normal range of motion and neck supple  Skin:     General: Skin is warm  Neurological:      General: No focal deficit present  Mental Status: She is alert and oriented to person, place, and time  BMI Counseling: Body mass index is 25 63 kg/m²  The BMI is above normal  Nutrition recommendations include 3-5 servings of fruits/vegetables daily  Exercise recommendations include exercising 3-5 times per week

## 2023-02-13 ENCOUNTER — TELEPHONE (OUTPATIENT)
Dept: FAMILY MEDICINE CLINIC | Facility: CLINIC | Age: 48
End: 2023-02-13

## 2023-02-13 DIAGNOSIS — J02.9 PHARYNGITIS, UNSPECIFIED ETIOLOGY: Primary | ICD-10-CM

## 2023-02-13 RX ORDER — CEFDINIR 300 MG/1
300 CAPSULE ORAL EVERY 12 HOURS SCHEDULED
Qty: 14 CAPSULE | Refills: 0 | Status: SHIPPED | OUTPATIENT
Start: 2023-02-13 | End: 2023-02-20

## 2023-02-13 NOTE — TELEPHONE ENCOUNTER
Patient called and stated she was prescribed Augmentin for her sore throat and ears  She began the medication on Saturday and by Sunday had severe diarrhea  It is not as bad today and her ears and throat have started to feel better  She stopped the medication last night due to the diarrhea and would like to know if there is anything else she should do

## 2023-02-13 NOTE — TELEPHONE ENCOUNTER
Patient was advised that cefdinir was sent to the pharmacy in place of augmentin  Received report from L&D nurse. Infant in open crib swaddled.

## 2023-02-16 ENCOUNTER — TELEPHONE (OUTPATIENT)
Dept: FAMILY MEDICINE CLINIC | Facility: CLINIC | Age: 48
End: 2023-02-16

## 2023-02-16 NOTE — TELEPHONE ENCOUNTER
Since starting the new antibiotics she is still experiencing diarrhea  She said she is using probiotics but everything seems to upset her stomach  She wants to know if she can just stop using it or if something else can be given

## 2023-02-17 NOTE — TELEPHONE ENCOUNTER
Well all antibiotics have the potential to cause diarrhea   I could change it 1 last time to azithromycin but I cant tell you that it wont cause the same thing

## 2023-02-17 NOTE — TELEPHONE ENCOUNTER
I spoke with pt and she said she feels better and does not feel the need to take any more medications

## 2023-03-01 ENCOUNTER — HOSPITAL ENCOUNTER (OUTPATIENT)
Dept: RADIOLOGY | Facility: HOSPITAL | Age: 48
Discharge: HOME | End: 2023-03-01

## 2023-03-01 VITALS — HEIGHT: 64 IN | BODY MASS INDEX: 24.75 KG/M2 | WEIGHT: 145 LBS

## 2023-03-01 DIAGNOSIS — Z12.31 ENCOUNTER FOR SCREENING MAMMOGRAM FOR MALIGNANT NEOPLASM OF BREAST: ICD-10-CM

## 2023-07-19 ENCOUNTER — APPOINTMENT (OUTPATIENT)
Dept: LAB | Facility: CLINIC | Age: 48
End: 2023-07-19
Payer: COMMERCIAL

## 2023-07-19 ENCOUNTER — RA CDI HCC (OUTPATIENT)
Dept: OTHER | Facility: HOSPITAL | Age: 48
End: 2023-07-19

## 2023-07-19 DIAGNOSIS — Z13.0 SCREENING FOR DEFICIENCY ANEMIA: ICD-10-CM

## 2023-07-19 DIAGNOSIS — I10 HYPERTENSION, UNSPECIFIED TYPE: ICD-10-CM

## 2023-07-19 DIAGNOSIS — F41.1 GAD (GENERALIZED ANXIETY DISORDER): ICD-10-CM

## 2023-07-19 DIAGNOSIS — E78.2 MIXED HYPERLIPIDEMIA: ICD-10-CM

## 2023-07-19 LAB
25(OH)D3 SERPL-MCNC: 43 NG/ML (ref 30–100)
ALBUMIN SERPL BCP-MCNC: 3.1 G/DL (ref 3.5–5)
ALP SERPL-CCNC: 77 U/L (ref 46–116)
ALT SERPL W P-5'-P-CCNC: 17 U/L (ref 12–78)
ANION GAP SERPL CALCULATED.3IONS-SCNC: 6 MMOL/L
AST SERPL W P-5'-P-CCNC: 13 U/L (ref 5–45)
BASOPHILS # BLD AUTO: 0.04 THOUSANDS/ÂΜL (ref 0–0.1)
BASOPHILS NFR BLD AUTO: 0 % (ref 0–1)
BILIRUB SERPL-MCNC: 0.4 MG/DL (ref 0.2–1)
BUN SERPL-MCNC: 15 MG/DL (ref 5–25)
CALCIUM ALBUM COR SERPL-MCNC: 9.3 MG/DL (ref 8.3–10.1)
CALCIUM SERPL-MCNC: 8.6 MG/DL (ref 8.3–10.1)
CHLORIDE SERPL-SCNC: 106 MMOL/L (ref 96–108)
CHOLEST SERPL-MCNC: 160 MG/DL
CO2 SERPL-SCNC: 24 MMOL/L (ref 21–32)
CREAT SERPL-MCNC: 0.82 MG/DL (ref 0.6–1.3)
CREAT UR-MCNC: 142 MG/DL
EOSINOPHIL # BLD AUTO: 0.12 THOUSAND/ÂΜL (ref 0–0.61)
EOSINOPHIL NFR BLD AUTO: 1 % (ref 0–6)
ERYTHROCYTE [DISTWIDTH] IN BLOOD BY AUTOMATED COUNT: 12.5 % (ref 11.6–15.1)
GFR SERPL CREATININE-BSD FRML MDRD: 84 ML/MIN/1.73SQ M
GLUCOSE P FAST SERPL-MCNC: 96 MG/DL (ref 65–99)
HCT VFR BLD AUTO: 41.1 % (ref 34.8–46.1)
HDLC SERPL-MCNC: 63 MG/DL
HGB BLD-MCNC: 13.2 G/DL (ref 11.5–15.4)
IMM GRANULOCYTES # BLD AUTO: 0.02 THOUSAND/UL (ref 0–0.2)
IMM GRANULOCYTES NFR BLD AUTO: 0 % (ref 0–2)
LDLC SERPL CALC-MCNC: 70 MG/DL (ref 0–100)
LYMPHOCYTES # BLD AUTO: 2.33 THOUSANDS/ÂΜL (ref 0.6–4.47)
LYMPHOCYTES NFR BLD AUTO: 24 % (ref 14–44)
MCH RBC QN AUTO: 28.6 PG (ref 26.8–34.3)
MCHC RBC AUTO-ENTMCNC: 32.1 G/DL (ref 31.4–37.4)
MCV RBC AUTO: 89 FL (ref 82–98)
MICROALBUMIN UR-MCNC: 15.3 MG/L (ref 0–20)
MICROALBUMIN/CREAT 24H UR: 11 MG/G CREATININE (ref 0–30)
MONOCYTES # BLD AUTO: 0.87 THOUSAND/ÂΜL (ref 0.17–1.22)
MONOCYTES NFR BLD AUTO: 9 % (ref 4–12)
NEUTROPHILS # BLD AUTO: 6.5 THOUSANDS/ÂΜL (ref 1.85–7.62)
NEUTS SEG NFR BLD AUTO: 66 % (ref 43–75)
NONHDLC SERPL-MCNC: 97 MG/DL
NRBC BLD AUTO-RTO: 0 /100 WBCS
PLATELET # BLD AUTO: 341 THOUSANDS/UL (ref 149–390)
PMV BLD AUTO: 10.8 FL (ref 8.9–12.7)
POTASSIUM SERPL-SCNC: 3.8 MMOL/L (ref 3.5–5.3)
PROT SERPL-MCNC: 7.6 G/DL (ref 6.4–8.4)
RBC # BLD AUTO: 4.61 MILLION/UL (ref 3.81–5.12)
SODIUM SERPL-SCNC: 136 MMOL/L (ref 135–147)
TRIGL SERPL-MCNC: 137 MG/DL
TSH SERPL DL<=0.05 MIU/L-ACNC: 2.51 UIU/ML (ref 0.45–4.5)
WBC # BLD AUTO: 9.88 THOUSAND/UL (ref 4.31–10.16)

## 2023-07-19 PROCEDURE — 85025 COMPLETE CBC W/AUTO DIFF WBC: CPT

## 2023-07-19 PROCEDURE — 82570 ASSAY OF URINE CREATININE: CPT

## 2023-07-19 PROCEDURE — 82306 VITAMIN D 25 HYDROXY: CPT

## 2023-07-19 PROCEDURE — 80053 COMPREHEN METABOLIC PANEL: CPT

## 2023-07-19 PROCEDURE — 80061 LIPID PANEL: CPT

## 2023-07-19 PROCEDURE — 82043 UR ALBUMIN QUANTITATIVE: CPT

## 2023-07-19 PROCEDURE — 84443 ASSAY THYROID STIM HORMONE: CPT

## 2023-07-19 PROCEDURE — 36415 COLL VENOUS BLD VENIPUNCTURE: CPT

## 2023-07-19 NOTE — PROGRESS NOTES
[address] [facility] coding opportunities          Chart Reviewed number of suggestions sent to Provider: 1   [id number]    Patients Insurance        Commercial Insurance: Piyush Espinosa

## 2023-07-26 ENCOUNTER — OFFICE VISIT (OUTPATIENT)
Dept: FAMILY MEDICINE CLINIC | Facility: CLINIC | Age: 48
End: 2023-07-26
Payer: COMMERCIAL

## 2023-07-26 VITALS
HEIGHT: 64 IN | RESPIRATION RATE: 16 BRPM | WEIGHT: 146 LBS | OXYGEN SATURATION: 98 % | BODY MASS INDEX: 24.92 KG/M2 | HEART RATE: 96 BPM | DIASTOLIC BLOOD PRESSURE: 82 MMHG | SYSTOLIC BLOOD PRESSURE: 130 MMHG

## 2023-07-26 DIAGNOSIS — Z12.11 COLON CANCER SCREENING: ICD-10-CM

## 2023-07-26 DIAGNOSIS — E78.2 MIXED HYPERLIPIDEMIA: ICD-10-CM

## 2023-07-26 DIAGNOSIS — Z00.00 ANNUAL PHYSICAL EXAM: Primary | ICD-10-CM

## 2023-07-26 DIAGNOSIS — Z12.4 CERVICAL CANCER SCREENING: ICD-10-CM

## 2023-07-26 DIAGNOSIS — I10 HYPERTENSION, UNSPECIFIED TYPE: ICD-10-CM

## 2023-07-26 DIAGNOSIS — F41.1 GAD (GENERALIZED ANXIETY DISORDER): ICD-10-CM

## 2023-07-26 PROCEDURE — 99214 OFFICE O/P EST MOD 30 MIN: CPT | Performed by: FAMILY MEDICINE

## 2023-07-26 PROCEDURE — 99396 PREV VISIT EST AGE 40-64: CPT | Performed by: FAMILY MEDICINE

## 2023-07-26 RX ORDER — LISINOPRIL 5 MG/1
5 TABLET ORAL DAILY
Qty: 90 TABLET | Refills: 3 | Status: SHIPPED | OUTPATIENT
Start: 2023-07-26

## 2023-07-26 NOTE — PROGRESS NOTES
Assessment/Plan:   Diagnoses and all orders for this visit:    Hypertension, unspecified type  -     lisinopril (ZESTRIL) 5 mg tablet; Take 1 tablet (5 mg total) by mouth daily  - BP stable in the office   - currently on ACEI 5mg and tolerating it well - cont current regimen   - nml renal function and urine microalbumin   - UTD with COVID IMMs and Booster x2  - UTD with Optho   - not a smoker, rare social drinker   - RTO in 1yr for f/u and annual exam - pt aware and agreeable     Mixed hyperlipidemia  - Lipids (7/19/2023): , , HDL 63, LDL 70  - diet/exercise  - cont OTC Fish Oil   - repeat labs annually   - The 10-year ASCVD risk score (Amanda ESCAMILLA, et al., 2019) is: 1%    Values used to calculate the score:      Age: 50 years      Sex: Female      Is Non- : No      Diabetic: No      Tobacco smoker: No      Systolic Blood Pressure: 490 mmHg      Is BP treated: Yes      HDL Cholesterol: 63 mg/dL      Total Cholesterol: 160 mg/dL    EDELMIRA (generalized anxiety disorder)  - has been seeing Therapist regularly (Geovanni Cisse) and finds it helpful   - youngest will be starting 1st grade   - Lorri is going to [employer] and got her 's license   - 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 Subjective:    Patient ID: Jorge Willoughby is a 50 y.o. female.   Zhanna Quiñones is a 50 y.o. female who presents to the office for an annual exam and f/u   1) Annual   - Specialists: Nava Huff (Dr Maddison Gary), Ob/Gyn: [facility]    - PMHx: HTN, Migraines (only 2 with Aura), Cluster HA x1, Allergies, Asthma (seasonal), IBS-D, L-shoulder subluxation (2/2 overuse from exercise), R-lateral epicondylitis, Radial Tunnel Syndrome and R-DeQuervain's   - allergies: Erythromycin (GI upset), HTCZ (dizziness), Latex/Medical Tape (rash), animal dander, peanuts   - Meds: see med rec  - PSHx: tonsillectomy, L-wrist surgery  - FHx: M (HL, HTN, stroke x3), F (HTN, HL), denies FHx of breast/colon/cervical ca   - Immunizations: UTD with COVID IMMs and Boosters x2, UTD with Tdap   - GYN Hx: has an appt scheduled 10/16/2023, last PAP was 6/2020 - no h/o abnml PAPs - overdue; last Mammo in 3/1/2023 (annual screening)   - Hm: UTD with Colon Ca screening (Cologuard 3/2022 - NEG - due again in 2025)   - diet/exercise: varied exercise; varied/balanced diet   - social: denies Tob/illicits; rare EtOH, single   - sexual Hx: not currently sexually active and declined STD screening in the office today   - last vision: wears glasses/contacts; goes annually to Optho   - last dental: goes Q6months   - reviewed labs done 7/19/2023   2) HTN  - BP in the office 130/82  - currently on ACEI 5mg and tolerating it well - denies swelling or dry cough   - denies F/C/N/V/HA/change in vision/CP/palpitations/SOB/wheezing/cough/abd pain/D/C/LE edema   3) EDELMIRA  - PHQ-9 score of 0 <-- 1 <-- 0 <-- 1 <-- 2 <-- 7 <-- 1   - EDELMIRA-7 score of 3 <-- 6 <-- 8 <-- 5 <-- 8 <-- 14 <-- 16 <-- 16  - has been seeing Therapist regularly (Lety Robertson) and finds it helpful   - youngest will be starting 1st grade   - Lorri is going to [employer] and got her 's license   - 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 The following portions of the patient's history were reviewed and updated as appropriate: allergies, current medications, past family history, past medical history, past social history, past surgical history and problem list.    Review of Systems  as per HPI    Objective:  /82 (BP Location: Left arm, Patient Position: Sitting, Cuff Size: Standard)   Pulse 96   Resp 16   Ht 5' 3.5" (1.613 m)   Wt 66.2 kg (146 lb)   SpO2 98%   BMI 25.46 kg/m²    Physical Exam  Vitals reviewed. Constitutional:       General: She is not in acute distress. Appearance: Normal appearance. She is not ill-appearing, toxic-appearing or diaphoretic. HENT:      Head: Normocephalic and atraumatic. Right Ear: External ear normal.      Left Ear: External ear normal.   Eyes:      General: No scleral icterus. Right eye: No discharge. Left eye: No discharge. Extraocular Movements: Extraocular movements intact. Conjunctiva/sclera: Conjunctivae normal.   Cardiovascular:      Rate and Rhythm: Normal rate and regular rhythm. Heart sounds: Normal heart sounds. Pulmonary:      Effort: Pulmonary effort is normal. No respiratory distress. Breath sounds: Normal breath sounds. No stridor. No wheezing, rhonchi or rales. Abdominal:      Palpations: Abdomen is soft. Musculoskeletal:         General: Normal range of motion. Cervical back: Normal range of motion. Right lower leg: No edema. Left lower leg: No edema. Skin:     General: Skin is warm. Neurological:      General: No focal deficit present. Mental Status: She is alert and oriented to person, place, and time. Psychiatric:         Mood and Affect: Mood normal.         Behavior: Behavior normal.         BMI Counseling: Body mass index is 25.46 kg/m². The BMI is above normal. Nutrition recommendations include 3-5 servings of fruits/vegetables daily. Exercise recommendations include exercising 3-5 times per week. Depression Screening Follow-up Plan: Patient's depression screening was positive with a PHQ-2 score of 0. Their PHQ-9 score was . Patient assessed for underlying major depression. They have no active suicidal ideations. Brief counseling provided and recommend additional follow-up/re-evaluation next office visit. Continue regular follow-up with their psychologist/therapist/psychiatrist who is managing their mental health condition(s).

## 2023-07-26 NOTE — PROGRESS NOTES
Assessment/Plan:   Diagnoses and all orders for this visit:    Annual physical exam  - reviewed PMHx, PSHx, meds, allergies, FHx, Soc Hx and Sexual Hx   - UTD with COVID IMMs and Booster x2  - UTD with Tdap  - gets annual Flu vaccine at work   - reviewed all nml labs done 7/19/2023  - discussed diet and encouraged exercise  - not currently sexually active and declined STD screening   - has annual GYN exam scheduled for 10/16/2023  - overdue for Cervical Ca screening - last PAP 6/2020   - UTD with Mammo (3/1/2023) - annual screening recommended   - UTD with Colon Ca screening - NEG Cologuard 3/2022 - repeat in 2025    - UTD with Optho and Dental   - RTO in 1yr for annual exam and f/u pr sooner if needed - pt aware and agreeable    Hypertension, unspecified type  -     lisinopril (ZESTRIL) 5 mg tablet;  Take 1 tablet (5 mg total) by mouth daily  - BP stable in the office   - currently on ACEI 5mg and tolerating it well - cont current regimen   - nml renal function and urine microalbumin   - UTD with COVID IMMs and Booster x2  - UTD with Optho   - not a smoker, rare social drinker   - RTO in 1yr for f/u and annual exam - pt aware and agreeable     Mixed hyperlipidemia  - Lipids (7/19/2023): , , HDL 63, LDL 70  - diet/exercise  - cont OTC Fish Oil   - repeat labs annually   - The 10-year ASCVD risk score (Amanda ESCAMILLA, et al., 2019) is: 1%    Values used to calculate the score:      Age: 50 years      Sex: Female      Is Non- : No      Diabetic: No      Tobacco smoker: No      Systolic Blood Pressure: 723 mmHg      Is BP treated: Yes      HDL Cholesterol: 63 mg/dL      Total Cholesterol: 160 mg/dL    EDELMIRA (generalized anxiety disorder)  - has been seeing Therapist regularly (Jani Mckeon) and finds it helpful   - youngest will be starting 1st grade   - Lorri is going to [employer] and got her 's license   - 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 Colon cancer screening  - UTD with Colon Ca screening - NEG Cologuard 3/2022 - repeat in 2025      Cervical cancer screening  - has annual GYN exam scheduled for 10/16/2023  - overdue for Cervical Ca screening - last PAP 6/2020         Subjective:    Patient ID: Shena Tsai is a 50 y.o. female.   Sola Sanders is a 50 y.o. female who presents to the office for an annual exam and f/u   1) Annual   - Specialists: Gricelda Aguilar (Dr Linda Lundberg), Ob/Gyn: [facility]    - PMHx: HTN, Migraines (only 2 with Aura), Cluster HA x1, Allergies, Asthma (seasonal), IBS-D, L-shoulder subluxation (2/2 overuse from exercise), R-lateral epicondylitis, Radial Tunnel Syndrome and R-DeQuervain's   - allergies: Erythromycin (GI upset), HTCZ (dizziness), Latex/Medical Tape (rash), animal dander, peanuts   - Meds: see med rec  - PSHx: tonsillectomy, L-wrist surgery  - FHx: M (HL, HTN, stroke x3), F (HTN, HL), denies FHx of breast/colon/cervical ca   - Immunizations: UTD with COVID IMMs and Boosters x2, UTD with Tdap   - GYN Hx: has an appt scheduled 10/16/2023, last PAP was 6/2020 - no h/o abnml PAPs - overdue; last Mammo in 3/1/2023 (annual screening)   - Hm: UTD with Colon Ca screening (Cologuard 3/2022 - NEG - due again in 2025)   - diet/exercise: varied exercise; varied/balanced diet   - social: denies Tob/illicits; rare EtOH, single   - sexual Hx: not currently sexually active and declined STD screening in the office today   - last vision: wears glasses/contacts; goes annually to Optho   - last dental: goes Q6months   - reviewed labs done 7/19/2023   2) HTN  - BP in the office 130/82  - currently on ACEI 5mg and tolerating it well - denies swelling or dry cough   - denies F/C/N/V/HA/change in vision/CP/palpitations/SOB/wheezing/cough/abd pain/D/C/LE edema   3) EDELMIRA  - PHQ-9 score of 0 <-- 1 <-- 0 <-- 1 <-- 2 <-- 7 <-- 1   - EDELMIRA-7 score of 3 <-- 6 <-- 8 <-- 5 <-- 8 <-- 14 <-- 16 <-- 16  - has been seeing Therapist regularly (Jagdish Show) and finds it helpful   - youngest will be starting 1st grade   - Lorri is going to [employer] and got her 's license   - 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 The following portions of the patient's history were reviewed and updated as appropriate: allergies, current medications, past family history, past medical history, past social history, past surgical history and problem list.    Review of Systems  as per HPI    Objective:  /82 (BP Location: Left arm, Patient Position: Sitting, Cuff Size: Standard)   Pulse 96   Resp 16   Ht 5' 3.5" (1.613 m)   Wt 66.2 kg (146 lb)   SpO2 98%   BMI 25.46 kg/m²    Physical Exam  Vitals reviewed. Constitutional:       General: She is not in acute distress. Appearance: Normal appearance. She is not ill-appearing, toxic-appearing or diaphoretic. HENT:      Head: Normocephalic and atraumatic. Right Ear: External ear normal.      Left Ear: External ear normal.   Eyes:      General: No scleral icterus. Right eye: No discharge. Left eye: No discharge. Extraocular Movements: Extraocular movements intact. Conjunctiva/sclera: Conjunctivae normal.   Cardiovascular:      Rate and Rhythm: Normal rate and regular rhythm. Heart sounds: Normal heart sounds. Pulmonary:      Effort: Pulmonary effort is normal. No respiratory distress. Breath sounds: Normal breath sounds. No stridor. No wheezing, rhonchi or rales. Abdominal:      Palpations: Abdomen is soft. Musculoskeletal:         General: Normal range of motion. Cervical back: Normal range of motion. Right lower leg: No edema. Left lower leg: No edema. Skin:     General: Skin is warm. Neurological:      General: No focal deficit present. Mental Status: She is alert and oriented to person, place, and time.    Psychiatric:         Mood and Affect: Mood normal. Behavior: Behavior normal.         BMI Counseling: Body mass index is 25.46 kg/m². The BMI is above normal. Nutrition recommendations include 3-5 servings of fruits/vegetables daily. Exercise recommendations include exercising 3-5 times per week. Depression Screening Follow-up Plan: Patient's depression screening was positive with a PHQ-2 score of 0. Their PHQ-9 score was . Patient assessed for underlying major depression. They have no active suicidal ideations. Brief counseling provided and recommend additional follow-up/re-evaluation next office visit. Continue regular follow-up with their psychologist/therapist/psychiatrist who is managing their mental health condition(s).

## 2023-08-06 DIAGNOSIS — Z30.41 ENCOUNTER FOR SURVEILLANCE OF CONTRACEPTIVE PILLS: ICD-10-CM

## 2023-08-07 RX ORDER — DESOGESTREL AND ETHINYL ESTRADIOL 21-5 (28)
1 KIT ORAL DAILY
Qty: 112 TABLET | Refills: 3 | Status: SHIPPED | OUTPATIENT
Start: 2023-08-07

## 2023-10-10 ENCOUNTER — VBI (OUTPATIENT)
Dept: ADMINISTRATIVE | Facility: OTHER | Age: 48
End: 2023-10-10

## 2023-10-16 ENCOUNTER — ANNUAL EXAM (OUTPATIENT)
Dept: OBGYN CLINIC | Facility: CLINIC | Age: 48
End: 2023-10-16
Payer: COMMERCIAL

## 2023-10-16 VITALS
SYSTOLIC BLOOD PRESSURE: 130 MMHG | WEIGHT: 147 LBS | HEIGHT: 64 IN | BODY MASS INDEX: 25.1 KG/M2 | DIASTOLIC BLOOD PRESSURE: 86 MMHG

## 2023-10-16 DIAGNOSIS — Z12.11 SCREENING FOR COLON CANCER: ICD-10-CM

## 2023-10-16 DIAGNOSIS — Z30.41 ENCOUNTER FOR SURVEILLANCE OF CONTRACEPTIVE PILLS: ICD-10-CM

## 2023-10-16 DIAGNOSIS — Z12.4 SCREENING FOR MALIGNANT NEOPLASM OF CERVIX: ICD-10-CM

## 2023-10-16 DIAGNOSIS — Z11.51 SCREENING FOR HUMAN PAPILLOMAVIRUS (HPV): ICD-10-CM

## 2023-10-16 DIAGNOSIS — Z01.419 WELL WOMAN EXAM WITH ROUTINE GYNECOLOGICAL EXAM: Primary | ICD-10-CM

## 2023-10-16 PROCEDURE — S0612 ANNUAL GYNECOLOGICAL EXAMINA: HCPCS | Performed by: OBSTETRICS & GYNECOLOGY

## 2023-10-16 PROCEDURE — G0476 HPV COMBO ASSAY CA SCREEN: HCPCS | Performed by: OBSTETRICS & GYNECOLOGY

## 2023-10-16 RX ORDER — DESOGESTREL AND ETHINYL ESTRADIOL 21-5 (28)
1 KIT ORAL DAILY
Qty: 84 TABLET | Refills: 4 | Status: SHIPPED | OUTPATIENT
Start: 2023-10-16 | End: 2023-10-20 | Stop reason: SDUPTHER

## 2023-10-16 NOTE — PROGRESS NOTES
ASSESSMENT & PLAN:   Diagnoses and all orders for this visit:    Well woman exam with routine gynecological exam  -     Liquid-based pap, screening    Screening for malignant neoplasm of cervix  -     Liquid-based pap, screening    Screening for human papillomavirus (HPV)  -     Liquid-based pap, screening    Screening for colon cancer  -     Mammo screening bilateral w 3d & cad; Future    Encounter for surveillance of contraceptive pills  -     desogestrel-ethinyl estradiol (Volnea) 0.15-0.02/0.01 MG () per tablet; Take 1 tablet by mouth daily          The following were reviewed in today's visit: ASCCP guidelines, Gardisil vaccination, STD testing breast self exam, mammography screening ordered, use and side effects of OCPs, menopause, exercise, and healthy diet. Patient to return to office in yearly for annual exam.     All questions have been answered to her satisfaction. CC:  Annual Gynecologic Examination  Chief Complaint   Patient presents with    Gynecologic Exam     Patient is here today for her yearly exam, pap due today(20-wnl), mammo 3/1/23-order placed for , Cologuard 3/31/24-lykipsis-ksazud 3 years. Patient is doing well, she has no concerns at this time. HPI: Queta Koch is a 46 y.o. Pierre Pipe who presents for annual gynecologic examination.   She has the following concerns:  would like to continue OCP - prevents menstrual migraines      Health Maintenance:    Exercise: daily  Breast exams/breast awareness: yes  Last mammogram:   Colorectal cancer screenin    Past Medical History:   Diagnosis Date    Allergic     seasonal, food allergies    Asthma     GERD (gastroesophageal reflux disease)     Hypertension     Irritable bowel syndrome     Migraine without aura     Shoulder subluxation, left     Tennis elbow        Past Surgical History:   Procedure Laterality Date     [facility] MSK PROCEDURE  2020    WRIST SURGERY         Past OB/Gyn History:   Patient's last menstrual period was 08/23/2023 (approximate). Menopausal status: perimenopausal  Menopausal symptoms: None    Last Pap: 2020 : no abnormalities  History of abnormal Pap smear: no    Patient is not currently sexually active.    STD testing: no  Current contraception: OCP (estrogen/progesterone)      Family History  Family History   Problem Relation Age of Onset    Stroke Mother         CEREBROVASCULAR ACCIDENT (CVA), LISTED 2X    Hypertension Mother     Hyperlipidemia Mother     Hypertension Father     Hyperlipidemia Father     Skin cancer Father     Melanoma Father 76    Cervical cancer Neg Hx     Colon cancer Neg Hx     Ovarian cancer Neg Hx     Uterine cancer Neg Hx        Family history of uterine or ovarian cancer: no  Family history of breast cancer: no  Family history of colon cancer: no    Social History:  Social History     Socioeconomic History    Marital status: Single     Spouse name: Not on file    Number of children: Not on file    Years of education: Not on file    Highest education level: Not on file   Occupational History    Not on file   Tobacco Use    Smoking status: Never    Smokeless tobacco: Never   Vaping Use    Vaping Use: Never used   Substance and Sexual Activity    Alcohol use: Not Currently     Comment: socially     Drug use: No    Sexual activity: Not Currently     Partners: Male     Comment: Birth Control Pills   Other Topics Concern    Not on file   Social History Narrative    CAFFEINE USE - 16oz/daily     EXERCISE HABITS    Adopted 3 children: girls ages 3, 10, and 13yo - all sisters     Is a  at [employer]     Financial Resource Strain: Not on file   Food Insecurity: Not on file   Transportation Needs: Not on file   Physical Activity: Not on file   Stress: Not on file   Social Connections: Not on file   Intimate Partner Violence: Not on file   Housing Stability: Not on file     Domestic violence screen: negative    Allergies: Allergies   Allergen Reactions    Cat Hair Extract     Erythromycin     Hctz [Hydrochlorothiazide] Dizziness    Peanut [Nuts - Food Allergy]     Seasonal Ic  [Cholestatin]     Trish Sun Screen Spf 30 [Albolene] Rash    Latex Rash    Medical Tape Rash       Medications:    Current Outpatient Medications:     albuterol (PROVENTIL HFA,VENTOLIN HFA) 90 mcg/act inhaler, Inhale, Disp: , Rfl:     Ascorbic Acid (VITAMIN C) 500 MG CAPS, Take by mouth, Disp: , Rfl:     Calcium 250 MG CAPS, Take by mouth, Disp: , Rfl:     desogestrel-ethinyl estradiol (Volnea) 0.15-0.02/0.01 MG (21/5) per tablet, Take 1 tablet by mouth daily, Disp: 84 tablet, Rfl: 4    EPINEPHrine (EPIPEN) 0.3 mg/0.3 mL SOAJ, as directed, Disp: , Rfl:     fexofenadine (ALLEGRA) 180 MG tablet, Take by mouth, Disp: , Rfl:     lisinopril (ZESTRIL) 5 mg tablet, Take 1 tablet (5 mg total) by mouth daily, Disp: 90 tablet, Rfl: 3    montelukast (SINGULAIR) 10 mg tablet, Take 1 tablet by mouth each evening, Disp: , Rfl:     Multiple Vitamin (MULTI-VITAMIN DAILY) TABS, Take by mouth, Disp: , Rfl:     Omega-3 Fatty Acids (Fish Oil) 1000 MG CPDR, Take by mouth 2 (two) times a day, Disp: 180 capsule, Rfl: 0    Review of Systems:  Review of Systems   Constitutional: Negative. HENT: Negative. Respiratory: Negative. Cardiovascular: Negative. Gastrointestinal: Negative. Genitourinary: Negative. Skin: Negative. Neurological: Negative. Psychiatric/Behavioral: Negative. Physical Exam:  /86 (BP Location: Left arm, Patient Position: Sitting, Cuff Size: Large)   Ht 5' 3.5" (1.613 m)   Wt 66.7 kg (147 lb)   LMP 08/23/2023 (Approximate)   BMI 25.63 kg/m²    Physical Exam  Constitutional:       Appearance: Normal appearance. Genitourinary:      Bladder and urethral meatus normal.      No lesions in the vagina.       Right Labia: No rash, tenderness, lesions, skin changes or Bartholin's cyst.     Left Labia: No tenderness, lesions, skin changes, Bartholin's cyst or rash. No vaginal erythema, tenderness or bleeding. Right Adnexa: not tender, not full and no mass present. Left Adnexa: not tender, not full and no mass present. Cervix is nulliparous. No cervical discharge, lesion or polyp. Uterus is not enlarged, fixed or tender. No uterine mass detected. Urethral meatus caruncle not present. No urethral tenderness or mass present. Breasts:     Right: No swelling, bleeding, inverted nipple, mass, nipple discharge, skin change or tenderness. Left: No swelling, bleeding, inverted nipple, mass, nipple discharge, skin change or tenderness. HENT:      Head: Normocephalic and atraumatic. Eyes:      Extraocular Movements: Extraocular movements intact. Conjunctiva/sclera: Conjunctivae normal.      Pupils: Pupils are equal, round, and reactive to light. Cardiovascular:      Rate and Rhythm: Normal rate and regular rhythm. Heart sounds: Normal heart sounds. No murmur heard. Pulmonary:      Effort: Pulmonary effort is normal. No respiratory distress. Breath sounds: Normal breath sounds. No wheezing or rales. Abdominal:      General: There is no distension. Palpations: Abdomen is soft. Tenderness: There is no abdominal tenderness. There is no guarding. Neurological:      General: No focal deficit present. Mental Status: She is alert and oriented to person, place, and time. Skin:     General: Skin is warm and dry. Psychiatric:         Mood and Affect: Mood normal.         Behavior: Behavior normal.   Vitals and nursing note reviewed.

## 2023-10-19 LAB
HPV HR 12 DNA CVX QL NAA+PROBE: NEGATIVE
HPV16 DNA CVX QL NAA+PROBE: NEGATIVE
HPV18 DNA CVX QL NAA+PROBE: NEGATIVE

## 2023-10-20 DIAGNOSIS — Z30.41 ENCOUNTER FOR SURVEILLANCE OF CONTRACEPTIVE PILLS: ICD-10-CM

## 2023-10-20 RX ORDER — DESOGESTREL AND ETHINYL ESTRADIOL 21-5 (28)
1 KIT ORAL DAILY
Qty: 112 TABLET | Refills: 4 | Status: SHIPPED | OUTPATIENT
Start: 2023-10-20 | End: 2025-05-02

## 2023-10-23 LAB
LAB AP GYN PRIMARY INTERPRETATION: NORMAL
Lab: NORMAL

## 2023-11-13 ENCOUNTER — NURSE TRIAGE (OUTPATIENT)
Age: 48
End: 2023-11-13

## 2023-11-13 ENCOUNTER — APPOINTMENT (OUTPATIENT)
Dept: RADIOLOGY | Facility: CLINIC | Age: 48
End: 2023-11-13
Payer: COMMERCIAL

## 2023-11-13 ENCOUNTER — OFFICE VISIT (OUTPATIENT)
Dept: FAMILY MEDICINE CLINIC | Facility: CLINIC | Age: 48
End: 2023-11-13
Payer: COMMERCIAL

## 2023-11-13 VITALS
WEIGHT: 147 LBS | OXYGEN SATURATION: 100 % | HEIGHT: 64 IN | BODY MASS INDEX: 25.1 KG/M2 | SYSTOLIC BLOOD PRESSURE: 138 MMHG | HEART RATE: 82 BPM | RESPIRATION RATE: 18 BRPM | DIASTOLIC BLOOD PRESSURE: 80 MMHG

## 2023-11-13 DIAGNOSIS — S89.92XA INJURY OF LEFT LOWER EXTREMITY, INITIAL ENCOUNTER: ICD-10-CM

## 2023-11-13 DIAGNOSIS — M25.572 PAIN AND SWELLING OF LEFT ANKLE: ICD-10-CM

## 2023-11-13 DIAGNOSIS — M79.605 LEFT LEG PAIN: ICD-10-CM

## 2023-11-13 DIAGNOSIS — M25.472 PAIN AND SWELLING OF LEFT ANKLE: ICD-10-CM

## 2023-11-13 DIAGNOSIS — S89.92XA INJURY OF LEFT LOWER EXTREMITY, INITIAL ENCOUNTER: Primary | ICD-10-CM

## 2023-11-13 PROCEDURE — 73610 X-RAY EXAM OF ANKLE: CPT

## 2023-11-13 PROCEDURE — 73590 X-RAY EXAM OF LOWER LEG: CPT

## 2023-11-13 PROCEDURE — 99213 OFFICE O/P EST LOW 20 MIN: CPT | Performed by: NURSE PRACTITIONER
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
## 2023-11-13 NOTE — TELEPHONE ENCOUNTER
Reason for Disposition   Injury and pain has not improved after 3 days    Answer Assessment - Initial Assessment Questions  1. MECHANISM: "How did the injury happen?" (e.g., twisting injury, direct blow)       Connecting car seat into the car   2. ONSET: "When did the injury happen?" (Minutes or hours ago)       yesterday  3. LOCATION: "Where is the injury located?"       Left lower leg  4. APPEARANCE of INJURY: "What does the injury look like?"  (e.g., deformity of leg)      Swelling , bruising   5. SEVERITY: "Can you put weight on that leg?" "Can you walk?"       yes  6. SIZE: For cuts, bruises, or swelling, ask: "How large is it?" (e.g., inches or centimeters)       Scrape , 5" in diameter  7. PAIN: "Is there pain?" If Yes, ask: "How bad is the pain?"  "What does it keep you from doing?" (e.g., Scale 1-10; or mild, moderate, severe)    -  NONE: (0): no pain    -  MILD (1-3): doesn't interfere with normal activities     -  MODERATE (4-7): interferes with normal activities (e.g., work or school) or awakens from sleep, limping     -  SEVERE (8-10): excruciating pain, unable to do any normal activities, unable to walk      Moderate when ambulating  8. TETANUS: For any breaks in the skin, ask: "When was the last tetanus booster?"      unknown  9.  OTHER SYMPTOMS: "Do you have any other symptoms?"       Swelling of the ankle    Protocols used: Leg Injury-ADULT-[place]

## 2023-11-13 NOTE — PROGRESS NOTES
Name: Mono Nava      : 1975      MRN: [id number]  Encounter Provider: DIAMOND Baker  Encounter Date: 2023   Encounter department: [address] [place] [address]     1. Injury of left lower extremity, initial encounter  -     XR ankle 3+ vw left; Future; Expected date: 2023  -     XR tibia fibula 2 vw left; Future; Expected date: 2023    2. Pain and swelling of left ankle  -     XR ankle 3+ vw left; Future; Expected date: 2023    3. Left leg pain  -     XR tibia fibula 2 vw left; Future; Expected date: 2023       Patient reports that she was trying to get her seats out of her mini [employer] and crushed her left lower leg between the seat and the back of the trunk. Patient reports that her leg got swollen and bruised right away. Patient reports that her leg really hurt last night. Patient reports that the leg is less painful today but still hurts with movement. Patient reports that the swelling has improved a little. Denies any numbness or tingling. Left ankle and lower leg swelling noted. Lower leg bruising noted. Xrays ordered. Will follow-up results with the patient. Patient instructed to follow-up sooner prn. Patient instructed to continue to use ice prn for pain. Patient instructed to take tylenol or ibuprofen OTC prn for pain. Subjective      Patient reports that she was trying to get her seats out of her mini van and crushed her left leg between the seat and the back of the trunk. Patient reports that her leg got swollen and bruised. Patient reports that her leg really hurt last night. Patient reports that leg is less painful today but still hurts with movement. Patient reports that the swelling has improved a little. Denies any numbness or tingling. Patient reports that she has used ice prn for the pain. Patient reports that she wanted her leg checked.           Review of Systems   Constitutional:  Negative for chills and fever. HENT:  Negative for congestion, ear pain and sore throat. Respiratory:  Negative for cough, chest tightness and shortness of breath. Cardiovascular:  Negative for chest pain. Gastrointestinal:  Negative for abdominal pain, diarrhea and vomiting. Musculoskeletal:         As noted in HPI. Skin:  Negative for rash. Neurological:  Negative for dizziness, syncope, light-headedness and headaches. Current Outpatient Medications on File Prior to Visit   Medication Sig    albuterol (PROVENTIL HFA,VENTOLIN HFA) 90 mcg/act inhaler Inhale    Ascorbic Acid (VITAMIN C) 500 MG CAPS Take by mouth    Calcium 250 MG CAPS Take by mouth    desogestrel-ethinyl estradiol (Volnea) 0.15-0.02/0.01 MG (21/5) per tablet Take 1 tablet by mouth daily Take 1 tab daily, skip placebos and start new pack immediately    EPINEPHrine (EPIPEN) 0.3 mg/0.3 mL SOAJ as directed    fexofenadine (ALLEGRA) 180 MG tablet Take by mouth    lisinopril (ZESTRIL) 5 mg tablet Take 1 tablet (5 mg total) by mouth daily    montelukast (SINGULAIR) 10 mg tablet Take 1 tablet by mouth each evening    Multiple Vitamin (MULTI-VITAMIN DAILY) TABS Take by mouth    Omega-3 Fatty Acids (Fish Oil) 1000 MG CPDR Take by mouth 2 (two) times a day       Objective     /80   Pulse 82   Resp 18   Ht 5' 3.5" (1.613 m)   Wt 66.7 kg (147 lb)   LMP 08/23/2023 (Approximate)   SpO2 100%   BMI 25.63 kg/m²     Physical Exam  Vitals reviewed. Constitutional:       General: She is not in acute distress. Appearance: She is not ill-appearing or diaphoretic. Cardiovascular:      Rate and Rhythm: Normal rate and regular rhythm. Pulses: Normal pulses. Heart sounds: Normal heart sounds. Pulmonary:      Effort: Pulmonary effort is normal. No respiratory distress. Breath sounds: Normal breath sounds. No wheezing. Musculoskeletal:      Comments: Left ankle and lower leg swelling noted. Lower leg bruising noted. Skin:     Findings: No rash. Neurological:      Mental Status: She is alert and oriented to person, place, and time.    Psychiatric:         Mood and Affect: Mood normal.       DIAMOND Melissa

## 2023-11-14 ENCOUNTER — OFFICE VISIT (OUTPATIENT)
Dept: FAMILY MEDICINE CLINIC | Facility: CLINIC | Age: 48
End: 2023-11-14
Payer: COMMERCIAL

## 2023-11-14 ENCOUNTER — TELEPHONE (OUTPATIENT)
Age: 48
End: 2023-11-14

## 2023-11-14 VITALS
SYSTOLIC BLOOD PRESSURE: 130 MMHG | HEIGHT: 64 IN | OXYGEN SATURATION: 95 % | DIASTOLIC BLOOD PRESSURE: 78 MMHG | BODY MASS INDEX: 25.1 KG/M2 | WEIGHT: 147 LBS | HEART RATE: 95 BPM

## 2023-11-14 DIAGNOSIS — M25.472 PAIN AND SWELLING OF LEFT ANKLE: ICD-10-CM

## 2023-11-14 DIAGNOSIS — M25.572 PAIN AND SWELLING OF LEFT ANKLE: ICD-10-CM

## 2023-11-14 DIAGNOSIS — S89.92XD INJURY OF LEFT LOWER EXTREMITY, SUBSEQUENT ENCOUNTER: Primary | ICD-10-CM

## 2023-11-14 DIAGNOSIS — M79.605 LEFT LEG PAIN: ICD-10-CM

## 2023-11-14 PROCEDURE — 99213 OFFICE O/P EST LOW 20 MIN: CPT | Performed by: FAMILY MEDICINE

## 2023-11-14 RX ORDER — NAPROXEN 500 MG/1
500 TABLET ORAL 2 TIMES DAILY WITH MEALS
Qty: 28 TABLET | Refills: 0 | Status: SHIPPED | OUTPATIENT
Start: 2023-11-14

## 2023-11-14 NOTE — PROGRESS NOTES
Assessment/Plan:   Diagnoses and all orders for this visit:    Injury of left lower extremity, subsequent encounter  -     naproxen (NAPROSYN) 500 mg tablet; Take 1 tablet (500 mg total) by mouth 2 (two) times a day with meals  - nml L-Tib/Fib and L-ankle Xrays  - advised Naproxen BID and RICE  - f/u if not getting better/worsening - pt aware and agreeable    Pain and swelling of left ankle  -     naproxen (NAPROSYN) 500 mg tablet; Take 1 tablet (500 mg total) by mouth 2 (two) times a day with meals  Left leg pain  -     naproxen (NAPROSYN) 500 mg tablet; Take 1 tablet (500 mg total) by mouth 2 (two) times a day with meals          Subjective:    Patient ID: Abdulkadir Yao is a 46 y.o. female. HPI  - was seen in the office yesterday by [address] [place] [address] for eval of LLE crush injury   - was trying to get the seats out of her minivan and crushed her LLE btw the seats and back of trunk   - had imaging done yesterday - all within range  - has been taking Tylenol w/o relief  - "I was unable to sleep bc of pain - even the sheets hurt"   - (+) hurts to bear weight   - (+) swelling and bruising noted   - denies numbness and tingling     The following portions of the patient's history were reviewed and updated as appropriate: allergies, current medications, past family history, past medical history, past social history, past surgical history and problem list.    Review of Systems  as per HPI    Objective:  /78   Pulse 95   Ht 5' 3.5" (1.613 m)   Wt 66.7 kg (147 lb)   LMP 2023 (Approximate)   SpO2 95%   BMI 25.63 kg/m²    Physical Exam  Vitals reviewed. Constitutional:       General: She is not in acute distress. Appearance: Normal appearance. She is not ill-appearing, toxic-appearing or diaphoretic. HENT:      Head: Normocephalic and atraumatic. Right Ear: External ear normal.      Left Ear: External ear normal.   Eyes:      General: No scleral icterus. Right eye: No discharge.          Left eye: No discharge. Extraocular Movements: Extraocular movements intact. Conjunctiva/sclera: Conjunctivae normal.   Pulmonary:      Effort: Pulmonary effort is normal.   Skin:     General: Skin is warm. Findings: Bruising present. Comments: (+) LLE swelling and bruising noted, nml DF/PF   Neurological:      General: No focal deficit present. Mental Status: She is alert and oriented to person, place, and time. Psychiatric:         Mood and Affect: Mood is anxious.

## 2023-11-14 NOTE — TELEPHONE ENCOUNTER
Patient calling to find out the results for her xray can we call the reading room to expedite results. Also she stated that her foot is now black and blue.  Wants to know if that is normal?  Estuardo Wakefield

## 2023-11-14 NOTE — TELEPHONE ENCOUNTER
Pt calling, she was evaluated yesterday for her her left leg injury. Pt is c/o large black and blue on her left heel which is new from yesterday. Pt states that foot pain is significantly worse today, she rates it at an 8/10 with ambulation. Pt is using ice and tylenol. She will try adding ibuprofen. Pt is concerned that pain is so much worse and that she has this large bruise below where she was actually injured. Please advise. Should pt come in for another appt?

## 2023-11-14 NOTE — TELEPHONE ENCOUNTER
Was able to leave a message I told her Dr Abrahan Whitman is available to see her and to call back if she wanted the appointment

## 2023-11-15 ENCOUNTER — TELEPHONE (OUTPATIENT)
Dept: FAMILY MEDICINE CLINIC | Facility: CLINIC | Age: 48
End: 2023-11-15

## 2023-11-15 NOTE — TELEPHONE ENCOUNTER
----- Message from Robert Huffman [place] sent at 11/15/2023  7:57 AM EST -----  Please let the patient know that her xray did not show any fracture of the left leg or ankle.

## 2024-02-12 DIAGNOSIS — Z11.1 SCREENING-PULMONARY TB: Primary | ICD-10-CM

## 2024-03-19 ENCOUNTER — TELEPHONE (OUTPATIENT)
Age: 49
End: 2024-03-19

## 2024-03-19 DIAGNOSIS — M77.12 LATERAL EPICONDYLITIS OF LEFT ELBOW: Primary | ICD-10-CM

## 2024-03-19 NOTE — TELEPHONE ENCOUNTER
Tennis elbow pain on left elbow has come back. Requesting to restart PT  again. Does she needs appt first with PCP or can she call PT for appt . Please advise

## 2024-04-01 ENCOUNTER — EVALUATION (OUTPATIENT)
Dept: PHYSICAL THERAPY | Facility: CLINIC | Age: 49
End: 2024-04-01
Payer: COMMERCIAL

## 2024-04-01 DIAGNOSIS — M77.12 LATERAL EPICONDYLITIS OF LEFT ELBOW: ICD-10-CM

## 2024-04-01 PROCEDURE — 97140 MANUAL THERAPY 1/> REGIONS: CPT | Performed by: PHYSICAL THERAPIST

## 2024-04-01 PROCEDURE — 97530 THERAPEUTIC ACTIVITIES: CPT | Performed by: PHYSICAL THERAPIST

## 2024-04-01 PROCEDURE — 97162 PT EVAL MOD COMPLEX 30 MIN: CPT | Performed by: PHYSICAL THERAPIST

## 2024-04-01 NOTE — PROGRESS NOTES
PT Evaluation     Today's date: 2024  Patient name: Mee Cm  : 1975  MRN: [id number]  Referring provider: Eleanor Green DO  Dx:   Encounter Diagnosis     ICD-10-CM    1. Lateral epicondylitis of left elbow  M77.12 Ambulatory Referral to Physical Therapy          Start Time: 1130  Stop Time: 1215  Total time in clinic (min): 45 minutes    Assessment  Assessment details: Patient is a 48 y.o. female who presents to outpatient PT with reports of lateral Left elbow pain that was recently aggravated about 4 months ago. Patient noted to have significant hypersensitivity to palpation along lateral Left elbow, wrist extensor and supinator muscle bundles, and lateral to olecranon / radial tunnel. Some pain noted with wrist flexion and extension, with pain with resisted wrist extension and pronation primarily. Special testing for Cozen's and Mill's test was positive for pain along lateral epicondyle region. Patient's hypersensitivity, apprehension to movement, and increased pain levels affect her ability with grasping, pulling/pushing, and grasping/lifting overhead and waist level. She also gets woken up at night time when lying on Left side. Patient was educated on pain management, activity modification, and desensitization techniques. HEP primarily focusing on postural mechanics and gentle UE stretches/radial nerve glides. Her current limitations affect her ability to complete and participation in ADLs, household chores, taking care of children, and work-related tasks. Patient will benefit from skilled PT services to improve pain levels, reduced muscle tension and hypersensitivity, improve mobility and posture, and improve ability and ease to perform ADLs without difficulty or pain. Patient would benefit from skilled PT services to address these impairments and to maximize function.  Thank you for the referral.    Impairments: abnormal muscle firing, abnormal or restricted ROM, abnormal movement, activity  intolerance, impaired physical strength, lacks appropriate home exercise program, pain with function, scapular dyskinesis, poor posture  and poor body mechanics  Functional limitations: grasping, pulling/pushing, grasping/lifting overhead and waist levelUnderstanding of Dx/Px/POC: good   Prognosis: good    Goals  Impairment Goals 4-6 weeks  In order to improve and maximize function patient will be able to...  - Decrease pain intensity to <4/10  - Improve elbow AROM to WFLs without pain throughout  - Increase scapular/UE strength to 4/5 throughout to help improve postural endurance  - Increase anterior chest and posterior neck flexibility to improve upper cross syndrome.  - Improve thoracic mobility in order to help reduce neck pain.  - Centralize symptoms and decrease numbness frequency/duration    Functional Goals 6-8 weeks  In order to improve and maximize function patient will be able to...  - Return to Prior Level of Function with no greater than 2/10 pain   - Increase Functional Status Measure (FOTO) to: > = anticipated at discharge  - Be independent and compliant with HEP  - Have the ROM necessary for driving and to be able to tolerate driving for >30 minutes.  - Sleep throughout the night without disturbances due to pain       Plan  Patient would benefit from: skilled PT  Planned modality interventions: cryotherapy, electrical stimulation/Russian stimulation and thermotherapy: hydrocollator packs  Planned therapy interventions: joint mobilization, manual therapy, neuromuscular re-education, patient education, strengthening, stretching, therapeutic activities, therapeutic exercise, home exercise program, functional ROM exercises, postural training, balance/weight bearing training, body mechanics training, IASTM, kinesiology taping, massage, Mejia taping, nerve gliding and flexibility  Frequency: 1-2x week.  Duration in weeks: 4  Treatment plan discussed with: patient, PTA and referring  physician      Subjective Evaluation    History of Present Illness  Mechanism of injury: Mee Cm is a 48 y.o. year-old female who presents to outpatient PT with reports of Left elbow pain with history of related symptoms in the past. She has attended PT at this clinic previous with related symptoms with improvements noted at the time. She reports recently she hurt it about 4 months ago while pushing . She reports the snow as probably heavier and she noticed the pain with squeezing and pushing. She thought her pain would subside over time, however worsened especially with gripping/grasping and lifting overhead activities. No recent imaging performed.          Recurrent probem    Quality of life: good    Patient Goals  Patient goals for therapy: decreased pain, increased motion, independence with ADLs/IADLs, return to sport/leisure activities, increased strength and return to work    Pain  Current pain ratin  At best pain ratin  At worst pain ratin  Location: Left elbow  Quality: discomfort and throbbing  Relieving factors: medications  Aggravating factors: overhead activity and lifting  Progression: improved    Social Support  Lives with: young children    Working:  @ [employer].  Hand dominance: right      Diagnostic Tests  No diagnostic tests performed  Treatments  Previous treatment: physical therapy  Current treatment: physical therapy      Objective     Observations     Additional Observation Details  Seated Posture: forward hunched/rounded shoulders posture    Palpation   Left   Muscle spasm in the supinator and wrist extensors.   Tenderness of the supinator and wrist extensors.     Additional Palpation Details  Severe tenderness noted along wrist extensor bundle and hypersensitivity at radial tunnel    Tenderness     Left Elbow   Tenderness in the antecubital fossa, lateral epicondyle and olecranon process.     Left Wrist/Hand   Tenderness in the lateral epicondyle and  olecranon process.     Neurological Testing     Sensation     Elbow   Left Elbow   Hypersensation: light touch    Comments   Left light touch: at radial tunnel / radial nerve pathway.     Active Range of Motion     Left Elbow   Normal active range of motion    Right Elbow   Normal active range of motion    Additional Active Range of Motion Details  Wrist ROM WNLs with some tightness with supination and some ERP with wrist flexion and extension at lateral elbow    Strength/Myotome Testing     Left Elbow   Flexion: 4  Extension: 4-  Forearm supination: 4-  Forearm pronation: 4- (pain upon resistance)    Right Elbow   Normal strength    Additional Strength Details  Wrist Flexion: Left 4-/5 without pain  Wrist Extension: Left 4-/5 with slight pain     Strength (Lv2): Right 50lb, 65lb, 55lb ; Left 15lb, 5lb, 5lb    Tests     Left Elbow   Positive Cozen's, Mill's and Tinel's sign (cubital tunnel).   Negative active medial epicondylitis, passive medial epicondylitis, reverse Cozen's test positive, valgus stress at 30 degrees and varus stress at 30 degrees.              Diagnosis: Left lateral elbow pain  Precautions: *chronicity of symptoms*; GERD, HTN, IBS  ( * asterisk indicates given per HEP)  Next Physician Appointment:   David HEP:     Manuals 4/1         STM          PROM          EPAT DK, D20-S                   Neuro Re-Ed          Scap Squeezes*          Chin Tucks*          Gentle Radial Nerve glides*          TB IR/ER walkouts                                        Ther Ex          Upper Trap stretch*          Wrist 4-way          Wrist Extensor stretch                                        Ther Activity          UBE for posture          Gripping                    Pt Ed HEP, POC         Re-Evaluation          Modalities          Heat/ice (PRN) Heat x5min

## 2024-04-05 ENCOUNTER — OFFICE VISIT (OUTPATIENT)
Dept: PHYSICAL THERAPY | Facility: CLINIC | Age: 49
End: 2024-04-05
Payer: COMMERCIAL

## 2024-04-05 DIAGNOSIS — M77.12 LATERAL EPICONDYLITIS OF LEFT ELBOW: Primary | ICD-10-CM

## 2024-04-05 PROCEDURE — 97110 THERAPEUTIC EXERCISES: CPT | Performed by: PHYSICAL THERAPIST

## 2024-04-05 PROCEDURE — 97140 MANUAL THERAPY 1/> REGIONS: CPT | Performed by: PHYSICAL THERAPIST

## 2024-04-05 PROCEDURE — 97112 NEUROMUSCULAR REEDUCATION: CPT | Performed by: PHYSICAL THERAPIST

## 2024-04-05 PROCEDURE — 97530 THERAPEUTIC ACTIVITIES: CPT | Performed by: PHYSICAL THERAPIST

## 2024-04-05 NOTE — PROGRESS NOTES
"Daily Note     Today's date: 2024  Patient name: Mee Cm  : 1975  MRN: [id number]  Referring provider: Eleanor Green DO  Dx:   Encounter Diagnosis     ICD-10-CM    1. Lateral epicondylitis of left elbow  M77.12           Start Time: 1000  Stop Time: 1046  Total time in clinic (min): 46 minutes    Subjective: Patient reports she was sore after leaving last session, but then felt very good after that. She reports she banged her elbow this morning and felt a little sore.       Objective: See treatment diary below      Assessment: Tolerated treatment well. Initiated session with EPAT to lateral elbow to patient's tolerance. Focused on self-stretches after to patient's tolerance. Added gripping with some pain with red gripper but no pain with yellow. Also focused on posture and cervical musculature stretches as well. Moist heat end of session with positive response. Patient exhibited good technique with therapeutic exercises and would benefit from continued PT      Plan: Continue per plan of care.  Progress treatment as tolerated.       Diagnosis: Left lateral elbow pain  Precautions: *chronicity of symptoms*; GERD, HTN, IBS  ( * asterisk indicates given per HEP)  Next Physician Appointment:   David HEP:     Manuals  4        STM          PROM          EPAT DK, D20-S DK, D20-S                  Neuro Re-Ed          Scap Squeezes*  Seated 2x10        Chin Tucks*  Seated 5\" x20        Gentle Radial Nerve glides*          Flex bar          TB IR/ER walkouts                                        Ther Ex          Upper Trap stretch*          Levator scap stretch  5\" x10ea        T/S seated extensions           Wrist 4-way          Wrist Extensor stretch  5-10\" x10                                      Ther Activity          UBE for posture          Gripping  RTB x1', YTB x1'                  Pt Ed HEP, POC POC        Re-Evaluation          Modalities          Heat/ice (PRN) Heat x5min Heat x8min         "

## 2024-04-08 ENCOUNTER — OFFICE VISIT (OUTPATIENT)
Dept: PHYSICAL THERAPY | Facility: CLINIC | Age: 49
End: 2024-04-08
Payer: COMMERCIAL

## 2024-04-08 DIAGNOSIS — M77.12 LATERAL EPICONDYLITIS OF LEFT ELBOW: Primary | ICD-10-CM

## 2024-04-08 PROCEDURE — 97530 THERAPEUTIC ACTIVITIES: CPT

## 2024-04-08 PROCEDURE — 97140 MANUAL THERAPY 1/> REGIONS: CPT

## 2024-04-08 PROCEDURE — 97112 NEUROMUSCULAR REEDUCATION: CPT

## 2024-04-08 PROCEDURE — 97110 THERAPEUTIC EXERCISES: CPT

## 2024-04-08 NOTE — PROGRESS NOTES
"Daily Note     Today's date: 2024  Patient name: Mee Cm  : 1975  MRN: [id number]  Referring provider: Eleanor Green DO  Dx:   Encounter Diagnosis     ICD-10-CM    1. Lateral epicondylitis of left elbow  M77.12                      Subjective: Pt states that her elbow is feeling better since she started.  She does not notice the numbness in her fingers like she had.  She is having a difficult time with the upper trap stretch as she feels that it causes her neck to get more stiff and tight.  Pt reports that she is trying to desensitize that area.        Objective: See treatment diary below      Assessment: Tolerated treatment well.  This session focused on manual therapies to decrease pt's pain as she continues with extreme sensitivity.  EPAT and laser to area surrounding lateral epicondyle which did decrease pt's pain.  She was better able to tolerate laser close to lateral epicondyle.  STM/TPR to L UT with muscle spasms noted and tenderness noted up into L sub occipital.  Pt has overall improved CS ROM and decreased pain noted after.  Discussed HEP and how to modify stretches to improve tolerance with pt reporting understanding.  Will progress pt as tolerated.      Plan: Continue per plan of care.      Diagnosis: Left lateral elbow pain  Precautions: *chronicity of symptoms*; GERD, HTN, IBS  ( * asterisk indicates given per HEP)  Next Physician Appointment:   David HEP:     Manuals        STM   L UT: TH       PROM          EPAT DK, D20-S DK, D20-S TH  D20-S       Laser   3.5 mins 3500J       Neuro Re-Ed          Scap Squeezes*  Seated 2x10        Chin Tucks*  Seated 5\" x20 Seated 5\"x10       Gentle Radial Nerve glides*          Flex bar          TB IR/ER walkouts                                        Ther Ex          Upper Trap stretch*   5\"x5 ea       Levator scap stretch  5\" x10ea 5\"x5 ea       T/S seated extensions           Wrist 4-way          Wrist Extensor stretch  5-10\" x10    "                                   Ther Activity          UBE for posture          Gripping  RTB x1', YTB x1'                  Pt Ed HEP, POC POC HEP       Re-Evaluation          Modalities          Heat/ice (PRN) Heat x5min Heat x8min Heat x8 min

## 2024-04-11 ENCOUNTER — OFFICE VISIT (OUTPATIENT)
Dept: PHYSICAL THERAPY | Facility: CLINIC | Age: 49
End: 2024-04-11
Payer: COMMERCIAL

## 2024-04-11 DIAGNOSIS — M77.12 LATERAL EPICONDYLITIS OF LEFT ELBOW: Primary | ICD-10-CM

## 2024-04-11 PROCEDURE — 97530 THERAPEUTIC ACTIVITIES: CPT

## 2024-04-11 PROCEDURE — 97110 THERAPEUTIC EXERCISES: CPT

## 2024-04-11 PROCEDURE — 97112 NEUROMUSCULAR REEDUCATION: CPT

## 2024-04-11 PROCEDURE — 97140 MANUAL THERAPY 1/> REGIONS: CPT

## 2024-04-11 NOTE — PROGRESS NOTES
"Daily Note     Today's date: 2024  Patient name: Mee Cm  : 1975  MRN: [id number]  Referring provider: Eleanor Green DO  Dx:   Encounter Diagnosis     ICD-10-CM    1. Lateral epicondylitis of left elbow  M77.12                      Subjective: Pt states that she felt good after her last visit, but the next day it was really sore.  Today it is sore but not as bad as yesterday.        Objective: See treatment diary below      Assessment: Tolerated treatment fair.  Pt has low tolerance to stretching for CS and wrist due to increased pain.  She notes that she gets numbness in her hand during gentle STM to forearm/wrist extensors up into distal bicep.  Pt had improved tolerance to gentle manuals, trying to avoid the most painful region.  Laser to lateral elbow/ wrist extensors to tolerance. Pt has decreased pain after session.  Education provided on HEP and how to modify ex's to tolerance.  Pt reports understanding.  Will progress as able.      Plan: Continue per plan of care.      Diagnosis: Left lateral elbow pain  Precautions: *chronicity of symptoms*; GERD, HTN, IBS  ( * asterisk indicates given per HEP)  Next Physician Appointment:   David HEP:     Manuals       STM   L UT: TH L UT/forearm      PROM          EPAT DK, D20-S DK, D20-S TH  D20-S       Laser   3.5 mins 3500J 3.5 mins 3500J      Neuro Re-Ed          Scap Squeezes*  Seated 2x10        Chin Tucks*  Seated 5\" x20 Seated 5\"x10 Seated 10x      Gentle Radial Nerve glides*          Flex bar          TB IR/ER walkouts                                        Ther Ex          Upper Trap stretch*   5\"x5 ea 5\"x5      Levator scap stretch  5\" x10ea 5\"x5 ea 5\"x5 ea      T/S seated extensions     10x2      Wrist 4-way          Wrist Extensor stretch  5-10\" x10  Unable                                     Ther Activity          UBE for posture          Gripping  RTB x1', YTB x1'  YTB  30\"                Pt Ed HEP, POC POC HEP HEP    "   Re-Evaluation          Modalities          Heat/ice (PRN) Heat x5min Heat x8min Heat x8 min Heat x8 min

## 2024-04-15 ENCOUNTER — OFFICE VISIT (OUTPATIENT)
Dept: PHYSICAL THERAPY | Facility: CLINIC | Age: 49
End: 2024-04-15
Payer: COMMERCIAL

## 2024-04-15 DIAGNOSIS — M77.12 LATERAL EPICONDYLITIS OF LEFT ELBOW: Primary | ICD-10-CM

## 2024-04-15 PROCEDURE — 97140 MANUAL THERAPY 1/> REGIONS: CPT

## 2024-04-15 PROCEDURE — 97530 THERAPEUTIC ACTIVITIES: CPT

## 2024-04-15 PROCEDURE — 97112 NEUROMUSCULAR REEDUCATION: CPT

## 2024-04-15 NOTE — PROGRESS NOTES
"Daily Note     Today's date: 4/15/2024  Patient name: Mee Cm  : 1975  MRN: [id number]  Referring provider: Eleanor Green DO  Dx:   Encounter Diagnosis     ICD-10-CM    1. Lateral epicondylitis of left elbow  M77.12                      Subjective: Pt states that she felt good after her last visit, probably for the day.  However the next day she had the same pain again.  Pt is struggling with whether the neck stretches are aggravating her pain as she will feel some numbness/tingling into her fingers.  Pt reports frustration at not knowing what is making it worse and if anything is helping.      Objective: See treatment diary below      Assessment: Tolerated treatment fair.  HP to start session as pt had increased pain.  She was only able to tolerate short time with the HP as this was aggravating and increased her pain.  Gentle STM to forearm/wrist extensors with muscle spasm noted proximally.  Pt had low tolerance to elbow PROM as this appeared to increase the tingling/numbness in her fingers.  Manual ulnar nerve glide also appeared to increase this numbness.  Attempted gentle radial nerve glides with no change in sx's.  Discussed HEP and how to modify according to sx's with pt reporting understanding.  Laser at end of session to elbow with good tolerance.  Less tolerance to laser at L UT.  Pt will benefit from continued therapy.  Will progress as able.      Plan: Continue per plan of care.      Diagnosis: Left lateral elbow pain  Precautions: *chronicity of symptoms*; GERD, HTN, IBS  ( * asterisk indicates given per HEP)  Next Physician Appointment:   David HEP:     Manuals 4/1 4/5 4/8 4/11 4/15     STM   L UT: TH L UT/forearm L UT/forearm     PROM     Elbow: TH     Nerve glides     TH (ulnar)     EPAT DK, D20-S DK, D20-S TH  D20-S       Laser   3.5 mins 3500J 3.5 mins 3500J 3.5 mins  3500 J  Elbow/  3min UT     Neuro Re-Ed          Scap Squeezes*  Seated 2x10   Stand  10x     Chin Tucks*  Seated 5\" " "x20 Seated 5\"x10 Seated 10x      Gentle Radial Nerve glides*     10x     Flex bar          TB IR/ER walkouts          No monies     AROM 20x                         Ther Ex          Upper Trap stretch*   5\"x5 ea 5\"x5      Levator scap stretch  5\" x10ea 5\"x5 ea 5\"x5 ea      T/S seated extensions     10x2      Wrist 4-way          Wrist Extensor stretch  5-10\" x10  Unable                                     Ther Activity          UBE for posture          Gripping  RTB x1', YTB x1'  YTB  30\"                Pt Ed HEP, POC POC HEP HEP HEP     Re-Evaluation          Modalities          Heat/ice (PRN) Heat x5min Heat x8min Heat x8 min Heat x8 min Heat x6 min     ICE PRN                         "

## 2024-04-22 ENCOUNTER — OFFICE VISIT (OUTPATIENT)
Dept: PHYSICAL THERAPY | Facility: CLINIC | Age: 49
End: 2024-04-22
Payer: COMMERCIAL

## 2024-04-22 DIAGNOSIS — M77.12 LATERAL EPICONDYLITIS OF LEFT ELBOW: Primary | ICD-10-CM

## 2024-04-22 PROCEDURE — 97140 MANUAL THERAPY 1/> REGIONS: CPT

## 2024-04-22 PROCEDURE — 97110 THERAPEUTIC EXERCISES: CPT

## 2024-04-22 PROCEDURE — 97530 THERAPEUTIC ACTIVITIES: CPT

## 2024-04-22 NOTE — PROGRESS NOTES
Daily Note     Today's date: 2024  Patient name: Mee Cm  : 1975  MRN: [id number]  Referring provider: Eleanor Green DO  Dx:   Encounter Diagnosis     ICD-10-CM    1. Lateral epicondylitis of left elbow  M77.12                      Subjective: Pt states that she is having a lot of pain today.  She felt better over the weekend and notes that she used her arm a lot yesterday so that may have aggravated it.  After discussing, pt does note that she has not had the numbness that she had been having.      Objective: See treatment diary below      Assessment: Tolerated treatment well.  Focused on ex's for wrist ROM and modalities and manuals to decrease pain.  Pt was able to tolerate wrist 4 way AROM, however required cues to move through pain free range as she noted discomfort with wrist flexion and ulnar deviation.  Gentle gripping was still difficult and she was only able to tolerate for 30 secs.  Pt had improved tolerance to manual therapy, with increased pressure along forearm with muscle spasms noted.  Pt noted minimal increased tingling into her ring and pinky finger with pressure to the spasms, however this was relieved when released.  Laser to forearm and UT with improved tolerance to UT since lv.  HEP discussed with modifications with pt reporting understanding.  Will progress as able as pt will benefit from continued therapy.        Plan: Continue per plan of care.      Diagnosis: Left lateral elbow pain  Precautions: *chronicity of symptoms*; GERD, HTN, IBS  ( * asterisk indicates given per HEP)  Next Physician Appointment:   David HEP:     Manuals 4/1 4/5 4/8 4/11 4/15 4/22    STM   L UT: TH L UT/forearm L UT/forearm L  UT/forearm    PROM     Elbow: TH     Nerve glides     TH (ulnar)     EPAT DK, D20-S DK, D20-S TH  D20-S       Laser   3.5 mins 3500J 3.5 mins 3500J 3.5 mins  3500 J  Elbow/  3min UT 4mins  3900J  UT 3' 3200J    Neuro Re-Ed          Scap Squeezes*  Seated 2x10   Stand  10x    "  Chin Tucks*  Seated 5\" x20 Seated 5\"x10 Seated 10x      Gentle Radial Nerve glides*     10x     Flex bar          TB IR/ER walkouts          No monies     AROM 20x                         Ther Ex          Upper Trap stretch*   5\"x5 ea 5\"x5      Levator scap stretch  5\" x10ea 5\"x5 ea 5\"x5 ea      T/S seated extensions     10x2      Wrist 4-way      AROM 10x    Wrist Extensor stretch  5-10\" x10  Unable                                     Ther Activity          UBE for posture          Gripping  RTB x1', YTB x1'  YTB  30\"  Yellow  1'              Pt Ed HEP, POC POC HEP HEP HEP HEP    Re-Evaluation          Modalities          Heat/ice (PRN) Heat x5min Heat x8min Heat x8 min Heat x8 min Heat x6 min Heat x8 min    ICE PRN                           "

## 2024-04-29 ENCOUNTER — EVALUATION (OUTPATIENT)
Dept: PHYSICAL THERAPY | Facility: CLINIC | Age: 49
End: 2024-04-29
Payer: COMMERCIAL

## 2024-04-29 ENCOUNTER — TELEPHONE (OUTPATIENT)
Age: 49
End: 2024-04-29

## 2024-04-29 DIAGNOSIS — M77.12 LATERAL EPICONDYLITIS OF LEFT ELBOW: Primary | ICD-10-CM

## 2024-04-29 PROCEDURE — 97530 THERAPEUTIC ACTIVITIES: CPT | Performed by: PHYSICAL THERAPIST

## 2024-04-29 PROCEDURE — 97110 THERAPEUTIC EXERCISES: CPT | Performed by: PHYSICAL THERAPIST

## 2024-04-29 NOTE — PROGRESS NOTES
"PT Re-Evaluation     Today's date: 2024  Patient name: Mee Cm  : 1975  MRN: [id number]  Referring provider: Eleanor Green DO  Dx:   Encounter Diagnosis     ICD-10-CM    1. Lateral epicondylitis of left elbow  M77.12             Start Time: 915  Stop Time: 1000  Total time in clinic (min): 45 minutes    Assessment  Assessment details: Patient is a 48 y.o. female who presents to outpatient PT with reports of lateral Left elbow pain that was recently aggravated about 5 months ago. Please note that patient presents today with moderate redness/rash noted along distal Left upper arm and mild along Left side of neck since last PT session. Patient reports her skin is sensitive to allergies/certain products or metals, etc. She will be following up with allergist soon. Patient presents for re-evaluation today with slightly improved pain levels. She reports reduced to minimal to no numbness/tingling and reduced \"nerve pain\" in Left arm at this time. She reports improved function and ability to perform certain ADLs, however continues to have pain throughout. She reports improved sleeping tolerance since start of PT. She continues to have pain along lateral Left elbow, wrist extensor and supinator muscle bundles, and lateral to olecranon / radial tunnel. She responds well to manual treatment and modalities in PT, however held this session due to skin changes presented today. Continues to have pain noted with wrist extension, with pain with resisted wrist extension and supination primarily. She continues to have difficulty and pain with activities that involve grasping, pulling/pushing, and grasping/lifting overhead and waist level. Patient was educated on pain science, activity modification, and desensitization techniques. HEP continues to emphasize maintaining better ROM and proper UE and postural strength. Patient will continue to work on proper mechanics and posture to avoid compensations with functional " tasks. Her current limitations continue to affect her ability to complete and participation in ADLs, household chores, taking care of children, and work-related tasks. Patient will benefit from continued skilled PT services to further improve pain levels, reduced muscle tension and hypersensitivity, improve mobility and posture, and improve ability and ease to perform ADLs without difficulty or pain. Patient would benefit from skilled PT services to address these impairments and to maximize function.  Thank you for the referral.  Impairments: abnormal muscle firing, abnormal or restricted ROM, abnormal movement, activity intolerance, impaired physical strength, lacks appropriate home exercise program, pain with function, scapular dyskinesis, poor posture  and poor body mechanics  Functional limitations: grasping, pulling/pushing, grasping/lifting overhead and waist levelUnderstanding of Dx/Px/POC: good   Prognosis: good    Goals  Impairment Goals 4-6 weeks  In order to improve and maximize function patient will be able to...  - Decrease pain intensity to <4/10. Improved  - Improve elbow AROM to WFLs without pain throughout. Slightly Improved  - Increase scapular/UE strength to 4/5 throughout to help improve postural endurance. Improved  - Increase anterior chest and posterior neck flexibility to improve upper cross syndrome. Improved  - Improve thoracic mobility in order to help reduce neck pain. Improved  - Centralize symptoms and decrease numbness frequency/duration. Met    Functional Goals 6-8 weeks  In order to improve and maximize function patient will be able to...  - Return to Prior Level of Function with no greater than 2/10 pain . Ongoing  - Increase Functional Status Measure (FOTO) to: > = anticipated at discharge. Partially Met  - Be independent and compliant with HEP. Met  - Have the ROM necessary for driving and to be able to tolerate driving for >30 minutes. Improved  - Sleep throughout the night  without disturbances due to pain . Met      Plan  Patient would benefit from: skilled PT  Planned modality interventions: cryotherapy, electrical stimulation/Russian stimulation and thermotherapy: hydrocollator packs  Planned therapy interventions: joint mobilization, manual therapy, neuromuscular re-education, patient education, strengthening, stretching, therapeutic activities, therapeutic exercise, home exercise program, functional ROM exercises, postural training, balance/weight bearing training, body mechanics training, IASTM, kinesiology taping, massage, Mejia taping, nerve gliding and flexibility  Frequency: 1-2x week.  Duration in weeks: 4  Treatment plan discussed with: patient, PTA and referring physician      Subjective Evaluation    History of Present Illness  Mechanism of injury: Mee Cm is a 48 y.o. year-old female who presents to outpatient PT with reports of Left elbow pain with history of related symptoms in the past. She has attended PT at this clinic previous with related symptoms with improvements noted at the time. She reports recently she hurt it about 4 months ago while pushing . She reports the snow as probably heavier and she noticed the pain with squeezing and pushing. She thought her pain would subside over time, however worsened especially with gripping/grasping and lifting overhead activities. No recent imaging performed.          Recurrent probem    Quality of life: good    Patient Goals  Patient goals for therapy: decreased pain, increased motion, independence with ADLs/IADLs, return to sport/leisure activities, increased strength and return to work    Pain  Current pain ratin  At best pain ratin  At worst pain ratin  Location: Left elbow  Quality: discomfort and throbbing  Relieving factors: medications  Aggravating factors: overhead activity and lifting  Progression: improved    Social Support  Lives with: young children    Working:  @  [employer].  Hand dominance: right      Diagnostic Tests  No diagnostic tests performed  Treatments  Previous treatment: physical therapy  Current treatment: physical therapy      Objective     Observations     Additional Observation Details  Seated Posture: forward hunched/rounded shoulders posture    Skin Inspection: redness, rash noted mildly along Left upper trap and moderately along distal Left upper arm, distal posterior upper arm with very mild raising; no bleeding, bruising, wounds or drainage noted; patient reported itchiness throughout    Palpation   Left   Muscle spasm in the supinator and wrist extensors.   Tenderness of the supinator and wrist extensors.     Additional Palpation Details  Severe tenderness noted along wrist extensor bundle and hypersensitivity at radial tunnel    Tenderness     Left Elbow   Tenderness in the antecubital fossa, lateral epicondyle and olecranon process.     Left Wrist/Hand   Tenderness in the lateral epicondyle and olecranon process.     Neurological Testing     Sensation     Elbow   Left Elbow   Hypersensation: light touch    Comments   Left light touch: at radial tunnel / radial nerve pathway.     Active Range of Motion     Left Elbow   Normal active range of motion  Extension: with pain  Forearm pronation: with pain    Right Elbow   Normal active range of motion    Additional Active Range of Motion Details  Wrist ROM WNLs with some tightness with supination and some ERP with wrist extension at lateral elbow    Strength/Myotome Testing     Left Elbow   Flexion: 4  Extension: 4-  Forearm supination: 4- (painful)  Forearm pronation: 4-    Right Elbow   Normal strength    Additional Strength Details  Wrist Flexion: Left 4-/5 without pain  Wrist Extension: Left 4-/5 with slight pain     Strength (Lv2): Right 45lb, 42lb, 50lb ; Left 10lb, 13lb, 15lb    Tests     Left Elbow   Positive Cozen's, Mill's and Tinel's sign (cubital tunnel).   Negative active medial epicondylitis,  "passive medial epicondylitis, reverse Cozen's test positive, valgus stress at 30 degrees and varus stress at 30 degrees.              Diagnosis: Left lateral elbow pain  Precautions: *chronicity of symptoms*; GERD, HTN, IBS  ( * asterisk indicates given per HEP)  Next Physician Appointment:   David HEP:     Manuals 4/1 4/5 4/8 4/11 4/15 4/22 4/29      STM   L UT: TH L UT/forearm L UT/forearm L  UT/forearm deferred      PROM     Elbow: TH        Nerve glides     TH (ulnar)        EPAT DK, D20-S DK, D20-S TH  D20-S          Laser   3.5 mins 3500J 3.5 mins 3500J 3.5 mins  3500 J  Elbow/  3min UT 4mins  3900J  UT 3' 3200J deferred      Neuro Re-Ed             Scap Squeezes*  Seated 2x10   Stand  10x        Chin Tucks*  Seated 5\" x20 Seated 5\"x10 Seated 10x         Gentle Radial Nerve glides*     10x        Flex bar             TB IR/ER walkouts             No monies     AROM 20x                                  Ther Ex             Upper Trap stretch*   5\"x5 ea 5\"x5   5\"x5      Levator scap stretch  5\" x10ea 5\"x5 ea 5\"x5 ea   5\"x5      T/S seated extensions     10x2         Wrist 4-way      AROM 10x AROM 10x      Wrist Extensor stretch  5-10\" x10  Unable                                                 Ther Activity             UBE for posture             Gripping  RTB x1', YTB x1'  YTB  30\"  Yellow  1'                    Pt Ed HEP, POC POC HEP HEP HEP HEP POC, skin check, HEP      Re-Evaluation       DK      Modalities             Heat/ice (PRN) Heat x5min Heat x8min Heat x8 min Heat x8 min Heat x6 min Heat x8 min       ICE PRN       x8min                 "

## 2024-04-29 NOTE — TELEPHONE ENCOUNTER
Patient has rash on L upper arm. Exactly where she had PT laser treatment . Believes its connected. Declined appt for today. Requesting a Rx be sent in to pharmacy . Rash started Wednesday.  is itchy and feels slight warm to touch. Not getting worse. Not spreading. Looks bumpy. Please advise

## 2024-04-30 ENCOUNTER — OFFICE VISIT (OUTPATIENT)
Dept: FAMILY MEDICINE CLINIC | Facility: CLINIC | Age: 49
End: 2024-04-30
Payer: COMMERCIAL

## 2024-04-30 VITALS
OXYGEN SATURATION: 99 % | HEART RATE: 99 BPM | HEIGHT: 64 IN | SYSTOLIC BLOOD PRESSURE: 138 MMHG | DIASTOLIC BLOOD PRESSURE: 78 MMHG | WEIGHT: 149 LBS | BODY MASS INDEX: 25.44 KG/M2 | RESPIRATION RATE: 16 BRPM

## 2024-04-30 DIAGNOSIS — L30.9 DERMATITIS: Primary | ICD-10-CM

## 2024-04-30 PROCEDURE — 99213 OFFICE O/P EST LOW 20 MIN: CPT | Performed by: FAMILY MEDICINE

## 2024-04-30 RX ORDER — PREDNISONE 10 MG/1
TABLET ORAL
Qty: 26 TABLET | Refills: 0 | Status: SHIPPED | OUTPATIENT
Start: 2024-04-30

## 2024-04-30 NOTE — ASSESSMENT & PLAN NOTE
Dermatitis type of rash on the left upper arm and few spots on the forearm, started where she was getting the treatment for the lateral epicondylitis most likely due to some contact, cause unknown, she will start the prednisone tapering dose 60 mg starting dose, advised to take with food and side effect of medicine discussed with her, after she finished prednisone advised to take Benadryl at nighttime and Allegra in the morning to avoid any further flareups while she is doing the physical therapy for tendinitis

## 2024-04-30 NOTE — PROGRESS NOTES
Name: Mee Cm      : 1975      MRN: [id number]  Encounter Provider: Nohemy Humphreys MD  Encounter Date: 2024   Encounter department: [facility]    Assessment & Plan     1. Dermatitis  Assessment & Plan:  Dermatitis type of rash on the left upper arm and few spots on the forearm, started where she was getting the treatment for the lateral epicondylitis most likely due to some contact, cause unknown, she will start the prednisone tapering dose 60 mg starting dose, advised to take with food and side effect of medicine discussed with her, after she finished prednisone advised to take Benadryl at nighttime and Allegra in the morning to avoid any further flareups while she is doing the physical therapy for tendinitis    Orders:  -     predniSONE 10 mg tablet; 6 tablets for 1st 2 days, 4 tablets for next 2 days, 2 tablets for 2 days and then 1 tablet for 2 days.           Subjective     She is doing physical therapy for the tennis elbow of the left elbow and since last Monday which is almost 1 week she started having slight redness itchy area around that area where she was getting treatment they were putting wraps around that area using towels and she says she is very allergic to many things very sensitive and she takes Allegra every day in the morning, currently not taking any Singulair, she says it is very pruritic and is spreading and she does not have any other rash on the body and she has no choking sensation no fever and no pain      Review of Systems   Constitutional: Negative.    HENT: Negative.     Eyes: Negative.    Respiratory: Negative.     Skin:  Positive for color change and rash.        Rash left upper arm        Past Medical History:   Diagnosis Date   • Allergic     seasonal, food allergies   • Asthma    • GERD (gastroesophageal reflux disease)    • Hypertension    • Irritable bowel syndrome    • Migraine without aura    • Shoulder subluxation, left    • Tennis elbow       Past Surgical History:   Procedure Laterality Date   • TONSILLECTOMY     • US GUIDED MSK PROCEDURE  1/14/2020   • WRIST SURGERY       Family History   Problem Relation Age of Onset   • Stroke Mother         CEREBROVASCULAR ACCIDENT (CVA), LISTED 2X   • Hypertension Mother    • Hyperlipidemia Mother    • Hypertension Father    • Hyperlipidemia Father    • Skin cancer Father    • Melanoma Father 75   • Cervical cancer Neg Hx    • Colon cancer Neg Hx    • Ovarian cancer Neg Hx    • Uterine cancer Neg Hx      Social History     Socioeconomic History   • Marital status: Single     Spouse name: None   • Number of children: None   • Years of education: None   • Highest education level: None   Occupational History   • None   Tobacco Use   • Smoking status: Never   • Smokeless tobacco: Never   Vaping Use   • Vaping status: Never Used   Substance and Sexual Activity   • Alcohol use: Not Currently     Comment: socially    • Drug use: No   • Sexual activity: Not Currently     Partners: Male     Comment: Birth Control Pills   Other Topics Concern   • None   Social History Narrative    CAFFEINE USE - 16oz/daily     EXERCISE HABITS    Adopted 3 children: girls ages 1, 6, and 14yo - all sisters     Is a  at [employer]      Social Determinants of Health     Financial Resource Strain: Not on file   Food Insecurity: Not on file   Transportation Needs: Not on file   Physical Activity: Not on file   Stress: Not on file   Social Connections: Not on file   Intimate Partner Violence: Not on file   Housing Stability: Not on file     Current Outpatient Medications on File Prior to Visit   Medication Sig   • albuterol (PROVENTIL HFA,VENTOLIN HFA) 90 mcg/act inhaler Inhale   • Ascorbic Acid (VITAMIN C) 500 MG CAPS Take by mouth   • Calcium 250 MG CAPS Take by mouth   • desogestrel-ethinyl estradiol (Volnea) 0.15-0.02/0.01 MG (21/5) per tablet Take 1 tablet by mouth daily Take 1 tab daily, skip placebos and start new  "pack immediately   • EPINEPHrine (EPIPEN) 0.3 mg/0.3 mL SOAJ as directed   • fexofenadine (ALLEGRA) 180 MG tablet Take by mouth   • lisinopril (ZESTRIL) 5 mg tablet Take 1 tablet (5 mg total) by mouth daily   • montelukast (SINGULAIR) 10 mg tablet Take 1 tablet by mouth each evening   • Multiple Vitamin (MULTI-VITAMIN DAILY) TABS Take by mouth   • naproxen (NAPROSYN) 500 mg tablet Take 1 tablet (500 mg total) by mouth 2 (two) times a day with meals   • Omega-3 Fatty Acids (Fish Oil) 1000 MG CPDR Take by mouth 2 (two) times a day     Allergies   Allergen Reactions   • Cat Hair Extract    • Erythromycin    • Hctz [Hydrochlorothiazide] Dizziness   • Peanut [Nuts - Food Allergy]    • Seasonal Ic  [Cholestatin]    • Trish Sun Screen Spf 30 [Albolene] Rash   • Latex Rash   • Medical Tape Rash     Immunization History   Administered Date(s) Administered   • COVID-19 MODERNA VACC 0.5 ML IM 12/16/2022   • COVID-19 PFIZER VACCINE 0.3 ML IM 03/11/2021, 04/01/2021, 10/07/2021   • INFLUENZA 10/16/2018, 10/16/2019, 10/20/2022   • Influenza, recombinant, quadrivalent,injectable, preservative free 09/04/2020   • Tdap 10/26/2012, 07/22/2022   • Tuberculin Skin Test-PPD Intradermal 05/06/2019       Objective     /78   Pulse 99   Resp 16   Ht 5' 3.5\" (1.613 m)   Wt 67.6 kg (149 lb)   SpO2 99%   BMI 25.98 kg/m²     Physical Exam  Vitals and nursing note reviewed.   Constitutional:       Appearance: Normal appearance.   HENT:      Head: Atraumatic.   Cardiovascular:      Rate and Rhythm: Normal rate.      Heart sounds: No murmur heard.  Pulmonary:      Effort: Pulmonary effort is normal. No respiratory distress.   Musculoskeletal:      Cervical back: Normal range of motion.   Skin:     Comments: Pink maculopapular kind of rash on the lateral part of the left upper arm and few papular areas seen on the forearm, non tender. warm         Nohemy Humphreys MD    "

## 2024-05-06 ENCOUNTER — OFFICE VISIT (OUTPATIENT)
Dept: PHYSICAL THERAPY | Facility: CLINIC | Age: 49
End: 2024-05-06
Payer: COMMERCIAL

## 2024-05-06 DIAGNOSIS — M77.12 LATERAL EPICONDYLITIS OF LEFT ELBOW: Primary | ICD-10-CM

## 2024-05-06 PROCEDURE — 97140 MANUAL THERAPY 1/> REGIONS: CPT

## 2024-05-06 PROCEDURE — 97110 THERAPEUTIC EXERCISES: CPT

## 2024-05-06 PROCEDURE — 97112 NEUROMUSCULAR REEDUCATION: CPT

## 2024-05-06 NOTE — PROGRESS NOTES
"Daily Note     Today's date: 2024  Patient name: Mee Cm  : 1975  MRN: [id number]  Referring provider: Eleanor Geren DO  Dx:   Encounter Diagnosis     ICD-10-CM    1. Lateral epicondylitis of left elbow  M77.12                      Subjective: Pt states that she was on steroids for the past few days to treat an allergic reaction on her arm and the steroids have helped with the elbow pain too.  Pt believes that she had a reaction to the detergent that our towels and;pillowcases are washed in.      Objective: See treatment diary below      Assessment: Tolerated treatment well.  Progressed pt with scapular strengthening as tolerated.  Pt notes minimal discomfort with ball on wall going CCW, no issues CW.  Cues for bent over scap retraction/row to maintain proper form and alignment.  Improved tolerance to STM with muscle spasms noted in wrist extensors.  Pt was also able to tolerate gentle wrist extensor stretch this visit.  Discussed HEP with pt reporting understanding.  Will progress as able.      Plan: Continue per plan of care.      Diagnosis: Left lateral elbow pain  Precautions: *chronicity of symptoms*; GERD, HTN, IBS  ( * asterisk indicates given per HEP)  Next Physician Appointment:   David HEP:     Manuals 4/1 4/5 4/8 4/11 4/15 4/22 4/29 5/6     STM   L UT: TH L UT/forearm L UT/forearm L  UT/forearm deferred      PROM     Elbow: TH        Nerve glides     TH (ulnar)        EPAT DK, D20-S DK, D20-S TH  D20-S          Laser   3.5 mins 3500J 3.5 mins 3500J 3.5 mins  3500 J  Elbow/  3min UT 4mins  3900J  UT 3' 3200J deferred      Neuro Re-Ed             Scap Squeezes*  Seated 2x10   Stand  10x        Chin Tucks*  Seated 5\" x20 Seated 5\"x10 Seated 10x         Gentle Radial Nerve glides*     10x        Flex bar             Ball on wall        CW/CCW 20x     TB IR/ER walkouts             Serratus press             No monies     AROM 20x   AROM  20x     Bent over scap retraction/row        15x   " "  Bent over \"I\"        10x2     Ther Ex             Upper Trap stretch*   5\"x5 ea 5\"x5   5\"x5      Levator scap stretch  5\" x10ea 5\"x5 ea 5\"x5 ea   5\"x5      T/S seated extensions     10x2    10x2     Wrist 4-way      AROM 10x AROM 10x AROM  10x2     Wrist Extensor stretch  5-10\" x10  Unable     5\"x10                                            Ther Activity             UBE for posture             Gripping  RTB x1', YTB x1'  YTB  30\"  Yellow  1'  Yellow  1'                  Pt Ed HEP, POC POC HEP HEP HEP HEP POC, skin check, HEP HEP     Re-Evaluation       DK      Modalities             Heat/ice (PRN) Heat x5min Heat x8min Heat x8 min Heat x8 min Heat x6 min Heat x8 min       ICE PRN       x8min                 "

## 2024-05-13 ENCOUNTER — TELEPHONE (OUTPATIENT)
Age: 49
End: 2024-05-13

## 2024-05-13 ENCOUNTER — OFFICE VISIT (OUTPATIENT)
Dept: PHYSICAL THERAPY | Facility: CLINIC | Age: 49
End: 2024-05-13
Payer: COMMERCIAL

## 2024-05-13 DIAGNOSIS — M77.12 LATERAL EPICONDYLITIS OF LEFT ELBOW: Primary | ICD-10-CM

## 2024-05-13 PROCEDURE — 97140 MANUAL THERAPY 1/> REGIONS: CPT

## 2024-05-13 PROCEDURE — 97530 THERAPEUTIC ACTIVITIES: CPT

## 2024-05-13 PROCEDURE — 97110 THERAPEUTIC EXERCISES: CPT

## 2024-05-13 PROCEDURE — 97112 NEUROMUSCULAR REEDUCATION: CPT

## 2024-05-13 NOTE — PROGRESS NOTES
"Daily Note     Today's date: 2024  Patient name: Mee Cm  : 1975  MRN: [id number]  Referring provider: Eleanor Green DO  Dx:   Encounter Diagnosis     ICD-10-CM    1. Lateral epicondylitis of left elbow  M77.12                      Subjective: Pt states that       Objective: See treatment diary below      Assessment: Tolerated treatment well.  Cues throughout to improve posture with fair carryover noted.  Fair tolerance to exercises that involved full elbow extension.  When pt was instructed to slightly bend her elbow she had improved tolerance.  Pt able to tolerate short holds for wrist extensor stretch.  Pt progressing slowly towards her goals and will benefit from continued therapy.      Plan: Continue per plan of care.      Diagnosis: Left lateral elbow pain  Precautions: *chronicity of symptoms*; GERD, HTN, IBS  ( * asterisk indicates given per HEP)  Next Physician Appointment:   David HEP:     Manuals 4/1 4/5 4/8 4/11 4/15 4/22 4/29 5/6 5/13    STM   L UT: TH L UT/forearm L UT/forearm L  UT/forearm deferred     PROM     Elbow: TH        Nerve glides     TH (ulnar)        EPAT DK, D20-S DK, D20-S TH  D20-S          Laser   3.5 mins 3500J 3.5 mins 3500J 3.5 mins  3500 J  Elbow/  3min UT 4mins  3900J  UT 3' 3200J deferred      Neuro Re-Ed             Scap Squeezes*  Seated 2x10   Stand  10x        Chin Tucks*  Seated 5\" x20 Seated 5\"x10 Seated 10x         Gentle Radial Nerve glides*     10x        Flex bar             Ball on wall        CW/CCW 20x CW/CCW 15x    TB rows         GTB 2x10    TB IR/ER walkouts             Serratus press             No monies     AROM 20x   AROM  20x AROM  20x    Bent over scap retraction/row        15x 2x10    Bent over \"I\"        10x2 10x    Ther Ex             Upper Trap stretch*   5\"x5 ea 5\"x5   5\"x5      Levator scap stretch  5\" x10ea 5\"x5 ea 5\"x5 ea   5\"x5      T/S seated extensions     10x2    10x2 10x2    Wrist 4-way      AROM 10x AROM 10x AROM  10x2 " "AROM  10x2    Wrist Extensor stretch  5-10\" x10  Unable     5\"x10 5\"x5                                           Ther Activity             UBE for posture             Gripping  RTB x1', YTB x1'  YTB  30\"  Yellow  1'  Yellow  1' Yellow  1'                 Pt Ed HEP, POC POC HEP HEP HEP HEP POC, skin check, HEP HEP HEP    Re-Evaluation       DK      Modalities             Heat/ice (PRN) Heat x5min Heat x8min Heat x8 min Heat x8 min Heat x6 min Heat x8 min       ICE PRN       x8min                   "

## 2024-05-13 NOTE — TELEPHONE ENCOUNTER
Patient called in post OV 4/30 with diagnosis of contact dermatitis. Patient had rash on upper and lower left arm; same arm as PT treatment for tennis elbow. Patient feels it is the detergent used in PT laundry due to rash only being on left treatment arm. Patient prescribed prednisone which patient completed Wednesday 5/8 with resolution of rash.     Rash returned Friday 5/10 with bumps, redness, itching on left lower arm, Patient is using moisturizer (Cereve) mildly to control rash. Patient is not sure what to do. OV for evaluation? New orders? Please follow up with patient for provider response.

## 2024-05-14 ENCOUNTER — OFFICE VISIT (OUTPATIENT)
Dept: FAMILY MEDICINE CLINIC | Facility: CLINIC | Age: 49
End: 2024-05-14
Payer: COMMERCIAL

## 2024-05-14 VITALS
HEIGHT: 64 IN | OXYGEN SATURATION: 98 % | SYSTOLIC BLOOD PRESSURE: 125 MMHG | WEIGHT: 149 LBS | DIASTOLIC BLOOD PRESSURE: 80 MMHG | BODY MASS INDEX: 25.44 KG/M2 | HEART RATE: 110 BPM

## 2024-05-14 DIAGNOSIS — L30.9 DERMATITIS: Primary | ICD-10-CM

## 2024-05-14 PROCEDURE — 99213 OFFICE O/P EST LOW 20 MIN: CPT | Performed by: FAMILY MEDICINE

## 2024-05-14 RX ORDER — SODIUM FLUORIDE 6 MG/ML
PASTE, DENTIFRICE DENTAL
COMMUNITY
Start: 2024-03-29

## 2024-05-14 RX ORDER — TRIAMCINOLONE ACETONIDE 5 MG/G
OINTMENT TOPICAL 2 TIMES DAILY
Qty: 15 G | Refills: 0 | Status: SHIPPED | OUTPATIENT
Start: 2024-05-14

## 2024-05-14 RX ORDER — PREDNISONE 10 MG/1
TABLET ORAL
Qty: 26 TABLET | Refills: 0 | Status: SHIPPED | OUTPATIENT
Start: 2024-05-14

## 2024-05-14 NOTE — PROGRESS NOTES
"Assessment/Plan:   Diagnoses and all orders for this visit:    Dermatitis  -     predniSONE 10 mg tablet; 6 tablets for 1st 2 days, 4 tablets for next 2 days, 2 tablets for 2 days and then 1 tablet for 2 days.  -     triamcinolone (KENALOG) 0.5 % ointment; Apply topically 2 (two) times a day  - for now, advised treatment with Kenalog BID x7days   - Pred taper given incase flares again   - f/u PRN - pt aware and agreeable    Other orders  -     Sodium Fluoride 5000 PPM 1.1 % PSTE; PLEASE SEE ATTACHED FOR DETAILED DIRECTIONS          Subjective:    Patient ID: Mee Cm is a 48 y.o. female.  HPI  - does PT for tennis elbow -- thinks allergic to detergent that towels are washed in at PT   - was treated with Pred taper on 4/30/2024 and completed on Wednesday   - dermatitis restarted on Friday night (but did not have PT) -- limited to L-arm   - (+) \"not as bad as before\"   - (+) wakes pt up from sleep   - (+) itchy forearm      The following portions of the patient's history were reviewed and updated as appropriate: allergies, current medications, past family history, past medical history, past social history, past surgical history and problem list.    Review of Systems  as per HPI    Objective:  /80   Pulse (!) 110   Ht 5' 3.5\" (1.613 m)   Wt 67.6 kg (149 lb)   SpO2 98%   BMI 25.98 kg/m²    Physical Exam  Vitals reviewed.   Constitutional:       General: She is not in acute distress.     Appearance: Normal appearance. She is not ill-appearing, toxic-appearing or diaphoretic.   HENT:      Head: Normocephalic and atraumatic.      Right Ear: External ear normal.      Left Ear: External ear normal.      Nose: Nose normal.   Eyes:      General: No scleral icterus.        Right eye: No discharge.         Left eye: No discharge.      Extraocular Movements: Extraocular movements intact.      Conjunctiva/sclera: Conjunctivae normal.   Pulmonary:      Effort: Pulmonary effort is normal.   Musculoskeletal:         " General: Normal range of motion.   Skin:     General: Skin is warm.      Findings: Rash present.      Comments: Slightly erythematous on L-upper arm and forearm.  Forearm also pruritic    Neurological:      Mental Status: She is alert.

## 2024-05-20 ENCOUNTER — OFFICE VISIT (OUTPATIENT)
Dept: PHYSICAL THERAPY | Facility: CLINIC | Age: 49
End: 2024-05-20
Payer: COMMERCIAL

## 2024-05-20 DIAGNOSIS — M77.12 LATERAL EPICONDYLITIS OF LEFT ELBOW: Primary | ICD-10-CM

## 2024-05-20 PROCEDURE — 97112 NEUROMUSCULAR REEDUCATION: CPT

## 2024-05-20 PROCEDURE — 97110 THERAPEUTIC EXERCISES: CPT

## 2024-05-20 PROCEDURE — 97140 MANUAL THERAPY 1/> REGIONS: CPT

## 2024-05-20 PROCEDURE — 97530 THERAPEUTIC ACTIVITIES: CPT

## 2024-05-20 NOTE — PROGRESS NOTES
"Daily Note     Today's date: 2024  Patient name: Mee Cm  : 1975  MRN: [id number]  Referring provider: Eleanor Green DO  Dx:   Encounter Diagnosis     ICD-10-CM    1. Lateral epicondylitis of left elbow  M77.12                      Subjective: Pt states that she has been a little sore again.  Pt reports that her pain level has gone back up again, not as bad as when she first started just more since the steroids.        Objective: See treatment diary below      Assessment: Tolerated treatment well.  Pt continues to be challenged with wrist AROM and elbow extension due to increased pain.  Pt educated and encouraged to move through AROM slower and decreasing her ROM so to have improved tolerance.  Discussed importance of scapular strength/stabilization and avoiding UT compensation.  Pt reports understanding.  Pt has improved tolerance to manuals.  Pt progressing slowly towards her goals and will benefit from continued therapy.      Plan: Continue per plan of care.      Diagnosis: Left lateral elbow pain  Precautions: *chronicity of symptoms*; GERD, HTN, IBS  ( * asterisk indicates given per HEP)  Next Physician Appointment:   David HEP:     Manuals 4/1 4/5 4/8 4/11 4/15 4/22 4/29 5/6 5/13 5/20   STM   L UT: TH L UT/forearm L UT/forearm L  UT/forearm deferred  TH   PROM     Elbow: TH        Nerve glides     TH (ulnar)        EPAT DK, D20-S DK, D20-S TH  D20-S          Laser   3.5 mins 3500J 3.5 mins 3500J 3.5 mins  3500 J  Elbow/  3min UT 4mins  3900J  UT 3' 3200J deferred      Neuro Re-Ed             Scap Squeezes*  Seated 2x10   Stand  10x        Chin Tucks*  Seated 5\" x20 Seated 5\"x10 Seated 10x         Gentle Radial Nerve glides*     10x        Flex bar             Ball on wall        CW/CCW 20x CW/CCW 15x    TB rows         GTB 2x10    TB IR/ER walkouts             Serratus press             No monies     AROM 20x   AROM  20x AROM  20x AROM  2x10   Bent over scap retraction/row        15x " "2x10 Scap retract 10x2   Bent over \"I\"        10x2 10x    Ther Ex             Upper Trap stretch*   5\"x5 ea 5\"x5   5\"x5      Levator scap stretch  5\" x10ea 5\"x5 ea 5\"x5 ea   5\"x5      T/S seated extensions     10x2    10x2 10x2 10x2   Wrist 4-way      AROM 10x AROM 10x AROM  10x2 AROM  10x2 AROM  10x2   Wrist Extensor stretch  5-10\" x10  Unable     5\"x10 5\"x5    Doorway stretch          10x5\"                             Ther Activity             UBE for posture             Gripping  RTB x1', YTB x1'  YTB  30\"  Yellow  1'  Yellow  1' Yellow  1' Yellow 1'   Theracane to UT          1 min   Pt Ed HEP, POC POC HEP HEP HEP HEP POC, skin check, HEP HEP HEP HEP   Re-Evaluation       DK      Modalities             Heat/ice (PRN) Heat x5min Heat x8min Heat x8 min Heat x8 min Heat x6 min Heat x8 min       ICE PRN       x8min                     "

## 2024-05-27 ENCOUNTER — APPOINTMENT (OUTPATIENT)
Dept: PHYSICAL THERAPY | Facility: CLINIC | Age: 49
End: 2024-05-27
Payer: COMMERCIAL

## 2024-05-28 ENCOUNTER — OFFICE VISIT (OUTPATIENT)
Dept: PHYSICAL THERAPY | Facility: CLINIC | Age: 49
End: 2024-05-28
Payer: COMMERCIAL

## 2024-05-28 DIAGNOSIS — M77.12 LATERAL EPICONDYLITIS OF LEFT ELBOW: Primary | ICD-10-CM

## 2024-05-28 PROCEDURE — 97530 THERAPEUTIC ACTIVITIES: CPT

## 2024-05-28 PROCEDURE — 97110 THERAPEUTIC EXERCISES: CPT

## 2024-05-28 PROCEDURE — 97140 MANUAL THERAPY 1/> REGIONS: CPT

## 2024-05-28 NOTE — PROGRESS NOTES
"Discharge    Today's date: 2024  Patient name: Mee Cm  : 1975  MRN: [id number]  Referring provider: Eleanor Green DO  Dx:   Encounter Diagnosis     ICD-10-CM    1. Lateral epicondylitis of left elbow  M77.12                      Subjective: Pt states that she is doing better than when she started, however continues with       Objective: See treatment diary below      Assessment: Tolerated treatment well.  Pt has improved tolerance to gripping object since start of PT.  She is also able to better tolerate wrist extensor stretch with elbow bent, more difficulty with elbow straight.  Pt also has improved tolerance to upper trap stretch.  She continues to be challenged with scapular strengthening as she compensates with UT and reports tingling/numbness into her hand.  More pressure applied during STM with less complaints.  Reviewed HEP with pt reporting understanding.  Pt will be discharged to her HEP at this time.      Plan: Discharged     Diagnosis: Left lateral elbow pain  Precautions: *chronicity of symptoms*; GERD, HTN, IBS  ( * asterisk indicates given per HEP)  Next Physician Appointment:   David HEP:     Manuals 5/28  4/8 4/11 4/15 4/22 4/29 5/6 5/13 5/20   STM TH  L UT: TH L UT/forearm L UT/forearm L  UT/forearm deferred  TH   PROM     Elbow: TH        Nerve glides     TH (ulnar)        EPAT   TH  D20-S          Laser   3.5 mins 3500J 3.5 mins 3500J 3.5 mins  3500 J  Elbow/  3min UT 4mins  3900J  UT 3' 3200J deferred      Neuro Re-Ed             Scap Squeezes*     Stand  10x        Chin Tucks*   Seated 5\"x10 Seated 10x         Gentle Radial Nerve glides*     10x        Flex bar             Ball on wall        CW/CCW 20x CW/CCW 15x    TB rows         GTB 2x10    TB IR/ER walkouts             Serratus press             No monies AROM  2x10    AROM 20x   AROM  20x AROM  20x AROM  2x10   Bent over scap retraction/row        15x 2x10 Scap retract 10x2   Bent over \"I\"        10x2 10x    Ther " "Ex             Upper Trap stretch*   5\"x5 ea 5\"x5   5\"x5      Levator scap stretch   5\"x5 ea 5\"x5 ea   5\"x5      T/S seated extensions     10x2    10x2 10x2 10x2   Wrist 4-way      AROM 10x AROM 10x AROM  10x2 AROM  10x2 AROM  10x2   Wrist Extensor stretch 5\"x5   Unable     5\"x10 5\"x5    Doorway stretch          10x5\"                             Ther Activity             UBE for posture             Gripping Yellow  1'   YTB  30\"  Yellow  1'  Yellow  1' Yellow  1' Yellow 1'   Theracane to UT          1 min   Pt Ed HEP  HEP HEP HEP HEP POC, skin check, HEP HEP HEP HEP   Re-Evaluation       DK      Modalities             Heat/ice (PRN)   Heat x8 min Heat x8 min Heat x6 min Heat x8 min       ICE PRN       x8min                       "

## 2024-06-04 ENCOUNTER — TELEPHONE (OUTPATIENT)
Age: 49
End: 2024-06-04

## 2024-06-04 ENCOUNTER — OFFICE VISIT (OUTPATIENT)
Dept: FAMILY MEDICINE CLINIC | Facility: CLINIC | Age: 49
End: 2024-06-04
Payer: COMMERCIAL

## 2024-06-04 VITALS
WEIGHT: 147 LBS | BODY MASS INDEX: 25.1 KG/M2 | SYSTOLIC BLOOD PRESSURE: 120 MMHG | HEIGHT: 64 IN | HEART RATE: 83 BPM | TEMPERATURE: 99.1 F | OXYGEN SATURATION: 99 % | DIASTOLIC BLOOD PRESSURE: 80 MMHG
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
## 2024-06-04 DIAGNOSIS — L03.113 CELLULITIS OF HAND, RIGHT: Primary | ICD-10-CM

## 2024-06-04 DIAGNOSIS — S60.561A INSECT BITE OF RIGHT HAND, INITIAL ENCOUNTER: ICD-10-CM

## 2024-06-04 PROCEDURE — 99213 OFFICE O/P EST LOW 20 MIN: CPT | Performed by: FAMILY MEDICINE

## 2024-06-04 RX ORDER — SULFAMETHOXAZOLE AND TRIMETHOPRIM 800; 160 MG/1; MG/1
1 TABLET ORAL EVERY 12 HOURS SCHEDULED
Qty: 14 TABLET | Refills: 0 | Status: SHIPPED | OUTPATIENT
Start: 2024-06-04 | End: 2024-06-11

## 2024-06-04 NOTE — TELEPHONE ENCOUNTER
Patient called in to report insect bite on right 4th finger 2 days ago; felt the bite by small black bug. Today the finger is swelling and has red streaks up the anterior hand; swelling to 3rd and 5th fingers; knuckles are swollen. Clear liquid drainage last night. OV scheduled with Dr. Servin today at 2:40 PM for evaluation and treatment.

## 2024-06-04 NOTE — PROGRESS NOTES
Outpatient Note- Follow up     HPI:     Mee Cm , 48 y.o. female  presents today for bug bite on the right hand.  The patient has increased redness and swelling to the fingers.  It has significantly increased over the last 24 hours.  The bite occurred about 2 nights ago on Sunday evening.  She saw the insect whether it is a spider or ant she is unaware, but it bit her and she swiped it off.  She went home and washed the area thoroughly.  Unfortunately she did have an increase in redness, swelling, itching, and over the course of today it has become more painful.  She noted some red streaking towards her wrist and close to the forearm.  She felt that it was necessary to follow-up in the office.  She denies any fever, chills, nausea, vomiting, lightheadedness, dizziness, chest pain, shortness of breath.        Past Medical History:   Diagnosis Date    Allergic     seasonal, food allergies    Asthma     GERD (gastroesophageal reflux disease)     Hypertension     Irritable bowel syndrome     Migraine without aura     Shoulder subluxation, left     Tennis elbow       ROS:   Review of Systems   See HPI    OBJECTIVE  Vitals:    06/04/24 1446   BP: 120/80   Pulse: 83   Temp: 99.1 °F (37.3 °C)   SpO2: 99%        Physical Exam  Constitutional:       General: She is not in acute distress.     Appearance: Normal appearance. She is not ill-appearing, toxic-appearing or diaphoretic.   HENT:      Nose: No congestion or rhinorrhea.   Eyes:      General:         Right eye: No discharge.         Left eye: No discharge.   Pulmonary:      Effort: No respiratory distress.   Musculoskeletal:      Comments: Range of motion of fingers within normal limits.  Pulses normal.  Mild tight sensation with a closed fist   Skin:     General: Skin is warm.      Findings: Erythema, lesion (Small bug bite on the ring finger of right hand.) and rash (Increased redness, heat, and mild swelling of the right hand) present.   Neurological:      Mental  Status: She is alert.            ASSESSMENT AND PLAN   Mee Ernst was seen today for insect bite.    Diagnoses and all orders for this visit:    Cellulitis of hand, right  Insect bite of right hand, initial encounter  Patient presents with bug bite which cause local increase in swelling, redness, heat.  Likely cellulitis of the right hand.  Will treat with Bactrim to cover for MRSA as well as other skin related ishmael.  She is to monitor for any significant rash.  Area was marked on her hand, if this surpasses the marked regions after 24 to 48 hours, will likely need to switch the medication to a Keflex ordered oxacillin.  Patient is to call with any new issues or concerns.  She may utilize ice, heat, Tylenol, or Motrin over-the-counter for pain and itching.  She should not have prolonged exposure to ice greater than 20 minutes and 1 hour timeframe.  -     sulfamethoxazole-trimethoprim (BACTRIM DS) 800-160 mg per tablet; Take 1 tablet by mouth every 12 (twelve) hours for 7 days             DO Aleksandar Solis [facility]  6/4/2024 3:07 PM

## 2024-06-25 ENCOUNTER — HOSPITAL ENCOUNTER (OUTPATIENT)
Dept: RADIOLOGY | Facility: HOSPITAL | Age: 49
Discharge: HOME | End: 2024-06-25
Payer: COMMERCIAL

## 2024-06-25 VITALS — WEIGHT: 145 LBS | HEIGHT: 64 IN | BODY MASS INDEX: 24.75 KG/M2

## 2024-06-25 DIAGNOSIS — Z12.11 SCREENING FOR COLON CANCER: ICD-10-CM

## 2024-06-25 DIAGNOSIS — Z12.31 ENCOUNTER FOR SCREENING MAMMOGRAM FOR BREAST CANCER: ICD-10-CM

## 2024-06-25 PROCEDURE — 77067 SCR MAMMO BI INCL CAD: CPT

## 2024-06-25 PROCEDURE — 77063 BREAST TOMOSYNTHESIS BI: CPT

## 2024-07-02 ENCOUNTER — NURSE TRIAGE (OUTPATIENT)
Age: 49
End: 2024-07-02

## 2024-07-02 ENCOUNTER — TELEMEDICINE (OUTPATIENT)
Dept: FAMILY MEDICINE CLINIC | Facility: CLINIC | Age: 49
End: 2024-07-02
Payer: COMMERCIAL

## 2024-07-02 VITALS — BODY MASS INDEX: 24.75 KG/M2 | TEMPERATURE: 98.1 F | HEIGHT: 64 IN | WEIGHT: 145 LBS

## 2024-07-02 DIAGNOSIS — U07.1 COVID-19: Primary | ICD-10-CM

## 2024-07-02 PROCEDURE — 99213 OFFICE O/P EST LOW 20 MIN: CPT | Performed by: FAMILY MEDICINE

## 2024-07-02 NOTE — TELEPHONE ENCOUNTER
Patient calls stating she tested Covid positive today.  Patient symptoms started on 6 30 2024. Patient symptoms include cough, sneezing, headache, congestion, sore throat, fatigue.  Denies chest pain , SOB, or wheezing at this time.   Patient is vaccinated and boosted. She has HTN & asthma.  I gave home care advice to the patient.  Patient would like to know from her doctor if they recommended an antiviral or any other recommendations.      Please review and call the patient back asap.

## 2024-07-02 NOTE — PROGRESS NOTES
COVID-19 Outpatient Progress Note  Name: Mee Cm      : 1975      MRN: [id number]  Encounter Provider: Eleanor Green DO  Encounter Date: 2024   Encounter department: [facility]    Assessment & Plan   1. COVID-19  - pt is UTD with COVID IMMs and Booster  - discussed symptomatic relief options with pt = Mucinex, Tylenol/Motrin, hot shower steam   - Paxlovid not indicated  - f/u PRN - pt aware and agreeable       Disposition:     While awaiting the results of covid testing, patient was advised to isolate.     Discussed symptom directed medication options with patient. Discussed vitamin D, vitamin C, and/or zinc supplementation with patient.   Pt tested COVID POS earlier today     I have spent a total time of 15 minutes on the day of the encounter for this patient including discussing diagnostic results, discussing prognosis, risks and benefits of treatment options, instructions for management, importance of treatment compliance, impressions, counseling/coordination of care, documenting in the medical record and obtaining or reviewing history.          Encounter provider: Eleanor Green DO     Provider located at: [address] [place] [phone]     Recent Visits  No visits were found meeting these conditions.  Showing recent visits within past 7 days and meeting all other requirements  Today's Visits  Date Type Provider Dept   24 Telemedicine Eleanor Green DO [facility]   Showing today's visits and meeting all other requirements  Future Appointments  No visits were found meeting these conditions.  Showing future appointments within next 150 days and meeting all other requirements    History of Present Illness      This virtual check-in was done via [employer] and patient was informed that this is a secure, HIPAA-compliant platform. She agrees to proceed.    Patient agrees to participate in a virtual check in via  telephone or video visit instead of presenting to the office to address urgent/immediate medical needs. Patient is aware this is a billable service. She acknowledged consent and understanding of privacy and security of the video platform. The patient has agreed to participate and understands they can discontinue the visit at any time.    After connecting through [employer], the patient was identified by name and date of birth. Mee Cm was informed that this was a telemedicine visit and that the exam was being conducted confidentially over secure lines. My office door was closed. No one else was in the room. Mee Cm acknowledged consent and understanding of privacy and security of the telemedicine visit. I informed the patient that I have reviewed her record in Epic and presented the opportunity for her to ask any questions regarding the visit today. The patient agreed to participate.     Verification of patient location:  Patient is located in the following state in which I hold an active license: [place]    Subjective:   Mee Cm is a 48 y.o. female who is concerned about COVID-19. Patient's symptoms include malaise, nasal congestion, rhinorrhea, sore throat, cough and headache. Patient denies fever, chills, shortness of breath, chest tightness, nausea and vomiting.     - Date of symptom onset: 6/30/2024      COVID-19 vaccination status: Fully vaccinated with booster    Exposure:   Contact with a person who is under investigation (PUI) for or who is positive for COVID-19 within the last 14 days?: No    Hospitalized recently for fever and/or lower respiratory symptoms?: No      Currently a healthcare worker that is involved in direct patient care?: No      Works in a special setting where the risk of COVID-19 transmission may be high? (this may include long-term care, correctional and long-term facilities; homeless shelters; assisted-living facilities and group homes.): No      Resident in a special setting where  "the risk of COVID-19 transmission may be high? (this may include long-term care, correctional and [facility] facilities; homeless shelters; assisted-living facilities and group homes.): No      - Daughter (Krupa) was sick last week   - pt started to feel unwell on Sunday - tested Sunday night and was NEG   - tested COVID POS today   - denies F/C  - (+) sinus HA, sinus pressure, congestion, teeth pain, sore throat, cough  - Mucinex and Tylenol helps      Lab Results   Component Value Date    SARSCOV2 Negative 09/06/2022       Review of Systems   Constitutional:  Negative for chills and fever.   HENT:  Positive for congestion, rhinorrhea and sore throat.    Respiratory:  Positive for cough. Negative for chest tightness and shortness of breath.    Gastrointestinal:  Negative for nausea and vomiting.   Neurological:  Positive for headaches.     Objective     Temp 98.1 °F (36.7 °C)   Ht 5' 3.5\" (1.613 m)   Wt 65.8 kg (145 lb)   BMI 25.28 kg/m²     Physical Exam  It was my intent to perform this visit via video technology but the patient was not able to do a video connection so the visit was completed via audio telephone only.    "

## 2024-07-02 NOTE — TELEPHONE ENCOUNTER
"Reason for Disposition   [1] COVID-19 infection suspected by caller or triager AND [2] mild symptoms (cough, fever, or others) AND [3] negative COVID-19 rapid test    Answer Assessment - Initial Assessment Questions  1. COVID-19 DIAGNOSIS: \"Who made your COVID-19 diagnosis?\" \"Was it confirmed by a positive lab test or self-test?\" If not diagnosed by a doctor (or NP/PA), ask \"Are there lots of cases (community spread) where you live?\" Note: See [facility] health department website, if unsure.          Tested covid positive today    Congestion, sneezing, sore throat, mucous in my throat, headache ,  tooth pain         2. COVID-19 EXPOSURE: \"Was there any known exposure to COVID before the symptoms began?\" [facility] Definition of close contact: within 6 feet (2 meters) for a total of 15 minutes or more over a 24-hour period.           Yes    3. ONSET: \"When did the COVID-19 symptoms start?\"           7 30 2024        4. WORST SYMPTOM: \"What is your worst symptom?\" (e.g., cough, fever, shortness of breath, muscle aches)          Cough yes chest sore from coughing    5. COUGH: \"Do you have a cough?\" If Yes, ask: \"How bad is the cough?\"          Yes        6. FEVER: \"Do you have a fever?\" If Yes, ask: \"What is your temperature, how was it measured, and when did it start?\"        Denies        7. RESPIRATORY STATUS: \"Describe your breathing?\" (e.g., shortness of breath, wheezing, unable to speak)         Denies      8. BETTER-SAME-WORSE: \"Are you getting better, staying the same or getting worse compared to yesterday?\"  If getting worse, ask, \"In what way?\"        worse      9. HIGH RISK DISEASE: \"Do you have any chronic medical problems?\" (e.g., asthma, heart or lung disease, weak immune system, obesity, etc.)        Asthma    HTN      10. VACCINE: \"Have you had the COVID-19 vaccine?\" If Yes, ask: \"Which one, how many shots, when did you get it?\"          Yes     11. BOOSTER: \"Have you received your COVID-19 booster?\" If Yes, ask: " "\"Which one and when did you get it?\"          Yes       13. OTHER SYMPTOMS: \"Do you have any other symptoms?\"  (e.g., chills, fatigue, headache, loss of smell or taste, muscle pain, sore throat)            Headache fatigue    Protocols used: Coronavirus (COVID-19) Diagnosed or Suspected-ADULT-[place]    "

## 2024-07-12 ENCOUNTER — TELEPHONE (OUTPATIENT)
Age: 49
End: 2024-07-12

## 2024-07-12 NOTE — TELEPHONE ENCOUNTER
Patient called in requesting labs for upcoming appointment on 7/30.   Please advise patient once labs are placed, thank you

## 2024-07-16 DIAGNOSIS — E78.2 MIXED HYPERLIPIDEMIA: ICD-10-CM

## 2024-07-16 DIAGNOSIS — E78.1 HYPERTRIGLYCERIDEMIA: ICD-10-CM

## 2024-07-16 DIAGNOSIS — I10 HYPERTENSION, UNSPECIFIED TYPE: ICD-10-CM

## 2024-07-16 DIAGNOSIS — F41.1 GAD (GENERALIZED ANXIETY DISORDER): ICD-10-CM

## 2024-07-16 DIAGNOSIS — Z13.0 SCREENING FOR DEFICIENCY ANEMIA: ICD-10-CM

## 2024-07-16 DIAGNOSIS — Z00.00 ANNUAL PHYSICAL EXAM: Primary | ICD-10-CM

## 2024-07-22 DIAGNOSIS — Z30.41 ENCOUNTER FOR SURVEILLANCE OF CONTRACEPTIVE PILLS: ICD-10-CM

## 2024-07-22 NOTE — TELEPHONE ENCOUNTER
Reason for call:   [x] Refill   [] Prior Auth  [] Other:     Office:   [] PCP/Provider -   [x] Specialty/Provider - Obgyn    Medication: desogestrel-ethinyl estradiol (Volnea) 0.15-0.02/0.01 MG (21/5) per tablet     Dose/Frequency: 1 tablet, Oral, Daily     Quantity: 112    Pharmacy: [facility] - [address]     Does the patient have enough for 3 days?   [x] Yes   [] No - Send as HP to POD

## 2024-07-23 ENCOUNTER — RA CDI HCC (OUTPATIENT)
Dept: OTHER | Facility: HOSPITAL | Age: 49
End: 2024-07-23

## 2024-07-23 RX ORDER — DESOGESTREL AND ETHINYL ESTRADIOL 21-5 (28)
1 KIT ORAL DAILY
Qty: 112 TABLET | Refills: 0 | Status: SHIPPED | OUTPATIENT
Start: 2024-07-23 | End: 2026-02-03

## 2024-07-25 LAB
25(OH)D3 SERPL-MCNC: 50 NG/ML (ref 30–100)
ALBUMIN/CREAT UR: 4 MG/G CREAT
CHOLEST SERPL-MCNC: 168 MG/DL
CHOLEST/HDLC SERPL: 3.2 (CALC)
CREAT UR-MCNC: 134 MG/DL (ref 20–275)
HDLC SERPL-MCNC: 53 MG/DL
LDLC SERPL CALC-MCNC: 76 MG/DL (CALC)
MICROALBUMIN UR-MCNC: 0.5 MG/DL
NONHDLC SERPL-MCNC: 115 MG/DL (CALC)
TRIGL SERPL-MCNC: 324 MG/DL

## 2024-07-30 ENCOUNTER — OFFICE VISIT (OUTPATIENT)
Dept: FAMILY MEDICINE CLINIC | Facility: CLINIC | Age: 49
End: 2024-07-30
Payer: COMMERCIAL

## 2024-07-30 VITALS
SYSTOLIC BLOOD PRESSURE: 150 MMHG | WEIGHT: 146 LBS | RESPIRATION RATE: 16 BRPM | HEIGHT: 64 IN | HEART RATE: 90 BPM | OXYGEN SATURATION: 98 % | BODY MASS INDEX: 24.92 KG/M2 | DIASTOLIC BLOOD PRESSURE: 70 MMHG

## 2024-07-30 DIAGNOSIS — E78.2 MIXED HYPERLIPIDEMIA: ICD-10-CM

## 2024-07-30 DIAGNOSIS — I10 ELEVATED BLOOD PRESSURE READING IN OFFICE WITH DIAGNOSIS OF HYPERTENSION: ICD-10-CM

## 2024-07-30 DIAGNOSIS — I10 HYPERTENSION, UNSPECIFIED TYPE: ICD-10-CM

## 2024-07-30 DIAGNOSIS — Z00.00 ANNUAL PHYSICAL EXAM: Primary | ICD-10-CM

## 2024-07-30 DIAGNOSIS — E78.1 HYPERTRIGLYCERIDEMIA: ICD-10-CM

## 2024-07-30 DIAGNOSIS — F41.1 GAD (GENERALIZED ANXIETY DISORDER): ICD-10-CM

## 2024-07-30 PROCEDURE — 99396 PREV VISIT EST AGE 40-64: CPT | Performed by: FAMILY MEDICINE

## 2024-07-30 PROCEDURE — 99214 OFFICE O/P EST MOD 30 MIN: CPT | Performed by: FAMILY MEDICINE

## 2024-07-30 PROCEDURE — 3725F SCREEN DEPRESSION PERFORMED: CPT | Performed by: FAMILY MEDICINE

## 2024-07-30 NOTE — PROGRESS NOTES
Assessment/Plan:   Diagnoses and all orders for this visit:    Annual physical exam  - reviewed PMHx, PSHx, meds, allergies, FHx, Soc Hx and Sexual Hx   - UTD with COVID IMMs and Booster x2  - UTD with Tdap  - reviewed labs done 7/25/2024  - additional labs pending   - discussed diet and encouraged exercise  - not currently sexually active and declined STD screening   - has annual GYN exam scheduled for 12/2024  - UTD with Cervical Ca screening (10/2023)   - UTD with Mammo (6/2024) - annual screening recommended   - UTD with Colon Ca screening - NEG Cologuard 3/2022 - repeat in 2025    - UTD with Optho and Dental   - RTO in 1yr for annual exam - pt aware and agreeable    Elevated blood pressure reading in office with diagnosis of hypertension  - BP in the office 168/84 and on my repeat 150/70   - (+) under increased stress - her oldest dropped out of college (and lost her scholarship) and is now living with her partner and working o/n at a [employer]  - youngest is starting 2nd grade in the Fall   - had COVID 7/2/2024 and then HFM (which was going through daughter's )   - currently on ACEI 5mg and tolerating it well - denies swelling or dry cough   - CMP pending   - nml urine microalbumin   - t/c PCV20 in the Fall   - elevated BP likely 2/2 recent increased stress - advised to RTO in 1wk for BP check - pt aware and agreeable   - ER precautions d/w pt   Hypertension, unspecified type  EDELMIRA (generalized anxiety disorder)  - does talk with Therapist Q2wks (of note, her Therapist is moving to [place])     Mixed hyperlipidemia  -     Lipid panel; Future  Hypertriglyceridemia  -     Lipid panel; Future  - Lipids (7/25/2024): ,  (<-- 137), HDL 53, LDL 76  - diet/exercise   - caution with fried/fatty foods, dairy, cheese  - cont OTC Fish Oil BID   - repeat labs in 3months   - The 10-year ASCVD risk score (Amanda ESCAMILLA, et al., 2019) is: 1.9%    Values used to calculate the score:      Age: 48 years      Sex:  Female      Is Non- : No      Diabetic: No      Tobacco smoker: No      Systolic Blood Pressure: 150 mmHg      Is BP treated: Yes      HDL Cholesterol: 53 mg/dL      Total Cholesterol: 168 mg/dL          Subjective:    Patient ID: Mee Cm is a 48 y.o. female.  Mee Cm is a 48 y.o. female who presents to the office for an annual exam and f/u   1) Annual   - Specialists: Allergist (Dr Wright)  - PMHx: HTN, Migraines (only 2 with Aura), Cluster HA x1, Allergies, Asthma (seasonal), IBS-D, L-shoulder subluxation (2/2 overuse from exercise), R-lateral epicondylitis, Radial Tunnel Syndrome and R-DeQuervain's   - allergies: Erythromycin (GI upset), HTCZ (dizziness), Latex/Medical Tape (rash), animal dander, peanuts   - Meds: see med rec  - PSHx: tonsillectomy, L-wrist surgery  - FHx: M (HL, HTN, stroke x3), F (HTN, HL), denies FHx of breast/colon/cervical ca   - Immunizations: UTD with COVID IMMs and Boosters x2, UTD with Tdap   - GYN Hx: [facility], has an appt scheduled 12/2/2024, last PAP was 10/2023; last Mammo in 6/2024 (annual screening advised)   - Hm: UTD with Colon Ca screening (Cologuard 3/2022 - NEG - due again in 2025)   - diet/exercise: varied exercise; varied/balanced diet   - social: denies Tob/illicits; rare EtOH, single   - sexual Hx: not currently sexually active and declined STD screening in the office today   - last vision: wears glasses/contacts; goes annually to Optho   - last dental: goes Q6months   - reviewed labs done 7/25/2024  2) HTN  - BP in the office 168/84 and on my repeat 150/70   - (+) under increased stress - her oldest dropped out of college (and lost her scholarship) and 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  - youngest is starting 2nd grade in the Fall   - had COVID 7/2/2024 and then HFM (which was going through daughter's )   - currently on ACEI 5mg and tolerating it well - denies swelling or dry cough  "  - denies F/C/N/V/HA/change in vision/CP/palpitations/SOB/wheezing/cough/abd pain/D/C/LE edema   3) EDELMIRA  - PHQ-9 score of 0 <-- 1 <-- 0 <-- 1 <-- 2 <-- 7 <-- 1   - EDELMIRA-7 score of 3 <-- 6 <-- 8 <-- 5 <-- 8 <-- 14 <-- 16 <-- 16  - has been seeing Therapist regularly (Madeleine - Q2wks) and finds it helpful   - youngest will be starting 2nd grade   - Lorri dropped out of college - [employer] - and moved in with her friend   - 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        The following portions of the patient's history were reviewed and updated as appropriate: allergies, current medications, past family history, past medical history, past social history, past surgical history and problem list.    Review of Systems  as per HPI    Objective:  /70 (BP Location: Right arm, Patient Position: Sitting, Cuff Size: Standard)   Pulse 90   Resp 16   Ht 5' 3.5\" (1.613 m)   Wt 66.2 kg (146 lb)   SpO2 98%   BMI 25.46 kg/m²    Physical Exam  Vitals reviewed.   Constitutional:       General: She is not in acute distress.     Appearance: Normal appearance. She is not ill-appearing, toxic-appearing or diaphoretic.   HENT:      Head: Normocephalic and atraumatic.      Right Ear: External ear normal.      Left Ear: External ear normal.   Eyes:      General: No scleral icterus.        Right eye: No discharge.         Left eye: No discharge.      Extraocular Movements: Extraocular movements intact.      Conjunctiva/sclera: Conjunctivae normal.   Cardiovascular:      Rate and Rhythm: Normal rate and regular rhythm.      Heart sounds: Normal heart sounds.   Pulmonary:      Effort: Pulmonary effort is normal.      Breath sounds: Normal breath sounds.   Abdominal:      Palpations: Abdomen is soft.   Musculoskeletal:         General: Normal range of motion.      Cervical back: Normal range of motion.      Right lower leg: No edema.      Left lower leg: No edema.   Skin:     General: " Skin is warm.   Neurological:      General: No focal deficit present.      Mental Status: She is alert and oriented to person, place, and time.   Psychiatric:         Mood and Affect: Mood normal.         Behavior: Behavior normal.

## 2024-07-30 NOTE — PATIENT INSTRUCTIONS
"Patient Education     Routine physical for adults   The Basics   Written by the doctors and editors at [facility]   What is a physical? -- A physical is a routine visit, or \"check-up,\" with your doctor. You might also hear it called a \"wellness visit\" or \"preventive visit.\"  During each visit, the doctor will:   Ask about your physical and mental health   Ask about your habits, behaviors, and lifestyle   Do an exam   Give you vaccines if needed   Talk to you about any medicines you take   Give advice about your health   Answer your questions  Getting regular check-ups is an important part of taking care of your health. It can help your doctor find and treat any problems you have. But it's also important for preventing health problems.  A routine physical is different from a \"sick visit.\" A sick visit is when you see a doctor because of a health concern or problem. Since physicals are scheduled ahead of time, you can think about what you want to ask the doctor.  How often should I get a physical? -- It depends on your age and health. In general, for people age 21 years and older:   If you are younger than 50 years, you might be able to get a physical every 3 years.   If you are 50 years or older, your doctor might recommend a physical every year.  If you have an ongoing health condition, like diabetes or high blood pressure, your doctor will probably want to see you more often.  What happens during a physical? -- In general, each visit will include:   Physical exam - The doctor or nurse will check your height, weight, heart rate, and blood pressure. They will also look at your eyes and ears. They will ask about how you are feeling and whether you have any symptoms that bother you.   Medicines - It's a good idea to bring a list of all the medicines you take to each doctor visit. Your doctor will talk to you about your medicines and answer any questions. Tell them if you are having any side effects that bother you. You " "should also tell them if you are having trouble paying for any of your medicines.   Habits and behaviors - This includes:   Your diet   Your exercise habits   Whether you smoke, drink alcohol, or use drugs   Whether you are sexually active   Whether you feel safe at home  Your doctor will talk to you about things you can do to improve your health and lower your risk of health problems. They will also offer help and support. For example, if you want to quit smoking, they can give you advice and might prescribe medicines. If you want to improve your diet or get more physical activity, they can help you with this, too.   Lab tests, if needed - The tests you get will depend on your age and situation. For example, your doctor might want to check your:   Cholesterol   Blood sugar   Iron level   Vaccines - The recommended vaccines will depend on your age, health, and what vaccines you already had. Vaccines are very important because they can prevent certain serious or deadly infections.   Discussion of screening - \"Screening\" means checking for diseases or other health problems before they cause symptoms. Your doctor can recommend screening based on your age, risk, and preferences. This might include tests to check for:   Cancer, such as breast, prostate, cervical, ovarian, colorectal, prostate, lung, or skin cancer   Sexually transmitted infections, such as chlamydia and gonorrhea   Mental health conditions like depression and anxiety  Your doctor will talk to you about the different types of screening tests. They can help you decide which screenings to have. They can also explain what the results might mean.   Answering questions - The physical is a good time to ask the doctor or nurse questions about your health. If needed, they can refer you to other doctors or specialists, too.  Adults older than 65 years often need other care, too. As you get older, your doctor will talk to you about:   How to prevent falling at " home   Hearing or vision tests   Memory testing   How to take your medicines safely   Making sure that you have the help and support you need at home  All topics are updated as new evidence becomes available and our peer review process is complete.  This topic retrieved from [employer] [place] on: May 02, 2024.  Topic 061214 Version 1.0  Release: 32.4.3 - C32.122  © 2024 UpToDate, Inc. and/or its affiliates. All rights reserved.  Consumer Information Use and Disclaimer   Disclaimer: This generalized information is a limited summary of diagnosis, treatment, and/or medication information. It is not meant to be comprehensive and should be used as a tool to help the user understand and/or assess potential diagnostic and treatment options. It does NOT include all information about conditions, treatments, medications, side effects, or risks that may apply to a specific patient. It is not intended to be medical advice or a substitute for the medical advice, diagnosis, or treatment of a health care provider based on the health care provider's examination and assessment of a patient's specific and unique circumstances. Patients must speak with a health care provider for complete information about their health, medical questions, and treatment options, including any risks or benefits regarding use of medications. This information does not endorse any treatments or medications as safe, effective, or approved for treating a specific patient. UpToDate, Inc. and its affiliates disclaim any warranty or liability relating to this information or the use thereof.The use of this information is governed by the Terms of Use, available at https://www.[employer].com/en/know/clinical-effectiveness-terms. 2024© UpToDate, Inc. and its affiliates and/or licensors. All rights reserved.  Copyright   © 2024 UpToDate, Inc. and/or its affiliates. All rights reserved.

## 2024-08-05 ENCOUNTER — TELEPHONE (OUTPATIENT)
Dept: FAMILY MEDICINE CLINIC | Facility: CLINIC | Age: 49
End: 2024-08-05

## 2024-08-06 ENCOUNTER — TELEPHONE (OUTPATIENT)
Age: 49
End: 2024-08-06

## 2024-08-06 NOTE — TELEPHONE ENCOUNTER
The patient called her appointment with Dr Green for a blood pressure check was cancelled   Dr Green wanted her to come back to have her blood pressure rechecked since it was high at her appointment   does she need an appointment with Dr Green  could it just be a nurse visit    please call the patient thank you

## 2024-09-03 ENCOUNTER — OFFICE VISIT (OUTPATIENT)
Dept: FAMILY MEDICINE CLINIC | Facility: CLINIC | Age: 49
End: 2024-09-03
Payer: COMMERCIAL

## 2024-09-03 VITALS
HEIGHT: 64 IN | BODY MASS INDEX: 24.92 KG/M2 | DIASTOLIC BLOOD PRESSURE: 78 MMHG | OXYGEN SATURATION: 98 % | SYSTOLIC BLOOD PRESSURE: 136 MMHG | RESPIRATION RATE: 16 BRPM | HEART RATE: 76 BPM | WEIGHT: 146 LBS

## 2024-09-03 DIAGNOSIS — F41.1 GAD (GENERALIZED ANXIETY DISORDER): ICD-10-CM

## 2024-09-03 DIAGNOSIS — E78.1 HYPERTRIGLYCERIDEMIA: ICD-10-CM

## 2024-09-03 DIAGNOSIS — Z23 ENCOUNTER FOR IMMUNIZATION: ICD-10-CM

## 2024-09-03 DIAGNOSIS — E78.2 MIXED HYPERLIPIDEMIA: ICD-10-CM

## 2024-09-03 DIAGNOSIS — I10 HYPERTENSION, UNSPECIFIED TYPE: Primary | ICD-10-CM

## 2024-09-03 PROCEDURE — 99214 OFFICE O/P EST MOD 30 MIN: CPT | Performed by: FAMILY MEDICINE

## 2024-09-03 PROCEDURE — 90471 IMMUNIZATION ADMIN: CPT | Performed by: FAMILY MEDICINE

## 2024-09-03 PROCEDURE — 90677 PCV20 VACCINE IM: CPT | Performed by: FAMILY MEDICINE

## 2024-09-03 RX ORDER — CHLORHEXIDINE GLUCONATE ORAL RINSE 1.2 MG/ML
SOLUTION DENTAL
COMMUNITY
Start: 2024-08-07

## 2024-09-03 NOTE — PROGRESS NOTES
Assessment/Plan:   Diagnoses and all orders for this visit:    Hypertension, unspecified type  - BP in the office 136/78   - stable urine microalbumin   - renal function pending   - getting stitches out tmrw from dental procedure  - received PCV20 in the office today   - cont ACEI 5mg QD   - diet/exercise   - caution with NSAIDs  - limit Na to 2g/day   - RTO in 2months for f/u - pt aware and agreeable     Encounter for immunization  -     Pneumococcal Conjugate Vaccine 20-valent (Pcv20)    EDELMIRA (generalized anxiety disorder)  - does talk with Therapist Q2wks (of note, her Therapist is moving to [place] but will be able to cont with her via Telemed)   - has noted panic attacks and trying to work through then   - discussed possibility of starting medications for anxiety - pt hesitant to start at this time and would like to think about it   - RTO in 2months for f/u - pt aware and agreeable      Mixed hyperlipidemia  Hypertriglyceridemia  - Lipids (7/25/2024): ,  (<-- 137), HDL 53, LDL 76  - diet/exercise   - caution with fried/fatty foods, dairy, cheese  - cont OTC Fish Oil BID   - repeat labs in 2months   - The 10-year ASCVD risk score (Amanda ESCAMILLA, et al., 2019) is: 1.6%    Values used to calculate the score:      Age: 48 years      Sex: Female      Is Non- : No      Diabetic: No      Tobacco smoker: No      Systolic Blood Pressure: 136 mmHg      Is BP treated: Yes      HDL Cholesterol: 53 mg/dL      Total Cholesterol: 168 mg/dL    Other orders  -     chlorhexidine (PERIDEX) 0.12 % solution; USE 15 ML TO RINSE TWICE DAILY FOR 30 SECS. EXPECTORATE BUT DO NOT RINSE AFTER          Subjective:    Patient ID: Mee Cm is a 48 y.o. female.  HPI  47yo F presents to the office for BP check   - BP in the office 136/78   - due for PVC20 and will get in the office today   - still doing counseling with same Therapist   - hesitant to start medication for EDELMIRA at this time  - denies  "F/C/N/V/CP/palpitations/SOB/wheezing/abd pain/LE edema       The following portions of the patient's history were reviewed and updated as appropriate: allergies, current medications, past family history, past medical history, past social history, past surgical history and problem list.    Review of Systems  as per HPI    Objective:  /78   Pulse 76   Resp 16   Ht 5' 3.5\" (1.613 m)   Wt 66.2 kg (146 lb)   SpO2 98%   BMI 25.46 kg/m²    Physical Exam  Vitals reviewed.   Constitutional:       General: She is not in acute distress.     Appearance: Normal appearance. She is not ill-appearing, toxic-appearing or diaphoretic.   HENT:      Head: Normocephalic and atraumatic.      Right Ear: External ear normal.      Left Ear: External ear normal.      Nose: Nose normal.   Eyes:      General: No scleral icterus.        Right eye: No discharge.         Left eye: No discharge.      Extraocular Movements: Extraocular movements intact.      Conjunctiva/sclera: Conjunctivae normal.   Cardiovascular:      Rate and Rhythm: Normal rate and regular rhythm.      Heart sounds: Normal heart sounds.   Pulmonary:      Effort: Pulmonary effort is normal.      Breath sounds: Normal breath sounds.   Abdominal:      Palpations: Abdomen is soft.   Musculoskeletal:         General: Normal range of motion.      Right lower leg: No edema.      Left lower leg: No edema.   Skin:     General: Skin is warm.   Neurological:      General: No focal deficit present.      Mental Status: She is alert and oriented to person, place, and time.   Psychiatric:         Mood and Affect: Mood normal.         Behavior: Behavior normal.         "

## 2024-09-05 ENCOUNTER — NURSE TRIAGE (OUTPATIENT)
Age: 49
End: 2024-09-05

## 2024-09-05 NOTE — TELEPHONE ENCOUNTER
"Patient takes Allegra daily, will take Ibuprofen for the swelling, suggest cool compresses , can take Benadryl but hold the Allegra , call tomorrow if persisting or call sooner if worsening.   Reason for Disposition   Mild immunization reaction    Answer Assessment - Initial Assessment Questions  1. SYMPTOMS: \"What is the main symptom?\" (e.g., redness, swelling, pain)       Redness, swelling and blotches  2. ONSET: \"When was the vaccine (shot) given?\" Tuesday \"How much later did the reaction  begin?\" (e.g., hours, days ago)       Last night   3. SEVERITY: \"How bad is it?\"       Mild to moderate 2\" in diameter   4. FEVER: \"Is there a fever?\" If Yes, ask: \"What is it, how was it measured, and when did it start?\"       Denies   5. IMMUNIZATIONS GIVEN: \"What shots have you recently received?\"      Prevnar 20   6. PAST REACTIONS: \"Have you reacted to immunizations before?\" If Yes, ask: \"What happened?\"      First time of this vaccine   7. OTHER SYMPTOMS: \"Do you have any other symptoms?\"      Denies    Protocols used: Immunization Reactions-ADULT-[place]    "

## 2024-09-07 DIAGNOSIS — I10 HYPERTENSION, UNSPECIFIED TYPE: ICD-10-CM

## 2024-09-08 RX ORDER — LISINOPRIL 5 MG/1
5 TABLET ORAL DAILY
Qty: 90 TABLET | Refills: 0 | Status: SHIPPED | OUTPATIENT
Start: 2024-09-08

## 2024-12-02 ENCOUNTER — TELEPHONE (OUTPATIENT)
Age: 49
End: 2024-12-02

## 2024-12-02 NOTE — TELEPHONE ENCOUNTER
Patient scheduled for 2/14. Patient would like a call back if Dr. Royal has a cancellation to be seen sooner. Patient said she did not want to see another provider.

## 2024-12-02 NOTE — TELEPHONE ENCOUNTER
Patient was scheduled today for yearly but it was canceled due to provider being in surgery. The next available is June that is too far. Please advise

## 2024-12-05 ENCOUNTER — RA CDI HCC (OUTPATIENT)
Dept: OTHER | Facility: HOSPITAL | Age: 49
End: 2024-12-05

## 2024-12-05 LAB
ALBUMIN SERPL-MCNC: 3.8 G/DL (ref 3.6–5.1)
ALBUMIN/GLOB SERPL: 1.1 (CALC) (ref 1–2.5)
ALP SERPL-CCNC: 67 U/L (ref 37–153)
ALT SERPL-CCNC: 11 U/L (ref 6–29)
AST SERPL-CCNC: 14 U/L (ref 10–35)
BASOPHILS # BLD AUTO: 55 CELLS/UL (ref 0–200)
BASOPHILS NFR BLD AUTO: 0.5 %
BILIRUB SERPL-MCNC: 0.4 MG/DL (ref 0.2–1.2)
BUN SERPL-MCNC: 14 MG/DL (ref 7–25)
BUN/CREAT SERPL: NORMAL (CALC) (ref 6–22)
CALCIUM SERPL-MCNC: 8.7 MG/DL (ref 8.6–10.4)
CHLORIDE SERPL-SCNC: 103 MMOL/L (ref 98–110)
CHOLEST SERPL-MCNC: 178 MG/DL
CHOLEST/HDLC SERPL: 2.7 (CALC)
CO2 SERPL-SCNC: 27 MMOL/L (ref 20–32)
CREAT SERPL-MCNC: 0.73 MG/DL (ref 0.5–1.03)
EOSINOPHIL # BLD AUTO: 132 CELLS/UL (ref 15–500)
EOSINOPHIL NFR BLD AUTO: 1.2 %
ERYTHROCYTE [DISTWIDTH] IN BLOOD BY AUTOMATED COUNT: 11.7 % (ref 11–15)
GFR/BSA.PRED SERPLBLD CYS-BASED-ARV: 100 ML/MIN/1.73M2
GLOBULIN SER CALC-MCNC: 3.4 G/DL (CALC) (ref 1.9–3.7)
GLUCOSE SERPL-MCNC: 93 MG/DL (ref 65–99)
HCT VFR BLD AUTO: 39.3 % (ref 35–45)
HDLC SERPL-MCNC: 65 MG/DL
HGB BLD-MCNC: 13.3 G/DL (ref 11.7–15.5)
LDLC SERPL CALC-MCNC: 87 MG/DL (CALC)
LYMPHOCYTES # BLD AUTO: 2750 CELLS/UL (ref 850–3900)
LYMPHOCYTES NFR BLD AUTO: 25 %
MCH RBC QN AUTO: 29.7 PG (ref 27–33)
MCHC RBC AUTO-ENTMCNC: 33.8 G/DL (ref 32–36)
MCV RBC AUTO: 87.7 FL (ref 80–100)
MONOCYTES # BLD AUTO: 891 CELLS/UL (ref 200–950)
MONOCYTES NFR BLD AUTO: 8.1 %
NEUTROPHILS # BLD AUTO: 7172 CELLS/UL (ref 1500–7800)
NEUTROPHILS NFR BLD AUTO: 65.2 %
NONHDLC SERPL-MCNC: 113 MG/DL (CALC)
PLATELET # BLD AUTO: 305 THOUSAND/UL (ref 140–400)
PMV BLD REES-ECKER: 11.1 FL (ref 7.5–12.5)
POTASSIUM SERPL-SCNC: 4 MMOL/L (ref 3.5–5.3)
PROT SERPL-MCNC: 7.2 G/DL (ref 6.1–8.1)
RBC # BLD AUTO: 4.48 MILLION/UL (ref 3.8–5.1)
SODIUM SERPL-SCNC: 138 MMOL/L (ref 135–146)
TRIGL SERPL-MCNC: 161 MG/DL
TSH SERPL-ACNC: 2.33 MIU/L
WBC # BLD AUTO: 11 THOUSAND/UL (ref 3.8–10.8)

## 2024-12-09 ENCOUNTER — RESULTS FOLLOW-UP (OUTPATIENT)
Dept: FAMILY MEDICINE CLINIC | Facility: CLINIC | Age: 49
End: 2024-12-09

## 2024-12-10 ENCOUNTER — OFFICE VISIT (OUTPATIENT)
Dept: FAMILY MEDICINE CLINIC | Facility: CLINIC | Age: 49
End: 2024-12-10
Payer: COMMERCIAL

## 2024-12-10 VITALS
HEIGHT: 64 IN | SYSTOLIC BLOOD PRESSURE: 136 MMHG | HEART RATE: 102 BPM | WEIGHT: 145 LBS | OXYGEN SATURATION: 100 % | DIASTOLIC BLOOD PRESSURE: 80 MMHG | BODY MASS INDEX: 24.75 KG/M2

## 2024-12-10 DIAGNOSIS — J45.20 MILD INTERMITTENT ASTHMA WITHOUT COMPLICATION: ICD-10-CM

## 2024-12-10 DIAGNOSIS — E78.1 HYPERTRIGLYCERIDEMIA: ICD-10-CM

## 2024-12-10 DIAGNOSIS — F41.1 GAD (GENERALIZED ANXIETY DISORDER): ICD-10-CM

## 2024-12-10 DIAGNOSIS — I10 HYPERTENSION, UNSPECIFIED TYPE: Primary | ICD-10-CM

## 2024-12-10 PROCEDURE — 99214 OFFICE O/P EST MOD 30 MIN: CPT | Performed by: FAMILY MEDICINE

## 2024-12-10 RX ORDER — LISINOPRIL 5 MG/1
5 TABLET ORAL DAILY
Qty: 90 TABLET | Refills: 0 | Status: SHIPPED | OUTPATIENT
Start: 2024-12-10

## 2024-12-10 RX ORDER — SERTRALINE HYDROCHLORIDE 25 MG/1
TABLET, FILM COATED ORAL
Qty: 60 TABLET | Refills: 1 | Status: SHIPPED | OUTPATIENT
Start: 2024-12-10

## 2024-12-10 NOTE — PROGRESS NOTES
Assessment/Plan:   Diagnoses and all orders for this visit:    Hypertension, unspecified type  -     lisinopril (ZESTRIL) 5 mg tablet; Take 1 tablet (5 mg total) by mouth daily  - BP in the office 138/80 and on my repeat = 136/80  - currently on ACEI 5mg QD and tolerating it well - advised to cont for now   - pt with worsening anxiety - see below   - UTD with PCV20   - UTD with annual Flu vaccine   - stable renal function   - RTO in 2months for f/u - pt aware and agreeable     EDELMIRA (generalized anxiety disorder)  -     Ambulatory referral to Psych Services; Future  -     sertraline (ZOLOFT) 25 mg tablet; Start with 25mg QD x2wks and then increase to 50mg QD  - anxiety has gotten worse - has had a few panic attacks since the summer - (+) palpitations, emesis and IBS   - has not been able to establish with new Therapist (her's has moved to [place])   - EDELMIRA-7 score of 7 in the office  - will start on Zoloft as mentioned above  - referred to [facility], [email or web address], [email or web address], [facility], [facility] and [facility]   - RTO in 2months for f/u - pt aware and agreeable     Hypertriglyceridemia   -  <-- 324   - cont with diet/exercise     Mild intermittent asthma without complication          Subjective:    Patient ID: Mee Cm is a 49 y.o. female.  HPI  48yo F presents to the office for BP check   - BP in the office 138/80 and on my repeat = 136/80  - currently on ACEI 5mg QD and tolerating it well   - UTD with PCV20   - UTD with annual Flu vaccine   - anxiety has gotten worse - has had a few panic attacks since the summer - (+) palpitations, emesis and IBS   - has not been able to establish with new Therapist (her's has moved to [place])   - EDELMIRA-7 score of 7 in the office  - denies F/C/N/V/CP/palpitations/SOB/wheezing/abd pain/LE edema       The following portions of the patient's history were reviewed and updated as appropriate: allergies, current medications, past family history, past medical  "history, past social history, past surgical history and problem list.    Review of Systems  as per HPI    Objective:  /80 (BP Location: Left arm, Patient Position: Sitting, Cuff Size: Standard)   Pulse 102   Ht 5' 3.5\" (1.613 m)   Wt 65.8 kg (145 lb)   SpO2 100%   BMI 25.28 kg/m²    Physical Exam  Vitals reviewed.   Constitutional:       General: She is not in acute distress.     Appearance: Normal appearance. She is not ill-appearing, toxic-appearing or diaphoretic.   HENT:      Head: Normocephalic and atraumatic.      Right Ear: External ear normal.      Left Ear: External ear normal.   Eyes:      General: No scleral icterus.        Right eye: No discharge.         Left eye: No discharge.      Extraocular Movements: Extraocular movements intact.      Conjunctiva/sclera: Conjunctivae normal.   Cardiovascular:      Rate and Rhythm: Normal rate and regular rhythm.      Heart sounds: Normal heart sounds.   Pulmonary:      Effort: Pulmonary effort is normal.      Breath sounds: Normal breath sounds.   Abdominal:      Palpations: Abdomen is soft.   Musculoskeletal:         General: Normal range of motion.      Cervical back: Normal range of motion.      Right lower leg: No edema.      Left lower leg: No edema.   Skin:     General: Skin is warm.   Neurological:      General: No focal deficit present.      Mental Status: She is alert and oriented to person, place, and time.   Psychiatric:         Attention and Perception: Attention normal.         Mood and Affect: Affect normal. Mood is anxious.         Speech: Speech normal. She is communicative.         Behavior: Behavior normal. Behavior is cooperative.         Cognition and Memory: Cognition normal.         Judgment: Judgment normal.      Comments: EDELMIRA-7 score of 7          "

## 2024-12-24 DIAGNOSIS — F41.1 GAD (GENERALIZED ANXIETY DISORDER): ICD-10-CM

## 2024-12-24 RX ORDER — SERTRALINE HYDROCHLORIDE 25 MG/1
TABLET, FILM COATED ORAL
Qty: 180 TABLET | Refills: 0 | Status: SHIPPED | OUTPATIENT
Start: 2024-12-24

## 2024-12-26 ENCOUNTER — TELEPHONE (OUTPATIENT)
Age: 49
End: 2024-12-26

## 2024-12-26 NOTE — TELEPHONE ENCOUNTER
Contacted patient in regards to Routine Referral in attempts to verify patient's needs of services and add patient to proper wait list. spoke with patient whom stated has services established. Writer shared referral will be closed.

## 2025-01-12 DIAGNOSIS — Z30.41 ENCOUNTER FOR SURVEILLANCE OF CONTRACEPTIVE PILLS: ICD-10-CM

## 2025-01-14 RX ORDER — DESOGESTREL AND ETHINYL ESTRADIOL 21-5 (28)
1 KIT ORAL DAILY
Qty: 112 TABLET | Refills: 3 | Status: SHIPPED | OUTPATIENT
Start: 2025-01-14

## 2025-02-06 ENCOUNTER — TELEMEDICINE (OUTPATIENT)
Dept: FAMILY MEDICINE CLINIC | Facility: CLINIC | Age: 50
End: 2025-02-06

## 2025-02-06 VITALS — WEIGHT: 145 LBS | TEMPERATURE: 97.9 F | BODY MASS INDEX: 24.75 KG/M2 | HEIGHT: 64 IN

## 2025-02-06 DIAGNOSIS — J06.9 UPPER RESPIRATORY INFECTION, ACUTE: Primary | ICD-10-CM

## 2025-02-06 DIAGNOSIS — R49.0 HOARSENESS: ICD-10-CM

## 2025-02-06 NOTE — ASSESSMENT & PLAN NOTE
-Advised patient on supportive care measures    -Patient did test herself for COVID-19 as well as influenza both of which were negative.  Explained that it likely is a another type of virus which could include respiratory syncytial virus.  Informed her that the management is really supportive care only at this time    -Advised patient that she can take cough suppressant over-the-counter with dextromethorphan in it.  She does have history of hypertension.  Avoid products with pseudoephedrine or phenylephrine

## 2025-02-06 NOTE — ASSESSMENT & PLAN NOTE
-Related to viral upper respiratory infection    -Advised on supportive care measures including using tea with lemon and honey.  Taking warm showers    -Patient does have prednisone prescription from they that was given to her after she had a spider bite.  They are 10 mg tablets and it was a tapering course of steroids.  Patient instructed to take 4 tablets daily with food for a total of 40 mg daily for the next 5 days which should help to reduce the inflammation and improve her laryngitis

## 2025-02-06 NOTE — PROGRESS NOTES
Virtual Regular Visit  Name: Mee Cm      : 1975      MRN: [id number]  Encounter Provider: Kevon Berrios DO  Encounter Date: 2025   Encounter department: [facility]      Verification of patient location:  Patient is located at Home in the following state in which I hold an active license [place] :  Assessment & Plan  Upper respiratory infection, acute  -Advised patient on supportive care measures    -Patient did test herself for COVID-19 as well as influenza both of which were negative.  Explained that it likely is a another type of virus which could include respiratory syncytial virus.  Informed her that the management is really supportive care only at this time    -Advised patient that she can take cough suppressant over-the-counter with dextromethorphan in it.  She does have history of hypertension.  Avoid products with pseudoephedrine or phenylephrine       Hoarseness  -Related to viral upper respiratory infection    -Advised on supportive care measures including using tea with lemon and honey.  Taking warm showers    -Patient does have prednisone prescription from they that was given to her after she had a spider bite.  They are 10 mg tablets and it was a tapering course of steroids.  Patient instructed to take 4 tablets daily with food for a total of 40 mg daily for the next 5 days which should help to reduce the inflammation and improve her laryngitis           Encounter provider Kevon Berrios DO    The patient was identified by name and date of birth. Mee Cm was informed that this is a telemedicine visit and that the visit is being conducted through the [employer] platform. She agrees to proceed..  My office door was closed. No one else was in the room.  She acknowledged consent and understanding of privacy and security of the video platform. The patient has agreed to participate and understands they can discontinue the visit at any time.    Patient is aware this is a  "billable service.     History of Present Illness     49-year-old female presents via video virtual visit complaining of respiratory symptoms including sore throat, nonproductive cough, hoarseness of voice since Friday.  She did have fever on Friday but that has since subsided.  She did perform home COVID-19 as well as influenza test both of which were negative.  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  She did call off on Monday and Tuesday but did go in and lectured for 3 hours yesterday which probably did not help her voice.  Aside from that she is not overall feeling poorly but does have dry hacking cough as well as laryngitis      Review of Systems   Constitutional:  Negative for activity change, appetite change, chills, diaphoresis, fatigue, fever and unexpected weight change.   HENT:  Positive for sore throat and voice change (Hoarseness of voice). Negative for congestion, ear pain, postnasal drip and rhinorrhea.    Respiratory:  Positive for cough. Negative for chest tightness, shortness of breath, wheezing and stridor.    Cardiovascular: Negative.    All other systems reviewed and are negative.      Objective   Temp 97.9 °F (36.6 °C)   Ht 5' 3.5\" (1.613 m)   Wt 65.8 kg (145 lb)   BMI 25.28 kg/m²     Physical Exam  Constitutional:       General: She is not in acute distress.     Appearance: Normal appearance. She is well-developed. She is not ill-appearing or toxic-appearing.   HENT:      Head: Normocephalic and atraumatic.      Mouth/Throat:      Comments: Hoarseness of voice  Eyes:      Extraocular Movements: Extraocular movements intact.      Pupils: Pupils are equal, round, and reactive to light.   Pulmonary:      Effort: Pulmonary effort is normal.      Comments: Dry hacking cough  Neurological:      General: No focal deficit present.      Mental Status: She is alert and oriented to person, place, and time.   Psychiatric:         Mood and Affect: Mood normal.  "        Behavior: Behavior normal.         Thought Content: Thought content normal.         Judgment: Judgment normal.         Visit Time  Total Visit Duration: 10 minutes

## 2025-02-10 ENCOUNTER — TELEPHONE (OUTPATIENT)
Age: 50
End: 2025-02-10

## 2025-02-10 NOTE — TELEPHONE ENCOUNTER
Pt had an appt on 2/6 and was given steroids. She has finished them and she still does not feel better. Pt still has laryngitis, ST at voice box, malaiase and cough. Pt said her voice is a little better. She has  had these symptoms for 11 days. Pt wants to know what she should do. Please, advise.

## 2025-02-11 ENCOUNTER — OFFICE VISIT (OUTPATIENT)
Dept: FAMILY MEDICINE CLINIC | Facility: CLINIC | Age: 50
End: 2025-02-11
Payer: COMMERCIAL

## 2025-02-11 VITALS
WEIGHT: 146 LBS | DIASTOLIC BLOOD PRESSURE: 80 MMHG | BODY MASS INDEX: 24.92 KG/M2 | HEART RATE: 88 BPM | SYSTOLIC BLOOD PRESSURE: 128 MMHG | HEIGHT: 64 IN | OXYGEN SATURATION: 99 % | TEMPERATURE: 98 F

## 2025-02-11 DIAGNOSIS — J06.9 UPPER RESPIRATORY INFECTION, ACUTE: Primary | ICD-10-CM

## 2025-02-11 DIAGNOSIS — R49.0 HOARSENESS: ICD-10-CM

## 2025-02-11 DIAGNOSIS — J45.20 MILD INTERMITTENT ASTHMA WITHOUT COMPLICATION: ICD-10-CM

## 2025-02-11 DIAGNOSIS — F41.1 GAD (GENERALIZED ANXIETY DISORDER): ICD-10-CM

## 2025-02-11 LAB — S PYO AG THROAT QL: NEGATIVE

## 2025-02-11 PROCEDURE — 87880 STREP A ASSAY W/OPTIC: CPT | Performed by: FAMILY MEDICINE

## 2025-02-11 PROCEDURE — 99214 OFFICE O/P EST MOD 30 MIN: CPT | Performed by: FAMILY MEDICINE

## 2025-02-11 RX ORDER — PREDNISONE 10 MG/1
TABLET ORAL
Qty: 43 TABLET | Refills: 0 | Status: SHIPPED | OUTPATIENT
Start: 2025-02-11

## 2025-02-11 RX ORDER — SERTRALINE HYDROCHLORIDE 25 MG/1
25 TABLET, FILM COATED ORAL DAILY
Start: 2025-02-11

## 2025-02-11 NOTE — ASSESSMENT & PLAN NOTE
- Strep NEG in the office today   Orders:    Ambulatory Referral to Otolaryngology; Future    predniSONE 10 mg tablet; Take 60mg QD x2days, take 50mg QD x2days, take 40mg QD x2days, take 30mg QD x2days, take 20mg QD x2days, take 10mg QD x2days, take 5mg QD x2days    POCT rapid ANTIGEN strepA

## 2025-02-11 NOTE — ASSESSMENT & PLAN NOTE
"- was initially nauseous on Zoloft but now tolerating 50mg QD   - has been going back to counseling Q2wks   - \"everything feels flat\" - didn't feel excitement when [employer] won SuperBowl   - has had 1 panic attacks this past month   - advised to reduce dose to Zoloft 25mg QD   - RTO in 3months for f/u - pt aware and agreeable   Orders:    sertraline (ZOLOFT) 25 mg tablet; Take 1 tablet (25 mg total) by mouth daily    "

## 2025-02-11 NOTE — ASSESSMENT & PLAN NOTE
- day #12 of illness   - did home COVID and Flu test - NEG   - had a virtual on 2/6 - was started on Pred 40mg QD x5days (completed yesterday)   - (+) fatigue, fever (none for the past week), dry cough, sore throat, hoarse voice (had lost voice last week)   - throat feels less swollen   - worse when laying down   - good PO intake   - has been drinking hot coffee/tea to soothe voice   - (+) sick contacts   - will do Pred taper (as below) and if no change, advised to f/u with ENT (referral given) - pt aware and agreeable

## 2025-02-14 ENCOUNTER — ANNUAL EXAM (OUTPATIENT)
Dept: OBGYN CLINIC | Facility: CLINIC | Age: 50
End: 2025-02-14
Payer: COMMERCIAL

## 2025-02-14 VITALS
HEIGHT: 64 IN | WEIGHT: 143.4 LBS | SYSTOLIC BLOOD PRESSURE: 132 MMHG | DIASTOLIC BLOOD PRESSURE: 82 MMHG | BODY MASS INDEX: 24.48 KG/M2

## 2025-02-14 DIAGNOSIS — Z30.41 ENCOUNTER FOR SURVEILLANCE OF CONTRACEPTIVE PILLS: ICD-10-CM

## 2025-02-14 DIAGNOSIS — Z12.11 SCREENING FOR COLON CANCER: ICD-10-CM

## 2025-02-14 DIAGNOSIS — Z12.31 ENCOUNTER FOR SCREENING MAMMOGRAM FOR MALIGNANT NEOPLASM OF BREAST: ICD-10-CM

## 2025-02-14 DIAGNOSIS — Z01.419 WOMEN'S ANNUAL ROUTINE GYNECOLOGICAL EXAMINATION: Primary | ICD-10-CM

## 2025-02-14 PROCEDURE — S0612 ANNUAL GYNECOLOGICAL EXAMINA: HCPCS | Performed by: OBSTETRICS & GYNECOLOGY

## 2025-02-14 RX ORDER — DESOGESTREL AND ETHINYL ESTRADIOL 21-5 (28)
1 KIT ORAL DAILY
Qty: 112 TABLET | Refills: 3 | Status: SHIPPED | OUTPATIENT
Start: 2025-02-14

## 2025-02-28 ENCOUNTER — TELEPHONE (OUTPATIENT)
Age: 50
End: 2025-02-28

## 2025-02-28 NOTE — TELEPHONE ENCOUNTER
Pt called in stating that she has a rash on both thighs that is red and itchy. Pt denies recent contact to anything new. Pt just finish course of prednisone on Monday. Pt states that the rash is not getting worse and she is prone to them. Pt take allegra daily. No SOB, chest pain. Advised hydrocortisone cream and benadryl. No OV available. Advise call back/UC if symptoms worsen.

## 2025-03-08 PROBLEM — J06.9 UPPER RESPIRATORY INFECTION, ACUTE: Status: RESOLVED | Noted: 2018-11-29 | Resolved: 2025-03-08

## 2025-03-12 DIAGNOSIS — I10 HYPERTENSION, UNSPECIFIED TYPE: ICD-10-CM

## 2025-03-13 RX ORDER — LISINOPRIL 5 MG/1
5 TABLET ORAL DAILY
Qty: 90 TABLET | Refills: 3 | Status: SHIPPED | OUTPATIENT
Start: 2025-03-13

## 2025-04-06 LAB — COLOGUARD RESULT REPORTABLE: NEGATIVE

## 2025-05-08 ENCOUNTER — OFFICE VISIT (OUTPATIENT)
Dept: FAMILY MEDICINE CLINIC | Facility: CLINIC | Age: 50
End: 2025-05-08
Payer: COMMERCIAL

## 2025-05-08 VITALS
HEIGHT: 64 IN | DIASTOLIC BLOOD PRESSURE: 80 MMHG | SYSTOLIC BLOOD PRESSURE: 120 MMHG | OXYGEN SATURATION: 98 % | BODY MASS INDEX: 25.61 KG/M2 | WEIGHT: 150 LBS | HEART RATE: 104 BPM

## 2025-05-08 DIAGNOSIS — I10 HYPERTENSION, UNSPECIFIED TYPE: Primary | ICD-10-CM

## 2025-05-08 DIAGNOSIS — F41.1 GAD (GENERALIZED ANXIETY DISORDER): ICD-10-CM

## 2025-05-08 DIAGNOSIS — E78.1 HYPERTRIGLYCERIDEMIA: ICD-10-CM

## 2025-05-08 DIAGNOSIS — Z13.0 SCREENING FOR DEFICIENCY ANEMIA: ICD-10-CM

## 2025-05-08 PROCEDURE — 99214 OFFICE O/P EST MOD 30 MIN: CPT | Performed by: FAMILY MEDICINE

## 2025-05-08 RX ORDER — SERTRALINE HYDROCHLORIDE 25 MG/1
25 TABLET, FILM COATED ORAL DAILY
Qty: 90 TABLET | Refills: 0 | Status: SHIPPED | OUTPATIENT
Start: 2025-05-08

## 2025-05-08 NOTE — PROGRESS NOTES
Name: Mee Cm      : 1975      MRN: [id number]  Encounter Provider: Eleanor Green DO  Encounter Date: 2025   Encounter department: [facility]  :  Assessment & Plan  Hypertension, unspecified type  - BP in the office 126/84 and 120/80 on my repeat   - currently on ACEI 5mg QD and tolerating it well - cont current regimen   - UTD with PCV20   - caution with NSAIDs  - limit Na to 2g/day   - annual Ophtho   - RTO in 6months, with labs, for f/u and annual exam - pt aware and agreeable   Orders:    Comprehensive metabolic panel; Future    Albumin / creatinine urine ratio; Future    EDELMIRA (generalized anxiety disorder)  - anxiety is stable on Zoloft 25mg QD   - no panic attacks this year, though has gained some weight on this   - follow with Therapist Q2wks but plans to space out visits  - 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  - RTO in 6months, with labs, for f/u and annual exam - pt aware and agreeable   Orders:    sertraline (ZOLOFT) 25 mg tablet; Take 1 tablet (25 mg total) by mouth daily    TSH, 3rd generation with Free T4 reflex; Future    Hypertriglyceridemia  -  <-- 324   - cont with diet/exercise   Orders:    Lipid panel; Future    Screening for deficiency anemia    Orders:    CBC and differential; Future           History of Present Illness   HPI  50yo F presents to the office for f/u  - BP in the office 126/84 and 120/80 on my repeat   - currently on ACEI 5mg QD and tolerating it well   - UTD with PCV20   - UTD with annual Flu vaccine   - anxiety is stable on Zoloft 25mg QD - no panic attacks this year, though has gained some weight on this   - follow with Therapist Q2wks but plans to space out visits  - 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  - denies F/C/N/V/CP/palpitations/SOB/wheezing/abd pain/LE edema       Review of Systems as  "per HPI     Objective   /80 (BP Location: Left arm, Patient Position: Sitting, Cuff Size: Standard)   Pulse 104   Ht 5' 3.5\" (1.613 m)   Wt 68 kg (150 lb)   SpO2 98%   BMI 26.15 kg/m²      Physical Exam  Vitals reviewed.   Constitutional:       General: She is not in acute distress.     Appearance: Normal appearance. She is not ill-appearing, toxic-appearing or diaphoretic.   HENT:      Head: Normocephalic and atraumatic.      Right Ear: External ear normal.      Left Ear: External ear normal.      Nose: Nose normal.   Eyes:      General: No scleral icterus.        Right eye: No discharge.         Left eye: No discharge.      Extraocular Movements: Extraocular movements intact.      Conjunctiva/sclera: Conjunctivae normal.   Cardiovascular:      Rate and Rhythm: Normal rate and regular rhythm.      Heart sounds: Normal heart sounds.   Pulmonary:      Effort: Pulmonary effort is normal. No respiratory distress.      Breath sounds: Normal breath sounds. No stridor. No wheezing, rhonchi or rales.   Musculoskeletal:         General: Normal range of motion.      Cervical back: Normal range of motion.      Right lower leg: No edema.      Left lower leg: No edema.   Skin:     General: Skin is warm.   Neurological:      General: No focal deficit present.      Mental Status: She is alert and oriented to person, place, and time.   Psychiatric:         Attention and Perception: Attention normal.         Mood and Affect: Mood and affect normal. Mood is not anxious or depressed.         Speech: Speech normal. She is communicative. Speech is not rapid and pressured.         Behavior: Behavior normal.         Thought Content: Thought content normal.         Cognition and Memory: Cognition normal.         Judgment: Judgment normal.         "

## 2025-05-08 NOTE — ASSESSMENT & PLAN NOTE
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  - RTO in 6months, with labs, for f/u and annual exam - pt aware and agreeable   Orders:    sertraline (ZOLOFT) 25 mg tablet; Take 1 tablet (25 mg total) by mouth daily    TSH, 3rd generation with Free T4 reflex; Future

## 2025-05-08 NOTE — ASSESSMENT & PLAN NOTE
- BP in the office 126/84 and 120/80 on my repeat   - currently on ACEI 5mg QD and tolerating it well - cont current regimen   - UTD with PCV20   - caution with NSAIDs  - limit Na to 2g/day   - annual Ophtho   - RTO in 6months, with labs, for f/u and annual exam - pt aware and agreeable   Orders:    Comprehensive metabolic panel; Future    Albumin / creatinine urine ratio; Future

## 2025-07-01 ENCOUNTER — HOSPITAL ENCOUNTER (OUTPATIENT)
Dept: RADIOLOGY | Facility: HOSPITAL | Age: 50
Discharge: HOME | End: 2025-07-01
Payer: COMMERCIAL

## 2025-07-01 DIAGNOSIS — Z12.31 ENCOUNTER FOR SCREENING MAMMOGRAM FOR MALIGNANT NEOPLASM OF BREAST: ICD-10-CM

## 2025-07-01 PROCEDURE — 77067 SCR MAMMO BI INCL CAD: CPT

## 2025-07-01 PROCEDURE — 77063 BREAST TOMOSYNTHESIS BI: CPT

## 2025-07-14 DIAGNOSIS — E55.9 VITAMIN D INSUFFICIENCY: Primary | ICD-10-CM

## 2025-08-20 ENCOUNTER — OFFICE VISIT (OUTPATIENT)
Dept: URGENT CARE | Facility: CLINIC | Age: 50
End: 2025-08-20
Payer: COMMERCIAL

## 2025-08-20 ENCOUNTER — APPOINTMENT (OUTPATIENT)
Dept: RADIOLOGY | Facility: CLINIC | Age: 50
End: 2025-08-20
Payer: COMMERCIAL

## 2025-08-20 ENCOUNTER — DOCUMENTATION (OUTPATIENT)
Dept: ADMINISTRATIVE | Facility: OTHER | Age: 50
End: 2025-08-20

## 2025-08-20 VITALS
WEIGHT: 150 LBS | HEIGHT: 63 IN | DIASTOLIC BLOOD PRESSURE: 84 MMHG | TEMPERATURE: 98 F | OXYGEN SATURATION: 98 % | HEART RATE: 89 BPM | RESPIRATION RATE: 18 BRPM | SYSTOLIC BLOOD PRESSURE: 130 MMHG | BODY MASS INDEX: 26.58 KG/M2

## 2025-08-20 DIAGNOSIS — S63.501A WRIST SPRAIN, RIGHT, INITIAL ENCOUNTER: Primary | ICD-10-CM
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
## 2025-08-20 DIAGNOSIS — M79.641 RIGHT HAND PAIN: ICD-10-CM

## 2025-08-20 PROCEDURE — G0382 LEV 3 HOSP TYPE B ED VISIT: HCPCS | Performed by: PHYSICIAN ASSISTANT

## 2025-08-20 PROCEDURE — 73130 X-RAY EXAM OF HAND: CPT

## 2025-08-20 PROCEDURE — 29125 APPL SHORT ARM SPLINT STATIC: CPT | Performed by: PHYSICIAN ASSISTANT

## 2025-08-20 PROCEDURE — 73090 X-RAY EXAM OF FOREARM: CPT

## 2025-08-20 PROCEDURE — S9083 URGENT CARE CENTER GLOBAL: HCPCS | Performed by: PHYSICIAN ASSISTANT

## 2025-08-27 PROBLEM — S63.621A SPRAIN OF INTERPHALANGEAL JOINT OF RIGHT THUMB: Status: ACTIVE | Noted: 2025-08-27

## 2025-08-27 PROBLEM — S63.621A SPRAIN OF INTERPHALANGEAL JOINT OF RIGHT THUMB, INITIAL ENCOUNTER: Status: ACTIVE | Noted: 2025-08-27

## 2025-08-27 PROBLEM — S63.501A WRIST SPRAIN, RIGHT, INITIAL ENCOUNTER: Status: ACTIVE | Noted: 2025-08-27
